# Patient Record
Sex: MALE | Race: WHITE | NOT HISPANIC OR LATINO | Employment: OTHER | URBAN - METROPOLITAN AREA
[De-identification: names, ages, dates, MRNs, and addresses within clinical notes are randomized per-mention and may not be internally consistent; named-entity substitution may affect disease eponyms.]

---

## 2017-03-24 ENCOUNTER — OFFICE VISIT (OUTPATIENT)
Dept: URGENT CARE | Facility: URGENT CARE | Age: 82
End: 2017-03-24
Payer: MEDICARE

## 2017-03-24 VITALS
WEIGHT: 175.9 LBS | HEART RATE: 90 BPM | OXYGEN SATURATION: 95 % | BODY MASS INDEX: 25.18 KG/M2 | TEMPERATURE: 98.7 F | SYSTOLIC BLOOD PRESSURE: 118 MMHG | HEIGHT: 70 IN | DIASTOLIC BLOOD PRESSURE: 72 MMHG

## 2017-03-24 DIAGNOSIS — R05.9 COUGH: ICD-10-CM

## 2017-03-24 DIAGNOSIS — R52 BODY ACHES: Primary | ICD-10-CM

## 2017-03-24 DIAGNOSIS — J11.1 INFLUENZA-LIKE ILLNESS: ICD-10-CM

## 2017-03-24 LAB
FLUAV+FLUBV AG SPEC QL: NEGATIVE
FLUAV+FLUBV AG SPEC QL: NORMAL
SPECIMEN SOURCE: NORMAL

## 2017-03-24 PROCEDURE — 99214 OFFICE O/P EST MOD 30 MIN: CPT | Performed by: PHYSICIAN ASSISTANT

## 2017-03-24 PROCEDURE — 87804 INFLUENZA ASSAY W/OPTIC: CPT | Performed by: PHYSICIAN ASSISTANT

## 2017-03-24 RX ORDER — OSELTAMIVIR PHOSPHATE 75 MG/1
75 CAPSULE ORAL 2 TIMES DAILY
Qty: 10 CAPSULE | Refills: 0 | Status: SHIPPED | OUTPATIENT
Start: 2017-03-24 | End: 2019-10-15

## 2017-03-24 RX ORDER — CODEINE PHOSPHATE AND GUAIFENESIN 10; 100 MG/5ML; MG/5ML
5-10 SOLUTION ORAL
Qty: 120 ML | Refills: 0 | Status: SHIPPED | OUTPATIENT
Start: 2017-03-24 | End: 2019-10-15

## 2017-03-24 RX ORDER — BENZONATATE 200 MG/1
200 CAPSULE ORAL 3 TIMES DAILY PRN
Qty: 21 CAPSULE | Refills: 0 | Status: SHIPPED | OUTPATIENT
Start: 2017-03-24 | End: 2019-10-15

## 2017-03-24 NOTE — PROGRESS NOTES
"SUBJECTIVE:   Santos Marti is a 85 year old male presenting with a chief complaint of fever, coughing, body aches.  Onset of symptoms was 1 day(s) ago.  Course of illness is same.    Severity moderate  Current and Associated symptoms: fever, nasal congestion, rhinorrhea, cough  and myalgias  Treatment measures tried include None tried.  Predisposing factors include GF with influenza .    PMH:  HTN     Allergies   Allergen Reactions     Sulfa Drugs Rash         Social History   Substance Use Topics     Smoking status: Former Smoker     Smokeless tobacco: Never Used      Comment: quit 1980's     Alcohol use Not on file       ROS:  CONSTITUTIONAL:POSITIVE  for chills and fever  INTEGUMENTARY/SKIN: NEGATIVE for worrisome rashes, moles or lesions  EYES: NEGATIVE for vision changes or irritation  RESP:POSITIVE for cough-non productive  CV: NEGATIVE for chest pain, palpitations or peripheral edema  GI: NEGATIVE for nausea, abdominal pain, heartburn, or change in bowel habits  MUSCULOSKELETAL: POSITIVE  for body aches  NEURO: NEGATIVE for weakness, dizziness or paresthesias    OBJECTIVE  :/72  Pulse 90  Temp 98.7  F (37.1  C) (Oral)  Ht 5' 10\" (1.778 m)  Wt 175 lb 14.4 oz (79.8 kg)  SpO2 95%  BMI 25.24 kg/m2  GENERAL APPEARANCE: healthy, alert and no distress  EYES: EOMI,  PERRL, conjunctiva clear  HENT: TM's normal bilaterally and rhinorrhea   NECK: supple, nontender, no lymphadenopathy  RESP: lungs clear to auscultation - no rales, rhonchi or wheezes  CV: regular rates and rhythm, normal S1 S2, no murmur noted  ABDOMEN:  soft, nontender, no HSM or masses and bowel sounds normal  NEURO: Normal strength and tone, sensory exam grossly normal,  normal speech and mentation  SKIN: no suspicious lesions or rashes    Results for orders placed or performed in visit on 03/24/17   Influenza A/B antigen   Result Value Ref Range    Influenza A/B Agn Specimen Nasopharyngeal     Influenza A Negative NEG    Influenza " B  NEG     Negative   Test results must be correlated with clinical data. If necessary, results   should be confirmed by a molecular assay or viral culture.         ASSESSMENT/PLAN:      ICD-10-CM    1. Body aches R52 Influenza A/B antigen   2. Influenza-like illness R69 oseltamivir (TAMIFLU) 75 MG capsule   3. Cough R05 benzonatate (TESSALON) 200 MG capsule     guaiFENesin-codeine (ROBITUSSIN AC) 100-10 MG/5ML SOLN solution       Patient given information about influenza.  Patient understands they are contagious until their fever has resolved without the use of motrin or tylenol.  At that time they can return to school/work.  Patient is to monitor for any worsening symptoms and return to the clinic if this occurs.  The most common complication of influenza is Pneumonia or other respiratory problems especially in those with underlying lung problems including asthma and COPD.  Patient will follow up if this occurs.

## 2017-03-24 NOTE — NURSING NOTE
"Chief Complaint   Patient presents with     Flu     cough, hedaches, body aches, fever 2x days        Initial /72  Pulse 90  Temp 98.7  F (37.1  C) (Oral)  Ht 5' 10\" (1.778 m)  Wt 175 lb 14.4 oz (79.8 kg)  SpO2 95%  BMI 25.24 kg/m2 Estimated body mass index is 25.24 kg/(m^2) as calculated from the following:    Height as of this encounter: 5' 10\" (1.778 m).    Weight as of this encounter: 175 lb 14.4 oz (79.8 kg).  Medication Reconciliation: complete    "

## 2017-03-24 NOTE — MR AVS SNAPSHOT
"              After Visit Summary   3/24/2017    Santos Marti    MRN: [id number]           Patient Information     Date Of Birth          1931        Visit Information        Provider Department      3/24/2017 10:45 AM Piotr Montaño PA-C [facility]        Today's Diagnoses     Body aches    -  1    Influenza-like illness        Cough           Follow-ups after your visit        Who to contact     If you have questions or need follow up information about today's clinic visit or your schedule please contact [facility] directly at [phone].  Normal or non-critical lab and imaging results will be communicated to you by MyChart, letter or phone within 4 business days after the clinic has received the results. If you do not hear from us within 7 days, please contact the clinic through [facility] or phone. If you have a critical or abnormal lab result, we will notify you by phone as soon as possible.  Submit refill requests through [facility] or call your pharmacy and they will forward the refill request to us. Please allow 3 business days for your refill to be completed.          Additional Information About Your Visit        MyChart Information     [facility] lets you send messages to your doctor, view your test results, renew your prescriptions, schedule appointments and more. To sign up, go to [email or web address] . Click on \"Log in\" on the left side of the screen, which will take you to the Welcome page. Then click on \"Sign up Now\" on the right side of the page.     You will be asked to enter the access code listed below, as well as some personal information. Please follow the directions to create your username and password.     Your access code is: [email or web address]  Expires: 2017 11:53 AM     Your access code will  in 90 days. If you need help or a new code, please call your [facility] clinic or [phone].        Care EveryWhere ID     " "This is your Care EveryWhere ID. This could be used by other organizations to access your [facility] medical records  [id number]        Your Vitals Were     Pulse Temperature Height Pulse Oximetry BMI (Body Mass Index)       90 98.7  F (37.1  C) (Oral) 5' 10\" (1.778 m) 95% 25.24 kg/m2        Blood Pressure from Last 3 Encounters:   03/24/17 118/72   04/16/15 137/87   03/05/08 128/80    Weight from Last 3 Encounters:   03/24/17 175 lb 14.4 oz (79.8 kg)   04/16/15 172 lb (78 kg)   03/05/08 173 lb 4.8 oz (78.6 kg)              We Performed the Following     Influenza A/B antigen          Today's Medication Changes          These changes are accurate as of: 3/24/17 11:53 AM.  If you have any questions, ask your nurse or doctor.               Start taking these medicines.        Dose/Directions    benzonatate 200 MG capsule   Commonly known as:  TESSALON   Used for:  Cough   Started by:  Piotr Montaño PA-C        Dose:  200 mg   Take 1 capsule (200 mg) by mouth 3 times daily as needed for cough   Quantity:  21 capsule   Refills:  0       guaiFENesin-codeine 100-10 MG/5ML Soln solution   Commonly known as:  ROBITUSSIN AC   Used for:  Cough   Started by:  Piotr Montaño PA-C        Dose:  5-10 mL   Take 5-10 mLs by mouth nightly as needed for cough   Quantity:  120 mL   Refills:  0       oseltamivir 75 MG capsule   Commonly known as:  TAMIFLU   Used for:  Influenza-like illness   Started by:  Piotr Montaño PA-C        Dose:  75 mg   Take 1 capsule (75 mg) by mouth 2 times daily   Quantity:  10 capsule   Refills:  0            Where to get your medicines      These medications were sent to [facility] [address] [address]     Phone:  [phone]     benzonatate 200 MG capsule    oseltamivir 75 MG capsule         Some of these will need a paper prescription and others can be bought over the counter.  Ask your nurse if you have questions.     " Bring a paper prescription for each of these medications     guaiFENesin-codeine 100-10 MG/5ML Soln solution                Primary Care Provider    None Specified       No primary provider on file.        Thank you!     Thank you for choosing [facility]  for your care. Our goal is always to provide you with excellent care. Hearing back from our patients is one way we can continue to improve our services. Please take a few minutes to complete the written survey that you may receive in the mail after your visit with us. Thank you!             Your Updated Medication List - Protect others around you: Learn how to safely use, store and throw away your medicines at www.disposemymeds.org.          This list is accurate as of: 3/24/17 11:53 AM.  Always use your most recent med list.                   Brand Name Dispense Instructions for use    ACIPHEX 20 MG EC tablet   Generic drug:  RABEprazole      1 TABLET EVERY MORNING       amLODIPine 10 MG tablet    NORVASC     1 TABLET DAILY       aspirin 81 MG tablet      Take 81 mg by mouth daily       benzonatate 200 MG capsule    TESSALON    21 capsule    Take 1 capsule (200 mg) by mouth 3 times daily as needed for cough       GLUCOSAMINE CHONDROITIN COMPLX PO      Take 2 tablets by mouth daily       guaiFENesin-codeine 100-10 MG/5ML Soln solution    ROBITUSSIN AC    120 mL    Take 5-10 mLs by mouth nightly as needed for cough       hydrochlorothiazide 25 MG tablet    HYDRODIURIL     1 TABLET EVERY MORNING       MULTIVITAMIN PO      Take 1 tablet by mouth daily       omeprazole 20 MG tablet      Take 20 mg by mouth daily       oseltamivir 75 MG capsule    TAMIFLU    10 capsule    Take 1 capsule (75 mg) by mouth 2 times daily       TRIAMTERENE PO      25 mg caps - 1 CAPSULE EVERY MORNING       valACYclovir 1000 mg tablet    VALTREX    21 tablet    Take 1 tablet (1,000 mg) by mouth 3 times daily for 7 days

## 2019-09-24 DIAGNOSIS — R31.9 HEMATURIA: Primary | ICD-10-CM

## 2019-10-04 ENCOUNTER — OFFICE VISIT (OUTPATIENT)
Dept: UROLOGY | Facility: CLINIC | Age: 84
End: 2019-10-04
Payer: MEDICARE

## 2019-10-04 VITALS
WEIGHT: 156 LBS | BODY MASS INDEX: 23.11 KG/M2 | SYSTOLIC BLOOD PRESSURE: 120 MMHG | OXYGEN SATURATION: 96 % | HEIGHT: 69 IN | HEART RATE: 80 BPM | DIASTOLIC BLOOD PRESSURE: 60 MMHG

## 2019-10-04 DIAGNOSIS — R31.0 GROSS HEMATURIA: Primary | ICD-10-CM

## 2019-10-04 PROCEDURE — 99202 OFFICE O/P NEW SF 15 MIN: CPT | Performed by: UROLOGY

## 2019-10-04 RX ORDER — ISOSORBIDE MONONITRATE 30 MG/1
TABLET, EXTENDED RELEASE ORAL
Refills: 0 | COMMUNITY
Start: 2019-07-26

## 2019-10-04 RX ORDER — LISINOPRIL AND HYDROCHLOROTHIAZIDE 20; 25 MG/1; MG/1
1 TABLET ORAL
COMMUNITY

## 2019-10-04 RX ORDER — METOPROLOL SUCCINATE 50 MG/1
75 TABLET, EXTENDED RELEASE ORAL
COMMUNITY
Start: 2019-07-29 | End: 2019-10-15

## 2019-10-04 ASSESSMENT — MIFFLIN-ST. JEOR: SCORE: 1367.99

## 2019-10-04 NOTE — NURSING NOTE
"Chief Complaint   Patient presents with     Blood in Urine     Patient seen by Dr Costello some year ago Patient here today because seen blood in Urine about 1 month       Blood pressure 120/60, pulse 80, height 1.753 m (5' 9\"), weight 70.8 kg (156 lb), SpO2 96 %. Body mass index is 23.04 kg/m .    There is no problem list on file for this patient.      Allergies   Allergen Reactions     Sulfa Drugs Rash       Current Outpatient Medications   Medication Sig Dispense Refill     ACIPHEX 20 MG OR TBEC 1 TABLET EVERY MORNING       apixaban ANTICOAGULANT (ELIQUIS) 5 MG tablet Take 5 mg by mouth       isosorbide mononitrate (IMDUR) 30 MG 24 hr tablet TK 1 T PO ONCE D  0     lisinopril-hydrochlorothiazide (PRINZIDE/ZESTORETIC) 20-25 MG tablet Take 1 tablet by mouth       metFORMIN (GLUCOPHAGE) 1000 MG tablet Take 500 mg by mouth       metoprolol succinate ER (TOPROL-XL) 50 MG 24 hr tablet Take 75 mg by mouth       omeprazole 20 MG tablet Take 20 mg by mouth daily       AMLODIPINE BESYLATE 10 MG OR TABS 1 TABLET DAILY       aspirin 81 MG tablet Take 81 mg by mouth daily       benzonatate (TESSALON) 200 MG capsule Take 1 capsule (200 mg) by mouth 3 times daily as needed for cough (Patient not taking: Reported on 10/4/2019) 21 capsule 0     GLUCOSAMINE CHONDROITIN COMPLX PO Take 2 tablets by mouth daily       guaiFENesin-codeine (ROBITUSSIN AC) 100-10 MG/5ML SOLN solution Take 5-10 mLs by mouth nightly as needed for cough (Patient not taking: Reported on 10/4/2019) 120 mL 0     HYDROCHLOROTHIAZIDE 25 MG OR TABS 1 TABLET EVERY MORNING       Multiple Vitamins-Minerals (MULTIVITAMIN PO) Take 1 tablet by mouth daily       oseltamivir (TAMIFLU) 75 MG capsule Take 1 capsule (75 mg) by mouth 2 times daily (Patient not taking: Reported on 10/4/2019) 10 capsule 0     TRIAMTERENE OR 25 mg caps - 1 CAPSULE EVERY MORNING       valACYclovir (VALTREX) 1000 mg tablet Take 1 tablet (1,000 mg) by mouth 3 times daily for 7 days 21 tablet 0 "       Social History     Tobacco Use     Smoking status: Former Smoker     Packs/day: 0.00     Smokeless tobacco: Never Used     Tobacco comment: quit 1980's   Substance Use Topics     Alcohol use: None     Drug use: None       ANGELIQUE Da Silva  10/4/2019

## 2019-10-04 NOTE — LETTER
10/4/2019       RE: Santos Marti  [address]  [facility] [address]     Dear Colleague,    Thank you for referring your patient, Santos Marti, to the [facility]OLOG[facility] [place] [facility]. Please see a copy of my visit note below.    Santos Marti is a pleasant 88-year-old male who was last seen by me 4-1/2 years ago.  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  He is now on Eliquis because of atrial fibrillation and a deep vein thrombosis after shoulder surgery.  He has had gross hematuria one time recently that was quite heavy and then resolved.  He is having no dysuria currently and his urinary stream is variable.  He dribbles sometimes and occasionally has urgency- he was told to do Kegel exercises on a regular basis and use a Nigel contraction to inhibit the urgency 4-1/2 years ago.  Unfortunately he never did these exercises.  Other past medical history: Former smoker.  Shoulder replacement surgeries, heart disease, type 2 diabetes, hypertension  Medications: AcipHex, Eliquis, imdur, lisinopril/hydrochlorothiazide, metformin, metoprolol, omeprazole allergies: Sulfa  Review of systems: As above, hearing loss.  [facility]  Exam: Alert and oriented, normal appearance, normal vital signs.  With spouse.  Normal respirations, neuro grossly intact assessment:  [facility], [facility] scan with and without IV contrast, office cystoscopy, urine FISH test or cytology, rectal exam and genital exam on return    Again, thank you for allowing me to participate in the care of your patient.      Sincerely,    Maximilian Costello MD

## 2019-10-04 NOTE — PROGRESS NOTES
Santos Marti is a pleasant 88-year-old male who was last seen by me 4-1/2 years ago.  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  He is now on Eliquis because of atrial fibrillation and a deep vein thrombosis after shoulder surgery.  He has had gross hematuria one time recently that was quite heavy and then resolved.  He is having no dysuria currently and his urinary stream is variable.  He dribbles sometimes and occasionally has urgency- he was told to do Kegel exercises on a regular basis and use a Nigel contraction to inhibit the urgency 4-1/2 years ago.  Unfortunately he never did these exercises.  Other past medical history: Former smoker.  Shoulder replacement surgeries, heart disease, type 2 diabetes, hypertension  Medications: AcipHex, Eliquis, imdur, lisinopril/hydrochlorothiazide, metformin, metoprolol, omeprazole allergies: Sulfa  Review of systems: As above, hearing loss.  [facility]  Exam: Alert and oriented, normal appearance, normal vital signs.  With spouse.  Normal respirations, neuro grossly intact assessment:  [facility], [facility] scan with and without IV contrast, office cystoscopy, urine FISH test or cytology, rectal exam and genital exam on return

## 2019-10-08 DIAGNOSIS — Z87.898 HISTORY OF GROSS HEMATURIA: Primary | ICD-10-CM

## 2019-10-09 ENCOUNTER — HOSPITAL ENCOUNTER (OUTPATIENT)
Dept: CT IMAGING | Facility: CLINIC | Age: 84
Discharge: HOME | End: 2019-10-09
Attending: UROLOGY | Admitting: UROLOGY
Payer: MEDICARE

## 2019-10-09 ENCOUNTER — OFFICE VISIT (OUTPATIENT)
Dept: UROLOGY | Facility: CLINIC | Age: 84
End: 2019-10-09
Payer: MEDICARE

## 2019-10-09 VITALS
DIASTOLIC BLOOD PRESSURE: 60 MMHG | BODY MASS INDEX: 22.19 KG/M2 | HEART RATE: 66 BPM | SYSTOLIC BLOOD PRESSURE: 112 MMHG | WEIGHT: 155 LBS | OXYGEN SATURATION: 94 % | HEIGHT: 70 IN

## 2019-10-09 DIAGNOSIS — Z87.898 HISTORY OF GROSS HEMATURIA: ICD-10-CM

## 2019-10-09 DIAGNOSIS — R31.0 GROSS HEMATURIA: ICD-10-CM

## 2019-10-09 LAB
ALBUMIN UR-MCNC: NEGATIVE MG/DL
APPEARANCE UR: CLEAR
BILIRUB UR QL STRIP: NEGATIVE
COLOR UR AUTO: YELLOW
CREAT BLD-MCNC: 1.2 MG/DL (ref 0.66–1.25)
GFR SERPL CREATININE-BSD FRML MDRD: 57 ML/MIN/{1.73_M2}
GLUCOSE UR STRIP-MCNC: NEGATIVE MG/DL
HGB UR QL STRIP: NEGATIVE
KETONES UR STRIP-MCNC: NEGATIVE MG/DL
LEUKOCYTE ESTERASE UR QL STRIP: NEGATIVE
NITRATE UR QL: NEGATIVE
PH UR STRIP: 5 PH (ref 5–7)
SOURCE: NORMAL
SP GR UR STRIP: <=1.005 (ref 1–1.03)
UROBILINOGEN UR STRIP-ACNC: 1 EU/DL (ref 0.2–1)

## 2019-10-09 PROCEDURE — 25000125 ZZHC RX 250: Performed by: UROLOGY

## 2019-10-09 PROCEDURE — 74178 CT ABD&PLV WO CNTR FLWD CNTR: CPT

## 2019-10-09 PROCEDURE — 52000 CYSTOURETHROSCOPY: CPT | Performed by: UROLOGY

## 2019-10-09 PROCEDURE — 25000128 H RX IP 250 OP 636: Performed by: UROLOGY

## 2019-10-09 PROCEDURE — 81003 URINALYSIS AUTO W/O SCOPE: CPT | Performed by: UROLOGY

## 2019-10-09 PROCEDURE — 82565 ASSAY OF CREATININE: CPT

## 2019-10-09 RX ORDER — CIPROFLOXACIN 500 MG/1
500 TABLET, FILM COATED ORAL ONCE
Qty: 1 TABLET | Refills: 0 | Status: SHIPPED | OUTPATIENT
Start: 2019-10-09 | End: 2019-10-15

## 2019-10-09 RX ORDER — CEFAZOLIN SODIUM 1 G
1 VIAL (EA) INJECTION SEE ADMIN INSTRUCTIONS
Status: CANCELLED | OUTPATIENT
Start: 2019-10-09

## 2019-10-09 RX ORDER — IOPAMIDOL 755 MG/ML
100 INJECTION, SOLUTION INTRAVASCULAR ONCE
Status: COMPLETED | OUTPATIENT
Start: 2019-10-09 | End: 2019-10-09

## 2019-10-09 RX ORDER — LIDOCAINE HYDROCHLORIDE 20 MG/ML
JELLY TOPICAL ONCE
Status: DISCONTINUED | OUTPATIENT
Start: 2019-10-09 | End: 2019-10-09 | Stop reason: HOSPADM

## 2019-10-09 RX ADMIN — IOPAMIDOL 100 ML: 755 INJECTION, SOLUTION INTRAVENOUS at 12:41

## 2019-10-09 RX ADMIN — SODIUM CHLORIDE 60 ML: 9 INJECTION, SOLUTION INTRAVENOUS at 12:41

## 2019-10-09 ASSESSMENT — MIFFLIN-ST. JEOR: SCORE: 1379.33

## 2019-10-09 ASSESSMENT — PAIN SCALES - GENERAL: PAINLEVEL: NO PAIN (0)

## 2019-10-09 NOTE — NURSING NOTE
"Chief Complaint   Patient presents with     Gross Hematuria     Patient here today for Cystoscopy and to go over his CT result       Blood pressure 112/60, pulse 66, height 1.778 m (5' 10\"), weight 70.3 kg (155 lb), SpO2 94 %. Body mass index is 22.24 kg/m .    Patient Active Problem List   Diagnosis     History of gross hematuria       Allergies   Allergen Reactions     Sulfa Drugs Rash       Current Outpatient Medications   Medication Sig Dispense Refill     ACIPHEX 20 MG OR TBEC 1 TABLET EVERY MORNING       AMLODIPINE BESYLATE 10 MG OR TABS 1 TABLET DAILY       apixaban ANTICOAGULANT (ELIQUIS) 5 MG tablet Take 5 mg by mouth       aspirin 81 MG tablet Take 81 mg by mouth daily       benzonatate (TESSALON) 200 MG capsule Take 1 capsule (200 mg) by mouth 3 times daily as needed for cough 21 capsule 0     ciprofloxacin (CIPRO) 500 MG tablet Take 1 tablet (500 mg) by mouth once for 1 dose 1 tablet 0     GLUCOSAMINE CHONDROITIN COMPLX PO Take 2 tablets by mouth daily       guaiFENesin-codeine (ROBITUSSIN AC) 100-10 MG/5ML SOLN solution Take 5-10 mLs by mouth nightly as needed for cough 120 mL 0     HYDROCHLOROTHIAZIDE 25 MG OR TABS 1 TABLET EVERY MORNING       isosorbide mononitrate (IMDUR) 30 MG 24 hr tablet TK 1 T PO ONCE D  0     lisinopril-hydrochlorothiazide (PRINZIDE/ZESTORETIC) 20-25 MG tablet Take 1 tablet by mouth       metFORMIN (GLUCOPHAGE) 1000 MG tablet Take 500 mg by mouth       metoprolol succinate ER (TOPROL-XL) 50 MG 24 hr tablet Take 75 mg by mouth       Multiple Vitamins-Minerals (MULTIVITAMIN PO) Take 1 tablet by mouth daily       omeprazole 20 MG tablet Take 20 mg by mouth daily       oseltamivir (TAMIFLU) 75 MG capsule Take 1 capsule (75 mg) by mouth 2 times daily 10 capsule 0     TRIAMTERENE OR 25 mg caps - 1 CAPSULE EVERY MORNING       valACYclovir (VALTREX) 1000 mg tablet Take 1 tablet (1,000 mg) by mouth 3 times daily for 7 days 21 tablet 0       Social History     Tobacco Use     Smoking " status: Former Smoker     Packs/day: 0.00     Smokeless tobacco: Never Used     Tobacco comment: quit 1980's   Substance Use Topics     Alcohol use: None     Drug use: None     UA RESULTS:  Recent Labs   Lab Test 10/09/19  1321   COLOR Yellow   APPEARANCE Clear   URINEGLC Negative   URINEBILI Negative   URINEKETONE Negative   SG <=1.005   UBLD Negative   URINEPH 5.0   PROTEIN Negative   UROBILINOGEN 1.0   NITRITE Negative   LEUKEST Negative     Prior to the start of the procedure and with procedural staff participation, I verbally confirmed the patient s identity using two indicators, relevant allergies, that the procedure was appropriate and matched the consent or emergent situation, and that the correct equipment/implants were available. Immediately prior to starting the procedure I conducted the Time Out with the procedural staff and re-confirmed the patient s name, procedure, and site/side. I have wiped the patient off with the povidone-Iodine solution, draped them,  used Lidocaine hydrochloride jelly, and instilled sterile water into the bladder. (The Joint Commission universal protocol was followed.)  Yes    Sedation (Moderate or Deep): None    5mL 2% lidocaine hydrochloride Urojet instilled into urethra.    NDC# [address]-54  Lot #:[id number]  Expiration Date:  06/21        Kirti Hart  CHRISFARHAT  10/9/2019

## 2019-10-09 NOTE — LETTER
10/9/2019       RE: Santos Marti  [address]  [facility] [address]     Dear Colleague,    Thank you for referring your patient, Sanots Marti, to the [facility]OLOG[facility] CL[facility] [place] at [facility]. Please see a copy of my visit note below.    Santos is an 88-year-old male with gross hematuria.  He returns for office cystoscopy.  His CT scan shows no renal stones and no renal mass.  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  Other past medical history, medications and allergies reviewed reviewed from last week  Flexible cystoscopy-normal urethra, radiated prosthetic fossa without obstruction.  Several papillary tumors in the bladder consistent with superficial transitional cell carcinoma.  Ureteral orifices normal.  Some radiation changes, not as severe as an prostate.  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  Schedule for outpatient video cystoscopy and cup biopsies of bladder tumor with fulguration.  Stop Eliquis 3 days prior and resume postop day 1.  All discussed with patient    Again, thank you for allowing me to participate in the care of your patient.      Sincerely,    Maximilian Costello MD

## 2019-10-09 NOTE — PATIENT INSTRUCTIONS
"                        AFTER YOUR CYSTOSCOPY  ?  ?  You have just completed a cystoscopy, or \"cysto\", which allowed your physician to learn more about your bladder (or to remove a stent placed after surgery). We suggest that you continue to avoid caffeine, fruit juice, and alcohol for the next 24 hours, however, you are encouraged to return to your normal activities.  ?  ?  A few things that are considered normal after your cystoscopy:  ?  * small amount of bleeding (or spotting) that clears within the next 24 hours  ?  * slight burning sensation with urination  ?  * sensation of needing to void (urinate) more frequently  ?  * the feeling of \"air\" in your urine  ?  * mild discomfort that is relieved with Tylenol    * bladder spasms  ?  ?  ?  Please contact our office promptly if you:  ?  * develop a fever above 101 degrees  ?  * are unable to urinate  ?  * develop bright red blood that does not stop  ?  * experience severe pain or swelling  ?  ?  ?  And of course, please contact our office with any concerns or questions [phone]  ?      "

## 2019-10-09 NOTE — PROGRESS NOTES
Santos is an 88-year-old male with gross hematuria.  He returns for office cystoscopy.  His CT scan shows no renal stones and no renal mass.  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  Other past medical history, medications and allergies reviewed reviewed from last week  Flexible cystoscopy-normal urethra, radiated prosthetic fossa without obstruction.  Several papillary tumors in the bladder consistent with superficial transitional cell carcinoma.  Ureteral orifices normal.  Some radiation changes, not as severe as an prostate.  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  Schedule for outpatient video cystoscopy and cup biopsies of bladder tumor with fulguration.  Stop Eliquis 3 days prior and resume postop day 1.  All discussed with patient

## 2019-10-10 ENCOUNTER — HOSPITAL ENCOUNTER (EMERGENCY)
Facility: CLINIC | Age: 84
Discharge: HOME | End: 2019-10-10
Attending: EMERGENCY MEDICINE | Admitting: EMERGENCY MEDICINE
Payer: MEDICARE

## 2019-10-10 VITALS
HEIGHT: 70 IN | OXYGEN SATURATION: 95 % | SYSTOLIC BLOOD PRESSURE: 158 MMHG | BODY MASS INDEX: 22.19 KG/M2 | TEMPERATURE: 97.4 F | WEIGHT: 155 LBS | DIASTOLIC BLOOD PRESSURE: 90 MMHG | RESPIRATION RATE: 15 BRPM | HEART RATE: 59 BPM

## 2019-10-10 DIAGNOSIS — R31.9 HEMATURIA, UNSPECIFIED TYPE: ICD-10-CM

## 2019-10-10 DIAGNOSIS — R33.9 URINARY RETENTION WITH INCOMPLETE BLADDER EMPTYING: ICD-10-CM

## 2019-10-10 PROCEDURE — 99283 EMERGENCY DEPT VISIT LOW MDM: CPT

## 2019-10-10 PROCEDURE — 25000125 ZZHC RX 250: Performed by: EMERGENCY MEDICINE

## 2019-10-10 PROCEDURE — 51702 INSERT TEMP BLADDER CATH: CPT

## 2019-10-10 RX ORDER — LIDOCAINE HYDROCHLORIDE 20 MG/ML
1 JELLY TOPICAL ONCE
Status: COMPLETED | OUTPATIENT
Start: 2019-10-10 | End: 2019-10-10

## 2019-10-10 RX ADMIN — LIDOCAINE HYDROCHLORIDE 1 TUBE: 20 JELLY TOPICAL at 02:36

## 2019-10-10 ASSESSMENT — ENCOUNTER SYMPTOMS
DIFFICULTY URINATING: 1
ABDOMINAL DISTENTION: 1

## 2019-10-10 ASSESSMENT — MIFFLIN-ST. JEOR: SCORE: 1379.33

## 2019-10-10 NOTE — ED PROVIDER NOTES
"History     Chief Complaint:  Urinary Retention (Pt had cystoscopy earlier today and found to have tumor in his bladder. Pt has been unable to void for several hours. Reports clots prior to inability to void.)     JANICE  Santos Marti is a 88 year old male who taking Eliquis presents to the emergency department today with urinary retention. The patient states that he had a cystoscopy yesterday and was found to have a small tumor in his bladder that is bleeding. He states he is having urinary retention with his last time urinating at 1830 this evening which was nonproductive with blood clots oozing out. He states he had one incident of hematemesis about a month ago, which has lead to the subsequent cystoscopy, biopsy and cauterization. He states he is scheduled for a biopsy and cauterization on October 22nd.     Allergies:  Sulfa Drugs     Medications:    Aciphex  Amlodipine  Eliquis  Baby Aspirin  Tessalon  Glucosamine  Robitussin ac  Hydrochlorothiazide  Imdur  Lisinopril  Metformin  Tamiflu  Valtrex  Omeprazole    Past Medical History:    RBBB  Type 2 diabetes  GERD  Osteoarthritis  Hypertension  Carcinoma of prostate    Past Surgical History:    Cystoscopy  Hernia repair  Prostate surgery  Tonsillectomy  Total hip arthroplasty    Family History:    Father - diabetes, bone cancer    Social History:  The patient was accompanied to the ED by his spouse.  Smoking Status: Former  Smokeless Tobacco: Never  Alcohol Use: Yes   Drug Use: No   PCP: Nehemias Gotti   Marital Status:       Review of Systems   Gastrointestinal: Positive for abdominal distention.   Genitourinary: Positive for difficulty urinating.   All other systems reviewed and are negative.         Physical Exam     Patient Vitals for the past 24 hrs:   BP Temp Temp src Pulse Heart Rate Resp SpO2 Height Weight   10/10/19 0234 (!) 158/90 -- -- 59 59 15 95 % -- --   10/10/19 0146 (!) 208/99 97.4  F (36.3  C) Oral -- 53 18 95 % 1.778 m (5' 10\") " 70.3 kg (155 lb)        Physical Exam  General: Patient is alert and normal appearing.  HEENT: Head atraumatic    Eyes: pupils equal and reactive. Conjunctiva clear   Nares: patent   Oropharynx: no lesions, uvula midline, no palatal draping, normal voice, no trismus  Neck: Supple without lymphadenopathy, no meningismus  Chest: Heart regular rate and rhythm.   Lungs: Equal clear to auscultation with no wheeze or rales  Abdomen: Soft, suprapubic fullness and tenderness, nondistended, normal bowel sounds  Back: No costovertebral angle tenderness, no midline C, T or L spine tenderness  Neuro: Grossly nonfocal, normal speech, strength equal bilaterally, CN 2-12 intact  Extremities: No deformities, equal radial and DP pulses. No clubbing, cyanosis.  No edema  Skin: Warm and dry with no rash.       Emergency Department Course     Interventions:  0236 Xylocaine 1 tube urethral     Emergency Department Course:  Nursing notes and vitals reviewed. (1:50 AM) I performed an exam of the patient as documented above.     Medicine administered as documented above.    0243 I rechecked the patient and discussed the results of their workup thus far.     Findings and plan explained to the Patient. Patient discharged home with instructions regarding supportive care, medications, and reasons to return. The importance of close follow-up was reviewed.     Impression & Plan       Medical Decision Making:  Santos Marti is a 88 year old male who presents for evaluation of abdominal pain and decreased urinary output.  I considered a broad differential including diverticulitis, aneurysm, urinary retention, ureterolithiasis, UTI, pyelonephritis, neurologic causes (MS, cauda equina,etc), colitis, etc.  The history and exam are consistent with acute urinary retention and this is confirmed after hunt catheter placement.  Patient had cystoscopy earlier today and was given Cipro after that.  Not start antibiotics at this time.  Patient was  irrigated to clear here in the emergency department.  Now draining clear urine.  He did have noted hematuria that likely was causing his retention.  Will send home with catheter and have patient followup with urology in 3-5 days.  Patient is stable for discharge home.          Diagnosis:    ICD-10-CM    1. Hematuria, unspecified type R31.9    2. Urinary retention with incomplete bladder emptying R33.9       Disposition:  Discharged to home.    Discharge Medications:  New Prescriptions    No medications on file      Scribe Disclosure:  Laz BURT, am serving as a scribe at 1:50 AM on 10/10/2019 to document services personally performed by Jazmin Singh MD based on my observations and the provider's statements to me.      10/10/2019    EMERGENCY DEPARTMENT       Jazmin Singh MD  10/10/19 0254

## 2019-10-10 NOTE — ED AVS SNAPSHOT
Emergency Department  [address] [place] [address]  Phone:  [phone]  Fax:  [phone]                                    Santos Marti   MRN: [id number]    Department:   Emergency Department   Date of Visit:  10/10/2019           After Visit Summary Signature Page    I have received my discharge instructions, and my questions have been answered. I have discussed any challenges I see with this plan with the nurse or doctor.    ..........................................................................................................................................  Patient/Patient Representative Signature      ..........................................................................................................................................  Patient Representative Print Name and Relationship to Patient    ..................................................               ................................................  Date                                   Time    ..........................................................................................................................................  Reviewed by Signature/Title    ...................................................              ..............................................  Date                                               Time          22EPIC Rev 08/18

## 2019-10-10 NOTE — ED TRIAGE NOTES
Pt had cystoscopy earlier today and found to have tumor in his bladder. Pt has been unable to void for several hours. Reports clots prior to inability to void.

## 2019-10-11 ENCOUNTER — OFFICE VISIT (OUTPATIENT)
Dept: UROLOGY | Facility: CLINIC | Age: 84
End: 2019-10-11
Payer: MEDICARE

## 2019-10-11 DIAGNOSIS — R33.9 URINARY RETENTION: Primary | ICD-10-CM

## 2019-10-11 PROCEDURE — 99211 OFF/OP EST MAY X REQ PHY/QHP: CPT

## 2019-10-11 NOTE — PROGRESS NOTES
Chief Complaint   Patient presents with     Gross Hematuria     Patient here today for Possible Cath Removal     Santos Marti comes into clinic today at the request of DR Singh the Emergency Department.Dr Costello is his Urology ReasonForVisit: Follow up after ED visit for Hunt Catheter placed  This service provided today was under the supervising provider of the day Dr Costello, who was available if needed.          Mr Boss had cystoscopy on 10/09/2019 with Dr Costello which a tumor was found  in his bladder. Mr Boss wasn't able  to void for several hours.He had to go into ED that day for a Hunt Catheter to be placed. He was told to follow up with Dr Costello.The patient came in today to have his cath removed.Which he was not able to because of  blood still visible in his leg bag. Per Dr Costello because of blood in his urine which is  still dark in color and visible as blood still in the bladder he would like to  leave the cath in until Monday 10/14/2019. Everything was explain to the patient. Mr. Marti was okay with Monday. Mr Boss had to have a Bladder irrigation which is  a procedure used to  flush sterile fluid through his hunt catheter which go into bladder to try to  prevent blood clots. Per Dr Costello please remove the hunt catheter if very little blood Is seen and he will see him on the day of his Surgery 10/22/2019.     Kirti Hart CMA

## 2019-10-11 NOTE — NURSING NOTE
Chief Complaint   Patient presents with     Gross Hematuria     Patient here today for Possible Cath Removal     Santos Marti comes into clinic today at the request of DR Singh the Emergency Department.Dr Costello is his Urology ReasonForVisit: Follow up after ED visit for Hunt Catheter placed  This service provided today was under the supervising provider of the day Dr Costello, who was available if needed.          Mr Boss had cystoscopy on 10/09/2019 with Dr Costello which a tumor was found  in his bladder. Mr Boss wasn't able  to void for several hours.He had to go into ED that day for a Hunt Catheter to be placed. He was told to follow up with Dr Costello.The patient came in today to have his cath removed.Which he was not able to because of  blood still visible in his leg bag. Per Dr Costello because of blood in his urine which is  still dark in color and visible as blood still in the bladder he would like to  leave the cath in until Monday 10/14/2019. Everything was explain to the patient. Mr. Marti was okay with Monday. Mr Boss had to have a Bladder irrigation which is  a procedure used to  flush sterile fluid through his hunt catheter which go into bladder to try to  prevent blood clots. Per Dr Costello please remove the hunt catheter if very little blood Is seen and he will see him on the day of his Surgery 10/22/2019.

## 2019-10-14 ENCOUNTER — ALLIED HEALTH/NURSE VISIT (OUTPATIENT)
Dept: UROLOGY | Facility: CLINIC | Age: 84
End: 2019-10-14
Payer: MEDICARE

## 2019-10-14 DIAGNOSIS — R33.9 URINARY RETENTION: Primary | ICD-10-CM

## 2019-10-14 PROCEDURE — 99211 OFF/OP EST MAY X REQ PHY/QHP: CPT

## 2019-10-14 NOTE — PROGRESS NOTES
Patient presents to clinic for catheter removal. Patient's leg-bag appeared to have clear-yellow urine. Patient's catheter balloon was deflated and catheter was removed with ease. No complaints voiced by patient. Patient instructed to drink plenty of water. If unable to urinate during office hours, he should call our office. If unable to urinate during non-office hours patient was instructed to go to ER. Patient will follow-up for procedure as planned.     Santos Marti comes into clinic today at the request of Dr. Costello Ordering Provider for Catheter Removal.     This service provided today was under the supervising provider of the day Dr. Murphy, who was available if needed.    Skye Serrano LPN

## 2019-10-15 RX ORDER — METOPROLOL TARTRATE 50 MG
50 TABLET ORAL 3 TIMES DAILY
COMMUNITY
End: 2019-10-21

## 2019-10-21 RX ORDER — ACETAMINOPHEN 500 MG
500-1000 TABLET ORAL EVERY 6 HOURS PRN
COMMUNITY

## 2019-10-21 RX ORDER — METOPROLOL SUCCINATE 50 MG/1
50 TABLET, EXTENDED RELEASE ORAL DAILY
COMMUNITY

## 2019-10-21 RX ORDER — MULTIPLE VITAMINS W/ MINERALS TAB 9MG-400MCG
1 TAB ORAL DAILY
Status: ON HOLD | COMMUNITY
End: 2019-10-22

## 2019-10-22 ENCOUNTER — HOSPITAL ENCOUNTER (EMERGENCY)
Facility: CLINIC | Age: 84
Discharge: HOME | End: 2019-10-22
Attending: EMERGENCY MEDICINE | Admitting: EMERGENCY MEDICINE
Payer: MEDICARE

## 2019-10-22 ENCOUNTER — ANESTHESIA EVENT (OUTPATIENT)
Dept: SURGERY | Facility: CLINIC | Age: 84
End: 2019-10-22
Payer: MEDICARE

## 2019-10-22 ENCOUNTER — ANESTHESIA (OUTPATIENT)
Dept: SURGERY | Facility: CLINIC | Age: 84
End: 2019-10-22
Payer: MEDICARE

## 2019-10-22 ENCOUNTER — HOSPITAL ENCOUNTER (OUTPATIENT)
Facility: CLINIC | Age: 84
Discharge: HOME | End: 2019-10-22
Attending: UROLOGY | Admitting: UROLOGY
Payer: MEDICARE

## 2019-10-22 VITALS
HEART RATE: 74 BPM | SYSTOLIC BLOOD PRESSURE: 124 MMHG | DIASTOLIC BLOOD PRESSURE: 69 MMHG | RESPIRATION RATE: 18 BRPM | TEMPERATURE: 98 F | OXYGEN SATURATION: 97 %

## 2019-10-22 VITALS
TEMPERATURE: 98.1 F | OXYGEN SATURATION: 98 % | HEIGHT: 70 IN | DIASTOLIC BLOOD PRESSURE: 87 MMHG | RESPIRATION RATE: 12 BRPM | BODY MASS INDEX: 22.19 KG/M2 | WEIGHT: 155 LBS | SYSTOLIC BLOOD PRESSURE: 159 MMHG | HEART RATE: 62 BPM

## 2019-10-22 DIAGNOSIS — Z87.898 HISTORY OF GROSS HEMATURIA: ICD-10-CM

## 2019-10-22 DIAGNOSIS — R33.9 URINARY RETENTION: ICD-10-CM

## 2019-10-22 LAB
GLUCOSE BLDC GLUCOMTR-MCNC: 145 MG/DL (ref 70–99)
GLUCOSE BLDC GLUCOMTR-MCNC: 150 MG/DL (ref 70–99)

## 2019-10-22 PROCEDURE — 25000128 H RX IP 250 OP 636: Performed by: NURSE ANESTHETIST, CERTIFIED REGISTERED

## 2019-10-22 PROCEDURE — 25000128 H RX IP 250 OP 636: Performed by: UROLOGY

## 2019-10-22 PROCEDURE — 99284 EMERGENCY DEPT VISIT MOD MDM: CPT | Mod: 25

## 2019-10-22 PROCEDURE — 37000008 ZZH ANESTHESIA TECHNICAL FEE, 1ST 30 MIN: Performed by: UROLOGY

## 2019-10-22 PROCEDURE — 40000306 ZZH STATISTIC PRE PROC ASSESS II: Performed by: UROLOGY

## 2019-10-22 PROCEDURE — 25800025 ZZH RX 258: Performed by: UROLOGY

## 2019-10-22 PROCEDURE — 25000125 ZZHC RX 250: Performed by: NURSE ANESTHETIST, CERTIFIED REGISTERED

## 2019-10-22 PROCEDURE — 51798 US URINE CAPACITY MEASURE: CPT

## 2019-10-22 PROCEDURE — 71000012 ZZH RECOVERY PHASE 1 LEVEL 1 FIRST HR: Performed by: UROLOGY

## 2019-10-22 PROCEDURE — 36000050 ZZH SURGERY LEVEL 2 1ST 30 MIN: Performed by: UROLOGY

## 2019-10-22 PROCEDURE — 51702 INSERT TEMP BLADDER CATH: CPT

## 2019-10-22 PROCEDURE — 71000027 ZZH RECOVERY PHASE 2 EACH 15 MINS: Performed by: UROLOGY

## 2019-10-22 PROCEDURE — 36000052 ZZH SURGERY LEVEL 2 EA 15 ADDTL MIN: Performed by: UROLOGY

## 2019-10-22 PROCEDURE — 27210794 ZZH OR GENERAL SUPPLY STERILE: Performed by: UROLOGY

## 2019-10-22 PROCEDURE — 88305 TISSUE EXAM BY PATHOLOGIST: CPT | Mod: 26 | Performed by: UROLOGY

## 2019-10-22 PROCEDURE — 25800030 ZZH RX IP 258 OP 636: Performed by: ANESTHESIOLOGY

## 2019-10-22 PROCEDURE — 25000125 ZZHC RX 250: Performed by: ANESTHESIOLOGY

## 2019-10-22 PROCEDURE — 37000009 ZZH ANESTHESIA TECHNICAL FEE, EACH ADDTL 15 MIN: Performed by: UROLOGY

## 2019-10-22 PROCEDURE — 88305 TISSUE EXAM BY PATHOLOGIST: CPT | Performed by: UROLOGY

## 2019-10-22 PROCEDURE — 52224 CYSTOSCOPY AND TREATMENT: CPT | Performed by: UROLOGY

## 2019-10-22 PROCEDURE — 82962 GLUCOSE BLOOD TEST: CPT | Mod: 91

## 2019-10-22 RX ORDER — MEPERIDINE HYDROCHLORIDE 50 MG/ML
12.5 INJECTION INTRAMUSCULAR; INTRAVENOUS; SUBCUTANEOUS
Status: DISCONTINUED | OUTPATIENT
Start: 2019-10-22 | End: 2019-10-22 | Stop reason: HOSPADM

## 2019-10-22 RX ORDER — ONDANSETRON 4 MG/1
4 TABLET, ORALLY DISINTEGRATING ORAL EVERY 30 MIN PRN
Status: DISCONTINUED | OUTPATIENT
Start: 2019-10-22 | End: 2019-10-22 | Stop reason: HOSPADM

## 2019-10-22 RX ORDER — PHENYLEPHRINE HYDROCHLORIDE 10 MG/ML
INJECTION INTRAVENOUS PRN
Status: DISCONTINUED | OUTPATIENT
Start: 2019-10-22 | End: 2019-10-22

## 2019-10-22 RX ORDER — LIDOCAINE HYDROCHLORIDE 20 MG/ML
JELLY TOPICAL
Status: DISCONTINUED
Start: 2019-10-22 | End: 2019-10-23 | Stop reason: HOSPADM

## 2019-10-22 RX ORDER — LABETALOL 20 MG/4 ML (5 MG/ML) INTRAVENOUS SYRINGE
10
Status: DISCONTINUED | OUTPATIENT
Start: 2019-10-22 | End: 2019-10-22 | Stop reason: HOSPADM

## 2019-10-22 RX ORDER — HYDROMORPHONE HYDROCHLORIDE 1 MG/ML
.3-.5 INJECTION, SOLUTION INTRAMUSCULAR; INTRAVENOUS; SUBCUTANEOUS EVERY 10 MIN PRN
Status: DISCONTINUED | OUTPATIENT
Start: 2019-10-22 | End: 2019-10-22 | Stop reason: HOSPADM

## 2019-10-22 RX ORDER — GLYCOPYRROLATE 0.2 MG/ML
INJECTION, SOLUTION INTRAMUSCULAR; INTRAVENOUS PRN
Status: DISCONTINUED | OUTPATIENT
Start: 2019-10-22 | End: 2019-10-22

## 2019-10-22 RX ORDER — DIMENHYDRINATE 50 MG/ML
25 INJECTION, SOLUTION INTRAMUSCULAR; INTRAVENOUS
Status: DISCONTINUED | OUTPATIENT
Start: 2019-10-22 | End: 2019-10-22 | Stop reason: HOSPADM

## 2019-10-22 RX ORDER — ONDANSETRON 2 MG/ML
4 INJECTION INTRAMUSCULAR; INTRAVENOUS EVERY 30 MIN PRN
Status: DISCONTINUED | OUTPATIENT
Start: 2019-10-22 | End: 2019-10-22 | Stop reason: HOSPADM

## 2019-10-22 RX ORDER — SODIUM CHLORIDE, SODIUM LACTATE, POTASSIUM CHLORIDE, CALCIUM CHLORIDE 600; 310; 30; 20 MG/100ML; MG/100ML; MG/100ML; MG/100ML
INJECTION, SOLUTION INTRAVENOUS CONTINUOUS
Status: DISCONTINUED | OUTPATIENT
Start: 2019-10-22 | End: 2019-10-22 | Stop reason: HOSPADM

## 2019-10-22 RX ORDER — HYDRALAZINE HYDROCHLORIDE 20 MG/ML
2.5-5 INJECTION INTRAMUSCULAR; INTRAVENOUS EVERY 10 MIN PRN
Status: DISCONTINUED | OUTPATIENT
Start: 2019-10-22 | End: 2019-10-22 | Stop reason: HOSPADM

## 2019-10-22 RX ORDER — PROPOFOL 10 MG/ML
INJECTION, EMULSION INTRAVENOUS PRN
Status: DISCONTINUED | OUTPATIENT
Start: 2019-10-22 | End: 2019-10-22

## 2019-10-22 RX ORDER — FENTANYL CITRATE 50 UG/ML
25-50 INJECTION, SOLUTION INTRAMUSCULAR; INTRAVENOUS
Status: DISCONTINUED | OUTPATIENT
Start: 2019-10-22 | End: 2019-10-22 | Stop reason: HOSPADM

## 2019-10-22 RX ORDER — DEXAMETHASONE SODIUM PHOSPHATE 4 MG/ML
INJECTION, SOLUTION INTRA-ARTICULAR; INTRALESIONAL; INTRAMUSCULAR; INTRAVENOUS; SOFT TISSUE PRN
Status: DISCONTINUED | OUTPATIENT
Start: 2019-10-22 | End: 2019-10-22

## 2019-10-22 RX ORDER — LIDOCAINE 40 MG/G
CREAM TOPICAL
Status: DISCONTINUED | OUTPATIENT
Start: 2019-10-22 | End: 2019-10-22 | Stop reason: HOSPADM

## 2019-10-22 RX ORDER — FENTANYL CITRATE 50 UG/ML
INJECTION, SOLUTION INTRAMUSCULAR; INTRAVENOUS PRN
Status: DISCONTINUED | OUTPATIENT
Start: 2019-10-22 | End: 2019-10-22

## 2019-10-22 RX ORDER — NALOXONE HYDROCHLORIDE 0.4 MG/ML
.1-.4 INJECTION, SOLUTION INTRAMUSCULAR; INTRAVENOUS; SUBCUTANEOUS
Status: DISCONTINUED | OUTPATIENT
Start: 2019-10-22 | End: 2019-10-22 | Stop reason: HOSPADM

## 2019-10-22 RX ORDER — CEFAZOLIN SODIUM 2 G/100ML
2 INJECTION, SOLUTION INTRAVENOUS
Status: COMPLETED | OUTPATIENT
Start: 2019-10-22 | End: 2019-10-22

## 2019-10-22 RX ORDER — ONDANSETRON 2 MG/ML
INJECTION INTRAMUSCULAR; INTRAVENOUS PRN
Status: DISCONTINUED | OUTPATIENT
Start: 2019-10-22 | End: 2019-10-22

## 2019-10-22 RX ORDER — CIPROFLOXACIN 500 MG/1
500 TABLET, FILM COATED ORAL ONCE
Qty: 1 TABLET | Refills: 0 | Status: SHIPPED | OUTPATIENT
Start: 2019-10-22 | End: 2019-10-22

## 2019-10-22 RX ORDER — CEFAZOLIN SODIUM 1 G/3ML
1 INJECTION, POWDER, FOR SOLUTION INTRAMUSCULAR; INTRAVENOUS SEE ADMIN INSTRUCTIONS
Status: DISCONTINUED | OUTPATIENT
Start: 2019-10-22 | End: 2019-10-22 | Stop reason: HOSPADM

## 2019-10-22 RX ADMIN — LIDOCAINE HYDROCHLORIDE 50 MG: 10 INJECTION, SOLUTION EPIDURAL; INFILTRATION; INTRACAUDAL; PERINEURAL at 13:58

## 2019-10-22 RX ADMIN — FENTANYL CITRATE 100 MCG: 50 INJECTION, SOLUTION INTRAMUSCULAR; INTRAVENOUS at 13:58

## 2019-10-22 RX ADMIN — PROPOFOL 150 MG: 10 INJECTION, EMULSION INTRAVENOUS at 13:58

## 2019-10-22 RX ADMIN — GLYCOPYRROLATE 0.2 MG: 0.2 INJECTION, SOLUTION INTRAMUSCULAR; INTRAVENOUS at 13:58

## 2019-10-22 RX ADMIN — CEFAZOLIN SODIUM 2 G: 2 INJECTION, SOLUTION INTRAVENOUS at 13:56

## 2019-10-22 RX ADMIN — PHENYLEPHRINE HYDROCHLORIDE 100 MCG: 10 INJECTION INTRAVENOUS at 14:06

## 2019-10-22 RX ADMIN — PHENYLEPHRINE HYDROCHLORIDE 100 MCG: 10 INJECTION INTRAVENOUS at 14:09

## 2019-10-22 RX ADMIN — ONDANSETRON HYDROCHLORIDE 4 MG: 2 INJECTION, SOLUTION INTRAVENOUS at 13:58

## 2019-10-22 RX ADMIN — SODIUM CHLORIDE, POTASSIUM CHLORIDE, SODIUM LACTATE AND CALCIUM CHLORIDE: 600; 310; 30; 20 INJECTION, SOLUTION INTRAVENOUS at 13:56

## 2019-10-22 RX ADMIN — DEXAMETHASONE SODIUM PHOSPHATE 4 MG: 4 INJECTION, SOLUTION INTRA-ARTICULAR; INTRALESIONAL; INTRAMUSCULAR; INTRAVENOUS; SOFT TISSUE at 13:58

## 2019-10-22 ASSESSMENT — COPD QUESTIONNAIRES: COPD: 0

## 2019-10-22 ASSESSMENT — ENCOUNTER SYMPTOMS
DIFFICULTY URINATING: 1
STRIDOR: 0
SEIZURES: 0
HEMATURIA: 1
DYSRHYTHMIAS: 1

## 2019-10-22 ASSESSMENT — LIFESTYLE VARIABLES: TOBACCO_USE: 0

## 2019-10-22 ASSESSMENT — MIFFLIN-ST. JEOR: SCORE: 1379.33

## 2019-10-22 NOTE — OP NOTE
Procedure Date: 10/22/2019      PREOPERATIVE DIAGNOSIS:  Papillary bladder tumors, gross hematuria.      POSTOPERATIVE DIAGNOSIS:  Papillary bladder tumors, gross hematuria.      PROCEDURE:  Examination under anesthesia, video cystoscopy, panendoscopy, Aleisha urethral dilation, cup biopsy of bladder tumor and fulguration of bladder tumors.      SURGEON:  Maximilian Costello MD      ANESTHESIA:  General laryngeal mask.      ESTIMATED BLOOD LOSS:  Less than 5 mL.      INDICATIONS:  The patient is an 88-year-old male who underwent radiation for prostate cancer a  number of years ago and has had gross hematuria.  CT scan was unremarkable and office cystoscopy revealed several areas of papillary bladder tumor, consistent with a low-grade superficial TCC of the bladder.  He also had a narrowing of the urethra and the deep bulb.  The patient's PSA has remained low and there is no evidence of recurrent prostate cancer.  He now presents for bladder tumor biopsy and fulguration.  The procedure, the alternatives, risks and followup were carefully discussed.      DESCRIPTION OF THE PROCEDURE:  The patient received 2 grams of IV Ancef and was taken to cystoscopy and placed supine on the operating table.  After adequate general laryngeal mask anesthesia, the patient was placed in lithotomy position and his genitalia were prepped and draped in a sterile fashion.  A #22 Chinese Storz cystoscope with 30 degree lens and video were used to visualize the urethra and bladder, using water as an irrigant.  The pendulous and distal bulbous urethra were normal, but there was narrowing of the deep bulbous urethra that I could not pass with the cystoscope.  I passed the 20-Chinese through 24-Chinese Aleisha sounds and dilated this area easily.  I followed this with the cystoscope and passed through the membranous urethra and the prostatic urethra, which showed radiation changes but no tumors.  The bladder neck was open.  The bladder revealed 2  different areas of papillary tumor, 1 near the right ureteral orifice and that was cauterized with the Bugbee Bovie.  On the posterior wall, there was a carpet of papillary tumor about a nickel or larger in size and this was biopsied and sent in formalin to Pathology.  The biopsy site and the surrounding area was then fulgurated with good hemostasis.  Other small areas of papillary tumor in 2 or 3 spots were cauterized with the Bugbee Bovie.  Hemostasis was good.  The bladder was emptied and the cystoscope removed.  Rectal exam revealed no palpable prostate tissue and no rectal mass.  The patient went to the recovery room in stable condition and will be discharged to home this evening and will take Cipro 500 mg x1 dose in the morning.  He will resume his Eliquis tomorrow and resume his glucosamine/chondroitin and multivitamin on Thursday.         FIDEL BARRON JR, MD             D: 10/22/2019   T: 10/22/2019   MT: KB      Name:     TRACI HOUSE   MRN:      [id number]        Account:        [id number]   :      1931           Procedure Date: 10/22/2019      Document: [id number]       cc: Leonardo Barron Jr, MD

## 2019-10-22 NOTE — ED AVS SNAPSHOT
[facility] Emergency Department  [address]  [facility] [address]  Phone:  [phone]  Fax:  [phone]                                    Santos Marti   MRN: [id number]    Department:  [facility] Emergency Department   Date of Visit:  10/22/2019           After Visit Summary Signature Page    I have received my discharge instructions, and my questions have been answered. I have discussed any challenges I see with this plan with the nurse or doctor.    ..........................................................................................................................................  Patient/Patient Representative Signature      ..........................................................................................................................................  Patient Representative Print Name and Relationship to Patient    ..................................................               ................................................  Date                                   Time    ..........................................................................................................................................  Reviewed by Signature/Title    ...................................................              ..............................................  Date                                               Time          22EPIC Rev 08/18

## 2019-10-22 NOTE — ANESTHESIA CARE TRANSFER NOTE
Patient: Santos Marti    Procedure(s):  Cystoscopy with bladder biopsy    Diagnosis: History of gross hematuria [Z87.448]  Diagnosis Additional Information: No value filed.    Anesthesia Type:   General, LMA     Note:  Airway :Face Mask  Patient transferred to:PACU  Comments:   Spontaneous respirations, oral suctioned, bilateral eye opening and hand grasps.  Extubated to FM O2 6lpm.  VSS to PACU.Handoff Report: Identifed the Patient, Identified the Reponsible Provider, Reviewed the pertinent medical history, Discussed the surgical course, Reviewed Intra-OP anesthesia mangement and issues during anesthesia, Set expectations for post-procedure period and Allowed opportunity for questions and acknowledgement of understanding      Vitals: (Last set prior to Anesthesia Care Transfer)    CRNA VITALS  10/22/2019 1400 - 10/22/2019 1437      10/22/2019             NIBP:  126/66    Pulse:  62    SpO2:  99 %    EKG:  A Paced                Electronically Signed By: CHUCK Galvan CRNA  October 22, 2019  2:37 PM

## 2019-10-22 NOTE — ANESTHESIA PREPROCEDURE EVALUATION
Anesthesia Pre-Procedure Evaluation    Patient: Santos Marti   MRN: [id number] : 1931          Preoperative Diagnosis: History of gross hematuria [Z87.448]    Procedure(s):  Cystoscopy with bladder biopsy    Past Medical History:   Diagnosis Date     Arthritis      Complication of anesthesia      Diabetes (H)      Gastroesophageal reflux disease      Hypertension      Pacemaker      Past Surgical History:   Procedure Laterality Date     CYSTOSCOPY  10/09/2019    Dr Costello     HERNIA REPAIR       ORTHOPEDIC SURGERY       PROSTATE SURGERY       Anesthesia Evaluation     . Pt has had prior anesthetic. Type: General           ROS/MED HX    ENT/Pulmonary:     (+)SHALINI risk factors hypertension, , . .   (-) tobacco use, asthma, COPD and recent URI   Neurologic:  - neg neurologic ROS    (-) seizures and CVA   Cardiovascular:     (+) hypertension--CAD, --. : . . . pacemaker :. dysrhythmias a-fib, . Previous cardiac testing date:results:Stress Testdate: results:Mild inferior ischemia.  EF 54%.ECG reviewed date:10/19 results:Atrial -paced rate 71.  PVCs.  RBBB. date: results:         (-) angina, valvular problems/murmurs and angina   METS/Exercise Tolerance:     Hematologic: Comments: Hgb 13.2  K 3.9  Cr 1.1  Plts 296,000 - neg hematologic  ROS       Musculoskeletal:   (+) arthritis,  -       GI/Hepatic:     (+) GERD Asymptomatic on medication,       Renal/Genitourinary:  - ROS Renal section negative       Endo:     (+) type II DM Not using insulin - not using insulin pump .   (-) Type I DM, thyroid disease, chronic steroid usage and obesity   Psychiatric:  - neg psychiatric ROS       Infectious Disease:  - neg infectious disease ROS       Malignancy:      - no malignancy   Other:    - neg other ROS                      Physical Exam  Normal systems: cardiovascular, pulmonary and dental    Airway   Mallampati: II  TM distance: >3 FB  Neck ROM: full    Dental     Cardiovascular   Rhythm and rate: irregular  "and normal  (-) no friction rub, no systolic click and no murmur    Pulmonary    breath sounds clear to auscultation(-) no rhonchi, no decreased breath sounds, no wheezes, no rales and no stridor            Lab Results   Component Value Date     06/27/2005    CHLORIDE 102 06/27/2005    CO2 27 06/27/2005    BUN 15 06/27/2005    PTT 29 06/27/2005    INR 0.97 06/27/2005       Preop Vitals  BP Readings from Last 3 Encounters:   10/22/19 135/84   10/10/19 (!) 158/90   10/09/19 112/60    Pulse Readings from Last 3 Encounters:   10/22/19 68   10/10/19 59   10/09/19 66      Resp Readings from Last 3 Encounters:   10/22/19 18   10/10/19 15    SpO2 Readings from Last 3 Encounters:   10/22/19 96%   10/10/19 95%   10/09/19 94%      Temp Readings from Last 1 Encounters:   10/22/19 98.2  F (36.8  C) (Temporal)    Ht Readings from Last 1 Encounters:   10/22/19 1.778 m (5' 10\")      Wt Readings from Last 1 Encounters:   10/22/19 70.3 kg (155 lb)    Estimated body mass index is 22.24 kg/m  as calculated from the following:    Height as of this encounter: 1.778 m (5' 10\").    Weight as of this encounter: 70.3 kg (155 lb).       Anesthesia Plan      History & Physical Review  History and physical reviewed and following examination; no interval change.    ASA Status:  3 .    NPO Status:  > 8 hours    Plan for General and LMA with Intravenous induction. Maintenance will be Balanced.    PONV prophylaxis:  Ondansetron (or other 5HT-3) and Dexamethasone or Solumedrol       Postoperative Care  Postoperative pain management:  IV analgesics.      Consents  Anesthetic plan, risks, benefits and alternatives discussed with:  Patient or representative, Patient and Spouse..                 Abelino Garcia MD                    .  "

## 2019-10-22 NOTE — DISCHARGE INSTRUCTIONS
CYSTOSCOPY DISCHARGE INSTRUCTIONS  [facility] UROLOGY  [address]  [phone]    YOU MAY GO BACK TO YOUR NORMAL DIET AND ACTIVITY, UNLESS YOUR DOCTOR TELLS YOU NOT TO.    FOR THE NEXT TWO DAYS, YOU MAY NOTICE:    SOME BLOOD IN YOUR URINE.  SOME BURNING WHEN YOU URINATE.  AN URGE TO URINATE MORE OFTEN.  BLADDER SPASMS.    THESE ARE NORMAL AFTER THE PROCEDURE.  THEY SHOULD GO AWAY AFTER A DAY OR TWO.  TO RELIEVE THESE PROBLEMS:     DRINK 6 TO 8 LARGE GLASSES OF WATER EACH DAY (INCLUDES DRINKS AT MEALS).  THIS WILL HELP CLEAR THE URINE.    TAKE WARM BATHS TO RELIEVE PAIN AND BLADDER SPASMS.  DO NOT ADD ANYTHING TO THE BATH WATER.    YOUR DOCTOR MAY PRESCRIBE PAIN MEDICINE.  YOU MAY ALSO TAKE TYLENOL (ACETAMINOPHEN) FOR PAIN.    CALL YOUR SURGEON IF YOU HAVE:    A FEVER OVER 101 DEGREES.  CHECK YOUR TEMPERATURE UNDER YOUR TONGUE.    CHILLS.    FAILURE TO URINATE (NO URINE COMES OUT WHEN YOU TRY TO USE THE TOILET).  TRY SOAKING IN A BATHTUB FULL OF WARM WATER.  IF STILL NO URINE, CALL YOUR DOCTOR.    A LOT OF BLOOD IN THE URINE, OR BLOOD CLOTS LARGER THAN A NICKEL.      PAIN IN THE BACK OR BELLY AREA (ABDOMEN).    PAIN OR SPASMS THAT ARE NOT RELIEVED BY WARM TUB BATHS AND PAIN MEDICINE.      SEVERE PAIN, BURNING OR OTHER PROBLEMS WHILE PASSING URINE.    PAIN THAT GETS WORSE AFTER TWO DAYS.        Take cipro in am  Resume glucosamine-chondroitin and multivitamin on Thursday  OK to take eliquis tomorrow    GENERAL ANESTHESIA OR SEDATION ADULT DISCHARGE INSTRUCTIONS   SPECIAL PRECAUTIONS FOR 24 HOURS AFTER SURGERY    IT IS NOT UNUSUAL TO FEEL LIGHT-HEADED OR FAINT, UP TO 24 HOURS AFTER SURGERY OR WHILE TAKING PAIN MEDICATION.  IF YOU HAVE THESE SYMPTOMS; SIT FOR A FEW MINUTES BEFORE STANDING AND HAVE SOMEONE ASSIST YOU WHEN YOU GET UP TO WALK OR USE THE BATHROOM.    YOU SHOULD REST AND RELAX FOR THE NEXT 24 HOURS AND YOU MUST MAKE ARRANGEMENTS TO HAVE SOMEONE STAY WITH YOU FOR AT LEAST 24 HOURS  AFTER YOUR DISCHARGE.  AVOID HAZARDOUS AND STRENUOUS ACTIVITIES.  DO NOT MAKE IMPORTANT DECISIONS FOR 24 HOURS.    DO NOT DRIVE ANY VEHICLE OR OPERATE MECHANICAL EQUIPMENT FOR 24 HOURS FOLLOWING THE END OF YOUR SURGERY.  EVEN THOUGH YOU MAY FEEL NORMAL, YOUR REACTIONS MAY BE AFFECTED BY THE MEDICATION YOU HAVE RECEIVED.    DO NOT DRINK ALCOHOLIC BEVERAGES FOR 24 HOURS FOLLOWING YOUR SURGERY.    DRINK CLEAR LIQUIDS (APPLE JUICE, GINGER ALE, 7-UP, BROTH, ETC.).  PROGRESS TO YOUR REGULAR DIET AS YOU FEEL ABLE.    YOU MAY HAVE A DRY MOUTH, A SORE THROAT, MUSCLES ACHES OR TROUBLE SLEEPING.  THESE SHOULD GO AWAY AFTER 24 HOURS.    CALL YOUR DOCTOR FOR ANY OF THE FOLLOWING:  SIGNS OF INFECTION (FEVER, GROWING TENDERNESS AT THE SURGERY SITE, A LARGE AMOUNT OF DRAINAGE OR BLEEDING, SEVERE PAIN, FOUL-SMELLING DRAINAGE, REDNESS OR SWELLING.    IT HAS BEEN OVER 8 TO 10 HOURS SINCE SURGERY AND YOU ARE STILL NOT ABLE TO URINATE (PASS WATER).

## 2019-10-22 NOTE — ANESTHESIA POSTPROCEDURE EVALUATION
Patient: Santos Marti    Procedure(s):  Cystoscopy with bladder biopsy    Diagnosis:History of gross hematuria [Z87.448]  Diagnosis Additional Information: No value filed.    Anesthesia Type:  General, LMA    Note:  Anesthesia Post Evaluation    Patient location during evaluation: PACU  Patient participation: Able to fully participate in evaluation  Level of consciousness: awake and alert  Pain management: adequate  multimodal analgesia used between 6 hours prior to anesthesia start to PACU dischargeAirway patency: patent  Cardiovascular status: acceptable  Respiratory status: acceptable  two or more mitigation strategies used for obstructive sleep apneaHydration status: acceptable  PONV: none     Anesthetic complications: None          Last vitals:  Vitals:    10/22/19 1550 10/22/19 1600 10/22/19 1654   BP:  135/77 (!) 159/87   Pulse:      Resp: 20 14 12   Temp: 98.1  F (36.7  C)     SpO2: 97%  98%         Electronically Signed By: Gary Caberra MD  October 22, 2019  5:00 PM

## 2019-10-22 NOTE — BRIEF OP NOTE
Diagnosis: Multiple bladder tumors, gross hematuria  Procedure: EUA, video cystopanendoscopy, cup biopsy of bladder tumor, fulguration of bladder tumors, Mustang urethral dilation  Surgeon: Trang  Anesthesia: General laryngeal mask  EBL: Less than 5 ml  Findings:  superficial appearing papillary bladder cancer, bulbous urethral stricture, radiation changes in bladder and prostate

## 2019-10-23 ENCOUNTER — TELEPHONE (OUTPATIENT)
Dept: UROLOGY | Facility: CLINIC | Age: 84
End: 2019-10-23

## 2019-10-23 LAB — COPATH REPORT: NORMAL

## 2019-10-23 NOTE — ED PROVIDER NOTES
History     Chief Complaint:  Urinary Retention    HPI   Santos Marti is a 88 year old male who presents with his wife to the ED for evaluation of urinary retention. The patient had a cystoscopy performed on 10/10/2019, and was found to have a tumor in his bladder; he later presented to [facility] ED that day for urinary retention. The patient had a catheter placed that night, which he had removed on 10/15/2019. The patient reports that he had a follow up cystoscopy with a biopsy earlier today, performed by Dr. Costello, and was discharged home around 1700. The patient states that he was able to urinate normally following the procedure, but noticed some clots in his urine. However, he states that he started experiencing urgency, and states that he has been unable to urinate for about 1.5 hours prior to presentation. The patient states that he normally is on Eliquis, but has not taken it the last few days due to his procedure, and that he was supposed to start taking it again tomorrow.     Allergies:  Sulfa drugs, rash     Medications:    Tylenol  Eliquis - was supposed to resume tomorrow  Cipro  Imdur  Prinzide  Metformin  Metoprolol succinate  Omeprazole     Past Medical History:    Arthritis  Complication of anesthesia  Diabetes  GERD  Hypertension  Pacemaker   Hx of gross hematuria    Past Surgical History:    Cystoscopy  Hernia repair  Orthopedic surgery   Prostate surgery    Family History:    Diabetes, father  Bone cancer, father    Social History:  Former smoker, quit in 1980.  Positive for alcohol use.   Negative for drug use.  Marital Status:   [2]     Review of Systems   Genitourinary: Positive for difficulty urinating, hematuria and urgency.   All other systems reviewed and are negative.      Physical Exam     Patient Vitals for the past 24 hrs:   BP Temp Temp src Pulse Resp SpO2   10/22/19 2154 (!) 180/117 98  F (36.7  C) Temporal 78 16 100 %     Physical Exam  General: Patient is alert  and interactive when I enter the room  Head:  The scalp, face, and head appear normal  Eyes:  Conjunctivae are normal  ENT:    The nose is normal    Pinnae are normal    External acoustic canals are normal  Neck:  Trachea midline  CV:  Pulses are normal   Resp:  No respiratory distress   Abdomen:      Soft, non-tender, non-distended  Musc:  Normal muscular tone    No major joint effusions    No asymmetric leg swelling  Skin:  No rash or lesions noted  Neuro:  Speech is normal and fluent. Face is symmetric.     Moving all extremities well.   Psych: Awake. Alert.  Normal affect.  Appropriate interactions.    Emergency Department Course   Interventions:  Lidocaine 2% external gel    Emergency Department Course:  Nursing notes and vitals reviewed. (2202) I performed an exam of the patient as documented above.     2215 I spoke with Dr. Berry of Urology about the patient.     Findings and plan explained to the Patient. Patient discharged home with instructions regarding supportive care, medications, and reasons to return. The importance of close follow-up was reviewed.     Impression & Plan    Medical Decision Making:  Santos Marti is a 88 year old male who presents for evaluation of decreased urinary output and urgency following a cystoscopy and biopsy earlier today.  I considered a broad differential including diverticulitis, aneurysm, urinary retention, ureterolithiasis, UTI, pyelonephritis, neurologic causes (MS, cauda equina,etc), colitis, etc.  The history and exam are consistent with acute urinary retention and this is confirmed after hunt catheter placement.  Did discuss with urology before Hunt placement and they recommended a 22 Georgian catheter.  He did have a large clot and then his urine was clear.  No indication for UA and no indication for CBI at this point.  Will send home with catheter and have patient followup with urology in 3-5 days. Patient is stable for discharge home.      Diagnosis:     ICD-10-CM   1. Urinary retention R33.9       Disposition:  The patient was discharged to home.    Scribe Disclosure:  I, Rosetta Vega, am serving as a scribe on 10/22/2019 at 10:02 PM to personally document services performed by Bridget Pruitt MD based on my observations and the provider's statements to me.     Rosetta Vega  10/22/2019   [facility] EMERGENCY DEPARTMENT       Bridget Pruitt MD  10/23/19 0132

## 2019-10-23 NOTE — TELEPHONE ENCOUNTER
Left message that Yes per Dr Costello he can make a nurse appointment tomorrow or Friday to get hunt removed.Emani Naik LPN

## 2019-10-23 NOTE — ED TRIAGE NOTES
Pt in with C/O urinary retention. Pt reports he had a cystoscopy today and was able to urinate after the procedure, but is now having bladder distention and an inability to urinate. Pt A&Ox4 ABCD's intact

## 2019-10-23 NOTE — TELEPHONE ENCOUNTER
[facility] Call Center    Phone Message    May a detailed message be left on voicemail: yes    Reason for Call: Other: pt calling to see if he can get an appt to get his catheter out before he sees Dr Costello? Please call pt. He says that his urine is clear in the catheter.     Action Taken: Message routed to:  [facility] ([facility] urology

## 2019-10-24 ENCOUNTER — ALLIED HEALTH/NURSE VISIT (OUTPATIENT)
Dept: UROLOGY | Facility: CLINIC | Age: 84
End: 2019-10-24
Payer: MEDICARE

## 2019-10-24 DIAGNOSIS — R33.9 URINARY RETENTION: Primary | ICD-10-CM

## 2019-10-24 PROCEDURE — 99211 OFF/OP EST MAY X REQ PHY/QHP: CPT

## 2019-10-24 NOTE — PROGRESS NOTES
Patient presents to clinic for Catheter removal and trial of Void. Patient's catheter was disconnected from leg-bag and a sterile syringe was attached to catheter. Patient was unable to hold any water in bladder via gravity instillation. Patient does not want to keep catheter in. He reports that he has not seen an blood in catheter, which is why catheter was originally placed. Patient's catheter balloon was deflated and catheter was removed. Patient was instructed to go home and drink plenty of water, if unable to void patient was instructed to go to ER.     Santos Toussaint Figueroa comes into clinic today at the request of Dr. Costello Ordering Provider for Catheter Removal and Trial of Void.     This service provided today was under the supervising provider of the day Dr. Murphy, who was available if needed.    Skye Serrano LPN

## 2019-10-30 ENCOUNTER — OFFICE VISIT (OUTPATIENT)
Dept: UROLOGY | Facility: CLINIC | Age: 84
End: 2019-10-30
Payer: MEDICARE

## 2019-10-30 VITALS
OXYGEN SATURATION: 98 % | HEIGHT: 70 IN | SYSTOLIC BLOOD PRESSURE: 138 MMHG | BODY MASS INDEX: 22.19 KG/M2 | HEART RATE: 62 BPM | WEIGHT: 155 LBS | DIASTOLIC BLOOD PRESSURE: 80 MMHG

## 2019-10-30 DIAGNOSIS — C67.2 MALIGNANT NEOPLASM OF LATERAL WALL OF URINARY BLADDER (H): Primary | ICD-10-CM

## 2019-10-30 PROCEDURE — 99212 OFFICE O/P EST SF 10 MIN: CPT | Performed by: UROLOGY

## 2019-10-30 ASSESSMENT — PAIN SCALES - GENERAL: PAINLEVEL: NO PAIN (0)

## 2019-10-30 ASSESSMENT — MIFFLIN-ST. JEOR: SCORE: 1379.33

## 2019-10-30 NOTE — PROGRESS NOTES
Santos Marti is an 88-year-old male with a history of prostate cancer that was treated with combination radiotherapy years ago.  He is now on Eliquis and had some gross hematuria.  He was found to have superficial low-grade bladder cancer as well as radiation changes in the prostate and bladder and a bulbous urethral stricture.  He is voiding comfortably now.  We discussed his pathology report and I have given him a copy of the report.  Other past medical history, medications and allergies reviewed  Exam: Normal appearance, with spouse.  Alert and oriented  Assessment: History of prostate cancer, grade 1 TCC of bladder.  Discussed follow-up with office cystoscopy and urine cytologies.  See me before he leaves in January for the winter or when he returns in early March for cystoscopy

## 2019-10-30 NOTE — NURSING NOTE
Chief Complaint   Patient presents with     Follow Up     patient is here to discuss pathology results.      Mary Ryder, CMA

## 2019-10-30 NOTE — LETTER
10/30/2019       RE: Santos Marti  [address]  [facility] [address]     Dear Colleague,    Thank you for referring your patient, Santos Marti, to the [facility] UROLOGY CL[facility]IC [place] at [facility]. Please see a copy of my visit note below.    Santos Marti is an 88-year-old male with a history of prostate cancer that was treated with combination radiotherapy years ago.  He is now on Eliquis and had some gross hematuria.  He was found to have superficial low-grade bladder cancer as well as radiation changes in the prostate and bladder and a bulbous urethral stricture.  He is voiding comfortably now.  We discussed his pathology report and I have given him a copy of the report.  Other past medical history, medications and allergies reviewed  Exam: Normal appearance, with spouse.  Alert and oriented  Assessment: History of prostate cancer, grade 1 TCC of bladder.  Discussed follow-up with office cystoscopy and urine cytologies.  See me before he leaves in January for the winter or when he returns in early March for cystoscopy    Again, thank you for allowing me to participate in the care of your patient.      Sincerely,    Maximilian Costello MD

## 2019-10-30 NOTE — LETTER
10/30/2019      RE: Santos Marti  [address]  [facility] [address]       Santos Marti is an 88-year-old male with a history of prostate cancer that was treated with combination radiotherapy years ago.  He is now on Eliquis and had some gross hematuria.  He was found to have superficial low-grade bladder cancer as well as radiation changes in the prostate and bladder and a bulbous urethral stricture.  He is voiding comfortably now.  We discussed his pathology report and I have given him a copy of the report.  Other past medical history, medications and allergies reviewed  Exam: Normal appearance, with spouse.  Alert and oriented  Assessment: History of prostate cancer, grade 1 TCC of bladder.  Discussed follow-up with office cystoscopy and urine cytologies.  See me before he leaves in January for the winter or when he returns in early March for cystoscopy    Maximilian Costello MD

## 2019-11-18 ENCOUNTER — HOSPITAL ENCOUNTER (EMERGENCY)
Facility: CLINIC | Age: 84
Discharge: HOME | End: 2019-11-19
Attending: EMERGENCY MEDICINE | Admitting: EMERGENCY MEDICINE
Payer: MEDICARE
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
## 2019-11-18 DIAGNOSIS — S01.81XA FACIAL LACERATION, INITIAL ENCOUNTER: ICD-10-CM

## 2019-11-18 PROCEDURE — 27210282 ZZH ADHESIVE DERMABOND SKIN

## 2019-11-18 PROCEDURE — 12011 RPR F/E/E/N/L/M 2.5 CM/<: CPT

## 2019-11-18 PROCEDURE — 99284 EMERGENCY DEPT VISIT MOD MDM: CPT | Mod: 25

## 2019-11-18 ASSESSMENT — MIFFLIN-ST. JEOR: SCORE: 1356.65

## 2019-11-18 NOTE — ED AVS SNAPSHOT
Emergency Department  [address] [place] [address]  Phone:  [phone]  Fax:  [phone]                                    Santos Marti   MRN: [id number]    Department:   Emergency Department   Date of Visit:  11/18/2019           After Visit Summary Signature Page    I have received my discharge instructions, and my questions have been answered. I have discussed any challenges I see with this plan with the nurse or doctor.    ..........................................................................................................................................  Patient/Patient Representative Signature      ..........................................................................................................................................  Patient Representative Print Name and Relationship to Patient    ..................................................               ................................................  Date                                   Time    ..........................................................................................................................................  Reviewed by Signature/Title    ...................................................              ..............................................  Date                                               Time          22EPIC Rev 08/18

## 2019-11-19 ENCOUNTER — APPOINTMENT (OUTPATIENT)
Dept: CT IMAGING | Facility: CLINIC | Age: 84
End: 2019-11-19
Attending: EMERGENCY MEDICINE
Payer: MEDICARE

## 2019-11-19 VITALS
WEIGHT: 150 LBS | DIASTOLIC BLOOD PRESSURE: 88 MMHG | HEIGHT: 70 IN | BODY MASS INDEX: 21.47 KG/M2 | SYSTOLIC BLOOD PRESSURE: 160 MMHG | TEMPERATURE: 97.5 F | OXYGEN SATURATION: 93 % | HEART RATE: 48 BPM

## 2019-11-19 PROCEDURE — 72125 CT NECK SPINE W/O DYE: CPT

## 2019-11-19 PROCEDURE — 70450 CT HEAD/BRAIN W/O DYE: CPT

## 2019-11-19 ASSESSMENT — ENCOUNTER SYMPTOMS
WOUND: 1
FEVER: 0
COUGH: 0
LIGHT-HEADEDNESS: 0
VOMITING: 0

## 2019-11-19 NOTE — ED NOTES
Bed: ED04  Expected date:   Expected time:   Means of arrival:   Comments:  536  88M  Head injury/Fall/HTN  [id number]

## 2019-11-19 NOTE — ED TRIAGE NOTES
Pt. had fall at home while taking out garbage=stumbled and fell striking head. Unsure if LOC, +laceration to righ lateral eyebrow. Concurrent stiffness to lover cervical-upper thorasic area. Pt. taking Elaquis for A-fib. Upon arrival, alert and orient. times 3.

## 2019-11-19 NOTE — ED PROVIDER NOTES
"  History     Chief Complaint:  Fall     The history is provided by the patient.      Santos Marti is a 88 year old male on EliProStor Systemsis with a history of atrial fibrillation, hypertension, and diabetes who presents with fall. He was walking along pushing his trash cart and fell over. The patient did hit the right side of his face on concrete, but denies loss of consciousness. He denies any light headedness prior to the fall. The patient was able to get back up with some help. He denies any cough, fever, or vomiting. He also notes scrapping his right knee from the fall.     Allergies:  Sulfa drugs     Medications:    apixaban  isosorbide mononitrate   lisinopril-hydrochlorothiazide  metformin  metoprolol succinate ER   omeprazole      Past Medical History:    Arthritis   Complication of anesthesia   Diabetes  Gastroesophageal reflux disease   Hypertension   Pacemaker     Past Surgical History:    Biopsy bladder  Hernia repair   Orthopedic surgery   Prostate surgery     Family History:    Diabetes   Bone cancer     Social History:  Smoking Status: former  Smokeless Tobacco: Never Used  Alcohol Use: Yes  Drug Use: Never  PCP: Nehemias Gotti   Marital Status:       Review of Systems   Constitutional: Negative for fever.   HENT:        Positive right facial injury    Respiratory: Negative for cough.    Gastrointestinal: Negative for vomiting.   Skin: Positive for wound (right knee).   Neurological: Negative for syncope and light-headedness.   All other systems reviewed and are negative.    Physical Exam     Patient Vitals for the past 24 hrs:   BP Temp Temp src Pulse Heart Rate SpO2 Height Weight   11/19/19 0121 -- -- -- -- -- 93 % -- --   11/19/19 0120 (!) 160/88 -- -- (!) 48 -- -- -- --   11/18/19 2330 (!) 177/109 97.5  F (36.4  C) Oral -- 68 95 % 1.778 m (5' 10\") 68 kg (150 lb)       Physical Exam   General: Appears well-developed and well-nourished.   Head: Abrasion and superficial laceration to right " upper eyelid/eyebrow region.  Mouth/Throat: Oropharynx is clear and moist.   Eyes: Conjunctivae are normal. Pupils are equal, round, and reactive to light.   Neck: Mild midline tendenress.  CV: Normal rate and regular rhythm.    Resp: Effort normal and breath sounds normal. No respiratory distress.   MSK: Normal range of motion. No tenderness to extremities.    Neuro: The patient is alert and oriented to person, place, and time.  PERRLA, EOMI, strength in upper/lower extremities normal and symmetrical.   Sensation normal. Speech normal.  GCS 15  Skin: Skin is warm and dry. Superficial abrasion to right knee.  Psych: normal mood and affect. behavior is normal.   Emergency Department Course     Imaging:  Radiology findings were communicated with the patient who voiced understanding of the findings.    Cervical spine CT w/o contrast  1. No evidence of an acute cervical spine fracture.  2. Vertebral body heights maintained and slight anterior listhesis of C4 in relation to C5 and C7 in relation to T1.  2. No significant canal compromise but prominent facet arthropathy leading to diffuse neural foraminal narrowing as described above.  Reading per radiology     Head CT w/o contrast  1.  No CT finding of a mass, hemorrhage or focal area suggestive of an acute infarct.  2.  Diffuse age related changes.  Reading per radiology       Procedures:    Laceration Repair        LACERATION:  A simple and superficial clean 2 cm laceration.      LOCATION:  Right eyelid       FUNCTION:  Distally sensation and circulation are intact.      ANESTHESIA:  none      PREPARATION:  Irrigation and Scrubbing with Shur Clens      DEBRIDEMENT:  no debridement      CLOSURE:  Wound was closed with Dermabond    Emergency Department Course:  Past medical records, nursing notes, and vitals reviewed.    2326 I performed an exam of the patient as documented above.     The patient was sent for a cervical spine and head CT while in the emergency  department, results above.       0118 Patient rechecked and updated.      0151 I rechecked the patient and preformed the procedure as noted above.      Findings and plan explained to the Patient. Patient discharged home with instructions regarding supportive care, medications, and reasons to return. The importance of close follow-up was reviewed.        Impression & Plan   Medical Decision Making:  Santos Marti is a 88 year old male who presents to the emergency department today following a fall.  He had what sounded to be like a mechanical fall when a trash can fell over causing him to fall.  He did strike his head, but there is no loss of consciousness and he was able to ambulate following.  On my evaluation, he did have a superficial laceration to the right eyelid along with his right knee was scraped.  Did obtain a CT scan of the head and neck which did not show any signs of acute process.  Patient is on a blood thinner, but continued to do well throughout his time in the ER with no neurologic findings.  His wounds were cleaned and I did apply skin adhesive to the laceration with good effect.  Patient was discharged home with recommendation to follow with his primary care doctor and supportive care recommendations and return instructions were discussed with him and his wife.      Discharge Diagnosis:    ICD-10-CM    1. Fall from standing, initial encounter W19.XXXA    2. Facial laceration, initial encounter S01.81XA        Disposition:  The patient is discharged to home.    Scribe Disclosure:  IEugene, am serving as a scribe at 1:16 AM on 11/19/2019 to document services personally performed by Kristopher Boston MD based on my observations and the provider's statements to me.     Nikki BURT, am serving as a scribe at 1:16 AM on 11/19/2019 to document services personally performed by Kristopher Boston MD based on my observations and the provider's statements to me.   11/18/2019   [facility]  EMERGENCY DEPARTMENT       Kristopher Boston MD  11/19/19 6611

## 2020-03-03 DIAGNOSIS — C67.2 MALIGNANT NEOPLASM OF LATERAL WALL OF URINARY BLADDER (H): Primary | ICD-10-CM

## 2020-03-11 ENCOUNTER — OFFICE VISIT (OUTPATIENT)
Dept: UROLOGY | Facility: CLINIC | Age: 85
End: 2020-03-11
Payer: MEDICARE

## 2020-03-11 VITALS
BODY MASS INDEX: 21.47 KG/M2 | WEIGHT: 150 LBS | HEIGHT: 70 IN | HEART RATE: 100 BPM | DIASTOLIC BLOOD PRESSURE: 68 MMHG | SYSTOLIC BLOOD PRESSURE: 124 MMHG | OXYGEN SATURATION: 96 %

## 2020-03-11 DIAGNOSIS — Z79.2 PROPHYLACTIC ANTIBIOTIC: ICD-10-CM

## 2020-03-11 DIAGNOSIS — Z85.51 PERSONAL HISTORY OF MALIGNANT NEOPLASM OF BLADDER: Primary | ICD-10-CM

## 2020-03-11 PROCEDURE — 99212 OFFICE O/P EST SF 10 MIN: CPT | Mod: 25 | Performed by: UROLOGY

## 2020-03-11 PROCEDURE — 52000 CYSTOURETHROSCOPY: CPT | Performed by: UROLOGY

## 2020-03-11 RX ORDER — CIPROFLOXACIN 500 MG/1
500 TABLET, FILM COATED ORAL ONCE
Qty: 1 TABLET | Refills: 0 | Status: SHIPPED | OUTPATIENT
Start: 2020-03-11 | End: 2020-07-13

## 2020-03-11 ASSESSMENT — PAIN SCALES - GENERAL: PAINLEVEL: NO PAIN (0)

## 2020-03-11 ASSESSMENT — MIFFLIN-ST. JEOR: SCORE: 1356.65

## 2020-03-11 NOTE — LETTER
3/11/2020       RE: Santos Marti  [address]  [facility] [address]     Dear Colleague,    Thank you for referring your patient, Santos Marti, to the [facility] UROLOGY CLINIC [place] at [facility]. Please see a copy of my visit note below.    Mr. 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  He also has a history of combination radiotherapy years ago for adenocarcinoma the prostate.  He is followed at the [facility] for this and his PSAs are not available.  He returns for cystoscopy today. Apparently the PSAs have been undetectable.  PMH reviewed  On eliquis  Exam: alert and oriented, normal ext. Genitalia  Flex cysto: scar at membranous urethra, radiation changes throughout prostate and bladder. No papillary tumors or raised erythema  A: no TCC recurrence  P: antibiotic X 1 today       See in July for BTC      Again, thank you for allowing me to participate in the care of your patient.      Sincerely,    Maximilian Costello MD

## 2020-03-11 NOTE — NURSING NOTE
Chief Complaint   Patient presents with     Hx of Bladder Cancer     Patient here today for Cystoscopy       UA RESULTS:  Recent Labs   Lab Test 10/09/19  1321   COLOR Yellow   APPEARANCE Clear   URINEGLC Negative   URINEBILI Negative   URINEKETONE Negative   SG <=1.005   UBLD Negative   URINEPH 5.0   PROTEIN Negative   UROBILINOGEN 1.0   NITRITE Negative   LEUKEST Negative     Pt has signed the consent form stating that we will be doing a CYSTOSCOPY (with or without stent removal) today, and that it is the correct procedure. I verbally confirmed the patient s identity using two indicators, relevant allergies, and that the correct equipment was available. Post-op information given to the pt as needed at check-out. A  appropriate antibiotic was sent  to the pharmacy in our building as recommended by the MD.

## 2020-03-11 NOTE — PROGRESS NOTES
Mr. 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  He also has a history of combination radiotherapy years ago for adenocarcinoma the prostate.  He is followed at the [facility] for this and his PSAs are not available.  He returns for cystoscopy today. Apparently the PSAs have been undetectable.  [facility] reviewed  On eliquis  Exam: alert and oriented, normal ext. Genitalia  Flex cysto: scar at membranous urethra, radiation changes throughout prostate and bladder. No papillary tumors or raised erythema  A: no TCC recurrence  P: antibiotic X 1 today       See in July for BTC

## 2020-03-11 NOTE — PATIENT INSTRUCTIONS

## 2020-07-07 DIAGNOSIS — Z85.51 PERSONAL HISTORY OF MALIGNANT NEOPLASM OF BLADDER: Primary | ICD-10-CM

## 2020-07-08 ENCOUNTER — PREP FOR PROCEDURE (OUTPATIENT)
Dept: UROLOGY | Facility: CLINIC | Age: 85
End: 2020-07-08

## 2020-07-08 ENCOUNTER — OFFICE VISIT (OUTPATIENT)
Dept: UROLOGY | Facility: CLINIC | Age: 85
End: 2020-07-08
Payer: MEDICARE

## 2020-07-08 VITALS
SYSTOLIC BLOOD PRESSURE: 136 MMHG | HEART RATE: 68 BPM | OXYGEN SATURATION: 95 % | BODY MASS INDEX: 21.47 KG/M2 | WEIGHT: 150 LBS | HEIGHT: 70 IN | DIASTOLIC BLOOD PRESSURE: 78 MMHG

## 2020-07-08 DIAGNOSIS — Z11.59 ENCOUNTER FOR SCREENING FOR OTHER VIRAL DISEASES: Primary | ICD-10-CM

## 2020-07-08 DIAGNOSIS — C67.2 MALIGNANT NEOPLASM OF LATERAL WALL OF URINARY BLADDER (H): Primary | ICD-10-CM

## 2020-07-08 DIAGNOSIS — Z79.2 PROPHYLACTIC ANTIBIOTIC: Primary | ICD-10-CM

## 2020-07-08 DIAGNOSIS — Z85.51 PERSONAL HISTORY OF MALIGNANT NEOPLASM OF BLADDER: ICD-10-CM

## 2020-07-08 LAB
ALBUMIN UR-MCNC: 100 MG/DL
APPEARANCE UR: CLEAR
BILIRUB UR QL STRIP: NEGATIVE
COLOR UR AUTO: YELLOW
GLUCOSE UR STRIP-MCNC: NEGATIVE MG/DL
HGB UR QL STRIP: ABNORMAL
KETONES UR STRIP-MCNC: ABNORMAL MG/DL
LEUKOCYTE ESTERASE UR QL STRIP: NEGATIVE
NITRATE UR QL: NEGATIVE
PH UR STRIP: 5 PH (ref 5–7)
SOURCE: ABNORMAL
SP GR UR STRIP: >1.03 (ref 1–1.03)
UROBILINOGEN UR STRIP-ACNC: 1 EU/DL (ref 0.2–1)

## 2020-07-08 PROCEDURE — 81003 URINALYSIS AUTO W/O SCOPE: CPT | Performed by: UROLOGY

## 2020-07-08 PROCEDURE — 52000 CYSTOURETHROSCOPY: CPT | Performed by: UROLOGY

## 2020-07-08 PROCEDURE — 99212 OFFICE O/P EST SF 10 MIN: CPT | Mod: 25 | Performed by: UROLOGY

## 2020-07-08 RX ORDER — CEFAZOLIN SODIUM 1 G
1 VIAL (EA) INJECTION SEE ADMIN INSTRUCTIONS
Status: CANCELLED | OUTPATIENT
Start: 2020-07-08

## 2020-07-08 RX ORDER — CIPROFLOXACIN 500 MG/1
500 TABLET, FILM COATED ORAL ONCE
Qty: 1 TABLET | Refills: 0 | Status: SHIPPED | OUTPATIENT
Start: 2020-07-08 | End: 2020-07-13

## 2020-07-08 ASSESSMENT — PAIN SCALES - GENERAL: PAINLEVEL: NO PAIN (0)

## 2020-07-08 ASSESSMENT — MIFFLIN-ST. JEOR: SCORE: 1356.65

## 2020-07-08 NOTE — LETTER
7/8/2020       RE: Santos Marti  [address]  [facility] [address]     Dear Colleague,    Thank you for referring your patient, Santos Marti, to the [facility] UROLOGY CLINIC [place] at [facility]. Please see a copy of my visit note below.    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  He is here for his 3-month follow-up and has had no difficulty voiding, dysuria or hematuria  Other past medical history: Radiation for prostate cancer years ago  Medications include Eliquis  Allergies: Sulfa  Exam: Alert and oriented, normal external genitalia, normal respirations, neuro grossly intact.  No respiratory symptoms or signs  Flexible cystoscopy- normal urethra, open prostatic fossa with normal mucosa and normal bladder neck.  Raised erythema left lateral wall with some elevation of bladder wall even when bladder full.  Patient has had no pneumaturia or urinary tract infections.  No lumen seen and no discharge seen.  Assessment: Possible recurrence of low-grade transitional cell carcinoma of the bladder  Plan: Transurethral resection of bladder tumor next week.  Stop Eliquis 3 days prior.  Antibiotic x1 today.  All discussed    Again, thank you for allowing me to participate in the care of your patient.      Sincerely,    Maximilian Costello MD

## 2020-07-08 NOTE — PROGRESS NOTES
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  He is here for his 3-month follow-up and has had no difficulty voiding, dysuria or hematuria  Other past medical history: Radiation for prostate cancer years ago  Medications include Eliquis  Allergies: Sulfa  Exam: Alert and oriented, normal external genitalia, normal respirations, neuro grossly intact.  No respiratory symptoms or signs  Flexible cystoscopy- normal urethra, open prostatic fossa with normal mucosa and normal bladder neck.  Raised erythema left lateral wall with some elevation of bladder wall even when bladder full.  Patient has had no pneumaturia or urinary tract infections.  No lumen seen and no discharge seen.  Assessment: Possible recurrence of low-grade transitional cell carcinoma of the bladder  Plan: Transurethral resection of bladder tumor next week.  Stop Eliquis 3 days prior.  Antibiotic x1 today.  All discussed

## 2020-07-12 DIAGNOSIS — Z11.59 ENCOUNTER FOR SCREENING FOR OTHER VIRAL DISEASES: ICD-10-CM

## 2020-07-12 PROCEDURE — U0003 INFECTIOUS AGENT DETECTION BY NUCLEIC ACID (DNA OR RNA); SEVERE ACUTE RESPIRATORY SYNDROME CORONAVIRUS 2 (SARS-COV-2) (CORONAVIRUS DISEASE [COVID-19]), AMPLIFIED PROBE TECHNIQUE, MAKING USE OF HIGH THROUGHPUT TECHNOLOGIES AS DESCRIBED BY CMS-2020-01-R: HCPCS | Performed by: UROLOGY

## 2020-07-12 PROCEDURE — 99207 ZZC NO BILLABLE SERVICE THIS VISIT: CPT

## 2020-07-13 LAB
SARS-COV-2 RNA SPEC QL NAA+PROBE: NOT DETECTED
SPECIMEN SOURCE: NORMAL

## 2020-07-15 ENCOUNTER — ANESTHESIA (OUTPATIENT)
Dept: SURGERY | Facility: CLINIC | Age: 85
End: 2020-07-15
Payer: MEDICARE

## 2020-07-15 ENCOUNTER — ANESTHESIA EVENT (OUTPATIENT)
Dept: SURGERY | Facility: CLINIC | Age: 85
End: 2020-07-15
Payer: MEDICARE

## 2020-07-15 ENCOUNTER — HOSPITAL ENCOUNTER (OUTPATIENT)
Facility: CLINIC | Age: 85
Discharge: HOME | End: 2020-07-15
Attending: UROLOGY | Admitting: UROLOGY
Payer: MEDICARE

## 2020-07-15 VITALS
BODY MASS INDEX: 22.62 KG/M2 | HEIGHT: 70 IN | WEIGHT: 158 LBS | HEART RATE: 61 BPM | SYSTOLIC BLOOD PRESSURE: 160 MMHG | RESPIRATION RATE: 20 BRPM | OXYGEN SATURATION: 97 % | TEMPERATURE: 97.5 F | DIASTOLIC BLOOD PRESSURE: 90 MMHG

## 2020-07-15 DIAGNOSIS — C67.2 MALIGNANT NEOPLASM OF LATERAL WALL OF URINARY BLADDER (H): ICD-10-CM

## 2020-07-15 LAB
ANION GAP SERPL CALCULATED.3IONS-SCNC: 6 MMOL/L (ref 3–14)
BUN SERPL-MCNC: 26 MG/DL (ref 7–30)
CALCIUM SERPL-MCNC: 8.9 MG/DL (ref 8.5–10.1)
CHLORIDE SERPL-SCNC: 106 MMOL/L (ref 94–109)
CO2 SERPL-SCNC: 26 MMOL/L (ref 20–32)
CREAT SERPL-MCNC: 1.08 MG/DL (ref 0.66–1.25)
ERYTHROCYTE [DISTWIDTH] IN BLOOD BY AUTOMATED COUNT: 12.2 % (ref 10–15)
GFR SERPL CREATININE-BSD FRML MDRD: 61 ML/MIN/{1.73_M2}
GLUCOSE BLDC GLUCOMTR-MCNC: 138 MG/DL (ref 70–99)
GLUCOSE SERPL-MCNC: 141 MG/DL (ref 70–99)
HCT VFR BLD AUTO: 42.8 % (ref 40–53)
HGB BLD-MCNC: 13.8 G/DL (ref 13.3–17.7)
MCH RBC QN AUTO: 31.9 PG (ref 26.5–33)
MCHC RBC AUTO-ENTMCNC: 32.2 G/DL (ref 31.5–36.5)
MCV RBC AUTO: 99 FL (ref 78–100)
PLATELET # BLD AUTO: 210 10E9/L (ref 150–450)
POTASSIUM SERPL-SCNC: 4.3 MMOL/L (ref 3.4–5.3)
RBC # BLD AUTO: 4.33 10E12/L (ref 4.4–5.9)
SODIUM SERPL-SCNC: 138 MMOL/L (ref 133–144)
WBC # BLD AUTO: 6.6 10E9/L (ref 4–11)

## 2020-07-15 PROCEDURE — 36000058 ZZH SURGERY LEVEL 3 EA 15 ADDTL MIN: Performed by: UROLOGY

## 2020-07-15 PROCEDURE — 93010 ELECTROCARDIOGRAM REPORT: CPT | Performed by: INTERNAL MEDICINE

## 2020-07-15 PROCEDURE — 88307 TISSUE EXAM BY PATHOLOGIST: CPT | Performed by: UROLOGY

## 2020-07-15 PROCEDURE — 37000009 ZZH ANESTHESIA TECHNICAL FEE, EACH ADDTL 15 MIN: Performed by: UROLOGY

## 2020-07-15 PROCEDURE — 85027 COMPLETE CBC AUTOMATED: CPT | Performed by: UROLOGY

## 2020-07-15 PROCEDURE — 36415 COLL VENOUS BLD VENIPUNCTURE: CPT | Performed by: UROLOGY

## 2020-07-15 PROCEDURE — 71000012 ZZH RECOVERY PHASE 1 LEVEL 1 FIRST HR: Performed by: UROLOGY

## 2020-07-15 PROCEDURE — 25000128 H RX IP 250 OP 636: Performed by: NURSE ANESTHETIST, CERTIFIED REGISTERED

## 2020-07-15 PROCEDURE — 25800025 ZZH RX 258: Performed by: UROLOGY

## 2020-07-15 PROCEDURE — 88341 IMHCHEM/IMCYTCHM EA ADD ANTB: CPT | Mod: 26,76 | Performed by: UROLOGY

## 2020-07-15 PROCEDURE — 36000056 ZZH SURGERY LEVEL 3 1ST 30 MIN: Performed by: UROLOGY

## 2020-07-15 PROCEDURE — 88342 IMHCHEM/IMCYTCHM 1ST ANTB: CPT | Mod: 26 | Performed by: UROLOGY

## 2020-07-15 PROCEDURE — 88307 TISSUE EXAM BY PATHOLOGIST: CPT | Mod: 26 | Performed by: UROLOGY

## 2020-07-15 PROCEDURE — 80048 BASIC METABOLIC PNL TOTAL CA: CPT | Performed by: UROLOGY

## 2020-07-15 PROCEDURE — 37000008 ZZH ANESTHESIA TECHNICAL FEE, 1ST 30 MIN: Performed by: UROLOGY

## 2020-07-15 PROCEDURE — 40000306 ZZH STATISTIC PRE PROC ASSESS II: Performed by: UROLOGY

## 2020-07-15 PROCEDURE — 25000128 H RX IP 250 OP 636: Performed by: UROLOGY

## 2020-07-15 PROCEDURE — 27210794 ZZH OR GENERAL SUPPLY STERILE: Performed by: UROLOGY

## 2020-07-15 PROCEDURE — 25800030 ZZH RX IP 258 OP 636: Performed by: NURSE ANESTHETIST, CERTIFIED REGISTERED

## 2020-07-15 PROCEDURE — 88342 IMHCHEM/IMCYTCHM 1ST ANTB: CPT | Performed by: UROLOGY

## 2020-07-15 PROCEDURE — 82962 GLUCOSE BLOOD TEST: CPT

## 2020-07-15 PROCEDURE — 25000125 ZZHC RX 250: Performed by: NURSE ANESTHETIST, CERTIFIED REGISTERED

## 2020-07-15 PROCEDURE — 71000027 ZZH RECOVERY PHASE 2 EACH 15 MINS: Performed by: UROLOGY

## 2020-07-15 PROCEDURE — 88341 IMHCHEM/IMCYTCHM EA ADD ANTB: CPT | Performed by: UROLOGY

## 2020-07-15 PROCEDURE — 52235 CYSTOSCOPY AND TREATMENT: CPT | Performed by: UROLOGY

## 2020-07-15 RX ORDER — CEFAZOLIN SODIUM 1 G/3ML
1 INJECTION, POWDER, FOR SOLUTION INTRAMUSCULAR; INTRAVENOUS SEE ADMIN INSTRUCTIONS
Status: DISCONTINUED | OUTPATIENT
Start: 2020-07-15 | End: 2020-07-15 | Stop reason: HOSPADM

## 2020-07-15 RX ORDER — GLYCOPYRROLATE 0.2 MG/ML
INJECTION, SOLUTION INTRAMUSCULAR; INTRAVENOUS PRN
Status: DISCONTINUED | OUTPATIENT
Start: 2020-07-15 | End: 2020-07-15

## 2020-07-15 RX ORDER — MEPERIDINE HYDROCHLORIDE 25 MG/ML
12.5 INJECTION INTRAMUSCULAR; INTRAVENOUS; SUBCUTANEOUS
Status: DISCONTINUED | OUTPATIENT
Start: 2020-07-15 | End: 2020-07-16 | Stop reason: HOSPADM

## 2020-07-15 RX ORDER — CEFAZOLIN SODIUM 2 G/100ML
2 INJECTION, SOLUTION INTRAVENOUS
Status: COMPLETED | OUTPATIENT
Start: 2020-07-15 | End: 2020-07-15

## 2020-07-15 RX ORDER — FENTANYL CITRATE 50 UG/ML
INJECTION, SOLUTION INTRAMUSCULAR; INTRAVENOUS PRN
Status: DISCONTINUED | OUTPATIENT
Start: 2020-07-15 | End: 2020-07-15

## 2020-07-15 RX ORDER — SODIUM CHLORIDE, SODIUM LACTATE, POTASSIUM CHLORIDE, CALCIUM CHLORIDE 600; 310; 30; 20 MG/100ML; MG/100ML; MG/100ML; MG/100ML
INJECTION, SOLUTION INTRAVENOUS CONTINUOUS PRN
Status: DISCONTINUED | OUTPATIENT
Start: 2020-07-15 | End: 2020-07-15

## 2020-07-15 RX ORDER — PHENYLEPHRINE HYDROCHLORIDE 10 MG/ML
INJECTION INTRAVENOUS PRN
Status: DISCONTINUED | OUTPATIENT
Start: 2020-07-15 | End: 2020-07-15

## 2020-07-15 RX ORDER — FENTANYL CITRATE 50 UG/ML
25-50 INJECTION, SOLUTION INTRAMUSCULAR; INTRAVENOUS
Status: CANCELLED | OUTPATIENT
Start: 2020-07-15

## 2020-07-15 RX ORDER — SODIUM CHLORIDE, SODIUM LACTATE, POTASSIUM CHLORIDE, CALCIUM CHLORIDE 600; 310; 30; 20 MG/100ML; MG/100ML; MG/100ML; MG/100ML
INJECTION, SOLUTION INTRAVENOUS CONTINUOUS
Status: DISCONTINUED | OUTPATIENT
Start: 2020-07-15 | End: 2020-07-16 | Stop reason: HOSPADM

## 2020-07-15 RX ORDER — HYDROMORPHONE HYDROCHLORIDE 1 MG/ML
.3-.5 INJECTION, SOLUTION INTRAMUSCULAR; INTRAVENOUS; SUBCUTANEOUS EVERY 10 MIN PRN
Status: DISCONTINUED | OUTPATIENT
Start: 2020-07-15 | End: 2020-07-16 | Stop reason: HOSPADM

## 2020-07-15 RX ORDER — ALBUTEROL SULFATE 0.83 MG/ML
2.5 SOLUTION RESPIRATORY (INHALATION) EVERY 4 HOURS PRN
Status: DISCONTINUED | OUTPATIENT
Start: 2020-07-15 | End: 2020-07-15 | Stop reason: HOSPADM

## 2020-07-15 RX ORDER — PROPOFOL 10 MG/ML
INJECTION, EMULSION INTRAVENOUS PRN
Status: DISCONTINUED | OUTPATIENT
Start: 2020-07-15 | End: 2020-07-15

## 2020-07-15 RX ORDER — NALOXONE HYDROCHLORIDE 0.4 MG/ML
.1-.4 INJECTION, SOLUTION INTRAMUSCULAR; INTRAVENOUS; SUBCUTANEOUS
Status: DISCONTINUED | OUTPATIENT
Start: 2020-07-15 | End: 2020-07-16 | Stop reason: HOSPADM

## 2020-07-15 RX ORDER — ONDANSETRON 4 MG/1
4 TABLET, ORALLY DISINTEGRATING ORAL EVERY 30 MIN PRN
Status: DISCONTINUED | OUTPATIENT
Start: 2020-07-15 | End: 2020-07-16 | Stop reason: HOSPADM

## 2020-07-15 RX ORDER — CIPROFLOXACIN 250 MG/1
250 TABLET, FILM COATED ORAL EVERY 12 HOURS
Qty: 2 TABLET | Refills: 0 | Status: SHIPPED | OUTPATIENT
Start: 2020-07-15 | End: 2020-07-30

## 2020-07-15 RX ORDER — ONDANSETRON 2 MG/ML
INJECTION INTRAMUSCULAR; INTRAVENOUS PRN
Status: DISCONTINUED | OUTPATIENT
Start: 2020-07-15 | End: 2020-07-15

## 2020-07-15 RX ORDER — FENTANYL CITRATE 50 UG/ML
25-50 INJECTION, SOLUTION INTRAMUSCULAR; INTRAVENOUS EVERY 5 MIN PRN
Status: DISCONTINUED | OUTPATIENT
Start: 2020-07-15 | End: 2020-07-15 | Stop reason: HOSPADM

## 2020-07-15 RX ORDER — LIDOCAINE HYDROCHLORIDE 10 MG/ML
INJECTION, SOLUTION INFILTRATION; PERINEURAL PRN
Status: DISCONTINUED | OUTPATIENT
Start: 2020-07-15 | End: 2020-07-15

## 2020-07-15 RX ORDER — DEXAMETHASONE SODIUM PHOSPHATE 4 MG/ML
INJECTION, SOLUTION INTRA-ARTICULAR; INTRALESIONAL; INTRAMUSCULAR; INTRAVENOUS; SOFT TISSUE PRN
Status: DISCONTINUED | OUTPATIENT
Start: 2020-07-15 | End: 2020-07-15

## 2020-07-15 RX ORDER — ONDANSETRON 2 MG/ML
4 INJECTION INTRAMUSCULAR; INTRAVENOUS EVERY 30 MIN PRN
Status: DISCONTINUED | OUTPATIENT
Start: 2020-07-15 | End: 2020-07-16 | Stop reason: HOSPADM

## 2020-07-15 RX ADMIN — PROPOFOL 30 MG: 10 INJECTION, EMULSION INTRAVENOUS at 14:29

## 2020-07-15 RX ADMIN — FENTANYL CITRATE 25 MCG: 50 INJECTION, SOLUTION INTRAMUSCULAR; INTRAVENOUS at 14:20

## 2020-07-15 RX ADMIN — FENTANYL CITRATE 50 MCG: 50 INJECTION, SOLUTION INTRAMUSCULAR; INTRAVENOUS at 14:15

## 2020-07-15 RX ADMIN — FENTANYL CITRATE 25 MCG: 50 INJECTION, SOLUTION INTRAMUSCULAR; INTRAVENOUS at 14:28

## 2020-07-15 RX ADMIN — PHENYLEPHRINE HYDROCHLORIDE 100 MCG: 10 INJECTION INTRAVENOUS at 14:23

## 2020-07-15 RX ADMIN — LIDOCAINE HYDROCHLORIDE 20 MG: 10 INJECTION, SOLUTION INFILTRATION; PERINEURAL at 14:15

## 2020-07-15 RX ADMIN — PHENYLEPHRINE HYDROCHLORIDE 100 MCG: 10 INJECTION INTRAVENOUS at 14:28

## 2020-07-15 RX ADMIN — GLYCOPYRROLATE 0.1 MG: 0.2 INJECTION, SOLUTION INTRAMUSCULAR; INTRAVENOUS at 14:15

## 2020-07-15 RX ADMIN — CEFAZOLIN SODIUM 2 G: 2 INJECTION, SOLUTION INTRAVENOUS at 14:17

## 2020-07-15 RX ADMIN — SODIUM CHLORIDE, POTASSIUM CHLORIDE, SODIUM LACTATE AND CALCIUM CHLORIDE: 600; 310; 30; 20 INJECTION, SOLUTION INTRAVENOUS at 12:40

## 2020-07-15 RX ADMIN — ONDANSETRON HYDROCHLORIDE 4 MG: 2 INJECTION, SOLUTION INTRAVENOUS at 14:33

## 2020-07-15 RX ADMIN — DEXAMETHASONE SODIUM PHOSPHATE 4 MG: 4 INJECTION, SOLUTION INTRA-ARTICULAR; INTRALESIONAL; INTRAMUSCULAR; INTRAVENOUS; SOFT TISSUE at 14:15

## 2020-07-15 RX ADMIN — PROPOFOL 120 MG: 10 INJECTION, EMULSION INTRAVENOUS at 14:15

## 2020-07-15 SDOH — HEALTH STABILITY: MENTAL HEALTH: CURRENT SMOKER: 0

## 2020-07-15 ASSESSMENT — MIFFLIN-ST. JEOR: SCORE: 1392.93

## 2020-07-15 ASSESSMENT — ENCOUNTER SYMPTOMS
DYSRHYTHMIAS: 1
SEIZURES: 0

## 2020-07-15 ASSESSMENT — LIFESTYLE VARIABLES: TOBACCO_USE: 0

## 2020-07-15 NOTE — ADDENDUM NOTE
Addendum  created 07/15/20 1532 by Edgard Serrano MD    Order list changed, Order sets accessed

## 2020-07-15 NOTE — ANESTHESIA POSTPROCEDURE EVALUATION
Patient: Santos Marti    Procedure(s):  CYSTOSCOPY, WITH TRANSURETHRAL RESECTION BLADDER TUMOR    Diagnosis:Malignant neoplasm of lateral wall of urinary bladder (H) [C67.2]  Diagnosis Additional Information: No value filed.    Anesthesia Type:  No value filed.    Note:  Anesthesia Post Evaluation    Patient location during evaluation: PACU  Patient participation: Able to fully participate in evaluation  Level of consciousness: awake and alert  Pain management: adequate  multimodal analgesia used between 6 hours prior to anesthesia start to PACU dischargeAirway patency: patent  Cardiovascular status: acceptable  Respiratory status: acceptable  two or more mitigation strategies used for obstructive sleep apneaHydration status: acceptable  PONV: none     Anesthetic complications: None          Last vitals:  Vitals:    07/15/20 1156 07/15/20 1448   BP: (!) 154/75 104/64   Pulse:  62   Resp: 12 14   Temp: 98.7  F (37.1  C) 97.8  F (36.6  C)   SpO2: 98% 100%         Electronically Signed By: Gary Cabrera MD  July 15, 2020  3:06 PM

## 2020-07-15 NOTE — ANESTHESIA PREPROCEDURE EVALUATION
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]    Procedure(s):  CYSTOSCOPY, WITH TRANSURETHRAL RESECTION BLADDER TUMOR    Past Medical History:   Diagnosis Date     Arthritis      Complication of anesthesia      Diabetes (H)      Gastroesophageal reflux disease      Hypertension      Pacemaker      Past Surgical History:   Procedure Laterality Date     CYSTOSCOPY  10/09/2019    Dr Costello     CYSTOSCOPY, BIOPSY BLADDER, COMBINED N/A 10/22/2019    Procedure: Examination under anesthesia, video cystoscopy, panendoscopy, Verónica urethral dilation, cup biopsy of bladder tumor and fulguration of bladder tumors;  Surgeon: Maximilian Costello MD;  Location: [facility] OR     ENT SURGERY      tonsilectomy     HERNIA REPAIR       ORTHOPEDIC SURGERY       PROSTATE SURGERY       Anesthesia Evaluation     .             ROS/MED HX    ENT/Pulmonary:     (+)SHALINI risk factors hypertension, , . .   (-) tobacco use and sleep apnea   Neurologic:      (-) seizures, Delerium, Dementia, Neuropathy and migraines   Cardiovascular: Comment: Exam Date  Exam Time  Accession #  Results    06  12:16 PM  [id number]  JOSE ALFREDO CASTILLO   Result Impression         STRESS ECHOCARDIOGRAM            Dr. Merlin Brown will report the stress ECG portion of this study.        The patient's baseline echocardiogram does demonstrate normal left   ventricular cavity size and overall systolic function. Aortic root   size appears normal. There is no pericardial effusion.          Post stress images were of adequate quality and shows appropriate   response by all myocardial segments to exercise without any regional   wall motion abnormalities.          CONCLUSION: No evidence of inducible ischemia seen in this study.       (+) hypertension--CAD, --. : . . . pacemaker :. dysrhythmias a-fib, .      (-) pulmonary  "hypertension   METS/Exercise Tolerance:     Hematologic:        (-) anemia   Musculoskeletal:   (+) arthritis,  -       GI/Hepatic:     (+) GERD Asymptomatic on medication,       Renal/Genitourinary:     (+) Other Renal/ Genitourinary, Bladder tumor      Endo:     (+) type II DM Not using insulin - not using insulin pump .      Psychiatric:        (-) psychiatric history   Infectious Disease:         Malignancy:         Other:                          Physical Exam      Airway   Mallampati: II  TM distance: >3 FB  Neck ROM: full    Dental     Cardiovascular   Rhythm and rate: regular and normal  (-) no murmur    Pulmonary    breath sounds clear to auscultation    Other findings: No lab results found.   No lab results found.        Lab Results   Component Value Date     06/27/2005    CHLORIDE 102 06/27/2005    CO2 27 06/27/2005    BUN 15 06/27/2005    PTT 29 06/27/2005    INR 0.97 06/27/2005       Preop Vitals  BP Readings from Last 3 Encounters:   07/15/20 (!) 154/75   07/08/20 136/78   03/11/20 124/68    Pulse Readings from Last 3 Encounters:   07/08/20 68   03/11/20 100   11/19/19 (!) 48      Resp Readings from Last 3 Encounters:   07/15/20 12   10/22/19 18   10/22/19 12    SpO2 Readings from Last 3 Encounters:   07/15/20 98%   07/08/20 95%   03/11/20 96%      Temp Readings from Last 1 Encounters:   07/15/20 98.7  F (37.1  C) (Temporal)    Ht Readings from Last 1 Encounters:   07/15/20 1.778 m (5' 10\")      Wt Readings from Last 1 Encounters:   07/15/20 71.7 kg (158 lb)    Estimated body mass index is 22.67 kg/m  as calculated from the following:    Height as of this encounter: 1.778 m (5' 10\").    Weight as of this encounter: 71.7 kg (158 lb).       Anesthesia Plan      History & Physical Review      ASA Status:  3 .    NPO Status:  > 8 hours    Plan for General with Intravenous induction. Maintenance will be Balanced.    PONV prophylaxis:  Ondansetron (or other 5HT-3) and Dexamethasone or Solumedrol    The " patient is not a current smoker      Postoperative Care  Postoperative pain management:  IV analgesics, Oral pain medications and Multi-modal analgesia.      Consents  Anesthetic plan, risks, benefits and alternatives discussed with:  Patient..                 Kristopher Alanis MD                    .

## 2020-07-15 NOTE — ANESTHESIA CARE TRANSFER NOTE
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    Anesthesia Type:   No value filed.     Note:  Airway :Face Mask and Oral Airway  Patient transferred to:PACU  Comments: Patient with spontaneous respirations and adequate tidal volumes. Patient awake and responsive. LMA removed in OR to 6L facemask. To PACU ventilating well. VSS. Report given.Handoff Report: Identifed the Patient, Identified the Reponsible Provider, Reviewed the pertinent medical history, Discussed the surgical course, Reviewed Intra-OP anesthesia mangement and issues during anesthesia, Set expectations for post-procedure period and Allowed opportunity for questions and acknowledgement of understanding      Vitals: (Last set prior to Anesthesia Care Transfer)    CRNA VITALS  7/15/2020 1416 - 7/15/2020 1453      7/15/2020             Pulse:  60    SpO2:  100 %                Electronically Signed By: CHUCK Davis CRNA  July 15, 2020  2:53 PM

## 2020-07-15 NOTE — OR NURSING
Able to void small amount with some blood noted.  Denies clots.  Pt states still feeling full will scan and record amount.

## 2020-07-15 NOTE — DISCHARGE INSTRUCTIONS
GENERAL ANESTHESIA OR SEDATION ADULT DISCHARGE INSTRUCTIONS   SPECIAL PRECAUTIONS FOR 24 HOURS AFTER SURGERY    IT IS NOT UNUSUAL TO FEEL LIGHT-HEADED OR FAINT, UP TO 24 HOURS AFTER SURGERY OR WHILE TAKING PAIN MEDICATION.  IF YOU HAVE THESE SYMPTOMS; SIT FOR A FEW MINUTES BEFORE STANDING AND HAVE SOMEONE ASSIST YOU WHEN YOU GET UP TO WALK OR USE THE BATHROOM.    YOU SHOULD REST AND RELAX FOR THE NEXT 24 HOURS AND YOU MUST MAKE ARRANGEMENTS TO HAVE SOMEONE STAY WITH YOU FOR AT LEAST 24 HOURS AFTER YOUR DISCHARGE.  AVOID HAZARDOUS AND STRENUOUS ACTIVITIES.  DO NOT MAKE IMPORTANT DECISIONS FOR 24 HOURS.    DO NOT DRIVE ANY VEHICLE OR OPERATE MECHANICAL EQUIPMENT FOR 24 HOURS FOLLOWING THE END OF YOUR SURGERY.  EVEN THOUGH YOU MAY FEEL NORMAL, YOUR REACTIONS MAY BE AFFECTED BY THE MEDICATION YOU HAVE RECEIVED.    DO NOT DRINK ALCOHOLIC BEVERAGES FOR 24 HOURS FOLLOWING YOUR SURGERY.    DRINK CLEAR LIQUIDS (APPLE JUICE, GINGER ALE, 7-UP, BROTH, ETC.).  PROGRESS TO YOUR REGULAR DIET AS YOU FEEL ABLE.    YOU MAY HAVE A DRY MOUTH, A SORE THROAT, MUSCLES ACHES OR TROUBLE SLEEPING.  THESE SHOULD GO AWAY AFTER 24 HOURS.    CALL YOUR DOCTOR FOR ANY OF THE FOLLOWING:  SIGNS OF INFECTION (FEVER, GROWING TENDERNESS AT THE SURGERY SITE, A LARGE AMOUNT OF DRAINAGE OR BLEEDING, SEVERE PAIN, FOUL-SMELLING DRAINAGE, REDNESS OR SWELLING.    IT HAS BEEN OVER 8 TO 10 HOURS SINCE SURGERY AND YOU ARE STILL NOT ABLE TO URINATE (PASS WATER).     CYSTOSCOPY DISCHARGE INSTRUCTIONS  [facility]  [address]  [phone]    YOU MAY GO BACK TO YOUR NORMAL DIET AND ACTIVITY, UNLESS YOUR DOCTOR TELLS YOU NOT TO.    FOR THE NEXT TWO DAYS, YOU MAY NOTICE:    SOME BLOOD IN YOUR URINE.  SOME BURNING WHEN YOU URINATE.  AN URGE TO URINATE MORE OFTEN.  BLADDER SPASMS.    THESE ARE NORMAL AFTER THE PROCEDURE.  THEY SHOULD GO AWAY AFTER A DAY OR TWO.  TO RELIEVE THESE PROBLEMS:     DRINK 6 TO 8 LARGE GLASSES OF WATER EACH DAY  (INCLUDES DRINKS AT MEALS).  THIS WILL HELP CLEAR THE URINE.    TAKE WARM BATHS TO RELIEVE PAIN AND BLADDER SPASMS.  DO NOT ADD ANYTHING TO THE BATH WATER.    YOUR DOCTOR MAY PRESCRIBE PAIN MEDICINE.  YOU MAY ALSO TAKE TYLENOL (ACETAMINOPHEN) FOR PAIN.    CALL YOUR SURGEON IF YOU HAVE:    A FEVER OVER 101 DEGREES.  CHECK YOUR TEMPERATURE UNDER YOUR TONGUE.    CHILLS.    FAILURE TO URINATE (NO URINE COMES OUT WHEN YOU TRY TO USE THE TOILET).  TRY SOAKING IN A BATHTUB FULL OF WARM WATER.  IF STILL NO URINE, CALL YOUR DOCTOR.    A LOT OF BLOOD IN THE URINE, OR BLOOD CLOTS LARGER THAN A NICKEL.      PAIN IN THE BACK OR BELLY AREA (ABDOMEN).    PAIN OR SPASMS THAT ARE NOT RELIEVED BY WARM TUB BATHS AND PAIN MEDICINE.      SEVERE PAIN, BURNING OR OTHER PROBLEMS WHILE PASSING URINE.    PAIN THAT GETS WORSE AFTER TWO DAY

## 2020-07-15 NOTE — BRIEF OP NOTE
Diagnosis: Recurrent bladder cancer  Procedure: Video cystopanendoscopy, EUA, transurethral resection of bladder tumor (medium)  Surgeon: Trang  Anesthesia: General laryngeal mask  EBL: Less than 5 ml  Findings: Solid tumor with gross muscle invasion

## 2020-07-15 NOTE — OR NURSING
Post void residual scan 43.  Pt states slow urinating but able to go, no clots.  Pt would like to go home.

## 2020-07-16 ENCOUNTER — HOSPITAL ENCOUNTER (EMERGENCY)
Facility: CLINIC | Age: 85
Discharge: HOME | End: 2020-07-16
Attending: EMERGENCY MEDICINE | Admitting: EMERGENCY MEDICINE
Payer: MEDICARE

## 2020-07-16 VITALS
DIASTOLIC BLOOD PRESSURE: 68 MMHG | SYSTOLIC BLOOD PRESSURE: 112 MMHG | RESPIRATION RATE: 20 BRPM | HEART RATE: 61 BPM | TEMPERATURE: 98.3 F | OXYGEN SATURATION: 99 %

## 2020-07-16 DIAGNOSIS — R33.8 POSTOPERATIVE RETENTION OF URINE: ICD-10-CM

## 2020-07-16 DIAGNOSIS — N99.89 POSTOPERATIVE RETENTION OF URINE: ICD-10-CM

## 2020-07-16 LAB
ALBUMIN UR-MCNC: 50 MG/DL
ANION GAP SERPL CALCULATED.3IONS-SCNC: 8 MMOL/L (ref 3–14)
APPEARANCE UR: CLEAR
BACTERIA #/AREA URNS HPF: ABNORMAL /HPF
BASOPHILS # BLD AUTO: 0 10E9/L (ref 0–0.2)
BASOPHILS NFR BLD AUTO: 0.1 %
BILIRUB UR QL STRIP: NEGATIVE
BUN SERPL-MCNC: 27 MG/DL (ref 7–30)
CALCIUM SERPL-MCNC: 8.9 MG/DL (ref 8.5–10.1)
CHLORIDE SERPL-SCNC: 100 MMOL/L (ref 94–109)
CO2 SERPL-SCNC: 24 MMOL/L (ref 20–32)
COLOR UR AUTO: ABNORMAL
CREAT SERPL-MCNC: 1.13 MG/DL (ref 0.66–1.25)
DIFFERENTIAL METHOD BLD: ABNORMAL
EOSINOPHIL # BLD AUTO: 0 10E9/L (ref 0–0.7)
EOSINOPHIL NFR BLD AUTO: 0.2 %
ERYTHROCYTE [DISTWIDTH] IN BLOOD BY AUTOMATED COUNT: 12.3 % (ref 10–15)
GFR SERPL CREATININE-BSD FRML MDRD: 57 ML/MIN/{1.73_M2}
GLUCOSE SERPL-MCNC: 146 MG/DL (ref 70–99)
GLUCOSE UR STRIP-MCNC: NEGATIVE MG/DL
HCT VFR BLD AUTO: 42.2 % (ref 40–53)
HGB BLD-MCNC: 13.9 G/DL (ref 13.3–17.7)
HGB UR QL STRIP: ABNORMAL
IMM GRANULOCYTES # BLD: 0 10E9/L (ref 0–0.4)
IMM GRANULOCYTES NFR BLD: 0.3 %
INR PPP: 1.08 (ref 0.86–1.14)
INTERPRETATION ECG - MUSE: NORMAL
KETONES UR STRIP-MCNC: NEGATIVE MG/DL
LEUKOCYTE ESTERASE UR QL STRIP: ABNORMAL
LYMPHOCYTES # BLD AUTO: 1.6 10E9/L (ref 0.8–5.3)
LYMPHOCYTES NFR BLD AUTO: 15.5 %
MCH RBC QN AUTO: 32 PG (ref 26.5–33)
MCHC RBC AUTO-ENTMCNC: 32.9 G/DL (ref 31.5–36.5)
MCV RBC AUTO: 97 FL (ref 78–100)
MONOCYTES # BLD AUTO: 0.8 10E9/L (ref 0–1.3)
MONOCYTES NFR BLD AUTO: 7.7 %
MUCOUS THREADS #/AREA URNS LPF: PRESENT /LPF
NEUTROPHILS # BLD AUTO: 8 10E9/L (ref 1.6–8.3)
NEUTROPHILS NFR BLD AUTO: 76.2 %
NITRATE UR QL: NEGATIVE
NRBC # BLD AUTO: 0 10*3/UL
NRBC BLD AUTO-RTO: 0 /100
PH UR STRIP: 5.5 PH (ref 5–7)
PLATELET # BLD AUTO: 201 10E9/L (ref 150–450)
POTASSIUM SERPL-SCNC: 3.9 MMOL/L (ref 3.4–5.3)
RBC # BLD AUTO: 4.34 10E12/L (ref 4.4–5.9)
RBC #/AREA URNS AUTO: 71 /HPF (ref 0–2)
SODIUM SERPL-SCNC: 132 MMOL/L (ref 133–144)
SOURCE: ABNORMAL
SP GR UR STRIP: 1.01 (ref 1–1.03)
UROBILINOGEN UR STRIP-MCNC: NORMAL MG/DL (ref 0–2)
WBC # BLD AUTO: 10.5 10E9/L (ref 4–11)
WBC #/AREA URNS AUTO: 20 /HPF (ref 0–5)

## 2020-07-16 PROCEDURE — 99283 EMERGENCY DEPT VISIT LOW MDM: CPT | Mod: 25

## 2020-07-16 PROCEDURE — 51700 IRRIGATION OF BLADDER: CPT

## 2020-07-16 PROCEDURE — 85025 COMPLETE CBC W/AUTO DIFF WBC: CPT | Performed by: EMERGENCY MEDICINE

## 2020-07-16 PROCEDURE — 87086 URINE CULTURE/COLONY COUNT: CPT | Performed by: EMERGENCY MEDICINE

## 2020-07-16 PROCEDURE — 81001 URINALYSIS AUTO W/SCOPE: CPT | Performed by: EMERGENCY MEDICINE

## 2020-07-16 PROCEDURE — 80048 BASIC METABOLIC PNL TOTAL CA: CPT | Performed by: EMERGENCY MEDICINE

## 2020-07-16 PROCEDURE — 85610 PROTHROMBIN TIME: CPT | Performed by: EMERGENCY MEDICINE

## 2020-07-16 PROCEDURE — 51702 INSERT TEMP BLADDER CATH: CPT

## 2020-07-16 RX ORDER — TAMSULOSIN HYDROCHLORIDE 0.4 MG/1
0.4 CAPSULE ORAL DAILY
Qty: 10 CAPSULE | Refills: 0 | Status: SHIPPED | OUTPATIENT
Start: 2020-07-16 | End: 2020-07-26

## 2020-07-16 RX ORDER — LIDOCAINE HYDROCHLORIDE 20 MG/ML
6 JELLY TOPICAL ONCE
Status: DISCONTINUED | OUTPATIENT
Start: 2020-07-16 | End: 2020-07-17 | Stop reason: HOSPADM

## 2020-07-16 RX ORDER — LIDOCAINE HYDROCHLORIDE 20 MG/ML
JELLY TOPICAL
Status: DISCONTINUED
Start: 2020-07-16 | End: 2020-07-17 | Stop reason: HOSPADM

## 2020-07-16 ASSESSMENT — ENCOUNTER SYMPTOMS
DIARRHEA: 0
ABDOMINAL PAIN: 1
VOMITING: 0
COUGH: 0
FEVER: 0

## 2020-07-16 NOTE — OP NOTE
Procedure Date: 07/15/2020      PREOPERATIVE DIAGNOSIS:  Recurrent bladder cancer.      POSTOPERATIVE DIAGNOSIS:  Recurrent bladder cancer.      PROCEDURE:  Video cystoscopy, EUA, transurethral resection of bladder tumor (medium size).      SURGEON:  Maximilian Costello MD      ANESTHESIA:  General laryngeal mask.      ESTIMATED BLOOD LOSS:  Less than 5 mL      INDICATIONS:  The patient is an 88-year-old male whom I have known for many years, who was treated with radiation for prostate cancer years ago.  He developed bladder cancer, and at his 3-month followup he was found to have a tumor on the left lateral wall, and he now presents for resection.  The procedure, the alternatives, risks and followup were carefully discussed.  The patient has been off his Eliquis for 3 full days.      DESCRIPTION OF THE PROCEDURE:  The patient was given 2 grams of IV Ancef and taken to cystoscopy and placed supine on the operating table.  After adequate general laryngeal mask anesthesia, the patient was placed in lithotomy position, and his genitalia were prepped and draped in a sterile fashion.  Rectal exam revealed no palpable prostate tissue or rectal mass and no bladder induration or mass.      I then passed a 26-Spanish Storz resectoscope sheath with direct vision using the 30-degree lens and video and water as an irrigant.  I passed through a radiated prostatic urethra and was able to get into the bladder with some resistance.  The resectoscope loop was then placed through the resectoscope sheath and the tumor on the left lateral wall visualized and completely resected down to superficial and deep muscularis, but no perforations were made.  The bleeding was less than 5 mL.  The tumor was irrigated out of the bladder, and the area was fulgurated widely with the roller ball, and there was good hemostasis.  The bladder was emptied and the resectoscope removed.  The patient went to the recovery room in stable condition and will be  discharged on Cipro 250 mg every 12 hours for 2 doses.  He will resume his Eliquis tomorrow and be called with a tissue report in 48 hours.         FIDEL BARRON JR, MD             D: 07/15/2020   T: 07/15/2020   MT: SAHIL      Name:     TRACI HOUSE   MRN:      [id number]        Account:        [id number]   :      1931           Procedure Date: 07/15/2020      Document: [id number]       cc: Nehemias Barron Jr, MD

## 2020-07-16 NOTE — ED AVS SNAPSHOT
[facility] Emergency Department  [address]  [facility] [address]  Phone:  [phone]  Fax:  [phone]                                    Santos Marti   MRN: [id number]    Department:  [facility] Emergency Department   Date of Visit:  7/16/2020           After Visit Summary Signature Page    I have received my discharge instructions, and my questions have been answered. I have discussed any challenges I see with this plan with the nurse or doctor.    ..........................................................................................................................................  Patient/Patient Representative Signature      ..........................................................................................................................................  Patient Representative Print Name and Relationship to Patient    ..................................................               ................................................  Date                                   Time    ..........................................................................................................................................  Reviewed by Signature/Title    ...................................................              ..............................................  Date                                               Time          22EPIC Rev 08/18

## 2020-07-17 LAB — COPATH REPORT: NORMAL

## 2020-07-17 NOTE — DISCHARGE INSTRUCTIONS
Return for fevers, increasing blood in the Gudino catheter bag, Gudino catheter bag not draining urine.  Follow-up with your urologist.

## 2020-07-17 NOTE — ED PROVIDER NOTES
History     Chief Complaint:  Urinary Retention    HPI   Santos Marti is a 88 year old male who presents with urinary retention and abdominal pain. He had a transurethral tumor resection yesterday, and initially noticed blood in his urine following the procedure. However this later resolved. This afternoon, he noticed urinary retention and he last urinated approximately 5 hours ago. He also reports mild abdominal discomfort. He denies fever, cough, vomiting, and diarrhea. Of note, he has not taken his Eliquis medication for the past 3 days.     Allergies:  Sulfa drugs     Medications:    Eliquis  Ciprofloxacin  Voltaren  Advair  Imdur  Prinzide  Metformin  Toprol  Omeprazole    Past Medical History:    Arthritis  Complication of anesthesia  Diabetes  GERD  Hypertension  pacemaker  Malignant neoplasm of urinary bladder    Past Surgical History:    Tonsillectomy  Hernia repair  Orthopedic surgery  Prostate surgery   Transurethral resection of bladder tumor    Family History:    Diabetes  Bone cancer     Social History:  Smoking status: former, quit 1980  Alcohol use: yes, 4 drinks per week  Marital Status:   [2]   Presents with wife    Review of Systems   Constitutional: Negative for fever.   Respiratory: Negative for cough.    Gastrointestinal: Positive for abdominal pain. Negative for diarrhea and vomiting.   Genitourinary: Positive for decreased urine volume.   All other systems reviewed and are negative.      Physical Exam     Patient Vitals for the past 24 hrs:   BP Temp Temp src Pulse Resp SpO2   07/16/20 2028 (!) 196/94 98.3  F (36.8  C) Temporal 60 20 99 %       Physical Exam  VS: Reviewed per above  HENT: Mucous membranes moist  EYES: sclera anicteric  CV: Rate as noted, regular rhythm.   RESP: Effort normal. Breath sounds are normal bilaterally.  GI: mild suprapubic tenderness without rebound/guarding, not distended.  NEURO: Alert, moving all extremities  MSK: No deformity of the  extremities  SKIN: Warm and dry    Emergency Department Course     Laboratory:  Laboratory findings were communicated with the patient who voiced understanding of the findings.  CBC: WNL. (WBC 10.5, HGB 13.9, )   BMP: Glucose 146 (H), GFR 57 (L),  (L), o/w WNL (Creatinine: 1.13)     INR: 1.08    UA with micro: moderate blood, protein albumin 50, small leukocyte esterase, WBC/HPF 20 (H), RBC/HPF (H), few bacteria, mucous present o/w negative  Urine Culture Aerobic Bacterial: Pending    Emergency Department Course:  Past medical records, nursing notes, and vitals reviewed.  2040: I performed an exam of the patient and obtained history, as documented above.     Blood drawn. This was sent to the lab for further testing, results above.    The patient provided a urine sample here in the emergency department. This was sent for laboratory testing, findings above.    2229: I rechecked the patient. Explained findings to patient.    2236: Patient discharged home with instructions regarding supportive care, medications, and reasons to return. The importance of close follow-up was reviewed.     Impression & Plan      Medical Decision Making:  Patient presents to the ER for evaluation of urinary retention.  On arrival vital signs within normal limits.  On exam he has mild suprapubic tenderness without peritoneal signs.  He had a transurethral resection of bladder neoplasm yesterday with Dr. Costello.  After Gudino catheter placement here in the ER, we are able to drain approximately 300 cc of asad urine.  We did temporarily irrigate the bladder to wash out any occult blood clots but no bleeding was identified.  Basic labs are reassuring.  UA is abnormal but not obviously infected and thus it was sent for culture.  Patient was discharged with Gudino catheter in place and given a prescription for Flomax.  I encouraged him to make an appointment with urology after the weekend for discussion of trial of void.  Return  precautions were discussed prior to discharge.    Diagnosis:    ICD-10-CM    1. Postoperative retention of urine  N99.89     R33.8        Disposition:  discharged to home    Discharge Medications:  New Prescriptions    TAMSULOSIN (FLOMAX) 0.4 MG CAPSULE    Take 1 capsule (0.4 mg) by mouth daily for 10 doses     Scribe Disclosure:  I, Sisi Temple, am serving as a scribe at 8:51 PM on 7/16/2020 to document services personally performed by Oliverio Spence MD based on my observations and the provider's statements to me.     Sisi Temple  7/16/2020   [facility] EMERGENCY DEPARTMENT       Oliverio Spence MD  07/17/20 0015       Oliverio Spence MD  07/17/20 0015

## 2020-07-18 LAB
BACTERIA SPEC CULT: NO GROWTH
Lab: NORMAL
SPECIMEN SOURCE: NORMAL

## 2020-07-21 ENCOUNTER — ALLIED HEALTH/NURSE VISIT (OUTPATIENT)
Dept: UROLOGY | Facility: CLINIC | Age: 85
End: 2020-07-21
Payer: MEDICARE

## 2020-07-21 DIAGNOSIS — Z79.2 PROPHYLACTIC ANTIBIOTIC: Primary | ICD-10-CM

## 2020-07-21 PROCEDURE — 99211 OFF/OP EST MAY X REQ PHY/QHP: CPT

## 2020-07-21 RX ORDER — CIPROFLOXACIN 500 MG/1
500 TABLET, FILM COATED ORAL ONCE
Qty: 1 TABLET | Refills: 0 | Status: SHIPPED | OUTPATIENT
Start: 2020-07-21 | End: 2020-07-21

## 2020-07-21 NOTE — PROGRESS NOTES
Santos Marit comes into clinic today at the request of Dr Costello Ordering Provider for hunt removal .  This service provided today was under the supervising provider of the day Dr Sifuentes, who was available if needed.      Patient went into post op  Retention  Was seen in ED  Last Thursday and hunt placed . He was aslo given 10 days of Flomax. Here  Today to have hunt removed . Urine clear in drainage bag. Balloon deflated for 12 ml  And hunt removed with ease. Instructed patient to continue Flomax until gone . Will check with Dr Costello to see if he needs more than the 10 day course that was given , He will  Take one  Cipro  500 mg  Today . Instructed to go home and drink water  Call office if unable to void this afternoon.DENISSE Norman LPN

## 2020-07-22 ENCOUNTER — OFFICE VISIT (OUTPATIENT)
Dept: UROLOGY | Facility: CLINIC | Age: 85
End: 2020-07-22
Payer: MEDICARE

## 2020-07-22 VITALS
OXYGEN SATURATION: 94 % | DIASTOLIC BLOOD PRESSURE: 60 MMHG | BODY MASS INDEX: 22.67 KG/M2 | HEART RATE: 62 BPM | SYSTOLIC BLOOD PRESSURE: 122 MMHG | WEIGHT: 158 LBS

## 2020-07-22 DIAGNOSIS — C67.2 MALIGNANT NEOPLASM OF LATERAL WALL OF URINARY BLADDER (H): Primary | ICD-10-CM

## 2020-07-22 LAB
ALBUMIN UR-MCNC: 100 MG/DL
APPEARANCE UR: CLEAR
BILIRUB UR QL STRIP: NEGATIVE
COLOR UR AUTO: YELLOW
GLUCOSE UR STRIP-MCNC: NEGATIVE MG/DL
HGB UR QL STRIP: ABNORMAL
KETONES UR STRIP-MCNC: NEGATIVE MG/DL
LEUKOCYTE ESTERASE UR QL STRIP: ABNORMAL
NITRATE UR QL: NEGATIVE
PH UR STRIP: 5 PH (ref 5–7)
RESIDUAL VOLUME (RV) (EXTERNAL): 13
SOURCE: ABNORMAL
SP GR UR STRIP: 1.02 (ref 1–1.03)
UROBILINOGEN UR STRIP-ACNC: 0.2 EU/DL (ref 0.2–1)

## 2020-07-22 PROCEDURE — 51798 US URINE CAPACITY MEASURE: CPT | Performed by: UROLOGY

## 2020-07-22 PROCEDURE — 99212 OFFICE O/P EST SF 10 MIN: CPT | Mod: 25 | Performed by: UROLOGY

## 2020-07-22 PROCEDURE — 81003 URINALYSIS AUTO W/O SCOPE: CPT | Performed by: UROLOGY

## 2020-07-22 ASSESSMENT — PAIN SCALES - GENERAL: PAINLEVEL: NO PAIN (0)

## 2020-07-22 NOTE — PROGRESS NOTES
Mr. Marti is an 88-year-old gentleman who was radiated years ago for prostate cancer and developed superficial bladder cancer in 2019.  He had a recurrence on the left lateral wall that was resected several days ago and shows high-grade muscle invasive disease.  He had a CT urogram last October that showed no metastases.  This will be repeated and he will be referred to medical oncology for possible systemic therapy.  Other past medical history: Arthritis, diabetes, GERD, hypertension, pacemaker, former smoker  Medications: Tylenol, Eliquis, Voltaren gel, Advair, Imdur, lisinopril/hydrochlorothiazide, metformin, metoprolol, omeprazole, Flomax  Allergies: Sulfa  Review of systems: Has had difficulty voiding since surgery and required a Gudino catheter on discharge.  Trial of void yesterday and is now having urinary frequency  Exam: Alert and oriented, normal respirations, neuro grossly intact  Postvoid residual: 13 cc  Assessment: Invasive bladder cancer, postoperative urinary retention that is resolved.  Urinary urgency will improve as he heals the left lateral wall  Plan: CT urogram, see medical oncology for suggestions.  Not a candidate for partial cystectomy or cystectomy, cannot receive more pelvic radiation

## 2020-07-22 NOTE — NURSING NOTE
Chief Complaint   Patient presents with     Urinary Retention     Patient  state not able Void UA and PVR today       UA RESULTS:  Recent Labs   Lab Test 07/22/20  0810 07/16/20  2120   COLOR Yellow Light Yellow   APPEARANCE Clear Clear   URINEGLC Negative Negative   URINEBILI Negative Negative   URINEKETONE Negative Negative   SG 1.025 1.014   UBLD Moderate* Moderate*   URINEPH 5.0 5.5   PROTEIN 100* 50*   UROBILINOGEN 0.2  --    NITRITE Negative Negative   LEUKEST Trace* Small*   RBCU  --  71*   WBCU  --  20*       PVR 13ML      Kirti Hart, [facility]

## 2020-07-22 NOTE — LETTER
7/22/2020      RE: Santos Marti  [address]  [facility] [address]       Mr. Marti is an 88-year-old gentleman who was radiated years ago for prostate cancer and developed superficial bladder cancer in 2019.  He had a recurrence on the left lateral wall that was resected several days ago and shows high-grade muscle invasive disease.  He had a CT urogram last October that showed no metastases.  This will be repeated and he will be referred to medical oncology for possible systemic therapy.  Other past medical history: Arthritis, diabetes, GERD, hypertension, pacemaker, former smoker  Medications: Tylenol, Eliquis, Voltaren gel, Advair, Imdur, lisinopril/hydrochlorothiazide, metformin, metoprolol, omeprazole, Flomax  Allergies: Sulfa  Review of systems: Has had difficulty voiding since surgery and required a Gudino catheter on discharge.  Trial of void yesterday and is now having urinary frequency  Exam: Alert and oriented, normal respirations, neuro grossly intact  Postvoid residual: 13 cc  Assessment: Invasive bladder cancer, postoperative urinary retention that is resolved.  Urinary urgency will improve as he heals the left lateral wall  Plan: CT urogram, see medical oncology for suggestions.  Not a candidate for partial cystectomy or cystectomy, cannot receive more pelvic radiation    Maximilian Costello MD

## 2020-07-22 NOTE — LETTER
7/22/2020       RE: Santos Marti  [address]  [facility] [address]     Dear Colleague,    Thank you for referring your patient, Santos Marti, to the [facility]OLOG[facility] [place] at [facility]. Please see a copy of my visit note below.    Mr. Marti is an 88-year-old gentleman who was radiated years ago for prostate cancer and developed superficial bladder cancer in 2019.  He had a recurrence on the left lateral wall that was resected several days ago and shows high-grade muscle invasive disease.  He had a CT urogram last October that showed no metastases.  This will be repeated and he will be referred to medical oncology for possible systemic therapy.  Other past medical history: Arthritis, diabetes, GERD, hypertension, pacemaker, former smoker  Medications: Tylenol, Eliquis, Voltaren gel, Advair, Imdur, lisinopril/hydrochlorothiazide, metformin, metoprolol, omeprazole, Flomax  Allergies: Sulfa  Review of systems: Has had difficulty voiding since surgery and required a Gudino catheter on discharge.  Trial of void yesterday and is now having urinary frequency  Exam: Alert and oriented, normal respirations, neuro grossly intact  Postvoid residual: 13 cc  Assessment: Invasive bladder cancer, postoperative urinary retention that is resolved.  Urinary urgency will improve as he heals the left lateral wall  Plan: CT urogram, see medical oncology for suggestions.  Not a candidate for partial cystectomy or cystectomy, cannot receive more pelvic radiation    Again, thank you for allowing me to participate in the care of your patient.      Sincerely,    Maximilian Costello MD

## 2020-07-24 ENCOUNTER — TELEPHONE (OUTPATIENT)
Dept: UROLOGY | Facility: CLINIC | Age: 85
End: 2020-07-24

## 2020-07-24 NOTE — TELEPHONE ENCOUNTER
Called [facility] to let them know about referral. They will call froilan Borrego RN      ----- Message from Emani Naik LPN sent at 7/22/2020  8:05 AM CDT -----    ----- Message -----  From: Maximilian Costello MD  Sent: 7/21/2020   5:02 PM CDT  To: [facility] Nurse-Serina    Needs appointment with Dr Branham or Dr Ellis in Oncology in #[phone] at [place] - muscle invasive bladder cancer  I spoke with patient

## 2020-07-30 ENCOUNTER — PATIENT OUTREACH (OUTPATIENT)
Dept: ONCOLOGY | Facility: CLINIC | Age: 85
End: 2020-07-30

## 2020-07-30 ENCOUNTER — ONCOLOGY VISIT (OUTPATIENT)
Dept: ONCOLOGY | Facility: CLINIC | Age: 85
End: 2020-07-30
Payer: MEDICARE

## 2020-07-30 VITALS
SYSTOLIC BLOOD PRESSURE: 98 MMHG | HEIGHT: 70 IN | RESPIRATION RATE: 18 BRPM | WEIGHT: 157.3 LBS | BODY MASS INDEX: 22.52 KG/M2 | DIASTOLIC BLOOD PRESSURE: 52 MMHG | OXYGEN SATURATION: 96 % | HEART RATE: 62 BPM | TEMPERATURE: 98.4 F

## 2020-07-30 DIAGNOSIS — C67.2 MALIGNANT NEOPLASM OF LATERAL WALL OF URINARY BLADDER (H): Primary | ICD-10-CM

## 2020-07-30 PROCEDURE — 99205 OFFICE O/P NEW HI 60 MIN: CPT | Performed by: INTERNAL MEDICINE

## 2020-07-30 ASSESSMENT — PAIN SCALES - GENERAL: PAINLEVEL: NO PAIN (0)

## 2020-07-30 ASSESSMENT — MIFFLIN-ST. JEOR: SCORE: 1389.76

## 2020-07-30 NOTE — LETTER
7/30/2020         RE: Santos Marti  [address]  [facility] [address]        Dear Colleague,    Thank you for referring your patient, Santos Marti, to the [facility]. Please see a copy of my visit note below.    [facility] CANCER [facility]    NEW PATIENT VISIT NOTE    PATIENT NAME: Santos Marti MRN # [id number]  DATE OF VISIT: July 30, 2020 YOB: 1931    REFERRING PROVIDER: No referring provider defined for this encounter.    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  He was on apixaban for atrial fibrillation.  TURBT done on 10/22/2019 revealed normal muscle invasive bladder cancer  -He was followed with surveillance cystoscopies every 3 months.  His cystoscopy evaluation was negative for any tumors or raised erythema on 3/11/2020, however a follow-up exam on 7/8/2020 revealed raised erythematous area in the left lateral bladder wall.  He had TURBT under anesthesia on 7/15/2020 which is revealed high-grade muscle invasive urothelial carcinoma.  -Santos was diagnosed with prostate cancer in October 2004.  He underwent brachii therapy followed by external beam radiation therapy administered at the [facility].  -Due to his previous radiation for his prostate cancer he is not a candidate for bladder radiation.  He is not a candidate for cystectomy due to his advanced age and also previous extensive radiation to the prostate.     CURRENT INTERVENTIONS:  Ongoing staging     HISTORY OF PRESENT ILLNESS       Last fall he had hematuria. He only had blood in urine for 3 hrs or so. It cleared up after that. He has been seeing doctor [facility] the [facility]. [facility] and they suggested just to follow without intervention. He was diagnosed with prostate cancer about 10 yrs ago. He was managed by   Trang at that time and so he reached out to him. Dr. Costello did a cystoscopic evaluation and trans urethral resection of bladder tumor (TURBT). He was then followed with serial cystoscopies. He had a cystoscopy done couple of weeks ago which has revealed muscle invasive bladder cancer. He has been referred to Medical Oncology for this.     He denies any further hematuria except around the time of cystoscopy. He denies any pain/discomfort. He needed catheter which was painful.     He has been in good health. He had both hip replacement 8 yrs ago and both shoulder replacement about 2 years ago. He had syncope after one of the shoulder surgeries. He was fitted with a pacemaker for this. He has been in good health. He has been recently diagnosed with diabetes mellitus type II. He has hypertension for a very long time.  He is on anticoagulation for atrial fibrillation.      PAST MEDICAL HISTORY     Past Medical History:   Diagnosis Date     Arthritis      Complication of anesthesia      Diabetes (H)      Gastroesophageal reflux disease      Hypertension      Pacemaker           CURRENT OUTPATIENT MEDICATIONS     Current Outpatient Medications   Medication Sig     acetaminophen (TYLENOL) 500 MG tablet Take 500-1,000 mg by mouth every 6 hours as needed for mild pain     apixaban ANTICOAGULANT (ELIQUIS) 5 MG tablet Take 5 mg by mouth 2 times daily      isosorbide mononitrate (IMDUR) 30 MG 24 hr tablet TK 1 T PO ONCE D     lisinopril-hydrochlorothiazide (PRINZIDE/ZESTORETIC) 20-25 MG tablet Take 1 tablet by mouth     metFORMIN (GLUCOPHAGE) 1000 MG tablet Take 500 mg by mouth daily      metoprolol succinate ER (TOPROL-XL) 50 MG 24 hr tablet Take 50 mg by mouth daily Take one and a half tabs daily     omeprazole 20 MG tablet Take 20 mg by mouth daily     fluticasone-salmeterol (ADVAIR) 500-50 MCG/DOSE inhaler Inhale 1 puff into the lungs every 12 hours     No current facility-administered medications for this visit.          ALLERGIES      Allergies   Allergen Reactions     Sulfa Drugs Rash        SOCIAL HISTORY   He is  and lives with his wife. Retired. He was an . 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     He quit smoking long ago about 30 yrs ago. He smoked about pack a week. He denies alcohol or recreational drug abuse.      FAMILY HISTORY   - Father had cancer - bone cancer. Had treatment and was in remission for rest of his life.   - Mother had 63 yrs of age; CVA     REVIEW OF SYSTEMS   As above in the HPI, o/w complete 12-point ROS was negative.     PHYSICAL EXAM   B/P: 98/52[manual[, T: 98.4, P: 62, R: 18  @LASTSAO2(4)@  Wt Readings from Last 3 Encounters:   07/30/20 71.4 kg (157 lb 4.8 oz)   07/22/20 71.7 kg (158 lb)   07/15/20 71.7 kg (158 lb)     GEN: NAD  HEENT: PERRL, EOMI, no icterus, injection or pallor. Oropharynx is clear.  NECK: no cervical or supraclavicular lymphadenopathy  LUNGS: clear bilaterally  CV: regular, no murmurs, rubs, or gallops  ABDOMEN: soft, non-tender, non-distended, normal bowel sounds, no hepatosplenomegaly by percussion or palpation  EXT: warm, well perfused, no edema  NEURO: alert  SKIN: no rashes     LABORATORY AND IMAGING STUDIES     Recent Labs   Lab Test 07/16/20  2120 07/15/20  1216   * 138   POTASSIUM 3.9 4.3   CHLORIDE 100 106   CO2 24 26   ANIONGAP 8 6   BUN 27 26   CR 1.13 1.08   * 141*   SAAD 8.9 8.9     No results for input(s): MAG, PHOS in the last 97389 hours.  Recent Labs   Lab Test 07/16/20  2120 07/15/20  1216   WBC 10.5 6.6   HGB 13.9 13.8    210   MCV 97 99   NEUTROPHIL 76.2  --      No results for input(s): BILITOTAL, ALKPHOS, ALT, AST, ALBUMIN, LDH in the last 93869 hours.  No results found for: TSH  No results for input(s): CEA in the last 46337 hours.  Results for orders placed or performed during the hospital encounter of 11/18/19   Head CT w/o contrast    Narrative    EXAM: CT HEAD W/O CONTRAST  LOCATION:  [facility]  DATE/TIME: 11/19/2019 12:41 AM    INDICATION: Fall hitting head.  COMPARISON: None.  TECHNIQUE: Routine without IV contrast. Multiplanar reformats. Dose reduction techniques were used.    FINDINGS:  INTRACRANIAL CONTENTS: No intracranial hemorrhage, extraaxial collection, or mass effect.  No CT evidence of acute infarct. There is diffuse confluent low-attenuation within the periventricular and subcortical white matter consistent with diffuse small   vessel ischemic disease. 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 The ventricular system, basal cisterns and the cortical sulci are consistent with diffuse volume loss.     VISUALIZED ORBITS/SINUSES/MASTOIDS: No intraorbital abnormality. There is no significant paranasal sinus disease. No middle ear or mastoid effusion.    BONES/SOFT TISSUES: The calvarium is intact. There is a mild mild scalp hematoma in the region of the right superior lateral orbital rim.      Impression    IMPRESSION:  1.  No CT finding of a mass, hemorrhage or focal area suggestive of an acute infarct.  2.  Diffuse age related changes.   Cervical spine CT w/o contrast    Narrative    EXAM: CT CERVICAL SPINE W/O CONTRAST  LOCATION: [facility]  DATE/TIME: 11/19/2019 12:41 AM    INDICATION: Fall with neck pain.  COMPARISON: None.  TECHNIQUE: CT cervical spine without contrast. Dose reduction techniques were performed.    FINDINGS: There is diffuse osteopenia of the bony structures. There is good anatomic alignment of the C1 and C2 vertebral bodies and also with the occipital condyles. The prevertebral soft tissues and the predental space is maintained. There is a slight   anterior listhesis of C4 in relation to C5 and C7 in relation to T1. The vertebral body heights are well-maintained throughout. There is prominent degenerative disc disease from the C4-C5 to the C6-C7 disc space levels. These levels have a significant   loss of disc space height, endplate  changes and anterior osteophyte formations. There is prominent facet arthropathy throughout the cervical region.     At the C3-C4 disc space level there is moderate bilateral neural foraminal narrowing. At the C4-C5 disc space level there is significant right neural foraminal narrowing and moderate left neural foraminal narrowing. At the C5-C6 and the C6-C7 disc space   levels there is moderate bilateral neural foraminal narrowing. There is no significant canal compromise throughout the cervical region. There is mild prominence of the interstitial tissues of the lung apices. The paraspinal soft tissues are grossly   unremarkable.      Impression    IMPRESSION:  1. No evidence of an acute cervical spine fracture.  2. Vertebral body heights maintained and slight anterior listhesis of C4 in relation to C5 and C7 in relation to T1.  2. No significant canal compromise but prominent facet arthropathy leading to diffuse neural foraminal narrowing as described above.       Lab Results   Component Value Date    PATH  07/15/2020     Patient Name: TRACI HOUSE  MR#: [id number]  Specimen #: [id number]  Collected: 7/15/2020  Received: 7/15/2020  Reported: 7/17/2020 12:40  Ordering Phy(s): FIDEL BARRON    For improved result formatting, select 'View Enhanced Report Format' under   Linked Documents section.    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  -Tumor invades lamina propria and muscularis propria.  -Urothelial (transitional cell) carcinoma in-situ: Not identified.  -Lymph/vascular space invasion: Not identified.  -Sampling includes: Urothelium, lamina propria, and muscularis propria.    Electronically signed out by:    Marta Bernal M.D.    CLINICAL HISTORY:  Malignant neoplasm of lateral wall of urinary  "bladder.    GROSS:  The specimen is received in formalin labeled with the patient's name,   identifying information and designated  \"bladder tumor\".  It consists of five pink brown rubbery tissue fragments,   ranging from 0.5-0.7 cm.  Submitted  entirely in one block. (Dictated by: FEDERICO Austin 7/15/2020 02:54 PM)    MICROSCOPIC:  Microscopic examination is performed.    Selected immunoperoxidase stains are performed to better characterize the   tumor, using appropriate controls.  The tumor cells erika for BECCA-3 and p63 (patchy); the tumor cells do not   erika for NKX3, chromogranin, and  synaptophysin. The immunohistochemical staining profile is consistent with   bladder origin of tumor.    The case is reviewed internally by an additional pathologist (SHUBHAM) who   concurs with the diagnosis.    The technical component of this testing was completed at the [facility], with the professional component performed   at the [facility], [address], [place]  [address]   ([phone])    CPT Codes:  A: 12185-KF5, 52048-DCJ, 30283-VBW, 94212-GZK, 52696-DEQ, 16118-YUY    COLLECTION SITE:  Client: [facility]  Location: [facility] (R)           ASSESSMENT    1. High-grade muscle invasive bladder cancer in a patient not a candidate for either surgery or radiation therapy  2. Prostate cancer treated with brachii therapy followed by external beam radiation therapy  3. Hypertension, diabetes mellitus type 2, atrial fibrillation on chronic anticoagulation with apixaban    DISCUSSION   I had a lengthy discussion with Santos in presence of several family members. I reviewed management of muscle invasive bladder cancer, prognosis, treatment options.     Localized non muscle invasive bladder cancer is treated with instillation of BCG, gemcitabine and mitomycin. Once the cancer invades in to the deeper muscle layers, it is treated " with either surgical excision or chemoradiation as this tumor has an early tendency to spread. I briefly reviewed both of the curative options. Chemotherapy is done dorinda-operatively to improve the cure rates as 30 - 40% fail despite appearance of localized muscle invasive bladder cancer. We reviewed that radiation is done with chemotherapy as radiation alone is not an effective curative option - though is often utilized in palliative setting. Radiation requires daily clinic visits for 6 weeks period.     He is not a candidate for surgery, chemotherapy or even radiation therapy. He has received previous radiation for prostate cancer - where he received brachytherapy with external beam radiation therapy. The amount of radiation received to the pelvic area is close to maximum with this combined radiation modality and he is unlikely to be a candidate even for palliative radiation at a later date.     I reviewed the option of immunotherapy.  I extensively reviewed immunotherapy with a PD-L1 inhibitor - pembrozilumab. I explained the concept of checkpoint inhibitor with a physiological role to sustain an effective but precise and limited immune response. PD-L1 is expressed on healthy, normal tissue in the area of trauma so that the immune response is limited to dead tissue and the infecting agents and does not extend over to the normal tissue. The tumor uses some of these checks and balances to its advantage and successfully evades the immune system. Pembrozilumab is administered intravenously every 3 weeks as an outpatient and interferes with this negative interaction between white cells and PDL1 on tumor cells permitting anti-tumor response.     This places patient at risk for autoimmunity if there are any white cells directed against any part of our own body. When this immune response involves skin give rash and is called dermatitis, with gut it presents with diarrhea and is called colitis, and with lung it gives  shortness of breath and is called pneumonitis. Similarly, depending on the affected tissue, we might have hepatitis, nephritis, thyroiditis, hypophysitis and so on. For the most part, only a few percentage of patients has substantial side effects; for example, pneumonitis or hepatitis. In these cases, we identify and act aggressively. We can use oral steroids to suppress the autoimmune response. The antitumor response is known to persist even beyond that. We would continue to treat as long as there are no unacceptable side effects and tumor is relatively stable without significant disease progression.      I would like to get a PET-CT scan to better assess his extent of disease. We could then consider if we should treat him or continue to observe him. There would be no right or wrong decision.      PLAN   - Recommend PET-CT scan to complete staging  - I would like to follow him to review results after his PET-CT scan with lasix protocol    Over 65 min of direct face to face time spent with patient with more than 50% time spent in counseling and coordinating care.      Imtiaz Ellis  Adj ,  Division of Hematology, Oncology & Transplantation  [facility].       Again, thank you for allowing me to participate in the care of your patient.        Sincerely,        Imtiaz Ellis MD

## 2020-07-30 NOTE — PROGRESS NOTES
Met patient briefly today after consultation with Dr. Ellis for bladder cancer.  Patient wishes to have treatment at [facility] as this is closer to home.  He was given written information on Keytruda and side effects briefly reviewed.  His CT scan for tomorrow will be canceled and will need a PET and then video visit with Dr. Ellis to review.  Provided him with name of David RN coordinator, Karoline Morales.  He I accompanied by his wife today.

## 2020-07-30 NOTE — NURSING NOTE
"Oncology Rooming Note    July 30, 2020 2:21 PM   Santos Marti is a 88 year old male who presents for:    Chief Complaint   Patient presents with     Oncology Clinic Visit     New Patient     Initial Vitals: BP 98/52 (BP Location: Left arm)   Pulse 62   Temp 98.4  F (36.9  C) (Oral)   Resp 18   Ht 1.778 m (5' 10\")   Wt 71.4 kg (157 lb 4.8 oz)   SpO2 96%   BMI 22.57 kg/m   Estimated body mass index is 22.57 kg/m  as calculated from the following:    Height as of this encounter: 1.778 m (5' 10\").    Weight as of this encounter: 71.4 kg (157 lb 4.8 oz). Body surface area is 1.88 meters squared.  No Pain (0) Comment: Data Unavailable   No LMP for male patient.  Allergies reviewed: Yes  Medications reviewed: Yes    Medications: Medication refills not needed today.  Pharmacy name entered into [employer]:    [id number] - [place], [place] - [address] AT [facility] [address] & [address] [place] [address] PHARMACY [facility] - [place], [place] - [address] [place] [address] PHARMACY - [place], [place] - [address]    Melanie Topete LPN              "

## 2020-07-30 NOTE — PROGRESS NOTES
[facility] CANCER CLINIC    NEW PATIENT VISIT NOTE    PATIENT NAME: Santos Marti MRN # [id number]  DATE OF VISIT: July 30, 2020 YOB: 1931    REFERRING PROVIDER: No referring provider defined for this encounter.    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  He was on apixaban for atrial fibrillation.  TURBT done on 10/22/2019 revealed normal muscle invasive bladder cancer  -He was followed with surveillance cystoscopies every 3 months.  His cystoscopy evaluation was negative for any tumors or raised erythema on 3/11/2020, however a follow-up exam on 7/8/2020 revealed raised erythematous area in the left lateral bladder wall.  He had TURBT under anesthesia on 7/15/2020 which is revealed high-grade muscle invasive urothelial carcinoma.  -Santos was diagnosed with prostate cancer in October 2004.  He underwent brachii therapy followed by external beam radiation therapy administered at the [facility].  -Due to his previous radiation for his prostate cancer he is not a candidate for bladder radiation.  He is not a candidate for cystectomy due to his advanced age and also previous extensive radiation to the prostate.     CURRENT INTERVENTIONS:  Ongoing staging     HISTORY OF PRESENT ILLNESS       Last fall he had hematuria. He only had blood in urine for 3 hrs or so. It cleared up after that. He has been seeing doctor at the [facility]. [facility] and they suggested just to follow without intervention. He was diagnosed with prostate cancer about 10 yrs ago. He was managed by Dr. Costello at that time and so he reached out to him. Dr. Costello did a cystoscopic evaluation and trans urethral resection of bladder tumor (TURBT). He was then followed with serial cystoscopies. He had a cystoscopy done couple of weeks ago which has revealed muscle  invasive bladder cancer. He has been referred to Medical Oncology for this.     He denies any further hematuria except around the time of cystoscopy. He denies any pain/discomfort. He needed catheter which was painful.     He has been in good health. He had both hip replacement 8 yrs ago and both shoulder replacement about 2 years ago. He had syncope after one of the shoulder surgeries. He was fitted with a pacemaker for this. He has been in good health. He has been recently diagnosed with diabetes mellitus type II. He has hypertension for a very long time.  He is on anticoagulation for atrial fibrillation.      PAST MEDICAL HISTORY     Past Medical History:   Diagnosis Date     Arthritis      Complication of anesthesia      Diabetes (H)      Gastroesophageal reflux disease      Hypertension      Pacemaker           CURRENT OUTPATIENT MEDICATIONS     Current Outpatient Medications   Medication Sig     acetaminophen (TYLENOL) 500 MG tablet Take 500-1,000 mg by mouth every 6 hours as needed for mild pain     apixaban ANTICOAGULANT (ELIQUIS) 5 MG tablet Take 5 mg by mouth 2 times daily      isosorbide mononitrate (IMDUR) 30 MG 24 hr tablet TK 1 T PO ONCE D     lisinopril-hydrochlorothiazide (PRINZIDE/ZESTORETIC) 20-25 MG tablet Take 1 tablet by mouth     metFORMIN (GLUCOPHAGE) 1000 MG tablet Take 500 mg by mouth daily      metoprolol succinate ER (TOPROL-XL) 50 MG 24 hr tablet Take 50 mg by mouth daily Take one and a half tabs daily     omeprazole 20 MG tablet Take 20 mg by mouth daily     fluticasone-salmeterol (ADVAIR) 500-50 MCG/DOSE inhaler Inhale 1 puff into the lungs every 12 hours     No current facility-administered medications for this visit.         ALLERGIES      Allergies   Allergen Reactions     Sulfa Drugs Rash        SOCIAL HISTORY   He is  and lives with his wife. Retired. He was an . 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     He quit smoking long ago about  30 yrs ago. He smoked about pack a week. He denies alcohol or recreational drug abuse.      FAMILY HISTORY   - Father had cancer - bone cancer. Had treatment and was in remission for rest of his life.   - Mother had 63 yrs of age; CVA     REVIEW OF SYSTEMS   As above in the HPI, o/w complete 12-point ROS was negative.     PHYSICAL EXAM   B/P: 98/52[manual[, T: 98.4, P: 62, R: 18  @LASTSAO2(4)@  Wt Readings from Last 3 Encounters:   07/30/20 71.4 kg (157 lb 4.8 oz)   07/22/20 71.7 kg (158 lb)   07/15/20 71.7 kg (158 lb)     GEN: NAD  HEENT: PERRL, EOMI, no icterus, injection or pallor. Oropharynx is clear.  NECK: no cervical or supraclavicular lymphadenopathy  LUNGS: clear bilaterally  CV: regular, no murmurs, rubs, or gallops  ABDOMEN: soft, non-tender, non-distended, normal bowel sounds, no hepatosplenomegaly by percussion or palpation  EXT: warm, well perfused, no edema  NEURO: alert  SKIN: no rashes     LABORATORY AND IMAGING STUDIES     Recent Labs   Lab Test 07/16/20  2120 07/15/20  1216   * 138   POTASSIUM 3.9 4.3   CHLORIDE 100 106   CO2 24 26   ANIONGAP 8 6   BUN 27 26   CR 1.13 1.08   * 141*   SAAD 8.9 8.9     No results for input(s): MAG, PHOS in the last 09673 hours.  Recent Labs   Lab Test 07/16/20  2120 07/15/20  1216   WBC 10.5 6.6   HGB 13.9 13.8    210   MCV 97 99   NEUTROPHIL 76.2  --      No results for input(s): BILITOTAL, ALKPHOS, ALT, AST, ALBUMIN, LDH in the last 03967 hours.  No results found for: TSH  No results for input(s): CEA in the last 49155 hours.  Results for orders placed or performed during the hospital encounter of 11/18/19   Head CT w/o contrast    Narrative    EXAM: CT HEAD W/O CONTRAST  LOCATION: [facility]  DATE/TIME: 11/19/2019 12:41 AM    INDICATION: Fall hitting head.  COMPARISON: None.  TECHNIQUE: Routine without IV contrast. Multiplanar reformats. Dose reduction techniques were used.    FINDINGS:  INTRACRANIAL CONTENTS: No intracranial  hemorrhage, extraaxial collection, or mass effect.  No CT evidence of acute infarct. There is diffuse confluent low-attenuation within the periventricular and subcortical white matter consistent with diffuse small   vessel ischemic disease. 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 The ventricular system, basal cisterns and the cortical sulci are consistent with diffuse volume loss.     VISUALIZED ORBITS/SINUSES/MASTOIDS: No intraorbital abnormality. There is no significant paranasal sinus disease. No middle ear or mastoid effusion.    BONES/SOFT TISSUES: The calvarium is intact. There is a mild mild scalp hematoma in the region of the right superior lateral orbital rim.      Impression    IMPRESSION:  1.  No CT finding of a mass, hemorrhage or focal area suggestive of an acute infarct.  2.  Diffuse age related changes.   Cervical spine CT w/o contrast    Narrative    EXAM: CT CERVICAL SPINE W/O CONTRAST  LOCATION: [facility]  DATE/TIME: 11/19/2019 12:41 AM    INDICATION: Fall with neck pain.  COMPARISON: None.  TECHNIQUE: CT cervical spine without contrast. Dose reduction techniques were performed.    FINDINGS: There is diffuse osteopenia of the bony structures. There is good anatomic alignment of the C1 and C2 vertebral bodies and also with the occipital condyles. The prevertebral soft tissues and the predental space is maintained. There is a slight   anterior listhesis of C4 in relation to C5 and C7 in relation to T1. The vertebral body heights are well-maintained throughout. There is prominent degenerative disc disease from the C4-C5 to the C6-C7 disc space levels. These levels have a significant   loss of disc space height, endplate changes and anterior osteophyte formations. There is prominent facet arthropathy throughout the cervical region.     At the C3-C4 disc space level there is moderate bilateral neural foraminal narrowing. At the C4-C5 disc space level there is significant right  "neural foraminal narrowing and moderate left neural foraminal narrowing. At the C5-C6 and the C6-C7 disc space   levels there is moderate bilateral neural foraminal narrowing. There is no significant canal compromise throughout the cervical region. There is mild prominence of the interstitial tissues of the lung apices. The paraspinal soft tissues are grossly   unremarkable.      Impression    IMPRESSION:  1. No evidence of an acute cervical spine fracture.  2. Vertebral body heights maintained and slight anterior listhesis of C4 in relation to C5 and C7 in relation to T1.  2. No significant canal compromise but prominent facet arthropathy leading to diffuse neural foraminal narrowing as described above.       Lab Results   Component Value Date    PATH  07/15/2020     Patient Name: TRACI HOUSE  MR#: [id number]  Specimen #: [id number]  Collected: 7/15/2020  Received: 7/15/2020  Reported: 7/17/2020 12:40  Ordering Phy(s): FIDEL BARRON    For improved result formatting, select 'View Enhanced Report Format' under   Linked Documents section.    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  -Tumor invades lamina propria and muscularis propria.  -Urothelial (transitional cell) carcinoma in-situ: Not identified.  -Lymph/vascular space invasion: Not identified.  -Sampling includes: Urothelium, lamina propria, and muscularis propria.    Electronically signed out by:    Marta Bernal M.D.    CLINICAL HISTORY:  Malignant neoplasm of lateral wall of urinary bladder.    GROSS:  The specimen is received in formalin labeled with the patient's name,   identifying information and designated  \"bladder tumor\".  It consists of five pink brown rubbery tissue fragments,   ranging from 0.5-0.7 cm.  Submitted  entirely in one block. (Dictated " by: FEDERICO Austin 7/15/2020 02:54 PM)    MICROSCOPIC:  Microscopic examination is performed.    Selected immunoperoxidase stains are performed to better characterize the   tumor, using appropriate controls.  The tumor cells erika for BECCA-3 and p63 (patchy); the tumor cells do not   erika for NKX3, chromogranin, and  synaptophysin. The immunohistochemical staining profile is consistent with   bladder origin of tumor.    The case is reviewed internally by an additional pathologist (SHUBHAM) who   concurs with the diagnosis.    The technical component of this testing was completed at the [facility], with the professional component performed   at the [facility], [address], [place]  [address]   ([phone])    CPT Codes:  A: 77009-UT0, 57615-XXE, 22322-SLJ, 41203-ETI, 92479-FYM, 06625-PUK    COLLECTION SITE:  Client: [facility]  Location: [facility] (R)           ASSESSMENT    1. High-grade muscle invasive bladder cancer in a patient not a candidate for either surgery or radiation therapy  2. Prostate cancer treated with brachii therapy followed by external beam radiation therapy  3. Hypertension, diabetes mellitus type 2, atrial fibrillation on chronic anticoagulation with apixaban    DISCUSSION   I had a lengthy discussion with Santos in presence of several family members. I reviewed management of muscle invasive bladder cancer, prognosis, treatment options.     Localized non muscle invasive bladder cancer is treated with instillation of BCG, gemcitabine and mitomycin. Once the cancer invades in to the deeper muscle layers, it is treated with either surgical excision or chemoradiation as this tumor has an early tendency to spread. I briefly reviewed both of the curative options. Chemotherapy is done dorinda-operatively to improve the cure rates as 30 - 40% fail despite appearance of localized muscle invasive  bladder cancer. We reviewed that radiation is done with chemotherapy as radiation alone is not an effective curative option - though is often utilized in palliative setting. Radiation requires daily clinic visits for 6 weeks period.     He is not a candidate for surgery, chemotherapy or even radiation therapy. He has received previous radiation for prostate cancer - where he received brachytherapy with external beam radiation therapy. The amount of radiation received to the pelvic area is close to maximum with this combined radiation modality and he is unlikely to be a candidate even for palliative radiation at a later date.     I reviewed the option of immunotherapy.  I extensively reviewed immunotherapy with a PD-L1 inhibitor - pembrozilumab. I explained the concept of checkpoint inhibitor with a physiological role to sustain an effective but precise and limited immune response. PD-L1 is expressed on healthy, normal tissue in the area of trauma so that the immune response is limited to dead tissue and the infecting agents and does not extend over to the normal tissue. The tumor uses some of these checks and balances to its advantage and successfully evades the immune system. Pembrozilumab is administered intravenously every 3 weeks as an outpatient and interferes with this negative interaction between white cells and PDL1 on tumor cells permitting anti-tumor response.     This places patient at risk for autoimmunity if there are any white cells directed against any part of our own body. When this immune response involves skin give rash and is called dermatitis, with gut it presents with diarrhea and is called colitis, and with lung it gives shortness of breath and is called pneumonitis. Similarly, depending on the affected tissue, we might have hepatitis, nephritis, thyroiditis, hypophysitis and so on. For the most part, only a few percentage of patients has substantial side effects; for example, pneumonitis or  hepatitis. In these cases, we identify and act aggressively. We can use oral steroids to suppress the autoimmune response. The antitumor response is known to persist even beyond that. We would continue to treat as long as there are no unacceptable side effects and tumor is relatively stable without significant disease progression.      I would like to get a PET-CT scan to better assess his extent of disease. We could then consider if we should treat him or continue to observe him. There would be no right or wrong decision.      PLAN   - Recommend PET-CT scan to complete staging  - I would like to follow him to review results after his PET-CT scan with lasix protocol    Over 65 min of direct face to face time spent with patient with more than 50% time spent in counseling and coordinating care.      Imtiaz Ellis  Adj ,  Division of Hematology, Oncology & Transplantation  [facility].

## 2020-08-05 ENCOUNTER — ANCILLARY PROCEDURE (OUTPATIENT)
Dept: PET IMAGING | Facility: CLINIC | Age: 85
End: 2020-08-05
Attending: INTERNAL MEDICINE
Payer: MEDICARE

## 2020-08-05 DIAGNOSIS — Z08 ENCOUNTER FOR FOLLOW-UP EXAMINATION AFTER COMPLETED TREATMENT FOR MALIGNANT NEOPLASM: ICD-10-CM

## 2020-08-05 DIAGNOSIS — C67.2 MALIGNANT NEOPLASM OF LATERAL WALL OF URINARY BLADDER (H): ICD-10-CM

## 2020-08-05 LAB
CREAT BLD-MCNC: 1.1 MG/DL (ref 0.5–1.2)
GFR SERPL CREATININE-BSD FRML MDRD: 60 ML/MIN/{1.73_M2}
GFRB: NORMAL
GLUCOSE SERPL-MCNC: 126 MG/DL (ref 70–99)

## 2020-08-05 RX ORDER — FUROSEMIDE 10 MG/ML
40 INJECTION INTRAMUSCULAR; INTRAVENOUS ONCE
Status: COMPLETED | OUTPATIENT
Start: 2020-08-05 | End: 2020-08-05

## 2020-08-05 RX ADMIN — FUROSEMIDE 40 MG: 10 INJECTION INTRAMUSCULAR; INTRAVENOUS at 13:45

## 2020-08-05 NOTE — DISCHARGE INSTRUCTIONS

## 2020-08-06 ENCOUNTER — TELEPHONE (OUTPATIENT)
Dept: ONCOLOGY | Facility: CLINIC | Age: 85
End: 2020-08-06

## 2020-08-06 ENCOUNTER — VIRTUAL VISIT (OUTPATIENT)
Dept: ONCOLOGY | Facility: CLINIC | Age: 85
End: 2020-08-06
Payer: MEDICARE

## 2020-08-06 DIAGNOSIS — C67.2 MALIGNANT NEOPLASM OF LATERAL WALL OF URINARY BLADDER (H): Primary | ICD-10-CM

## 2020-08-06 PROCEDURE — 99215 OFFICE O/P EST HI 40 MIN: CPT | Mod: 95 | Performed by: INTERNAL MEDICINE

## 2020-08-06 RX ORDER — METHYLPREDNISOLONE SODIUM SUCCINATE 125 MG/2ML
125 INJECTION, POWDER, LYOPHILIZED, FOR SOLUTION INTRAMUSCULAR; INTRAVENOUS
Status: CANCELLED
Start: 2020-08-13

## 2020-08-06 RX ORDER — LORAZEPAM 2 MG/ML
0.5 INJECTION INTRAMUSCULAR EVERY 4 HOURS PRN
Status: CANCELLED
Start: 2020-08-13

## 2020-08-06 RX ORDER — EPINEPHRINE 1 MG/ML
0.3 INJECTION, SOLUTION INTRAMUSCULAR; SUBCUTANEOUS EVERY 5 MIN PRN
Status: CANCELLED | OUTPATIENT
Start: 2020-08-13

## 2020-08-06 RX ORDER — MEPERIDINE HYDROCHLORIDE 25 MG/ML
25 INJECTION INTRAMUSCULAR; INTRAVENOUS; SUBCUTANEOUS EVERY 30 MIN PRN
Status: CANCELLED | OUTPATIENT
Start: 2020-08-13

## 2020-08-06 RX ORDER — NALOXONE HYDROCHLORIDE 0.4 MG/ML
.1-.4 INJECTION, SOLUTION INTRAMUSCULAR; INTRAVENOUS; SUBCUTANEOUS
Status: CANCELLED | OUTPATIENT
Start: 2020-08-13

## 2020-08-06 RX ORDER — HEPARIN SODIUM,PORCINE 10 UNIT/ML
5 VIAL (ML) INTRAVENOUS
Status: CANCELLED | OUTPATIENT
Start: 2020-08-13

## 2020-08-06 RX ORDER — ALBUTEROL SULFATE 0.83 MG/ML
2.5 SOLUTION RESPIRATORY (INHALATION)
Status: CANCELLED | OUTPATIENT
Start: 2020-08-13

## 2020-08-06 RX ORDER — HEPARIN SODIUM (PORCINE) LOCK FLUSH IV SOLN 100 UNIT/ML 100 UNIT/ML
5 SOLUTION INTRAVENOUS
Status: CANCELLED | OUTPATIENT
Start: 2020-08-13

## 2020-08-06 RX ORDER — SODIUM CHLORIDE 9 MG/ML
1000 INJECTION, SOLUTION INTRAVENOUS CONTINUOUS PRN
Status: CANCELLED
Start: 2020-08-13

## 2020-08-06 RX ORDER — DIPHENHYDRAMINE HYDROCHLORIDE 50 MG/ML
50 INJECTION INTRAMUSCULAR; INTRAVENOUS
Status: CANCELLED
Start: 2020-08-13

## 2020-08-06 RX ORDER — ALBUTEROL SULFATE 90 UG/1
1-2 AEROSOL, METERED RESPIRATORY (INHALATION)
Status: CANCELLED
Start: 2020-08-13

## 2020-08-06 NOTE — TELEPHONE ENCOUNTER
This  spoke to patient regarding future appointments. Patient verbalized understanding of lab draw prior to infusion and dates, times of infusion/clinic appointments.

## 2020-08-06 NOTE — NURSING NOTE
"SYMPTOM QUESTIONNAIRE    Pain: None    Nausea/Vomiting: None    Mouth Sores: None    Shortness of Breath: None    Smoking: No    Fever or Chills: None    Hard Stools: None    Soft Stools: None    Weight Loss: None    Weakness: None    Burning, numbness or tingling in hands or feet: None    Problems with skin or swelling: None    Memory Loss: None    Anxiety or Depression: None    Trouble Sleeping: None    Santos Marti is a 88 year old male who is being evaluated via a billable video visit.      The patient has been notified of following:     \"This video visit will be conducted via a call between you and your physician/provider. We have found that certain health care needs can be provided without the need for an in-person physical exam.  This service lets us provide the care you need with a video conversation.  If a prescription is necessary we can send it directly to your pharmacy.  If lab work is needed we can place an order for that and you can then stop by our lab to have the test done at a later time.    Video visits are billed at different rates depending on your insurance coverage.  Please reach out to your insurance provider with any questions.    If during the course of the call the physician/provider feels a video visit is not appropriate, you will not be charged for this service.\"    Patient has given verbal consent for Video visit? Yes  How would you like to obtain your AVS? MyChart  If you are dropped from the video visit, the video invite should be resent to: Text to cell phone: [phone]  Will anyone else be joining your video visit? Yes, patient's wife Virginia who is home with patient.        Video-Visit Details    Type of service:  Video Visit    Originating Location (pt. Location): Home    Distant Location (provider location):  [facility]     Platform used for Video Visit: Kristi Romo CMA              "

## 2020-08-06 NOTE — LETTER
8/6/2020         RE: Santos Marti  [address]  [facility] [address]        Dear Colleague,    Thank you for referring your patient, Santos Marti, to the [facility]. Please see a copy of my visit note below.    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  He was on apixaban for atrial fibrillation.  TURBT done on 10/22/2019 revealed normal muscle invasive bladder cancer  -He was followed with surveillance cystoscopies every 3 months.  His cystoscopy evaluation was negative for any tumors or raised erythema on 3/11/2020, however a follow-up exam on 7/8/2020 revealed raised erythematous area in the left lateral bladder wall.  He had TURBT under anesthesia on 7/15/2020 which is revealed high-grade muscle invasive urothelial carcinoma.  -Santos was diagnosed with prostate cancer in October 2004.  He underwent brachii therapy followed by external beam radiation therapy administered at the [facility].  -Due to his previous radiation for his prostate cancer he is not a candidate for bladder radiation.  He is not a candidate for cystectomy due to his advanced age and also previous extensive radiation to the prostate.    CURRENT INTERVENTIONS:  Ongoing staging    SUBJECTIVE   Santos Marti is being followed for muscle invasive bladder cancer    Patient was reached over video for this telemedicine visit due to restrictions posed by COVID 19 pandemic.  He was joined by his wife at this clinic visit over video.  He is being seen with restaging PET CT scan.  He has no new symptoms  since our last evaluation a week ago.      PAST MEDICAL HISTORY     Past Medical History:   Diagnosis Date     Arthritis      Complication of anesthesia      Diabetes (H)      Gastroesophageal reflux disease      Hypertension      Pacemaker          CURRENT OUTPATIENT MEDICATIONS     Current Outpatient Medications   Medication Sig     acetaminophen (TYLENOL) 500 MG tablet Take 500-1,000 mg by mouth every 6 hours as needed for mild pain     apixaban ANTICOAGULANT (ELIQUIS) 5 MG tablet Take 5 mg by mouth 2 times daily      fluticasone-salmeterol (ADVAIR) 500-50 MCG/DOSE inhaler Inhale 1 puff into the lungs every 12 hours     isosorbide mononitrate (IMDUR) 30 MG 24 hr tablet TK 1 T PO ONCE D     lisinopril-hydrochlorothiazide (PRINZIDE/ZESTORETIC) 20-25 MG tablet Take 1 tablet by mouth     metFORMIN (GLUCOPHAGE) 1000 MG tablet Take 500 mg by mouth daily      metoprolol succinate ER (TOPROL-XL) 50 MG 24 hr tablet Take 50 mg by mouth daily Take one and a half tabs daily     omeprazole 20 MG tablet Take 20 mg by mouth daily     No current facility-administered medications for this visit.         ALLERGIES      Allergies   Allergen Reactions     Sulfa Drugs Rash        REVIEW OF SYSTEMS   As above in the HPI, o/w complete 12-point ROS was negative.     PHYSICAL EXAM   There were no vitals taken for this visit.  Limited physical exam during video visit due to COVID19 restrictions  Elderly male in no acute distress  Breathing comfortably, no tachypnea  Speech and hearing normal  Pleasant mood and congruent affect  Moving all extremities, no focal neurologic deficits apparent      LABORATORY AND IMAGING STUDIES     Recent Labs   Lab Test 08/05/20  1225 07/16/20  2120 07/15/20  1216   NA  --  132* 138   POTASSIUM  --  3.9 4.3   CHLORIDE  --  100 106   CO2  --  24 26   ANIONGAP  --  8 6   BUN  --  27 26   CR  --  1.13 1.08   * 146* 141*   SAAD  --  8.9 8.9     No results for input(s): MAG, PHOS in the last 55985  hours.  Recent Labs   Lab Test 07/16/20  2120 07/15/20  1216   WBC 10.5 6.6   HGB 13.9 13.8    210   MCV 97 99   NEUTROPHIL 76.2  --      No results for input(s): BILITOTAL, ALKPHOS, ALT, AST, ALBUMIN, LDH in the last 13222 hours.  No results found for: TSH  No results for input(s): CEA in the last 60544 hours.  Results for orders placed or performed in visit on 08/05/20   PET Oncology Whole Body    Narrative    Combined Report of:    PET and CT on  8/5/2020 2:40 PM :    1. PET of the neck, chest, abdomen, and pelvis.  2. PET CT Fusion for Attenuation Correction and Anatomical  Localization:    3. Diagnostic CT scan of the chest, abdomen, and pelvis with  intravenous contrast for interpretation. Additional PET/CT images of  the abdomen and pelvis after administration of Lasix.  4. 3D MIP and PET-CT fused images were processed on an independent  workstation and archived to PACS and reviewed by a radiologist.    Technique:    1. PET: The patient received 14.6 mCi of F-18-FDG; the serum glucose  was 126 prior to administration, body weight was 74.1 kg. Images were  evaluated in the axial, sagittal, and coronal planes as well as the  rotational whole body MIP. Images were acquired from the Vertex to the  Feet.    UPTAKE WAS MEASURED AT 60 MINUTES.     BACKGROUND:  Liver SUV max= 3.5,   Aorta Blood SUV Max: 2.8.     2. CT: Volumetric acquisition for clinical interpretation of the  chest, abdomen, and pelvis acquired at 3 mm sections . The chest,  abdomen, and pelvis were evaluated at 5 mm sections in bone, soft  tissue, and lung windows.      The patient received 100 cc. Of Optiray 320 intravenously for the  examination.    breeza oral contrast    3. 3D MIP and PET-CT fused images were processed on an independent  workstation and archived to PACS and reviewed by a radiologist.    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  Also diagnosed with prostate cancer 10 years ago.    COMPARISON: Abdomen and pelvis CT 10/9/2019    FINDINGS:     HEAD/NECK:  There is no  suspicious FDG uptake in the neck.     The paranasal sinuses are clear. The mastoid air cells are clear.     The mucosal pharyngeal space, the , prevertebral and carotid  spaces are within normal limits.     No masses, mass effect or pathologically enlarged lymph nodes are  evident. The thyroid gland appears normal.    CHEST:  There is no suspicious FDG uptake in the chest. Left chest wall  pacemaker.  FDG activity along the right shoulder replacement, max SUV 8.7.   Degenerative changes at the left sternoclavicular joint.  There are no pathologically enlarged mediastinal, hilar or axillary  lymph nodes.  Stable non-FDG avid 8 mm right lower lobe nodule.      There is no significant pericardial or plural effusions.    ABDOMEN AND PELVIS:  There is an FDG avid left lateral bladder wall mass measuring 1.7 x  2.8 cm, max SUV 11.1. Bilateral hip arthroplasties contribute to  significant artifact in this region.  FDG avidity of the rectal wall with large amount of intramural stool  There are no suspicious hepatic lesions. There is no splenomegaly or  evidence for splenic or pancreatic mass lesion.  There are no suspicious adrenal mass lesions or opaque gallbladder  calculi. There is symmetric nephrographic renal phase without  hydronephrosis.    There is no evidence for diverticulitis, bowel obstruction or free  fluid.  Multiple radiation seeds in the prostate.    LOWER EXTREMITIES: .  No abnormal masses or hypermetabolic lesions.    BONES:   There are no suspicious lytic or blastic osseous lesions.  There is no  abnormal FDG uptake in the skeleton.        Impression    IMPRESSION: In this patient with transitional cell carcinoma of  the  bladder status post transurethral resection of the tumor:   1. 2.8 cm FDG avid left lateral bladder wall transitional cell  carcinoma, representing residual tumor.  2. No evidence of metastatic disease in the chest, abdomen, or pelvis.  3. FDG avid rectal wall with large amount of stool, likely  representing stercoral colitis.  4. FDG avidity along the  patient's right shoulder arthroplasty,  likely inflammatory.    I have personally reviewed the examination and initial interpretation  and I agree with the findings.    SANJAY MARIE MD           ASSESSMENT AND PLAN   1. High-grade muscle invasive bladder cancer in a patient not a candidate for either surgery or radiation therapy  2. Prostate cancer treated with brachii therapy followed by external beam radiation therapy  3. Hypertension, diabetes mellitus type 2, atrial fibrillation on chronic anticoagulation with apixaban    I had a lengthy discussion with patient at this clinic visit over video in presence of his wife.  Santos presented with hematuria in October 2019.  He had cystoscopy with biopsy which revealed non-muscle invasive bladder cancer at that time.  He has been followed with serial surveillance cystoscopies since then.  His cystoscopic evaluation in March was negative but the follow-up exam on July 8 revealed an elevated erythematous area in the left lateral bladder wall.  He had TURBT done on 7/15/2020 which is revealed muscle invasive bladder cancer.  He has been referred to medical oncology for further management.    I did get a PET CT scan which was done on August 5, 2020.  I have reviewed actual images of this PET scan with patient and his wife.  He has uptake in the left lateral bladder wall at the known area of disease.  He has residual disease in the bladder wall as suspected.  There is additional uptake in the rectal mucosa which is likely benign.  There is uptake in the right shoulder where he had arthroplasty done.  He denies any pain  in his right shoulder at this time.    He at least has a locally advanced bladder cancer.  I explained him that typical management for his stage of disease is neoadjuvant chemotherapy followed by cystoprostatectomy with lymph node dissection.  He is not a surgical candidate owing to extensive radiation that he received for his prostate cancer and also his advanced age of 88 years.  His previous radiation to the prostate also makes him ineligible for any additional radiation to the bladder.    I reviewed the option of systemic immunotherapy which has been approved for non-muscle invasive bladder cancer and also metastatic bladder in patients were not a chemotherapy candidate.      I extensively reviewed immunotherapy with a PD-L1 inhibitor - pembrozilumab. I explained the concept of checkpoint inhibitor with a physiological role to sustain an effective but precise and limited immune response. PD-L1 is expressed on healthy, normal tissue in the area of trauma so that the immune response is limited to dead tissue and the infecting agents and does not extend over to the normal tissue. The tumor uses some of these checks and balances to its advantage and successfully evades the immune system. Pembrozilumab is administered intravenously every 3 weeks as an outpatient and interferes with this negative interaction between white cells and PDL1 on tumor cells permitting anti-tumor response.     This places patient at risk for autoimmunity if there are any white cells directed against any part of our own body. When this immune response involves skin give rash and is called dermatitis, with gut it presents with diarrhea and is called colitis, and with lung it gives shortness of breath and is called pneumonitis. Similarly, depending on the affected tissue, we might have hepatitis, nephritis, thyroiditis, hypophysitis and so on. For the most part, only a few percentage of patients has substantial side effects; for example,  pneumonitis or hepatitis. In these cases, we identify and act aggressively. We can use oral steroids to suppress the autoimmune response. The antitumor response is known to persist even beyond that. We would continue to treat as long as there are no unacceptable side effects and tumor is relatively stable without significant disease progression.  I will hold therapy if he have a complete response after a year.    After reviewing different options he would like to proceed with immunotherapy as discussed above.  I will have this coordinated at our facility in [place].    Type of service:  Video Visit  Originating Location (pt. Location): Home  Distant Location (provider location):  [facility]   Platform used for Video Visit: [employer]    Over 45 min spent with patient during this video visit with more than 50% time spent in counseling and coordinating care.      Imtiaz Ellis    Hematologist and Medical Oncologist  [facility]      Again, thank you for allowing me to participate in the care of your patient.        Sincerely,        Imtiaz Ellis MD

## 2020-08-06 NOTE — PROGRESS NOTES
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  He was on apixaban for atrial fibrillation.  TURBT done on 10/22/2019 revealed normal muscle invasive bladder cancer  -He was followed with surveillance cystoscopies every 3 months.  His cystoscopy evaluation was negative for any tumors or raised erythema on 3/11/2020, however a follow-up exam on 7/8/2020 revealed raised erythematous area in the left lateral bladder wall.  He had TURBT under anesthesia on 7/15/2020 which is revealed high-grade muscle invasive urothelial carcinoma.  -Santos was diagnosed with prostate cancer in October 2004.  He underwent brachii therapy followed by external beam radiation therapy administered at the [facility].  -Due to his previous radiation for his prostate cancer he is not a candidate for bladder radiation.  He is not a candidate for cystectomy due to his advanced age and also previous extensive radiation to the prostate.    CURRENT INTERVENTIONS:  Ongoing staging    SUBJECTIVE   Santos Marti is being followed for muscle invasive bladder cancer    Patient was reached over video for this telemedicine visit due to restrictions posed by COVID 19 pandemic.  He was joined by his wife at this clinic visit over video.  He is being seen with restaging PET CT scan.  He has no new symptoms since our last evaluation a week ago.      PAST MEDICAL HISTORY     Past Medical History:   Diagnosis Date     Arthritis      Complication of anesthesia      Diabetes (H)      Gastroesophageal reflux disease      Hypertension      Pacemaker          CURRENT  OUTPATIENT MEDICATIONS     Current Outpatient Medications   Medication Sig     acetaminophen (TYLENOL) 500 MG tablet Take 500-1,000 mg by mouth every 6 hours as needed for mild pain     apixaban ANTICOAGULANT (ELIQUIS) 5 MG tablet Take 5 mg by mouth 2 times daily      fluticasone-salmeterol (ADVAIR) 500-50 MCG/DOSE inhaler Inhale 1 puff into the lungs every 12 hours     isosorbide mononitrate (IMDUR) 30 MG 24 hr tablet TK 1 T PO ONCE D     lisinopril-hydrochlorothiazide (PRINZIDE/ZESTORETIC) 20-25 MG tablet Take 1 tablet by mouth     metFORMIN (GLUCOPHAGE) 1000 MG tablet Take 500 mg by mouth daily      metoprolol succinate ER (TOPROL-XL) 50 MG 24 hr tablet Take 50 mg by mouth daily Take one and a half tabs daily     omeprazole 20 MG tablet Take 20 mg by mouth daily     No current facility-administered medications for this visit.         ALLERGIES      Allergies   Allergen Reactions     Sulfa Drugs Rash        REVIEW OF SYSTEMS   As above in the HPI, o/w complete 12-point ROS was negative.     PHYSICAL EXAM   There were no vitals taken for this visit.  Limited physical exam during video visit due to COVID19 restrictions  Elderly male in no acute distress  Breathing comfortably, no tachypnea  Speech and hearing normal  Pleasant mood and congruent affect  Moving all extremities, no focal neurologic deficits apparent      LABORATORY AND IMAGING STUDIES     Recent Labs   Lab Test 08/05/20  1225 07/16/20  2120 07/15/20  1216   NA  --  132* 138   POTASSIUM  --  3.9 4.3   CHLORIDE  --  100 106   CO2  --  24 26   ANIONGAP  --  8 6   BUN  --  27 26   CR  --  1.13 1.08   * 146* 141*   SAAD  --  8.9 8.9     No results for input(s): MAG, PHOS in the last 62491 hours.  Recent Labs   Lab Test 07/16/20  2120 07/15/20  1216   WBC 10.5 6.6   HGB 13.9 13.8    210   MCV 97 99   NEUTROPHIL 76.2  --      No results for input(s): BILITOTAL, ALKPHOS, ALT, AST, ALBUMIN, LDH in the last 95258 hours.  No results found for:  TSH  No results for input(s): CEA in the last 83257 hours.  Results for orders placed or performed in visit on 08/05/20   PET Oncology Whole Body    Narrative    Combined Report of:    PET and CT on  8/5/2020 2:40 PM :    1. PET of the neck, chest, abdomen, and pelvis.  2. PET CT Fusion for Attenuation Correction and Anatomical  Localization:    3. Diagnostic CT scan of the chest, abdomen, and pelvis with  intravenous contrast for interpretation. Additional PET/CT images of  the abdomen and pelvis after administration of Lasix.  4. 3D MIP and PET-CT fused images were processed on an independent  workstation and archived to PACS and reviewed by a radiologist.    Technique:    1. PET: The patient received 14.6 mCi of F-18-FDG; the serum glucose  was 126 prior to administration, body weight was 74.1 kg. Images were  evaluated in the axial, sagittal, and coronal planes as well as the  rotational whole body MIP. Images were acquired from the Vertex to the  Feet.    UPTAKE WAS MEASURED AT 60 MINUTES.     BACKGROUND:  Liver SUV max= 3.5,   Aorta Blood SUV Max: 2.8.     2. CT: Volumetric acquisition for clinical interpretation of the  chest, abdomen, and pelvis acquired at 3 mm sections . The chest,  abdomen, and pelvis were evaluated at 5 mm sections in bone, soft  tissue, and lung windows.      The patient received 100 cc. Of Optiray 320 intravenously for the  examination.    breeza oral contrast    3. 3D MIP and PET-CT fused images were processed on an independent  workstation and archived to PACS and reviewed by a radiologist.    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  Also diagnosed with prostate cancer 10 years ago.    COMPARISON: Abdomen and pelvis CT 10/9/2019    FINDINGS:      HEAD/NECK:  There is no  suspicious FDG uptake in the neck.     The paranasal sinuses are clear. The mastoid air cells are clear.     The mucosal pharyngeal space, the , prevertebral and carotid  spaces are within normal limits.     No masses, mass effect or pathologically enlarged lymph nodes are  evident. The thyroid gland appears normal.    CHEST:  There is no suspicious FDG uptake in the chest. Left chest wall  pacemaker.  FDG activity along the right shoulder replacement, max SUV 8.7.   Degenerative changes at the left sternoclavicular joint.  There are no pathologically enlarged mediastinal, hilar or axillary  lymph nodes.  Stable non-FDG avid 8 mm right lower lobe nodule.      There is no significant pericardial or plural effusions.    ABDOMEN AND PELVIS:  There is an FDG avid left lateral bladder wall mass measuring 1.7 x  2.8 cm, max SUV 11.1. Bilateral hip arthroplasties contribute to  significant artifact in this region.  FDG avidity of the rectal wall with large amount of intramural stool  There are no suspicious hepatic lesions. There is no splenomegaly or  evidence for splenic or pancreatic mass lesion.  There are no suspicious adrenal mass lesions or opaque gallbladder  calculi. There is symmetric nephrographic renal phase without  hydronephrosis.    There is no evidence for diverticulitis, bowel obstruction or free  fluid.  Multiple radiation seeds in the prostate.    LOWER EXTREMITIES: .  No abnormal masses or hypermetabolic lesions.    BONES:   There are no suspicious lytic or blastic osseous lesions.  There is no  abnormal FDG uptake in the skeleton.        Impression    IMPRESSION: In this patient with transitional cell carcinoma of the  bladder status post transurethral resection of the tumor:   1. 2.8 cm FDG avid left lateral bladder wall transitional cell  carcinoma, representing residual tumor.  2. No evidence of metastatic disease in the chest, abdomen, or pelvis.  3. FDG avid  rectal wall with large amount of stool, likely  representing stercoral colitis.  4. FDG avidity along the  patient's right shoulder arthroplasty,  likely inflammatory.    I have personally reviewed the examination and initial interpretation  and I agree with the findings.    SANJAY MARIE MD           ASSESSMENT AND PLAN   1. High-grade muscle invasive bladder cancer in a patient not a candidate for either surgery or radiation therapy  2. Prostate cancer treated with brachii therapy followed by external beam radiation therapy  3. Hypertension, diabetes mellitus type 2, atrial fibrillation on chronic anticoagulation with apixaban    I had a lengthy discussion with patient at this clinic visit over video in presence of his wife.  Santos presented with hematuria in October 2019.  He had cystoscopy with biopsy which revealed non-muscle invasive bladder cancer at that time.  He has been followed with serial surveillance cystoscopies since then.  His cystoscopic evaluation in March was negative but the follow-up exam on July 8 revealed an elevated erythematous area in the left lateral bladder wall.  He had TURBT done on 7/15/2020 which is revealed muscle invasive bladder cancer.  He has been referred to medical oncology for further management.    I did get a PET CT scan which was done on August 5, 2020.  I have reviewed actual images of this PET scan with patient and his wife.  He has uptake in the left lateral bladder wall at the known area of disease.  He has residual disease in the bladder wall as suspected.  There is additional uptake in the rectal mucosa which is likely benign.  There is uptake in the right shoulder where he had arthroplasty done.  He denies any pain in his right shoulder at this time.    He at least has a locally advanced bladder cancer.  I explained him that typical management for his stage of disease is neoadjuvant chemotherapy followed by cystoprostatectomy with lymph node dissection.  He is not  a surgical candidate owing to extensive radiation that he received for his prostate cancer and also his advanced age of 88 years.  His previous radiation to the prostate also makes him ineligible for any additional radiation to the bladder.    I reviewed the option of systemic immunotherapy which has been approved for non-muscle invasive bladder cancer and also metastatic bladder in patients were not a chemotherapy candidate.      I extensively reviewed immunotherapy with a PD-L1 inhibitor - pembrozilumab. I explained the concept of checkpoint inhibitor with a physiological role to sustain an effective but precise and limited immune response. PD-L1 is expressed on healthy, normal tissue in the area of trauma so that the immune response is limited to dead tissue and the infecting agents and does not extend over to the normal tissue. The tumor uses some of these checks and balances to its advantage and successfully evades the immune system. Pembrozilumab is administered intravenously every 3 weeks as an outpatient and interferes with this negative interaction between white cells and PDL1 on tumor cells permitting anti-tumor response.     This places patient at risk for autoimmunity if there are any white cells directed against any part of our own body. When this immune response involves skin give rash and is called dermatitis, with gut it presents with diarrhea and is called colitis, and with lung it gives shortness of breath and is called pneumonitis. Similarly, depending on the affected tissue, we might have hepatitis, nephritis, thyroiditis, hypophysitis and so on. For the most part, only a few percentage of patients has substantial side effects; for example, pneumonitis or hepatitis. In these cases, we identify and act aggressively. We can use oral steroids to suppress the autoimmune response. The antitumor response is known to persist even beyond that. We would continue to treat as long as there are no  unacceptable side effects and tumor is relatively stable without significant disease progression.  I will hold therapy if he have a complete response after a year.    After reviewing different options he would like to proceed with immunotherapy as discussed above.  I will have this coordinated at our facility in [place].    Type of service:  Video Visit  Originating Location (pt. Location): Home  Distant Location (provider location):  [facility]   Platform used for Video Visit: [employer]    Over 45 min spent with patient during this video visit with more than 50% time spent in counseling and coordinating care.      Imtiaz Ellis    Hematologist and Medical Oncologist  [facility]

## 2020-08-12 ENCOUNTER — HOSPITAL ENCOUNTER (OUTPATIENT)
Facility: CLINIC | Age: 85
Setting detail: SPECIMEN
Discharge: HOME | End: 2020-08-12
Attending: INTERNAL MEDICINE | Admitting: INTERNAL MEDICINE
Payer: MEDICARE

## 2020-08-12 ENCOUNTER — INFUSION THERAPY VISIT (OUTPATIENT)
Dept: INFUSION THERAPY | Facility: CLINIC | Age: 85
End: 2020-08-12
Attending: INTERNAL MEDICINE
Payer: MEDICARE

## 2020-08-12 DIAGNOSIS — Z79.899 ENCOUNTER FOR LONG-TERM (CURRENT) USE OF MEDICATIONS: ICD-10-CM

## 2020-08-12 DIAGNOSIS — C67.2 MALIGNANT NEOPLASM OF LATERAL WALL OF URINARY BLADDER (H): Primary | ICD-10-CM

## 2020-08-12 LAB
ALBUMIN SERPL-MCNC: 4 G/DL (ref 3.4–5)
ALP SERPL-CCNC: 89 U/L (ref 40–150)
ALT SERPL W P-5'-P-CCNC: 15 U/L (ref 0–70)
ANION GAP SERPL CALCULATED.3IONS-SCNC: 6 MMOL/L (ref 3–14)
AST SERPL W P-5'-P-CCNC: 11 U/L (ref 0–45)
BILIRUB SERPL-MCNC: 1.2 MG/DL (ref 0.2–1.3)
BUN SERPL-MCNC: 32 MG/DL (ref 7–30)
CALCIUM SERPL-MCNC: 9.4 MG/DL (ref 8.5–10.1)
CHLORIDE SERPL-SCNC: 103 MMOL/L (ref 94–109)
CO2 SERPL-SCNC: 27 MMOL/L (ref 20–32)
CREAT SERPL-MCNC: 1.17 MG/DL (ref 0.66–1.25)
GFR SERPL CREATININE-BSD FRML MDRD: 55 ML/MIN/{1.73_M2}
GLUCOSE SERPL-MCNC: 151 MG/DL (ref 70–99)
POTASSIUM SERPL-SCNC: 4.1 MMOL/L (ref 3.4–5.3)
PROT SERPL-MCNC: 7.7 G/DL (ref 6.8–8.8)
SODIUM SERPL-SCNC: 136 MMOL/L (ref 133–144)
TSH SERPL DL<=0.005 MIU/L-ACNC: 1.69 MU/L (ref 0.4–4)

## 2020-08-12 PROCEDURE — 36415 COLL VENOUS BLD VENIPUNCTURE: CPT

## 2020-08-12 PROCEDURE — 80053 COMPREHEN METABOLIC PANEL: CPT | Performed by: INTERNAL MEDICINE

## 2020-08-12 PROCEDURE — 84443 ASSAY THYROID STIM HORMONE: CPT | Performed by: INTERNAL MEDICINE

## 2020-08-12 NOTE — PROGRESS NOTES
Medical Assistant Note:  Santos Marti presents today for blood draw.    Patient seen by provider today: No.   present during visit today: Not Applicable.    Concerns: No Concerns.    Procedure:  Lab draw site: rac, Needle type: bf, Gauge: 23.    Post Assessment:  Labs drawn without difficulty: Yes.    Discharge Plan:  Departure Mode: Ambulatory.    Face to Face Time: 5 min  .    Gaby Luque, CMA

## 2020-08-13 ENCOUNTER — ONCOLOGY VISIT (OUTPATIENT)
Dept: ONCOLOGY | Facility: CLINIC | Age: 85
End: 2020-08-13
Attending: INTERNAL MEDICINE
Payer: MEDICARE

## 2020-08-13 ENCOUNTER — INFUSION THERAPY VISIT (OUTPATIENT)
Dept: INFUSION THERAPY | Facility: CLINIC | Age: 85
End: 2020-08-13
Attending: INTERNAL MEDICINE
Payer: MEDICARE

## 2020-08-13 VITALS
RESPIRATION RATE: 14 BRPM | BODY MASS INDEX: 22.24 KG/M2 | SYSTOLIC BLOOD PRESSURE: 153 MMHG | OXYGEN SATURATION: 97 % | WEIGHT: 155 LBS | DIASTOLIC BLOOD PRESSURE: 84 MMHG | HEART RATE: 60 BPM | TEMPERATURE: 97.6 F

## 2020-08-13 DIAGNOSIS — C67.2 MALIGNANT NEOPLASM OF LATERAL WALL OF URINARY BLADDER (H): Primary | ICD-10-CM

## 2020-08-13 PROCEDURE — 25800030 ZZH RX IP 258 OP 636: Performed by: INTERNAL MEDICINE

## 2020-08-13 PROCEDURE — G0463 HOSPITAL OUTPT CLINIC VISIT: HCPCS | Mod: 25

## 2020-08-13 PROCEDURE — 25000128 H RX IP 250 OP 636: Performed by: INTERNAL MEDICINE

## 2020-08-13 PROCEDURE — 96413 CHEMO IV INFUSION 1 HR: CPT

## 2020-08-13 PROCEDURE — 99214 OFFICE O/P EST MOD 30 MIN: CPT | Performed by: NURSE PRACTITIONER

## 2020-08-13 RX ORDER — LORAZEPAM 0.5 MG/1
0.5 TABLET ORAL EVERY 4 HOURS PRN
Qty: 30 TABLET | Refills: 2 | Status: SHIPPED | OUTPATIENT
Start: 2020-08-13 | End: 2020-11-09

## 2020-08-13 RX ORDER — PROCHLORPERAZINE MALEATE 10 MG
5 TABLET ORAL EVERY 6 HOURS PRN
Qty: 30 TABLET | Refills: 2 | Status: CANCELLED | OUTPATIENT
Start: 2020-08-13

## 2020-08-13 RX ORDER — PROCHLORPERAZINE MALEATE 10 MG
5 TABLET ORAL EVERY 6 HOURS PRN
Qty: 30 TABLET | Refills: 2 | Status: SHIPPED | OUTPATIENT
Start: 2020-08-13 | End: 2020-11-09

## 2020-08-13 RX ADMIN — SODIUM CHLORIDE 400 MG: 9 INJECTION, SOLUTION INTRAVENOUS at 11:55

## 2020-08-13 RX ADMIN — SODIUM CHLORIDE 250 ML: 9 INJECTION, SOLUTION INTRAVENOUS at 11:23

## 2020-08-13 ASSESSMENT — PAIN SCALES - GENERAL: PAINLEVEL: NO PAIN (0)

## 2020-08-13 NOTE — PROGRESS NOTES
"Oncology Rooming Note    August 13, 2020 10:24 AM   Santos Marti is a 88 year old male who presents for:    Chief Complaint   Patient presents with     Oncology Clinic Visit     Initial Vitals: BP (!) 153/84   Pulse 60   Temp 97.6  F (36.4  C) (Oral)   Resp 14   Wt 70.3 kg (155 lb)   SpO2 97%   BMI 22.24 kg/m   Estimated body mass index is 22.24 kg/m  as calculated from the following:    Height as of 7/30/20: 1.778 m (5' 10\").    Weight as of this encounter: 70.3 kg (155 lb). Body surface area is 1.86 meters squared.  No Pain (0) Comment: Data Unavailable   No LMP for male patient.  Allergies reviewed: Yes  Medications reviewed: Yes    Medications: Medication refills not needed today.  Pharmacy name entered into [employer]:    [id number] - [place], [place] - [address] [facility] [address] [facility]AC[facility] [place], [place] - [address] [place] [address] PHARMACY - [place], [place] - [address]    Clinical concerns: no       Shari J. Schoenberger, CMA            "

## 2020-08-13 NOTE — LETTER
"    8/13/2020         RE: Santos Marti  [address]  [facility][1] [phone]        Dear Colleague,    Thank you for referring your patient, Santos Marti, to the [facility]. Please see a copy of my visit note below.    Oncology Rooming Note    August 13, 2020 10:24 AM   Santos Marti is a 88 year old male who presents for:    Chief Complaint   Patient presents with     Oncology Clinic Visit     Initial Vitals: BP (!) 153/84   Pulse 60   Temp 97.6  F (36.4  C) (Oral)   Resp 14   Wt 70.3 kg (155 lb)   SpO2 97%   BMI 22.24 kg/m   Estimated body mass index is 22.24 kg/m  as calculated from the following:    Height as of 7/30/20: 1.778 m (5' 10\").    Weight as of this encounter: 70.3 kg (155 lb). Body surface area is 1.86 meters squared.  No Pain (0) Comment: Data Unavailable   No LMP for male patient.  Allergies reviewed: Yes  Medications reviewed: Yes    Medications: Medication refills not needed today.  Pharmacy name entered into [employer]:    [id number] - [place], [place] - [address] [facility] [address] [facility]ACY - [place], [place] - [address]    Clinical concerns: no       Shari J. Schoenberger, ITZEL              Oncology/Hematology Visit Note  Aug 13, 2020    Reason for Visit: follow up of high-grade muscle invasive bladder cancer-patient is not a candidate for surgery or radiation therapy.  Patient had previous radiation to the prostate  Locally advanced bladder cancer -she met with Dr. Williamson who recommended treatment with Keytruda every 6 weeks      Interval History:    Patient reports he is feeling well.  He denies fever chills sweats denies cough no shortness of breath denies chest pain denies nausea vomiting diarrhea denies abdominal pain denies bleeding energy and appetite are good      Review of Systems:  14 point ROS of systems including " Constitutional, Eyes, Respiratory, Cardiovascular, Gastroenterology, Genitourinary, Integumentary, Muscularskeletal, Psychiatric were all negative except for pertinent positives noted in my HPI.      Physical Examination:  General: The patient is a pleasant male in no acute distress.  BP (!) 153/84   Pulse 60   Temp 97.6  F (36.4  C) (Oral)   Resp 14   Wt 70.3 kg (155 lb)   SpO2 97%   BMI 22.24 kg/m    HEENT: EOMI, PERRL. Sclerae are anicteric. Oral mucosa is pink and moist with no lesions or thrush.   Lymph: Neck is supple with no lymphadenopathy in the cervical or supraclavicular areas.   Heart: Regular rate and rhythm.   Lungs: Clear to auscultation bilaterally.   GI: Bowel sounds present, soft, nontender with no palpable hepatosplenomegaly or masses.   Extremities: No lower extremity edema noted bilaterally.   Skin: No rashes, petechiae, or bruising noted on exposed skin.    Laboratory Data:  Results for [facility] (MRN [id number]) as of 8/13/2020 11:50   Ref. Range 8/12/2020 10:14   Sodium Latest Ref Range: 133 - 144 mmol/L 136   Potassium Latest Ref Range: 3.4 - 5.3 mmol/L 4.1   Chloride Latest Ref Range: 94 - 109 mmol/L 103   Carbon Dioxide Latest Ref Range: 20 - 32 mmol/L 27   Urea Nitrogen Latest Ref Range: 7 - 30 mg/dL 32 (H)   Creatinine Latest Ref Range: 0.66 - 1.25 mg/dL 1.17   GFR Estimate Latest Ref Range: >60 mL/min/1.73_m2 55 (L)   GFR Estimate If Black Latest Ref Range: >60 mL/min/1.73_m2 64   Calcium Latest Ref Range: 8.5 - 10.1 mg/dL 9.4   Anion Gap Latest Ref Range: 3 - 14 mmol/L 6   Albumin Latest Ref Range: 3.4 - 5.0 g/dL 4.0   Protein Total Latest Ref Range: 6.8 - 8.8 g/dL 7.7   Bilirubin Total Latest Ref Range: 0.2 - 1.3 mg/dL 1.2   Alkaline Phosphatase Latest Ref Range: 40 - 150 U/L 89   ALT Latest Ref Range: 0 - 70 U/L 15   AST Latest Ref Range: 0 - 45 U/L 11   TSH Latest Ref Range: 0.40 - 4.00 mU/L 1.69   Glucose Latest Ref Range: 70 - 99 mg/dL 151 (H)       Assessment  and Plan:      This is a 88-year-old male with    igh-grade muscle invasive bladder cancer-patient is not a candidate for surgery or radiation therapy.  Patient had previous radiation to the prostate  Locally advanced bladder cancer -she met with Dr. Ellis who recommended treatment with Keytruda every 6 weeks  Treatment regimen and side effects of Keytruda discussed in detail with patient.  Questions answered patient agreeable to proceed with treatment  -Patient has follow-up appointment with Dr. Williamson in 6 weeks for cycle 2      Prostate cancer diagnosed in 2004 treated with brachytherapy and followed [facility]       Patient is advised to call our clinic or go to emergency room in the event of fever chills sweats cough shortness of breath chest pain sore throat nausea vomiting diarrhea abdominal pain or bleeding  Or any changes in health condition      CHUCK Dumont CNP  [facility] [place]     Chart documentation with Dragon Voice recognition Software. Although reviewed after completion, some words and grammatical errors may remain.          Again, thank you for allowing me to participate in the care of your patient.        Sincerely,        CHUCK Dumont CNP

## 2020-08-13 NOTE — PROGRESS NOTES
Oncology/Hematology Visit Note  Aug 13, 2020    Reason for Visit: follow up of high-grade muscle invasive bladder cancer-patient is not a candidate for surgery or radiation therapy.  Patient had previous radiation to the prostate  Locally advanced bladder cancer -she met with Dr. Williamson who recommended treatment with Keytruda every 6 weeks      Interval History:    Patient reports he is feeling well.  He denies fever chills sweats denies cough no shortness of breath denies chest pain denies nausea vomiting diarrhea denies abdominal pain denies bleeding energy and appetite are good      Review of Systems:  14 point ROS of systems including Constitutional, Eyes, Respiratory, Cardiovascular, Gastroenterology, Genitourinary, Integumentary, Muscularskeletal, Psychiatric were all negative except for pertinent positives noted in my HPI.      Physical Examination:  General: The patient is a pleasant male in no acute distress.  BP (!) 153/84   Pulse 60   Temp 97.6  F (36.4  C) (Oral)   Resp 14   Wt 70.3 kg (155 lb)   SpO2 97%   BMI 22.24 kg/m    HEENT: EOMI, PERRL. Sclerae are anicteric. Oral mucosa is pink and moist with no lesions or thrush.   Lymph: Neck is supple with no lymphadenopathy in the cervical or supraclavicular areas.   Heart: Regular rate and rhythm.   Lungs: Clear to auscultation bilaterally.   GI: Bowel sounds present, soft, nontender with no palpable hepatosplenomegaly or masses.   Extremities: No lower extremity edema noted bilaterally.   Skin: No rashes, petechiae, or bruising noted on exposed skin.    Laboratory Data:  Results for [facility] (MRN [id number]) as of 8/13/2020 11:50   Ref. Range 8/12/2020 10:14   Sodium Latest Ref Range: 133 - 144 mmol/L 136   Potassium Latest Ref Range: 3.4 - 5.3 mmol/L 4.1   Chloride Latest Ref Range: 94 - 109 mmol/L 103   Carbon Dioxide Latest Ref Range: 20 - 32 mmol/L 27   Urea Nitrogen Latest Ref Range: 7 - 30 mg/dL 32 (H)   Creatinine Latest Ref  Range: 0.66 - 1.25 mg/dL 1.17   GFR Estimate Latest Ref Range: >60 mL/min/1.73_m2 55 (L)   GFR Estimate If Black Latest Ref Range: >60 mL/min/1.73_m2 64   Calcium Latest Ref Range: 8.5 - 10.1 mg/dL 9.4   Anion Gap Latest Ref Range: 3 - 14 mmol/L 6   Albumin Latest Ref Range: 3.4 - 5.0 g/dL 4.0   Protein Total Latest Ref Range: 6.8 - 8.8 g/dL 7.7   Bilirubin Total Latest Ref Range: 0.2 - 1.3 mg/dL 1.2   Alkaline Phosphatase Latest Ref Range: 40 - 150 U/L 89   ALT Latest Ref Range: 0 - 70 U/L 15   AST Latest Ref Range: 0 - 45 U/L 11   TSH Latest Ref Range: 0.40 - 4.00 mU/L 1.69   Glucose Latest Ref Range: 70 - 99 mg/dL 151 (H)       Assessment and Plan:      This is a 88-year-old male with    igh-grade muscle invasive bladder cancer-patient is not a candidate for surgery or radiation therapy.  Patient had previous radiation to the prostate  Locally advanced bladder cancer -she met with Dr. Ellis who recommended treatment with Keytruda every 6 weeks  Treatment regimen and side effects of Keytruda discussed in detail with patient.  Questions answered patient agreeable to proceed with treatment  -Patient has follow-up appointment with Dr. Williamson in 6 weeks for cycle 2      Prostate cancer diagnosed in 2004 treated with brachytherapy and followed [facility]       Patient is advised to call our clinic or go to emergency room in the event of fever chills sweats cough shortness of breath chest pain sore throat nausea vomiting diarrhea abdominal pain or bleeding  Or any changes in health condition      CHUCK Dumont [facility] [place]     Chart documentation with Dragon Voice recognition Software. Although reviewed after completion, some words and grammatical errors may remain.

## 2020-08-13 NOTE — PROGRESS NOTES
Infusion Nursing Note:  Santos Marti presents today for keytruda.    Patient seen by provider today: Yes: DEVORA Valderrama NP   present during visit today: Not Applicable.    Note: N/A.    Intravenous Access:  Peripheral IV placed.    Treatment Conditions:  Results reviewed, labs MET treatment parameters, ok to proceed with treatment.      Post Infusion Assessment:  Patient tolerated infusion without incident.  Blood return noted pre and post infusion.  Site patent and intact, free from redness, edema or discomfort.  No evidence of extravasations.  Access discontinued per protocol.       Discharge Plan:   Discharge instructions reviewed with: Patient.  Patient and/or family verbalized understanding of discharge instructions and all questions answered.  AVS to patient via [employer].  Patient will return 9/23/20 for next appointment.   Patient discharged in stable condition accompanied by: self.  Departure Mode: Ambulatory.    Estuardo Starr RN

## 2020-08-14 ENCOUNTER — CARE COORDINATION (OUTPATIENT)
Dept: ONCOLOGY | Facility: CLINIC | Age: 85
End: 2020-08-14

## 2020-08-14 NOTE — PROGRESS NOTES
Follow up call made to Santos to see how he is doing after Keytruda yesterday.   States he is feeling just fine.   Offered no new concerns or questions.  Briefly reviewed commonly reported s/e of keytruda with him. (Previous education done with AJAY Aparicio)  Gave him my name and contact information.

## 2020-09-09 ENCOUNTER — TELEPHONE (OUTPATIENT)
Dept: UROLOGY | Facility: CLINIC | Age: 85
End: 2020-09-09

## 2020-09-09 NOTE — TELEPHONE ENCOUNTER
Health Call Center    Phone Message    May a detailed message be left on voicemail: yes     Reason for Call: Other: Santos calling to schedule his follow up appointment with Dr. Costello. Santos saw Dr. Costello on 7/22/20, and Dr. Costello had wanted Santos to return around 10/22/20. Dr. Costello's next available appointment was not until 2/5/21. Santos isn't sure if it is necessary for him to come in for a follow up with Dr. Costello anyway, because he stated he thought his care was being given to Dr. Ellis. Santos would like to know if he should be following up with Dr. Costello, and if he is, if there is anything closer to the 10/22/20 that he can be scheduled for. Please give Santos a call back at your earliest convenience to discuss.     Action Taken: Message routed to:  Other: [facility]    Travel Screening: Not Applicable

## 2020-09-23 ENCOUNTER — HOSPITAL ENCOUNTER (OUTPATIENT)
Facility: CLINIC | Age: 85
Setting detail: SPECIMEN
Discharge: HOME | End: 2020-09-23
Attending: INTERNAL MEDICINE | Admitting: INTERNAL MEDICINE
Payer: MEDICARE

## 2020-09-23 ENCOUNTER — INFUSION THERAPY VISIT (OUTPATIENT)
Dept: INFUSION THERAPY | Facility: CLINIC | Age: 85
End: 2020-09-23
Attending: INTERNAL MEDICINE
Payer: MEDICARE

## 2020-09-23 DIAGNOSIS — C67.2 MALIGNANT NEOPLASM OF LATERAL WALL OF URINARY BLADDER (H): Primary | ICD-10-CM

## 2020-09-23 LAB
ALBUMIN SERPL-MCNC: 3.9 G/DL (ref 3.4–5)
ALP SERPL-CCNC: 94 U/L (ref 40–150)
ALT SERPL W P-5'-P-CCNC: 15 U/L (ref 0–70)
ANION GAP SERPL CALCULATED.3IONS-SCNC: 4 MMOL/L (ref 3–14)
AST SERPL W P-5'-P-CCNC: 11 U/L (ref 0–45)
BILIRUB SERPL-MCNC: 0.5 MG/DL (ref 0.2–1.3)
BUN SERPL-MCNC: 24 MG/DL (ref 7–30)
CALCIUM SERPL-MCNC: 9 MG/DL (ref 8.5–10.1)
CHLORIDE SERPL-SCNC: 105 MMOL/L (ref 94–109)
CO2 SERPL-SCNC: 28 MMOL/L (ref 20–32)
CREAT SERPL-MCNC: 1.2 MG/DL (ref 0.66–1.25)
GFR SERPL CREATININE-BSD FRML MDRD: 53 ML/MIN/{1.73_M2}
GLUCOSE SERPL-MCNC: 148 MG/DL (ref 70–99)
POTASSIUM SERPL-SCNC: 3.9 MMOL/L (ref 3.4–5.3)
PROT SERPL-MCNC: 7.7 G/DL (ref 6.8–8.8)
SODIUM SERPL-SCNC: 137 MMOL/L (ref 133–144)
TSH SERPL DL<=0.005 MIU/L-ACNC: 0.49 MU/L (ref 0.4–4)

## 2020-09-23 PROCEDURE — 36415 COLL VENOUS BLD VENIPUNCTURE: CPT

## 2020-09-23 PROCEDURE — 80053 COMPREHEN METABOLIC PANEL: CPT | Performed by: INTERNAL MEDICINE

## 2020-09-23 PROCEDURE — 84443 ASSAY THYROID STIM HORMONE: CPT | Mod: GZ | Performed by: INTERNAL MEDICINE

## 2020-09-23 NOTE — PROGRESS NOTES
Medical Assistant Note:  Santos Marti presents today for blood draw.    Patient seen by provider today: No.   present during visit today: Not Applicable.    Concerns: No Concerns.    Procedure:  Lab draw site: lac, Needle type: bf, Gauge: 23.    Post Assessment:  Labs drawn without difficulty: Yes.    Discharge Plan:  Departure Mode: Ambulatory.    Face to Face Time: 5 min  .    Gaby Luque, CMA

## 2020-09-24 ENCOUNTER — ONCOLOGY VISIT (OUTPATIENT)
Dept: ONCOLOGY | Facility: CLINIC | Age: 85
End: 2020-09-24
Attending: INTERNAL MEDICINE
Payer: MEDICARE

## 2020-09-24 ENCOUNTER — INFUSION THERAPY VISIT (OUTPATIENT)
Dept: INFUSION THERAPY | Facility: CLINIC | Age: 85
End: 2020-09-24
Attending: INTERNAL MEDICINE
Payer: MEDICARE

## 2020-09-24 VITALS
WEIGHT: 158 LBS | OXYGEN SATURATION: 98 % | RESPIRATION RATE: 16 BRPM | SYSTOLIC BLOOD PRESSURE: 162 MMHG | HEART RATE: 43 BPM | DIASTOLIC BLOOD PRESSURE: 77 MMHG | BODY MASS INDEX: 22.62 KG/M2 | HEIGHT: 70 IN | TEMPERATURE: 97.9 F

## 2020-09-24 DIAGNOSIS — R53.83 OTHER FATIGUE: ICD-10-CM

## 2020-09-24 DIAGNOSIS — C67.2 MALIGNANT NEOPLASM OF LATERAL WALL OF URINARY BLADDER (H): Primary | ICD-10-CM

## 2020-09-24 DIAGNOSIS — Z91.89 AT RISK FOR INJURY FROM CHEMOTHERAPY: ICD-10-CM

## 2020-09-24 PROCEDURE — 25000128 H RX IP 250 OP 636: Performed by: INTERNAL MEDICINE

## 2020-09-24 PROCEDURE — G0008 ADMIN INFLUENZA VIRUS VAC: HCPCS

## 2020-09-24 PROCEDURE — 25800030 ZZH RX IP 258 OP 636: Performed by: INTERNAL MEDICINE

## 2020-09-24 PROCEDURE — 96413 CHEMO IV INFUSION 1 HR: CPT

## 2020-09-24 PROCEDURE — G0463 HOSPITAL OUTPT CLINIC VISIT: HCPCS | Mod: 25

## 2020-09-24 PROCEDURE — 99214 OFFICE O/P EST MOD 30 MIN: CPT | Performed by: INTERNAL MEDICINE

## 2020-09-24 PROCEDURE — 90662 IIV NO PRSV INCREASED AG IM: CPT | Performed by: INTERNAL MEDICINE

## 2020-09-24 RX ORDER — DIPHENHYDRAMINE HYDROCHLORIDE 50 MG/ML
50 INJECTION INTRAMUSCULAR; INTRAVENOUS
Status: CANCELLED
Start: 2020-09-24

## 2020-09-24 RX ORDER — ALBUTEROL SULFATE 0.83 MG/ML
2.5 SOLUTION RESPIRATORY (INHALATION)
Status: CANCELLED | OUTPATIENT
Start: 2020-09-24

## 2020-09-24 RX ORDER — HEPARIN SODIUM (PORCINE) LOCK FLUSH IV SOLN 100 UNIT/ML 100 UNIT/ML
5 SOLUTION INTRAVENOUS
Status: CANCELLED | OUTPATIENT
Start: 2020-09-24

## 2020-09-24 RX ORDER — LORAZEPAM 2 MG/ML
0.5 INJECTION INTRAMUSCULAR EVERY 4 HOURS PRN
Status: CANCELLED
Start: 2020-09-24

## 2020-09-24 RX ORDER — NALOXONE HYDROCHLORIDE 0.4 MG/ML
.1-.4 INJECTION, SOLUTION INTRAMUSCULAR; INTRAVENOUS; SUBCUTANEOUS
Status: CANCELLED | OUTPATIENT
Start: 2020-09-24

## 2020-09-24 RX ORDER — HEPARIN SODIUM,PORCINE 10 UNIT/ML
5 VIAL (ML) INTRAVENOUS
Status: CANCELLED | OUTPATIENT
Start: 2020-09-24

## 2020-09-24 RX ORDER — MEPERIDINE HYDROCHLORIDE 25 MG/ML
25 INJECTION INTRAMUSCULAR; INTRAVENOUS; SUBCUTANEOUS EVERY 30 MIN PRN
Status: CANCELLED | OUTPATIENT
Start: 2020-09-24

## 2020-09-24 RX ORDER — ALBUTEROL SULFATE 90 UG/1
1-2 AEROSOL, METERED RESPIRATORY (INHALATION)
Status: CANCELLED
Start: 2020-09-24

## 2020-09-24 RX ORDER — METHYLPREDNISOLONE SODIUM SUCCINATE 125 MG/2ML
125 INJECTION, POWDER, LYOPHILIZED, FOR SOLUTION INTRAMUSCULAR; INTRAVENOUS
Status: CANCELLED
Start: 2020-09-24

## 2020-09-24 RX ORDER — EPINEPHRINE 1 MG/ML
0.3 INJECTION, SOLUTION INTRAMUSCULAR; SUBCUTANEOUS EVERY 5 MIN PRN
Status: CANCELLED | OUTPATIENT
Start: 2020-09-24

## 2020-09-24 RX ORDER — SODIUM CHLORIDE 9 MG/ML
1000 INJECTION, SOLUTION INTRAVENOUS CONTINUOUS PRN
Status: CANCELLED
Start: 2020-09-24

## 2020-09-24 RX ADMIN — INFLUENZA A VIRUS A/MICHIGAN/45/2015 X-275 (H1N1) ANTIGEN (FORMALDEHYDE INACTIVATED), INFLUENZA A VIRUS A/SINGAPORE/INFIMH-16-0019/2016 IVR-186 (H3N2) ANTIGEN (FORMALDEHYDE INACTIVATED), INFLUENZA B VIRUS B/PHUKET/3073/2013 ANTIGEN (FORMALDEHYDE INACTIVATED), AND INFLUENZA B VIRUS B/MARYLAND/15/2016 BX-69A ANTIGEN (FORMALDEHYDE INACTIVATED) 0.7 ML: 60; 60; 60; 60 INJECTION, SUSPENSION INTRAMUSCULAR at 09:27

## 2020-09-24 RX ADMIN — SODIUM CHLORIDE 250 ML: 9 INJECTION, SOLUTION INTRAVENOUS at 09:23

## 2020-09-24 RX ADMIN — SODIUM CHLORIDE 400 MG: 9 INJECTION, SOLUTION INTRAVENOUS at 09:26

## 2020-09-24 ASSESSMENT — MIFFLIN-ST. JEOR: SCORE: 1387.93

## 2020-09-24 ASSESSMENT — PAIN SCALES - GENERAL: PAINLEVEL: NO PAIN (0)

## 2020-09-24 NOTE — PROGRESS NOTES
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    Weight as of this encounter: 71.7 kg (158 lb). Body surface area is 1.88 meters squared.  No Pain (0) Comment: Data Unavailable   No LMP for male patient.  Allergies reviewed: Yes  Medications reviewed: Yes    Medications: Medication refills not needed today.  Pharmacy name entered into W[employer]:    [id number] - [place], [place] - [address] AT [facility] & [address] [place] [address] PHARMACY [facility] - [place], [place] - [address] [facility]ACY - [place], [place] - [address]    Clinical concerns: no      Tanya Ceja CMA              "

## 2020-09-24 NOTE — LETTER
"    9/24/2020         RE: Santos Marti  [address]  [place] [facility] [address]        Dear Colleague,    Thank you for referring your patient, Santos Marti, to the [facility]. Please see a copy of my visit note below.    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    Weight as of this encounter: 71.7 kg (158 lb). Body surface area is 1.88 meters squared.  No Pain (0) Comment: Data Unavailable   No LMP for male patient.  Allergies reviewed: Yes  Medications reviewed: Yes    Medications: Medication refills not needed today.  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  He was on apixaban for atrial fibrillation.  " TURBT done on 10/22/2019 revealed normal muscle invasive bladder cancer  -He was followed with surveillance cystoscopies every 3 months.  His cystoscopy evaluation was negative for any tumors or raised erythema on 3/11/2020, however a follow-up exam on 7/8/2020 revealed raised erythematous area in the left lateral bladder wall.  He had TURBT under anesthesia on 7/15/2020 which is revealed high-grade muscle invasive urothelial carcinoma.  -Santos was diagnosed with prostate cancer in October 2004.  He underwent brachii therapy followed by external beam radiation therapy administered at the [facility].  -Due to his previous radiation for his prostate cancer he is not a candidate for bladder radiation.  He is not a candidate for cystectomy due to his advanced age and also previous extensive radiation to the prostate.  - He was started on pembrolizumab for his locally advanced bladder cancer since 8/13/20.     CURRENT INTERVENTIONS:  Pembrolizumab 400 mg IV every 6 weeks since 8/13/20    SUBJECTIVE   Santos Marti is being followed for muscle invasive bladder cancer    Patient is being followed on therapy for his muscle invasive bladder cancer. He has been started on pembrolizumab 6 weeks ago. He has not noticed any side effects on therapy. He denies any recurrent hematuria. He has good appetite and fair energy. No side effects suggestive of autoimmune disease.       PAST MEDICAL HISTORY     Past Medical History:   Diagnosis Date     Arthritis      Complication of anesthesia      Diabetes (H)      Gastroesophageal reflux disease      Hypertension      Pacemaker          CURRENT OUTPATIENT MEDICATIONS     Current Outpatient Medications   Medication Sig     acetaminophen (TYLENOL) 500 MG tablet Take 500-1,000 mg by mouth every 6 hours as needed for mild pain     apixaban ANTICOAGULANT (ELIQUIS) 5 MG tablet Take 5 mg by mouth 2 times daily      isosorbide mononitrate (IMDUR) 30 MG 24 hr tablet TK 1 T PO ONCE D  "    lisinopril-hydrochlorothiazide (PRINZIDE/ZESTORETIC) 20-25 MG tablet Take 1 tablet by mouth     LORazepam (ATIVAN) 0.5 MG tablet Take 1 tablet (0.5 mg) by mouth every 4 hours as needed (Anxiety, Nausea/Vomiting or Sleep)     metFORMIN (GLUCOPHAGE) 1000 MG tablet Take 500 mg by mouth daily      metoprolol succinate ER (TOPROL-XL) 50 MG 24 hr tablet Take 50 mg by mouth daily Take one and a half tabs daily     omeprazole 20 MG tablet Take 20 mg by mouth daily     prochlorperazine (COMPAZINE) 10 MG tablet Take 0.5 tablets (5 mg) by mouth every 6 hours as needed (Nausea/Vomiting)     fluticasone-salmeterol (ADVAIR) 500-50 MCG/DOSE inhaler Inhale 1 puff into the lungs every 12 hours     No current facility-administered medications for this visit.         ALLERGIES      Allergies   Allergen Reactions     Sulfa Drugs Rash        REVIEW OF SYSTEMS   As above in the HPI, o/w complete 12-point ROS was negative.     PHYSICAL EXAM   BP (!) 162/77 (BP Location: Right arm, Patient Position: Sitting, Cuff Size: Adult Regular)   Pulse (!) 43   Temp 97.9  F (36.6  C) (Oral)   Resp 16   Ht 1.778 m (5' 10\")   Wt 71.7 kg (158 lb)   SpO2 98%   BMI 22.67 kg/m    Limited physical exam during video visit due to COVID19 restrictions  Elderly male in no acute distress  Breathing comfortably, no tachypnea  Speech and hearing normal  Pleasant mood and congruent affect  Moving all extremities, no focal neurologic deficits apparent      LABORATORY AND IMAGING STUDIES     Recent Labs   Lab Test 09/23/20  1032 08/12/20  1014 08/05/20  1225 07/16/20  2120 07/15/20  1216    136  --  132* 138   POTASSIUM 3.9 4.1  --  3.9 4.3   CHLORIDE 105 103  --  100 106   CO2 28 27  --  24 26   ANIONGAP 4 6  --  8 6   BUN 24 32*  --  27 26   CR 1.20 1.17  --  1.13 1.08   * 151* 126* 146* 141*   SAAD 9.0 9.4  --  8.9 8.9     No results for input(s): MAG, PHOS in the last 72881 hours.  Recent Labs   Lab Test 07/16/20 2120 07/15/20  1216   WBC " 10.5 6.6   HGB 13.9 13.8    210   MCV 97 99   NEUTROPHIL 76.2  --      Recent Labs   Lab Test 09/23/20  1032 08/12/20  1014   BILITOTAL 0.5 1.2   ALKPHOS 94 89   ALT 15 15   AST 11 11   ALBUMIN 3.9 4.0     TSH   Date Value Ref Range Status   09/23/2020 0.49 0.40 - 4.00 mU/L Final   08/12/2020 1.69 0.40 - 4.00 mU/L Final     No results for input(s): CEA in the last 36726 hours.  Results for orders placed or performed in visit on 08/05/20   PET Oncology Whole Body    Narrative    Combined Report of:    PET and CT on  8/5/2020 2:40 PM :    1. PET of the neck, chest, abdomen, and pelvis.  2. PET CT Fusion for Attenuation Correction and Anatomical  Localization:    3. Diagnostic CT scan of the chest, abdomen, and pelvis with  intravenous contrast for interpretation. Additional PET/CT images of  the abdomen and pelvis after administration of Lasix.  4. 3D MIP and PET-CT fused images were processed on an independent  workstation and archived to PACS and reviewed by a radiologist.    Technique:    1. PET: The patient received 14.6 mCi of F-18-FDG; the serum glucose  was 126 prior to administration, body weight was 74.1 kg. Images were  evaluated in the axial, sagittal, and coronal planes as well as the  rotational whole body MIP. Images were acquired from the Vertex to the  Feet.    UPTAKE WAS MEASURED AT 60 MINUTES.     BACKGROUND:  Liver SUV max= 3.5,   Aorta Blood SUV Max: 2.8.     2. CT: Volumetric acquisition for clinical interpretation of the  chest, abdomen, and pelvis acquired at 3 mm sections . The chest,  abdomen, and pelvis were evaluated at 5 mm sections in bone, soft  tissue, and lung windows.      The patient received 100 cc. Of Optiray 320 intravenously for the  examination.    breeza oral contrast    3. 3D MIP and PET-CT fused images were processed on an independent  workstation and archived to PACS and reviewed by a radiologist.    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  Also diagnosed with prostate cancer 10 years ago.    COMPARISON: Abdomen and pelvis CT 10/9/2019    FINDINGS:     HEAD/NECK:  There is no  suspicious FDG uptake in the neck.     The paranasal sinuses are clear. The mastoid air cells are clear.     The mucosal pharyngeal space, the , prevertebral and carotid  spaces are within normal limits.     No masses, mass effect or pathologically enlarged lymph nodes are  evident. The thyroid gland appears normal.    CHEST:  There is no suspicious FDG uptake in the chest. Left chest wall  pacemaker.  FDG activity along the right shoulder replacement, max SUV 8.7.   Degenerative changes at the left sternoclavicular joint.  There are no pathologically enlarged mediastinal, hilar or axillary  lymph nodes.  Stable non-FDG avid 8 mm right lower lobe nodule.      There is no significant pericardial or plural effusions.    ABDOMEN AND PELVIS:  There is an FDG avid left lateral bladder wall mass measuring 1.7 x  2.8 cm, max SUV 11.1. Bilateral hip arthroplasties contribute to  significant artifact in this region.  FDG avidity of the rectal wall with large amount of intramural stool  There are no suspicious hepatic lesions. There is no splenomegaly or  evidence for splenic or pancreatic mass lesion.  There are no suspicious adrenal mass lesions or opaque gallbladder  calculi. There is symmetric nephrographic renal phase without  hydronephrosis.    There is no evidence for diverticulitis, bowel obstruction or free  fluid.  Multiple radiation seeds in the prostate.    LOWER EXTREMITIES: .  No abnormal masses or hypermetabolic lesions.    BONES:   There are no suspicious lytic or blastic osseous lesions.  There is no  abnormal FDG uptake in the skeleton.         Impression    IMPRESSION: In this patient with transitional cell carcinoma of the  bladder status post transurethral resection of the tumor:   1. 2.8 cm FDG avid left lateral bladder wall transitional cell  carcinoma, representing residual tumor.  2. No evidence of metastatic disease in the chest, abdomen, or pelvis.  3. FDG avid rectal wall with large amount of stool, likely  representing stercoral colitis.  4. FDG avidity along the  patient's right shoulder arthroplasty,  likely inflammatory.    I have personally reviewed the examination and initial interpretation  and I agree with the findings.    SANJAY MARIE MD               ASSESSMENT AND PLAN   1. High-grade muscle invasive bladder cancer in a patient not a candidate for either surgery or radiation therapy  2. Prostate cancer treated with brachii therapy followed by external beam radiation therapy  3. Hypertension, diabetes mellitus type 2, atrial fibrillation on chronic anticoagulation with apixaban    I had a lengthy discussion with patient at this clinic visit. He has been started on pembrolizumab for his muscle invasive bladder cancer on 8/13/20. He has tolerated this well. He has not noticed any side effects on therapy. There is nothing to suggest auto-immune disease. He denies any hematuria.     Monitoring response to this disease is going to be a little difficult as it is only accurately measured on a PET-CT and we cannot use the PET scan for continued response assessment. I would follow him with CT scans. As long as he is not having a clear disease progression, we could continue on therapy as current. He has a follow up cystoscopy in 6 weeks or so. I would also get a CT scan and urine cytology. Together these would give us enough data points to monitor his disease progression rate. We would continue as long as he is gaining clinical benefit.     I would recommend that he receive influenza vaccine. He should get it during his appointment for  pembrolizumab infusion. I will see if we have a shingles vaccine as he notes that he is due for it.     I will see him in 6 weeks with labs including urine cytology and CT chest, abdomen and pelvis prior to visit.     Over 25 min spent face to face with patient during this visit with more than 50% time spent in counseling and coordinating care.      Imtiaz Ellis    Hematologist and Medical Oncologist  [facility]      Again, thank you for allowing me to participate in the care of your patient.        Sincerely,        Imtiaz Ellis MD

## 2020-09-24 NOTE — PROGRESS NOTES
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  He was on apixaban for atrial fibrillation.  TURBT done on 10/22/2019 revealed normal muscle invasive bladder cancer  -He was followed with surveillance cystoscopies every 3 months.  His cystoscopy evaluation was negative for any tumors or raised erythema on 3/11/2020, however a follow-up exam on 7/8/2020 revealed raised erythematous area in the left lateral bladder wall.  He had TURBT under anesthesia on 7/15/2020 which is revealed high-grade muscle invasive urothelial carcinoma.  -Santos was diagnosed with prostate cancer in October 2004.  He underwent brachii therapy followed by external beam radiation therapy administered at the [facility].  -Due to his previous radiation for his prostate cancer he is not a candidate for bladder radiation.  He is not a candidate for cystectomy due to his advanced age and also previous extensive radiation to the prostate.  - He was started on pembrolizumab for his locally advanced bladder cancer since 8/13/20.     CURRENT INTERVENTIONS:  Pembrolizumab 400 mg IV every 6 weeks since 8/13/20    SUBJECTIVE   Santos Marti is being followed for muscle invasive bladder cancer    Patient is being followed on therapy for his muscle invasive bladder cancer. He has been started on pembrolizumab 6 weeks ago. He has not noticed any side effects on therapy. He denies any recurrent hematuria. He has good appetite and fair energy. No side effects suggestive of autoimmune disease.       PAST MEDICAL HISTORY     Past Medical History:   Diagnosis Date  "    Arthritis      Complication of anesthesia      Diabetes (H)      Gastroesophageal reflux disease      Hypertension      Pacemaker          CURRENT OUTPATIENT MEDICATIONS     Current Outpatient Medications   Medication Sig     acetaminophen (TYLENOL) 500 MG tablet Take 500-1,000 mg by mouth every 6 hours as needed for mild pain     apixaban ANTICOAGULANT (ELIQUIS) 5 MG tablet Take 5 mg by mouth 2 times daily      isosorbide mononitrate (IMDUR) 30 MG 24 hr tablet TK 1 T PO ONCE D     lisinopril-hydrochlorothiazide (PRINZIDE/ZESTORETIC) 20-25 MG tablet Take 1 tablet by mouth     LORazepam (ATIVAN) 0.5 MG tablet Take 1 tablet (0.5 mg) by mouth every 4 hours as needed (Anxiety, Nausea/Vomiting or Sleep)     metFORMIN (GLUCOPHAGE) 1000 MG tablet Take 500 mg by mouth daily      metoprolol succinate ER (TOPROL-XL) 50 MG 24 hr tablet Take 50 mg by mouth daily Take one and a half tabs daily     omeprazole 20 MG tablet Take 20 mg by mouth daily     prochlorperazine (COMPAZINE) 10 MG tablet Take 0.5 tablets (5 mg) by mouth every 6 hours as needed (Nausea/Vomiting)     fluticasone-salmeterol (ADVAIR) 500-50 MCG/DOSE inhaler Inhale 1 puff into the lungs every 12 hours     No current facility-administered medications for this visit.         ALLERGIES      Allergies   Allergen Reactions     Sulfa Drugs Rash        REVIEW OF SYSTEMS   As above in the HPI, o/w complete 12-point ROS was negative.     PHYSICAL EXAM   BP (!) 162/77 (BP Location: Right arm, Patient Position: Sitting, Cuff Size: Adult Regular)   Pulse (!) 43   Temp 97.9  F (36.6  C) (Oral)   Resp 16   Ht 1.778 m (5' 10\")   Wt 71.7 kg (158 lb)   SpO2 98%   BMI 22.67 kg/m    Limited physical exam during video visit due to COVID19 restrictions  Elderly male in no acute distress  Breathing comfortably, no tachypnea  Speech and hearing normal  Pleasant mood and congruent affect  Moving all extremities, no focal neurologic deficits apparent      LABORATORY AND " IMAGING STUDIES     Recent Labs   Lab Test 09/23/20  1032 08/12/20  1014 08/05/20  1225 07/16/20  2120 07/15/20  1216    136  --  132* 138   POTASSIUM 3.9 4.1  --  3.9 4.3   CHLORIDE 105 103  --  100 106   CO2 28 27  --  24 26   ANIONGAP 4 6  --  8 6   BUN 24 32*  --  27 26   CR 1.20 1.17  --  1.13 1.08   * 151* 126* 146* 141*   SAAD 9.0 9.4  --  8.9 8.9     No results for input(s): MAG, PHOS in the last 59295 hours.  Recent Labs   Lab Test 07/16/20  2120 07/15/20  1216   WBC 10.5 6.6   HGB 13.9 13.8    210   MCV 97 99   NEUTROPHIL 76.2  --      Recent Labs   Lab Test 09/23/20  1032 08/12/20  1014   BILITOTAL 0.5 1.2   ALKPHOS 94 89   ALT 15 15   AST 11 11   ALBUMIN 3.9 4.0     TSH   Date Value Ref Range Status   09/23/2020 0.49 0.40 - 4.00 mU/L Final   08/12/2020 1.69 0.40 - 4.00 mU/L Final     No results for input(s): CEA in the last 94424 hours.  Results for orders placed or performed in visit on 08/05/20   PET Oncology Whole Body    Narrative    Combined Report of:    PET and CT on  8/5/2020 2:40 PM :    1. PET of the neck, chest, abdomen, and pelvis.  2. PET CT Fusion for Attenuation Correction and Anatomical  Localization:    3. Diagnostic CT scan of the chest, abdomen, and pelvis with  intravenous contrast for interpretation. Additional PET/CT images of  the abdomen and pelvis after administration of Lasix.  4. 3D MIP and PET-CT fused images were processed on an independent  workstation and archived to PACS and reviewed by a radiologist.    Technique:    1. PET: The patient received 14.6 mCi of F-18-FDG; the serum glucose  was 126 prior to administration, body weight was 74.1 kg. Images were  evaluated in the axial, sagittal, and coronal planes as well as the  rotational whole body MIP. Images were acquired from the Vertex to the  Feet.    UPTAKE WAS MEASURED AT 60 MINUTES.     BACKGROUND:  Liver SUV max= 3.5,   Aorta Blood SUV Max: 2.8.     2. CT: Volumetric acquisition for clinical  interpretation of the  chest, abdomen, and pelvis acquired at 3 mm sections . The chest,  abdomen, and pelvis were evaluated at 5 mm sections in bone, soft  tissue, and lung windows.      The patient received 100 cc. Of Optiray 320 intravenously for the  examination.    breeza oral contrast    3. 3D MIP and PET-CT fused images were processed on an independent  workstation and archived to PACS and reviewed by a radiologist.    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  Also diagnosed with prostate cancer 10 years ago.    COMPARISON: Abdomen and pelvis CT 10/9/2019    FINDINGS:     HEAD/NECK:  There is no  suspicious FDG uptake in the neck.     The paranasal sinuses are clear. The mastoid air cells are clear.     The mucosal pharyngeal space, the , prevertebral and carotid  spaces are within normal limits.     No masses, mass effect or pathologically enlarged lymph nodes are  evident. The thyroid gland appears normal.    CHEST:  There is no suspicious FDG uptake in the chest. Left chest wall  pacemaker.  FDG activity along the right shoulder replacement, max SUV 8.7.   Degenerative changes at the left sternoclavicular joint.  There are no pathologically enlarged mediastinal, hilar or axillary  lymph nodes.  Stable non-FDG avid 8 mm right lower lobe nodule.      There is no significant pericardial or plural effusions.    ABDOMEN AND PELVIS:  There is an FDG avid left lateral bladder wall mass measuring 1.7 x  2.8 cm, max SUV 11.1. Bilateral hip arthroplasties contribute to  significant artifact in this region.  FDG avidity of the rectal wall with large amount of intramural stool  There are no suspicious hepatic lesions. There is no splenomegaly or  evidence for splenic or pancreatic mass  lesion.  There are no suspicious adrenal mass lesions or opaque gallbladder  calculi. There is symmetric nephrographic renal phase without  hydronephrosis.    There is no evidence for diverticulitis, bowel obstruction or free  fluid.  Multiple radiation seeds in the prostate.    LOWER EXTREMITIES: .  No abnormal masses or hypermetabolic lesions.    BONES:   There are no suspicious lytic or blastic osseous lesions.  There is no  abnormal FDG uptake in the skeleton.        Impression    IMPRESSION: In this patient with transitional cell carcinoma of the  bladder status post transurethral resection of the tumor:   1. 2.8 cm FDG avid left lateral bladder wall transitional cell  carcinoma, representing residual tumor.  2. No evidence of metastatic disease in the chest, abdomen, or pelvis.  3. FDG avid rectal wall with large amount of stool, likely  representing stercoral colitis.  4. FDG avidity along the  patient's right shoulder arthroplasty,  likely inflammatory.    I have personally reviewed the examination and initial interpretation  and I agree with the findings.    SANJAY MARIE MD               ASSESSMENT AND PLAN   1. High-grade muscle invasive bladder cancer in a patient not a candidate for either surgery or radiation therapy  2. Prostate cancer treated with brachii therapy followed by external beam radiation therapy  3. Hypertension, diabetes mellitus type 2, atrial fibrillation on chronic anticoagulation with apixaban    I had a lengthy discussion with patient at this clinic visit. He has been started on pembrolizumab for his muscle invasive bladder cancer on 8/13/20. He has tolerated this well. He has not noticed any side effects on therapy. There is nothing to suggest auto-immune disease. He denies any hematuria.     Monitoring response to this disease is going to be a little difficult as it is only accurately measured on a PET-CT and we cannot use the PET scan for continued response assessment. I would  follow him with CT scans. As long as he is not having a clear disease progression, we could continue on therapy as current. He has a follow up cystoscopy in 6 weeks or so. I would also get a CT scan and urine cytology. Together these would give us enough data points to monitor his disease progression rate. We would continue as long as he is gaining clinical benefit.     I would recommend that he receive influenza vaccine. He should get it during his appointment for pembrolizumab infusion. I will see if we have a shingles vaccine as he notes that he is due for it.     I will see him in 6 weeks with labs including urine cytology and CT chest, abdomen and pelvis prior to visit.     Over 25 min spent face to face with patient during this visit with more than 50% time spent in counseling and coordinating care.      Imtiaz Ellis    Hematologist and Medical Oncologist  [facility]

## 2020-09-24 NOTE — PROGRESS NOTES
Infusion Nursing Note:  Santos Marti presents today for C2D1 Keytruda.    Patient seen by provider today: Yes: Dr. Ellis    Note: Received flu vaccine today per order.  No new concerns today after patient's visit with Dr. Ellis.    Intravenous Access:  Peripheral IV placed.      Treatment Conditions:  Lab Results   Component Value Date    HGB 13.9 07/16/2020     Lab Results   Component Value Date    WBC 10.5 07/16/2020      Lab Results   Component Value Date    ANEU 8.0 07/16/2020     Lab Results   Component Value Date     07/16/2020      Lab Results   Component Value Date     09/23/2020                   Lab Results   Component Value Date    POTASSIUM 3.9 09/23/2020           No results found for: MAG         Lab Results   Component Value Date    CR 1.20 09/23/2020                   Lab Results   Component Value Date    SAAD 9.0 09/23/2020                Lab Results   Component Value Date    BILITOTAL 0.5 09/23/2020           Lab Results   Component Value Date    ALBUMIN 3.9 09/23/2020                    Lab Results   Component Value Date    ALT 15 09/23/2020           Lab Results   Component Value Date    AST 11 09/23/2020       Results reviewed, labs MET treatment parameters, ok to proceed with treatment.      Post Infusion Assessment:  Patient tolerated infusion without incident.  Patient tolerated one flu vaccine without incident to left arm.  Blood return noted pre and post infusion.  Site patent and intact, free from redness, edema or discomfort.  No evidence of extravasations.  Access discontinued per protocol.    Discharge Plan:   Patient declined prescription refills.  Discharge instructions reviewed with: Patient.  Patient and/or family verbalized understanding of discharge instructions and all questions answered.  AVS to patient via Weiju[employer].  No future appointment    Patient discharged in stable condition accompanied by: self.  Departure Mode: Ambulatory.    Bridgette Rios  RN

## 2020-10-22 ENCOUNTER — OFFICE VISIT (OUTPATIENT)
Dept: UROLOGY | Facility: CLINIC | Age: 85
End: 2020-10-22
Payer: MEDICARE

## 2020-10-22 VITALS
WEIGHT: 155 LBS | BODY MASS INDEX: 22.19 KG/M2 | HEIGHT: 70 IN | SYSTOLIC BLOOD PRESSURE: 124 MMHG | DIASTOLIC BLOOD PRESSURE: 70 MMHG

## 2020-10-22 DIAGNOSIS — Z79.2 PROPHYLACTIC ANTIBIOTIC: ICD-10-CM

## 2020-10-22 DIAGNOSIS — Z85.46 PERSONAL HISTORY OF MALIGNANT NEOPLASM OF PROSTATE: ICD-10-CM

## 2020-10-22 DIAGNOSIS — Z85.51 PERSONAL HISTORY OF MALIGNANT NEOPLASM OF BLADDER: Primary | ICD-10-CM

## 2020-10-22 LAB
ALBUMIN UR-MCNC: >=300 MG/DL
APPEARANCE UR: CLEAR
BILIRUB UR QL STRIP: ABNORMAL
COLOR UR AUTO: YELLOW
GLUCOSE UR STRIP-MCNC: NEGATIVE MG/DL
HGB UR QL STRIP: ABNORMAL
KETONES UR STRIP-MCNC: ABNORMAL MG/DL
LEUKOCYTE ESTERASE UR QL STRIP: NEGATIVE
NITRATE UR QL: NEGATIVE
PH UR STRIP: 5.5 PH (ref 5–7)
SOURCE: ABNORMAL
SP GR UR STRIP: >1.03 (ref 1–1.03)
UROBILINOGEN UR STRIP-ACNC: 1 EU/DL (ref 0.2–1)

## 2020-10-22 PROCEDURE — 81003 URINALYSIS AUTO W/O SCOPE: CPT | Mod: QW | Performed by: UROLOGY

## 2020-10-22 PROCEDURE — 52000 CYSTOURETHROSCOPY: CPT | Performed by: UROLOGY

## 2020-10-22 PROCEDURE — 99212 OFFICE O/P EST SF 10 MIN: CPT | Mod: 25 | Performed by: UROLOGY

## 2020-10-22 RX ORDER — LIDOCAINE HYDROCHLORIDE 20 MG/ML
JELLY TOPICAL ONCE
Status: ACTIVE | OUTPATIENT
Start: 2020-10-22

## 2020-10-22 RX ORDER — CIPROFLOXACIN 500 MG/1
500 TABLET, FILM COATED ORAL ONCE
Qty: 1 TABLET | Refills: 0 | Status: SHIPPED | OUTPATIENT
Start: 2020-10-22 | End: 2020-10-22

## 2020-10-22 ASSESSMENT — PAIN SCALES - GENERAL: PAINLEVEL: NO PAIN (0)

## 2020-10-22 ASSESSMENT — MIFFLIN-ST. JEOR: SCORE: 1374.33

## 2020-10-22 NOTE — PROGRESS NOTES
"Santos Marti is an 89-year-old gentleman who was treated with combination radiation years ago for adenocarcinoma of the prostate.  He developed transitional cell carcinoma of the bladder and 2019 and was found to have muscle invasive disease.  He is currently receiving IV pembrolizumab.  He returns for office cystoscopy.  He has been voiding comfortably and has had no hematuria or dysuria  Other past medical history, medications and allergies were reviewed  Exam: Alert and oriented, normal appearance, normal respirations, normal external genitalia  Flexible cystoscopy-normal urethra, normal prostatic fossa, significant radiation changes in bladder with no papillary tumors or raised erythema.  Normal ureteral orifices  Assessment: Transitional cell carcinoma of the bladder, adenocarcinoma of the prostate.  [facility] \"undetectable \"-they have never sent me copies of these results.  Plan: Antibiotic x1 today.  Follow-up cystoscopy in 3 months + PSA  "

## 2020-10-22 NOTE — PATIENT INSTRUCTIONS

## 2020-10-22 NOTE — NURSING NOTE
Chief Complaint   Patient presents with     Bladder Cancer     Prior to the start of the procedure and with procedural staff participation, I verbally confirmed the patient s identity using two indicators, relevant allergies, that the procedure was appropriate and matched the consent or emergent situation, and that the correct equipment/implants were available. Immediately prior to starting the procedure I conducted the Time Out with the procedural staff and re-confirmed the patient s name, procedure, and site/side. I have wiped the patient off with the povidone-Iodine solution, draped them, and used Lidocaine hydrochloride jelly. (The Joint Commission universal protocol was followed.)  Yes    Sedation (Moderate or Deep): None    5mL 2% lidocaine hydrochloride Urojet instilled into urethra.    NDC# [address]-5  Lot #: [id number]   Expiration Date:  05/22    Estefanía Shaver EMT

## 2020-10-22 NOTE — LETTER
"10/22/2020       RE: Santos Marti  [address]  [place] [facility] [address]     Dear Colleague,    Thank you for referring your patient, Santos Marti, to the [facility] [place] at [facility]. Please see a copy of my visit note below.    Santos Marti is an 89-year-old gentleman who was treated with combination radiation years ago for adenocarcinoma of the prostate.  He developed transitional cell carcinoma of the bladder and 2019 and was found to have muscle invasive disease.  He is currently receiving IV pembrolizumab.  He returns for office cystoscopy.  He has been voiding comfortably and has had no hematuria or dysuria  Other past medical history, medications and allergies were reviewed  Exam: Alert and oriented, normal appearance, normal respirations, normal external genitalia  Flexible cystoscopy-normal urethra, normal prostatic fossa, significant radiation changes in bladder with no papillary tumors or raised erythema.  Normal ureteral orifices  Assessment: Transitional cell carcinoma of the bladder, adenocarcinoma of the prostate.  [facility] \"[facility] \"-they have never sent me copies of these results.  Plan: Antibiotic x1 today.  Follow-up cystoscopy in 3 months + PSA      Again, thank you for allowing me to participate in the care of your patient.      Sincerely,    Maximilian Costello MD      "

## 2020-10-22 NOTE — LETTER
"10/22/2020      RE: Santos Marti  [address]  [place] [address]       Santos Marti is an 89-year-old gentleman who was treated with combination radiation years ago for adenocarcinoma of the prostate.  He developed transitional cell carcinoma of the bladder and 2019 and was found to have muscle invasive disease.  He is currently receiving IV pembrolizumab.  He returns for office cystoscopy.  He has been voiding comfortably and has had no hematuria or dysuria  Other past medical history, medications and allergies were reviewed  Exam: Alert and oriented, normal appearance, normal respirations, normal external genitalia  Flexible cystoscopy-normal urethra, normal prostatic fossa, significant radiation changes in bladder with no papillary tumors or raised erythema.  Normal ureteral orifices  Assessment: Transitional cell carcinoma of the bladder, adenocarcinoma of the prostate.  [facility] and [facility] been \"[facility]etectable \"-they have never sent me copies of these results.  Plan: Antibiotic x1 today.  Follow-up cystoscopy in 3 months + PSA      Maximilian Costello MD    "

## 2020-11-03 ENCOUNTER — HOSPITAL ENCOUNTER (OUTPATIENT)
Dept: CT IMAGING | Facility: CLINIC | Age: 85
End: 2020-11-03
Attending: INTERNAL MEDICINE
Payer: MEDICARE

## 2020-11-03 ENCOUNTER — HOSPITAL ENCOUNTER (OUTPATIENT)
Dept: LAB | Facility: CLINIC | Age: 85
End: 2020-11-03
Attending: INTERNAL MEDICINE
Payer: MEDICARE

## 2020-11-03 DIAGNOSIS — R53.83 OTHER FATIGUE: ICD-10-CM

## 2020-11-03 DIAGNOSIS — C67.2 MALIGNANT NEOPLASM OF LATERAL WALL OF URINARY BLADDER (H): ICD-10-CM

## 2020-11-03 DIAGNOSIS — C67.2 MALIGNANT NEOPLASM OF LATERAL WALL OF URINARY BLADDER (H): Primary | ICD-10-CM

## 2020-11-03 DIAGNOSIS — Z91.89 AT RISK FOR INJURY FROM CHEMOTHERAPY: ICD-10-CM

## 2020-11-03 LAB
ALBUMIN SERPL-MCNC: 3.8 G/DL (ref 3.4–5)
ALBUMIN UR-MCNC: 100 MG/DL
ALP SERPL-CCNC: 89 U/L (ref 40–150)
ALT SERPL W P-5'-P-CCNC: 16 U/L (ref 0–70)
ANION GAP SERPL CALCULATED.3IONS-SCNC: 5 MMOL/L (ref 3–14)
APPEARANCE UR: CLEAR
AST SERPL W P-5'-P-CCNC: 12 U/L (ref 0–45)
BASOPHILS # BLD AUTO: 0 10E9/L (ref 0–0.2)
BASOPHILS NFR BLD AUTO: 0.1 %
BILIRUB SERPL-MCNC: 0.5 MG/DL (ref 0.2–1.3)
BILIRUB UR QL STRIP: NEGATIVE
BUN SERPL-MCNC: 26 MG/DL (ref 7–30)
CALCIUM SERPL-MCNC: 9.1 MG/DL (ref 8.5–10.1)
CHLORIDE SERPL-SCNC: 103 MMOL/L (ref 94–109)
CO2 SERPL-SCNC: 28 MMOL/L (ref 20–32)
COLOR UR AUTO: ABNORMAL
CREAT BLD-MCNC: 1.2 MG/DL (ref 0.66–1.25)
CREAT SERPL-MCNC: 1.13 MG/DL (ref 0.66–1.25)
DIFFERENTIAL METHOD BLD: NORMAL
EOSINOPHIL # BLD AUTO: 0.1 10E9/L (ref 0–0.7)
EOSINOPHIL NFR BLD AUTO: 0.5 %
ERYTHROCYTE [DISTWIDTH] IN BLOOD BY AUTOMATED COUNT: 13.3 % (ref 10–15)
GFR SERPL CREATININE-BSD FRML MDRD: 57 ML/MIN/{1.73_M2}
GFR SERPL CREATININE-BSD FRML MDRD: 57 ML/MIN/{1.73_M2}
GLUCOSE SERPL-MCNC: 170 MG/DL (ref 70–99)
GLUCOSE UR STRIP-MCNC: NEGATIVE MG/DL
HCT VFR BLD AUTO: 43.1 % (ref 40–53)
HGB BLD-MCNC: 14 G/DL (ref 13.3–17.7)
HGB UR QL STRIP: ABNORMAL
IMM GRANULOCYTES # BLD: 0 10E9/L (ref 0–0.4)
IMM GRANULOCYTES NFR BLD: 0.2 %
KETONES UR STRIP-MCNC: NEGATIVE MG/DL
LDH SERPL L TO P-CCNC: 169 U/L (ref 85–227)
LEUKOCYTE ESTERASE UR QL STRIP: NEGATIVE
LYMPHOCYTES # BLD AUTO: 2.4 10E9/L (ref 0.8–5.3)
LYMPHOCYTES NFR BLD AUTO: 22.2 %
MCH RBC QN AUTO: 31.2 PG (ref 26.5–33)
MCHC RBC AUTO-ENTMCNC: 32.5 G/DL (ref 31.5–36.5)
MCV RBC AUTO: 96 FL (ref 78–100)
MONOCYTES # BLD AUTO: 0.9 10E9/L (ref 0–1.3)
MONOCYTES NFR BLD AUTO: 8.1 %
NEUTROPHILS # BLD AUTO: 7.4 10E9/L (ref 1.6–8.3)
NEUTROPHILS NFR BLD AUTO: 68.9 %
NITRATE UR QL: NEGATIVE
NRBC # BLD AUTO: 0 10*3/UL
NRBC BLD AUTO-RTO: 0 /100
PH UR STRIP: 5 PH (ref 5–7)
PLATELET # BLD AUTO: 213 10E9/L (ref 150–450)
POTASSIUM SERPL-SCNC: 3.9 MMOL/L (ref 3.4–5.3)
PROT SERPL-MCNC: 7.6 G/DL (ref 6.8–8.8)
RBC # BLD AUTO: 4.49 10E12/L (ref 4.4–5.9)
SODIUM SERPL-SCNC: 136 MMOL/L (ref 133–144)
SOURCE: ABNORMAL
SP GR UR STRIP: 1.02 (ref 1–1.03)
TSH SERPL DL<=0.005 MIU/L-ACNC: 0.56 MU/L (ref 0.4–4)
UROBILINOGEN UR STRIP-MCNC: 0.2 MG/DL (ref 0–2)
WBC # BLD AUTO: 10.7 10E9/L (ref 4–11)

## 2020-11-03 PROCEDURE — 80053 COMPREHEN METABOLIC PANEL: CPT | Performed by: INTERNAL MEDICINE

## 2020-11-03 PROCEDURE — 250N000009 HC RX 250: Performed by: INTERNAL MEDICINE

## 2020-11-03 PROCEDURE — 81003 URINALYSIS AUTO W/O SCOPE: CPT | Performed by: UROLOGY

## 2020-11-03 PROCEDURE — 85025 COMPLETE CBC W/AUTO DIFF WBC: CPT | Performed by: INTERNAL MEDICINE

## 2020-11-03 PROCEDURE — 82565 ASSAY OF CREATININE: CPT | Mod: 91

## 2020-11-03 PROCEDURE — 83615 LACTATE (LD) (LDH) ENZYME: CPT | Performed by: INTERNAL MEDICINE

## 2020-11-03 PROCEDURE — 250N000011 HC RX IP 250 OP 636: Performed by: INTERNAL MEDICINE

## 2020-11-03 PROCEDURE — 74177 CT ABD & PELVIS W/CONTRAST: CPT

## 2020-11-03 PROCEDURE — 84443 ASSAY THYROID STIM HORMONE: CPT | Performed by: INTERNAL MEDICINE

## 2020-11-03 RX ORDER — IOPAMIDOL 755 MG/ML
76 INJECTION, SOLUTION INTRAVASCULAR ONCE
Status: COMPLETED | OUTPATIENT
Start: 2020-11-03 | End: 2020-11-03

## 2020-11-03 RX ADMIN — IOPAMIDOL 76 ML: 755 INJECTION, SOLUTION INTRAVENOUS at 11:35

## 2020-11-03 RX ADMIN — SODIUM CHLORIDE 62 ML: 9 INJECTION, SOLUTION INTRAVENOUS at 11:35

## 2020-11-05 ENCOUNTER — INFUSION THERAPY VISIT (OUTPATIENT)
Dept: INFUSION THERAPY | Facility: CLINIC | Age: 85
End: 2020-11-05
Attending: INTERNAL MEDICINE
Payer: MEDICARE

## 2020-11-05 VITALS
HEART RATE: 70 BPM | TEMPERATURE: 97.4 F | BODY MASS INDEX: 22.71 KG/M2 | DIASTOLIC BLOOD PRESSURE: 80 MMHG | HEIGHT: 70 IN | OXYGEN SATURATION: 100 % | SYSTOLIC BLOOD PRESSURE: 164 MMHG | WEIGHT: 158.6 LBS | RESPIRATION RATE: 16 BRPM

## 2020-11-05 DIAGNOSIS — C67.2 MALIGNANT NEOPLASM OF LATERAL WALL OF URINARY BLADDER (H): Primary | ICD-10-CM

## 2020-11-05 PROCEDURE — 250N000011 HC RX IP 250 OP 636: Performed by: INTERNAL MEDICINE

## 2020-11-05 PROCEDURE — 96413 CHEMO IV INFUSION 1 HR: CPT

## 2020-11-05 PROCEDURE — 99207 PR NO CHARGE LOS: CPT

## 2020-11-05 PROCEDURE — 258N000003 HC RX IP 258 OP 636: Performed by: INTERNAL MEDICINE

## 2020-11-05 RX ORDER — HEPARIN SODIUM,PORCINE 10 UNIT/ML
5 VIAL (ML) INTRAVENOUS
Status: CANCELLED | OUTPATIENT
Start: 2020-11-05

## 2020-11-05 RX ORDER — NALOXONE HYDROCHLORIDE 0.4 MG/ML
.1-.4 INJECTION, SOLUTION INTRAMUSCULAR; INTRAVENOUS; SUBCUTANEOUS
Status: CANCELLED | OUTPATIENT
Start: 2020-11-05

## 2020-11-05 RX ORDER — DIPHENHYDRAMINE HYDROCHLORIDE 50 MG/ML
50 INJECTION INTRAMUSCULAR; INTRAVENOUS
Status: CANCELLED
Start: 2020-11-05

## 2020-11-05 RX ORDER — ALBUTEROL SULFATE 0.83 MG/ML
2.5 SOLUTION RESPIRATORY (INHALATION)
Status: CANCELLED | OUTPATIENT
Start: 2020-11-05

## 2020-11-05 RX ORDER — LORAZEPAM 2 MG/ML
0.5 INJECTION INTRAMUSCULAR EVERY 4 HOURS PRN
Status: CANCELLED
Start: 2020-11-05

## 2020-11-05 RX ORDER — HEPARIN SODIUM (PORCINE) LOCK FLUSH IV SOLN 100 UNIT/ML 100 UNIT/ML
5 SOLUTION INTRAVENOUS
Status: CANCELLED | OUTPATIENT
Start: 2020-11-05

## 2020-11-05 RX ORDER — MEPERIDINE HYDROCHLORIDE 25 MG/ML
25 INJECTION INTRAMUSCULAR; INTRAVENOUS; SUBCUTANEOUS EVERY 30 MIN PRN
Status: CANCELLED | OUTPATIENT
Start: 2020-11-05

## 2020-11-05 RX ORDER — ALBUTEROL SULFATE 90 UG/1
1-2 AEROSOL, METERED RESPIRATORY (INHALATION)
Status: CANCELLED
Start: 2020-11-05

## 2020-11-05 RX ORDER — SODIUM CHLORIDE 9 MG/ML
1000 INJECTION, SOLUTION INTRAVENOUS CONTINUOUS PRN
Status: CANCELLED
Start: 2020-11-05

## 2020-11-05 RX ORDER — EPINEPHRINE 1 MG/ML
0.3 INJECTION, SOLUTION, CONCENTRATE INTRAVENOUS EVERY 5 MIN PRN
Status: CANCELLED | OUTPATIENT
Start: 2020-11-05

## 2020-11-05 RX ADMIN — SODIUM CHLORIDE 400 MG: 9 INJECTION, SOLUTION INTRAVENOUS at 10:26

## 2020-11-05 RX ADMIN — SODIUM CHLORIDE 250 ML: 9 INJECTION, SOLUTION INTRAVENOUS at 10:24

## 2020-11-05 ASSESSMENT — MIFFLIN-ST. JEOR: SCORE: 1390.65

## 2020-11-05 ASSESSMENT — PAIN SCALES - GENERAL: PAINLEVEL: NO PAIN (0)

## 2020-11-05 NOTE — PROGRESS NOTES
Infusion Nursing Note:  Santos Marti presents today for C3D1 Keytruda.    Patient seen by provider today: No   present during visit today: Not Applicable.    Note: N/A.    Intravenous Access:  Peripheral IV placed.    Treatment Conditions:  Lab Results   Component Value Date    HGB 14.0 11/03/2020     Lab Results   Component Value Date    WBC 10.7 11/03/2020      Lab Results   Component Value Date    ANEU 7.4 11/03/2020     Lab Results   Component Value Date     11/03/2020      Lab Results   Component Value Date     11/03/2020                   Lab Results   Component Value Date    POTASSIUM 3.9 11/03/2020           No results found for: MAG         Lab Results   Component Value Date    CR 1.13 11/03/2020                   Lab Results   Component Value Date    SAAD 9.1 11/03/2020                Lab Results   Component Value Date    BILITOTAL 0.5 11/03/2020           Lab Results   Component Value Date    ALBUMIN 3.8 11/03/2020                    Lab Results   Component Value Date    ALT 16 11/03/2020           Lab Results   Component Value Date    AST 12 11/03/2020       Results reviewed, labs MET treatment parameters, ok to proceed with treatment.      Post Infusion Assessment:  Patient tolerated infusion without incident.  Blood return noted pre and post infusion.  Site patent and intact, free from redness, edema or discomfort.  No evidence of extravasations.  Access discontinued per protocol.       Discharge Plan:   Discharge instructions reviewed with: Patient.  Patient and/or family verbalized understanding of discharge instructions and all questions answered.  AVS to patient via Sc[employer]lecT.  Patient will return 12/17/20 for next appointment.   Patient discharged in stable condition accompanied by: self.  Departure Mode: Ambulatory.    Luisa Fajardo RN                      '

## 2020-11-06 ENCOUNTER — ONCOLOGY VISIT (OUTPATIENT)
Dept: ONCOLOGY | Facility: CLINIC | Age: 85
End: 2020-11-06
Attending: INTERNAL MEDICINE
Payer: MEDICARE

## 2020-11-06 VITALS
RESPIRATION RATE: 16 BRPM | SYSTOLIC BLOOD PRESSURE: 136 MMHG | HEART RATE: 57 BPM | DIASTOLIC BLOOD PRESSURE: 77 MMHG | TEMPERATURE: 98.2 F | OXYGEN SATURATION: 98 % | BODY MASS INDEX: 22.53 KG/M2 | WEIGHT: 157 LBS

## 2020-11-06 DIAGNOSIS — C67.2 MALIGNANT NEOPLASM OF LATERAL WALL OF URINARY BLADDER (H): Primary | ICD-10-CM

## 2020-11-06 PROCEDURE — G0463 HOSPITAL OUTPT CLINIC VISIT: HCPCS

## 2020-11-06 PROCEDURE — 99214 OFFICE O/P EST MOD 30 MIN: CPT | Performed by: INTERNAL MEDICINE

## 2020-11-06 ASSESSMENT — PAIN SCALES - GENERAL: PAINLEVEL: NO PAIN (0)

## 2020-11-06 NOTE — NURSING NOTE
"Oncology Rooming Note    November 6, 2020 3:30 PM   Santos Marti is a 89 year old male who presents for:    Chief Complaint   Patient presents with     Oncology Clinic Visit     Malignant neoplasm of lateral wall of urinary bladder      Initial Vitals: /77   Pulse 57   Temp 98.2  F (36.8  C) (Oral)   Resp 16   Wt 71.2 kg (157 lb)   SpO2 98%   BMI 22.53 kg/m   Estimated body mass index is 22.53 kg/m  as calculated from the following:    Height as of 11/5/20: 1.778 m (5' 10\").    Weight as of this encounter: 71.2 kg (157 lb). Body surface area is 1.88 meters squared.  No Pain (0) Comment: Data Unavailable   No LMP for male patient.  Allergies reviewed: Yes  Medications reviewed: Yes    Medications: Medication refills not needed today.  Pharmacy name entered into [employer]:    [id number] - [place], [place] - [address] AT [facility] [address] [facility] [place], [place] - [address] [place] [address] PHARMACY - [place], [place] - [address]    Clinical concerns: follow up       [facility]              "

## 2020-11-06 NOTE — PROGRESS NOTES
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  He was on apixaban for atrial fibrillation.  TURBT done on 10/22/2019 revealed normal muscle invasive bladder cancer  -He was followed with surveillance cystoscopies every 3 months.  His cystoscopy evaluation was negative for any tumors or raised erythema on 3/11/2020, however a follow-up exam on 7/8/2020 revealed raised erythematous area in the left lateral bladder wall.  He had TURBT under anesthesia on 7/15/2020 which is revealed high-grade muscle invasive urothelial carcinoma.  -Santos was diagnosed with prostate cancer in October 2004.  He underwent brachii therapy followed by external beam radiation therapy administered at the [facility].  -Due to his previous radiation for his prostate cancer he is not a candidate for bladder radiation.  He is not a candidate for cystectomy due to his advanced age and also previous extensive radiation to the prostate.  - He was started on pembrolizumab for his locally advanced bladder cancer since 8/13/20.     CURRENT INTERVENTIONS:  Pembrolizumab 400 mg IV every 6 weeks since 8/13/20    SUBJECTIVE   Santos Marti is being followed for muscle invasive bladder cancer    Patient is being followed on therapy for his muscle invasive bladder cancer. He has been started on pembrolizumab 3 months ago. He has not noticed any side effects on therapy. He denies any recurrent hematuria. He has good appetite and fair energy. No side effects suggestive of autoimmune disease.       PAST MEDICAL HISTORY     Past Medical History:   Diagnosis Date      Arthritis      Complication of anesthesia      Diabetes (H)      Gastroesophageal reflux disease      Hypertension      Pacemaker          CURRENT OUTPATIENT MEDICATIONS     Current Outpatient Medications   Medication Sig     acetaminophen (TYLENOL) 500 MG tablet Take 500-1,000 mg by mouth every 6 hours as needed for mild pain     apixaban ANTICOAGULANT (ELIQUIS) 5 MG tablet Take 5 mg by mouth 2 times daily      isosorbide mononitrate (IMDUR) 30 MG 24 hr tablet TK 1 T PO ONCE D     lisinopril-hydrochlorothiazide (PRINZIDE/ZESTORETIC) 20-25 MG tablet Take 1 tablet by mouth     metFORMIN (GLUCOPHAGE) 1000 MG tablet Take 500 mg by mouth daily      metoprolol succinate ER (TOPROL-XL) 50 MG 24 hr tablet Take 50 mg by mouth daily Take one and a half tabs daily     omeprazole 20 MG tablet Take 20 mg by mouth daily     Current Facility-Administered Medications   Medication     lidocaine (XYLOCAINE) 2 % external gel        ALLERGIES      Allergies   Allergen Reactions     Sulfa Drugs Rash        REVIEW OF SYSTEMS   As above in the HPI, o/w complete 12-point ROS was negative.     PHYSICAL EXAM   /77   Pulse 57   Temp 98.2  F (36.8  C) (Oral)   Resp 16   Wt 71.2 kg (157 lb)   SpO2 98%   BMI 22.53 kg/m    Limited physical exam during video visit due to COVID19 restrictions  Elderly male in no acute distress  Breathing comfortably, no tachypnea  Speech and hearing normal  Pleasant mood and congruent affect  Moving all extremities, no focal neurologic deficits apparent      LABORATORY AND IMAGING STUDIES     Recent Labs   Lab Test 11/03/20  1130 09/23/20  1032 08/12/20  1014 08/05/20  1225 07/16/20  2120 07/15/20  1216    137 136  --  132* 138   POTASSIUM 3.9 3.9 4.1  --  3.9 4.3   CHLORIDE 103 105 103  --  100 106   CO2 28 28 27  --  24 26   ANIONGAP 5 4 6  --  8 6   BUN 26 24 32*  --  27 26   CR 1.13 1.20 1.17  --  1.13 1.08   * 148* 151* 126* 146* 141*   SAAD 9.1 9.0 9.4  --  8.9 8.9     No results  for input(s): MAG, PHOS in the last 27067 hours.  Recent Labs   Lab Test 11/03/20  1130 07/16/20  2120 07/15/20  1216   WBC 10.7 10.5 6.6   HGB 14.0 13.9 13.8    201 210   MCV 96 97 99   NEUTROPHIL 68.9 76.2  --      Recent Labs   Lab Test 11/03/20  1130 09/23/20  1032 08/12/20  1014   BILITOTAL 0.5 0.5 1.2   ALKPHOS 89 94 89   ALT 16 15 15   AST 12 11 11   ALBUMIN 3.8 3.9 4.0     --   --      TSH   Date Value Ref Range Status   11/03/2020 0.56 0.40 - 4.00 mU/L Final   09/23/2020 0.49 0.40 - 4.00 mU/L Final   08/12/2020 1.69 0.40 - 4.00 mU/L Final     No results for input(s): CEA in the last 27187 hours.  Results for orders placed or performed during the hospital encounter of 11/03/20   CT Chest/Abdomen/Pelvis w Contrast    Narrative    CT CHEST/ABDOMEN/PELVIS W CONTRAST 11/3/2020 11:48 AM    CLINICAL HISTORY: Bladder cancer on immunotherapy; Malignant neoplasm  of lateral wall of urinary bladder (H)    TECHNIQUE: CT scan of the chest, abdomen, and pelvis was performed  following injection of IV contrast. Multiplanar reformats were  obtained. Dose reduction techniques were used.   CONTRAST: 76mL Isovue-370    COMPARISON: PET CT 8/25/2020    FINDINGS:   LUNGS AND PLEURA: Stable small pulmonary nodules. For example a 7 mm  left upper lobe nodule (series 8, image 127) and 7 mm right upper lobe  nodule (series 8, image 193) are unchanged. No definite new or  enlarging nodules. Evaluation is slightly limited by breathing. No  infiltrate or effusion.    MEDIASTINUM/AXILLAE: No lymphadenopathy. Pacemaker. The heart and  great vessels are normal in size. No pericardial effusion.    HEPATOBILIARY: No significant mass or bile duct dilatation. No  calcified gallstones.     PANCREAS: No significant mass, duct dilatation, or inflammatory  change.    SPLEEN: Normal.    ADRENAL GLANDS: Normal.    KIDNEYS/BLADDER: No suspicious renal masses, calculi or hydronephrosis  in either kidney. Evaluation of the bladder is  limited by streak  artifact from bilateral hip arthroplasties.    BOWEL: Diverticulosis in the colon. No acute inflammatory change. No  obstruction.     PELVIC ORGANS: Brachytherapy seeds in the prostate gland.    ADDITIONAL FINDINGS: No lymphadenopathy in the abdomen and visualized  pelvis. Evaluation of the pelvis is limited by bilateral hip  arthroplasties.    MUSCULOSKELETAL: Bilateral shoulder and hip arthroplasties.  Degenerative changes in the spine. Stable wedge compression deformity  of midthoracic vertebral bodies. No suspicious lesions in the bones.      Impression    IMPRESSION:  1.  Small pulmonary nodules measuring up to 7 mm are stable.  2.  No definite evidence for metastatic disease in the abdomen and  pelvis. Evaluation of the bladder and pelvis is limited by streak  artifact from bilateral hip arthroplasties.    AMADEO SMITH MD                    ASSESSMENT AND PLAN   1. High-grade muscle invasive bladder cancer in a patient not a candidate for either surgery or radiation therapy  2. Prostate cancer treated with brachii therapy followed by external beam radiation therapy  3. Hypertension, diabetes mellitus type 2, atrial fibrillation on chronic anticoagulation with apixaban    I had a lengthy discussion with patient at this clinic visit. He has been started on pembrolizumab for his muscle invasive bladder cancer on 8/13/20. He has tolerated this well. He has not noticed any side effects on therapy. There is nothing to suggest auto-immune disease. He denies any hematuria.     I reviewed actual images with him. His bladder tumor cannot be assessed on the CT portion due to the bilateral hip replacements. I have pasted representative pictures above. Monitoring response to this disease is going to be a little difficult as it is only accurately measured on a PET-CT and we cannot use the PET scan for continued response assessment. I would follow him with CT scans. As long as he is not having a clear  disease progression, we could continue on therapy as current. He is being followed up cystoscopy. I would also get a CT scan and urine cytology. Together these would give us enough data points to monitor his disease progression rate. We would continue as long as he is gaining clinical benefit.     I will have him follow with Sophia Bruce for the next infusion and I will see him in 3 months with labs including urine cytology and CT chest, abdomen and pelvis prior to visit.     Over 25 min spent face to face with patient during this visit with more than 50% time spent in counseling and coordinating care.      Imtiaz Ellis    Hematologist and Medical Oncologist  [facility]

## 2020-11-06 NOTE — LETTER
11/6/2020         RE: Santos Marti  [address]  [place] [facility] [place] [facility] [address]        Dear Colleague,    Thank you for referring your patient, Santos Marti, to the [facility]. Please see a copy of my visit note below.    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  He was on apixaban for atrial fibrillation.  TURBT done on 10/22/2019 revealed normal muscle invasive bladder cancer  -He was followed with surveillance cystoscopies every 3 months.  His cystoscopy evaluation was negative for any tumors or raised erythema on 3/11/2020, however a follow-up exam on 7/8/2020 revealed raised erythematous area in the left lateral bladder wall.  He had TURBT under anesthesia on 7/15/2020 which is revealed high-grade muscle invasive urothelial carcinoma.  -Santos was diagnosed with prostate cancer in October 2004.  He underwent brachii therapy followed by external beam radiation therapy administered at the [facility].  -Due to his previous radiation for his prostate cancer he is not a candidate for bladder radiation.  He is not a candidate for cystectomy due to his advanced age and also previous extensive radiation to the prostate.  - He was started on pembrolizumab for his locally advanced bladder cancer since 8/13/20.     CURRENT INTERVENTIONS:  Pembrolizumab 400 mg IV every 6 weeks since 8/13/20    SUBJECTIVE   Santos Marti is being followed for muscle invasive bladder cancer    Patient is being followed on therapy for his muscle invasive bladder cancer. He has been started  on pembrolizumab 3 months ago. He has not noticed any side effects on therapy. He denies any recurrent hematuria. He has good appetite and fair energy. No side effects suggestive of autoimmune disease.       PAST MEDICAL HISTORY     Past Medical History:   Diagnosis Date     Arthritis      Complication of anesthesia      Diabetes (H)      Gastroesophageal reflux disease      Hypertension      Pacemaker          CURRENT OUTPATIENT MEDICATIONS     Current Outpatient Medications   Medication Sig     acetaminophen (TYLENOL) 500 MG tablet Take 500-1,000 mg by mouth every 6 hours as needed for mild pain     apixaban ANTICOAGULANT (ELIQUIS) 5 MG tablet Take 5 mg by mouth 2 times daily      isosorbide mononitrate (IMDUR) 30 MG 24 hr tablet TK 1 T PO ONCE D     lisinopril-hydrochlorothiazide (PRINZIDE/ZESTORETIC) 20-25 MG tablet Take 1 tablet by mouth     metFORMIN (GLUCOPHAGE) 1000 MG tablet Take 500 mg by mouth daily      metoprolol succinate ER (TOPROL-XL) 50 MG 24 hr tablet Take 50 mg by mouth daily Take one and a half tabs daily     omeprazole 20 MG tablet Take 20 mg by mouth daily     Current Facility-Administered Medications   Medication     lidocaine (XYLOCAINE) 2 % external gel        ALLERGIES      Allergies   Allergen Reactions     Sulfa Drugs Rash        REVIEW OF SYSTEMS   As above in the HPI, o/w complete 12-point ROS was negative.     PHYSICAL EXAM   /77   Pulse 57   Temp 98.2  F (36.8  C) (Oral)   Resp 16   Wt 71.2 kg (157 lb)   SpO2 98%   BMI 22.53 kg/m    Limited physical exam during video visit due to COVID19 restrictions  Elderly male in no acute distress  Breathing comfortably, no tachypnea  Speech and hearing normal  Pleasant mood and congruent affect  Moving all extremities, no focal neurologic deficits apparent      LABORATORY AND IMAGING STUDIES     Recent Labs   Lab Test 11/03/20  1130 09/23/20  1032 08/12/20  1014 08/05/20  1225 07/16/20  2120 07/15/20  1216    137 136  --  132*  138   POTASSIUM 3.9 3.9 4.1  --  3.9 4.3   CHLORIDE 103 105 103  --  100 106   CO2 28 28 27  --  24 26   ANIONGAP 5 4 6  --  8 6   BUN 26 24 32*  --  27 26   CR 1.13 1.20 1.17  --  1.13 1.08   * 148* 151* 126* 146* 141*   SAAD 9.1 9.0 9.4  --  8.9 8.9     No results for input(s): MAG, PHOS in the last 36391 hours.  Recent Labs   Lab Test 11/03/20  1130 07/16/20 2120 07/15/20  1216   WBC 10.7 10.5 6.6   HGB 14.0 13.9 13.8    201 210   MCV 96 97 99   NEUTROPHIL 68.9 76.2  --      Recent Labs   Lab Test 11/03/20  1130 09/23/20  1032 08/12/20  1014   BILITOTAL 0.5 0.5 1.2   ALKPHOS 89 94 89   ALT 16 15 15   AST 12 11 11   ALBUMIN 3.8 3.9 4.0     --   --      TSH   Date Value Ref Range Status   11/03/2020 0.56 0.40 - 4.00 mU/L Final   09/23/2020 0.49 0.40 - 4.00 mU/L Final   08/12/2020 1.69 0.40 - 4.00 mU/L Final     No results for input(s): CEA in the last 23029 hours.  Results for orders placed or performed during the hospital encounter of 11/03/20   CT Chest/Abdomen/Pelvis w Contrast    Narrative    CT CHEST/ABDOMEN/PELVIS W CONTRAST 11/3/2020 11:48 AM    CLINICAL HISTORY: Bladder cancer on immunotherapy; Malignant neoplasm  of lateral wall of urinary bladder (H)    TECHNIQUE: CT scan of the chest, abdomen, and pelvis was performed  following injection of IV contrast. Multiplanar reformats were  obtained. Dose reduction techniques were used.   CONTRAST: 76mL Isovue-370    COMPARISON: PET CT 8/25/2020    FINDINGS:   LUNGS AND PLEURA: Stable small pulmonary nodules. For example a 7 mm  left upper lobe nodule (series 8, image 127) and 7 mm right upper lobe  nodule (series 8, image 193) are unchanged. No definite new or  enlarging nodules. Evaluation is slightly limited by breathing. No  infiltrate or effusion.    MEDIASTINUM/AXILLAE: No lymphadenopathy. Pacemaker. The heart and  great vessels are normal in size. No pericardial effusion.    HEPATOBILIARY: No significant mass or bile duct dilatation.  No  calcified gallstones.     PANCREAS: No significant mass, duct dilatation, or inflammatory  change.    SPLEEN: Normal.    ADRENAL GLANDS: Normal.    KIDNEYS/BLADDER: No suspicious renal masses, calculi or hydronephrosis  in either kidney. Evaluation of the bladder is limited by streak  artifact from bilateral hip arthroplasties.    BOWEL: Diverticulosis in the colon. No acute inflammatory change. No  obstruction.     PELVIC ORGANS: Brachytherapy seeds in the prostate gland.    ADDITIONAL FINDINGS: No lymphadenopathy in the abdomen and visualized  pelvis. Evaluation of the pelvis is limited by bilateral hip  arthroplasties.    MUSCULOSKELETAL: Bilateral shoulder and hip arthroplasties.  Degenerative changes in the spine. Stable wedge compression deformity  of midthoracic vertebral bodies. No suspicious lesions in the bones.      Impression    IMPRESSION:  1.  Small pulmonary nodules measuring up to 7 mm are stable.  2.  No definite evidence for metastatic disease in the abdomen and  pelvis. Evaluation of the bladder and pelvis is limited by streak  artifact from bilateral hip arthroplasties.    AMADEO SMITH MD                    ASSESSMENT AND PLAN   1. High-grade muscle invasive bladder cancer in a patient not a candidate for either surgery or radiation therapy  2. Prostate cancer treated with brachii therapy followed by external beam radiation therapy  3. Hypertension, diabetes mellitus type 2, atrial fibrillation on chronic anticoagulation with apixaban    I had a lengthy discussion with patient at this clinic visit. He has been started on pembrolizumab for his muscle invasive bladder cancer on 8/13/20. He has tolerated this well. He has not noticed any side effects on therapy. There is nothing to suggest auto-immune disease. He denies any hematuria.     I reviewed actual images with him. His bladder tumor cannot be assessed on the CT portion due to the bilateral hip replacements. I have pasted representative  pictures above. Monitoring response to this disease is going to be a little difficult as it is only accurately measured on a PET-CT and we cannot use the PET scan for continued response assessment. I would follow him with CT scans. As long as he is not having a clear disease progression, we could continue on therapy as current. He is being followed up cystoscopy. I would also get a CT scan and urine cytology. Together these would give us enough data points to monitor his disease progression rate. We would continue as long as he is gaining clinical benefit.     I will have him follow with Sophia Bruce for the next infusion and I will see him in 3 months with labs including urine cytology and CT chest, abdomen and pelvis prior to visit.     Over 25 min spent face to face with patient during this visit with more than 50% time spent in counseling and coordinating care.      Imtiaz Ellis    Hematologist and Medical Oncologist  [facility]        Again, thank you for allowing me to participate in the care of your patient.        Sincerely,        Imtiaz Ellis MD

## 2020-12-16 ENCOUNTER — HOSPITAL ENCOUNTER (OUTPATIENT)
Facility: CLINIC | Age: 85
Setting detail: SPECIMEN
Discharge: HOME | End: 2020-12-16
Attending: INTERNAL MEDICINE | Admitting: INTERNAL MEDICINE
Payer: MEDICARE

## 2020-12-16 ENCOUNTER — INFUSION THERAPY VISIT (OUTPATIENT)
Dept: INFUSION THERAPY | Facility: CLINIC | Age: 85
End: 2020-12-16
Attending: INTERNAL MEDICINE
Payer: MEDICARE

## 2020-12-16 DIAGNOSIS — Z91.89 AT RISK FOR INJURY FROM CHEMOTHERAPY: ICD-10-CM

## 2020-12-16 DIAGNOSIS — R53.83 OTHER FATIGUE: ICD-10-CM

## 2020-12-16 DIAGNOSIS — C67.2 MALIGNANT NEOPLASM OF LATERAL WALL OF URINARY BLADDER (H): Primary | ICD-10-CM

## 2020-12-16 DIAGNOSIS — C67.2 MALIGNANT NEOPLASM OF LATERAL WALL OF URINARY BLADDER (H): ICD-10-CM

## 2020-12-16 LAB
ALBUMIN SERPL-MCNC: 4.1 G/DL (ref 3.4–5)
ALP SERPL-CCNC: 85 U/L (ref 40–150)
ALT SERPL W P-5'-P-CCNC: 19 U/L (ref 0–70)
ANION GAP SERPL CALCULATED.3IONS-SCNC: 4 MMOL/L (ref 3–14)
AST SERPL W P-5'-P-CCNC: 14 U/L (ref 0–45)
BASOPHILS # BLD AUTO: 0 10E9/L (ref 0–0.2)
BASOPHILS NFR BLD AUTO: 0.2 %
BILIRUB SERPL-MCNC: 0.6 MG/DL (ref 0.2–1.3)
BUN SERPL-MCNC: 22 MG/DL (ref 7–30)
CALCIUM SERPL-MCNC: 9.1 MG/DL (ref 8.5–10.1)
CHLORIDE SERPL-SCNC: 106 MMOL/L (ref 94–109)
CO2 SERPL-SCNC: 29 MMOL/L (ref 20–32)
CREAT SERPL-MCNC: 1.24 MG/DL (ref 0.66–1.25)
DIFFERENTIAL METHOD BLD: NORMAL
EOSINOPHIL # BLD AUTO: 0.1 10E9/L (ref 0–0.7)
EOSINOPHIL NFR BLD AUTO: 1.1 %
ERYTHROCYTE [DISTWIDTH] IN BLOOD BY AUTOMATED COUNT: 13.5 % (ref 10–15)
GFR SERPL CREATININE-BSD FRML MDRD: 51 ML/MIN/{1.73_M2}
GLUCOSE SERPL-MCNC: 147 MG/DL (ref 70–99)
HCT VFR BLD AUTO: 45 % (ref 40–53)
HGB BLD-MCNC: 14.8 G/DL (ref 13.3–17.7)
IMM GRANULOCYTES # BLD: 0 10E9/L (ref 0–0.4)
IMM GRANULOCYTES NFR BLD: 0.2 %
LDH SERPL L TO P-CCNC: 211 U/L (ref 85–227)
LYMPHOCYTES # BLD AUTO: 2.1 10E9/L (ref 0.8–5.3)
LYMPHOCYTES NFR BLD AUTO: 25.9 %
MCH RBC QN AUTO: 31.6 PG (ref 26.5–33)
MCHC RBC AUTO-ENTMCNC: 32.9 G/DL (ref 31.5–36.5)
MCV RBC AUTO: 96 FL (ref 78–100)
MONOCYTES # BLD AUTO: 0.6 10E9/L (ref 0–1.3)
MONOCYTES NFR BLD AUTO: 7.6 %
NEUTROPHILS # BLD AUTO: 5.3 10E9/L (ref 1.6–8.3)
NEUTROPHILS NFR BLD AUTO: 65 %
NRBC # BLD AUTO: 0 10*3/UL
NRBC BLD AUTO-RTO: 0 /100
PLATELET # BLD AUTO: 232 10E9/L (ref 150–450)
POTASSIUM SERPL-SCNC: 3.7 MMOL/L (ref 3.4–5.3)
PROT SERPL-MCNC: 8 G/DL (ref 6.8–8.8)
RBC # BLD AUTO: 4.69 10E12/L (ref 4.4–5.9)
SODIUM SERPL-SCNC: 139 MMOL/L (ref 133–144)
TSH SERPL DL<=0.005 MIU/L-ACNC: 0.84 MU/L (ref 0.4–4)
WBC # BLD AUTO: 8.1 10E9/L (ref 4–11)

## 2020-12-16 PROCEDURE — 99207 PR NO CHARGE NURSE ONLY: CPT

## 2020-12-16 PROCEDURE — 83615 LACTATE (LD) (LDH) ENZYME: CPT | Performed by: INTERNAL MEDICINE

## 2020-12-16 PROCEDURE — 36415 COLL VENOUS BLD VENIPUNCTURE: CPT

## 2020-12-16 PROCEDURE — 84443 ASSAY THYROID STIM HORMONE: CPT | Performed by: INTERNAL MEDICINE

## 2020-12-16 PROCEDURE — 80053 COMPREHEN METABOLIC PANEL: CPT | Performed by: INTERNAL MEDICINE

## 2020-12-16 PROCEDURE — 85025 COMPLETE CBC W/AUTO DIFF WBC: CPT | Performed by: INTERNAL MEDICINE

## 2020-12-16 RX ORDER — ALBUTEROL SULFATE 0.83 MG/ML
2.5 SOLUTION RESPIRATORY (INHALATION)
Status: CANCELLED | OUTPATIENT
Start: 2020-12-17

## 2020-12-16 RX ORDER — LORAZEPAM 2 MG/ML
0.5 INJECTION INTRAMUSCULAR EVERY 4 HOURS PRN
Status: CANCELLED
Start: 2020-12-17

## 2020-12-16 RX ORDER — DIPHENHYDRAMINE HYDROCHLORIDE 50 MG/ML
50 INJECTION INTRAMUSCULAR; INTRAVENOUS
Status: CANCELLED
Start: 2020-12-17

## 2020-12-16 RX ORDER — HEPARIN SODIUM,PORCINE 10 UNIT/ML
5 VIAL (ML) INTRAVENOUS
Status: CANCELLED | OUTPATIENT
Start: 2020-12-17

## 2020-12-16 RX ORDER — EPINEPHRINE 1 MG/ML
0.3 INJECTION, SOLUTION INTRAMUSCULAR; SUBCUTANEOUS EVERY 5 MIN PRN
Status: CANCELLED | OUTPATIENT
Start: 2020-12-17

## 2020-12-16 RX ORDER — SODIUM CHLORIDE 9 MG/ML
1000 INJECTION, SOLUTION INTRAVENOUS CONTINUOUS PRN
Status: CANCELLED
Start: 2020-12-17

## 2020-12-16 RX ORDER — METHYLPREDNISOLONE SODIUM SUCCINATE 125 MG/2ML
125 INJECTION, POWDER, LYOPHILIZED, FOR SOLUTION INTRAMUSCULAR; INTRAVENOUS
Status: CANCELLED
Start: 2020-12-17

## 2020-12-16 RX ORDER — HEPARIN SODIUM (PORCINE) LOCK FLUSH IV SOLN 100 UNIT/ML 100 UNIT/ML
5 SOLUTION INTRAVENOUS
Status: CANCELLED | OUTPATIENT
Start: 2020-12-17

## 2020-12-16 RX ORDER — ALBUTEROL SULFATE 90 UG/1
1-2 AEROSOL, METERED RESPIRATORY (INHALATION)
Status: CANCELLED
Start: 2020-12-17

## 2020-12-16 RX ORDER — NALOXONE HYDROCHLORIDE 0.4 MG/ML
.1-.4 INJECTION, SOLUTION INTRAMUSCULAR; INTRAVENOUS; SUBCUTANEOUS
Status: CANCELLED | OUTPATIENT
Start: 2020-12-17

## 2020-12-16 RX ORDER — MEPERIDINE HYDROCHLORIDE 25 MG/ML
25 INJECTION INTRAMUSCULAR; INTRAVENOUS; SUBCUTANEOUS EVERY 30 MIN PRN
Status: CANCELLED | OUTPATIENT
Start: 2020-12-17

## 2020-12-16 NOTE — PROGRESS NOTES
Medical Assistant Note:    Santos Marti presents today for blood draw.    Patient seen by provider today: No.   present during visit today: Not Applicable.    Concerns: No Concerns.    Procedure:  Lab draw site: rac, Needle type: bf, Gauge: 23.    Post Assessment:  Labs drawn without difficulty: Yes.    Discharge Plan:  Departure Mode: Ambulatory.    Face to Face Time: 5 minutes.    Anastasiya Huerta CMA  12/16/2020      10:48 AM

## 2020-12-17 ENCOUNTER — HOSPITAL ENCOUNTER (OUTPATIENT)
Facility: CLINIC | Age: 85
Setting detail: SPECIMEN
Discharge: HOME | End: 2020-12-17
Attending: NURSE PRACTITIONER | Admitting: NURSE PRACTITIONER
Payer: MEDICARE

## 2020-12-17 ENCOUNTER — ONCOLOGY VISIT (OUTPATIENT)
Dept: ONCOLOGY | Facility: CLINIC | Age: 85
End: 2020-12-17
Attending: NURSE PRACTITIONER
Payer: MEDICARE

## 2020-12-17 ENCOUNTER — INFUSION THERAPY VISIT (OUTPATIENT)
Dept: INFUSION THERAPY | Facility: CLINIC | Age: 85
End: 2020-12-17
Attending: INTERNAL MEDICINE
Payer: MEDICARE

## 2020-12-17 VITALS
RESPIRATION RATE: 16 BRPM | SYSTOLIC BLOOD PRESSURE: 165 MMHG | TEMPERATURE: 97.9 F | HEIGHT: 70 IN | WEIGHT: 162.8 LBS | BODY MASS INDEX: 23.31 KG/M2 | OXYGEN SATURATION: 97 % | DIASTOLIC BLOOD PRESSURE: 83 MMHG | HEART RATE: 56 BPM

## 2020-12-17 DIAGNOSIS — C67.2 MALIGNANT NEOPLASM OF LATERAL WALL OF URINARY BLADDER (H): Primary | ICD-10-CM

## 2020-12-17 DIAGNOSIS — C91.10 CLL (CHRONIC LYMPHOCYTIC LEUKEMIA) (H): ICD-10-CM

## 2020-12-17 DIAGNOSIS — Z87.898 HISTORY OF GROSS HEMATURIA: ICD-10-CM

## 2020-12-17 DIAGNOSIS — D47.2 MGUS (MONOCLONAL GAMMOPATHY OF UNKNOWN SIGNIFICANCE): ICD-10-CM

## 2020-12-17 LAB
ALBUMIN UR-MCNC: NEGATIVE MG/DL
APPEARANCE UR: CLEAR
BILIRUB UR QL STRIP: NEGATIVE
COLOR UR AUTO: YELLOW
GLUCOSE UR STRIP-MCNC: NEGATIVE MG/DL
HGB UR QL STRIP: NEGATIVE
KETONES UR STRIP-MCNC: NEGATIVE MG/DL
LEUKOCYTE ESTERASE UR QL STRIP: NEGATIVE
NITRATE UR QL: NEGATIVE
PH UR STRIP: 6 PH (ref 5–7)
SOURCE: NORMAL
SP GR UR STRIP: 1.02 (ref 1–1.03)
UROBILINOGEN UR STRIP-MCNC: NORMAL MG/DL (ref 0–2)

## 2020-12-17 PROCEDURE — 96413 CHEMO IV INFUSION 1 HR: CPT

## 2020-12-17 PROCEDURE — G0463 HOSPITAL OUTPT CLINIC VISIT: HCPCS

## 2020-12-17 PROCEDURE — 258N000003 HC RX IP 258 OP 636: Performed by: NURSE PRACTITIONER

## 2020-12-17 PROCEDURE — 250N000011 HC RX IP 250 OP 636: Performed by: NURSE PRACTITIONER

## 2020-12-17 PROCEDURE — 99214 OFFICE O/P EST MOD 30 MIN: CPT | Performed by: NURSE PRACTITIONER

## 2020-12-17 PROCEDURE — 81003 URINALYSIS AUTO W/O SCOPE: CPT | Performed by: NURSE PRACTITIONER

## 2020-12-17 RX ADMIN — SODIUM CHLORIDE 400 MG: 9 INJECTION, SOLUTION INTRAVENOUS at 11:15

## 2020-12-17 RX ADMIN — SODIUM CHLORIDE 250 ML: 9 INJECTION, SOLUTION INTRAVENOUS at 11:15

## 2020-12-17 ASSESSMENT — PAIN SCALES - GENERAL: PAINLEVEL: NO PAIN (0)

## 2020-12-17 ASSESSMENT — MIFFLIN-ST. JEOR: SCORE: 1409.71

## 2020-12-17 NOTE — PROGRESS NOTES
"Oncology/Hematology Visit Note  Dec 17, 2020    Reason for Visit: follow up of high-grade muscle invasive bladder cancer-patient is not a candidate for surgery or radiation therapy.  Patient had previous radiation to the prostate  Locally advanced bladder cancer -she met with Dr. Ellis who recommended treatment with Keytruda every 6 weeks      Interval History:  Overall patient reports feeling well.  He denies fever chills sweats cough shortness of breath chest pain denies headache dizziness seizures.  Denies nausea vomiting diarrhea abdominal pain bleeding.  Denies excessive fatigue    Review of Systems:  14 point ROS of systems including Constitutional, Eyes, Respiratory, Cardiovascular, Gastroenterology, Genitourinary, Integumentary, Muscularskeletal, Psychiatric were all negative except for pertinent positives noted in my HPI.      Physical Examination:  General: The patient is a pleasant male in no acute distress.  BP (!) 165/83   Pulse 56   Temp 97.9  F (36.6  C) (Oral)   Resp 16   Ht 1.778 m (5' 10\")   Wt 73.8 kg (162 lb 12.8 oz)   SpO2 97%   BMI 23.36 kg/m    HEENT: EOMI, PERRL. Sclerae are anicteric. Oral mucosa is pink and moist with no lesions or thrush.   Lymph: Neck is supple with no lymphadenopathy in the cervical or supraclavicular areas.   Heart: Regular rate and rhythm.   Lungs: Clear to auscultation bilaterally.   GI: Bowel sounds present, soft, nontender with no palpable hepatosplenomegaly or masses.   Extremities: No lower extremity edema noted bilaterally.   Skin: No rashes, petechiae, or bruising noted on exposed skin.    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  Patient had previous radiation to the prostate  Locally advanced bladder cancer -pt met with Dr. Ellis who recommended treatment with Keytruda every 6 weeks  -Patient Saw Doctor , " November who recommended repeat of CT scan and urine cytology in 3 months  Meanwhile we continue with immunotherapy  labreviewed patient has been tolerating treatment well continue with immunotherapy  -Schedule for Keytruda every 6 weeks  -Schedule for CT scan and urine cytology February  Schedule with Dr. Ellis in February        Prostate cancer diagnosed in 2004 treated with brachytherapy and followed by [facility] beam [facility].       Hypertension  Patient is asymptomatic  He is taking blood pressure medications  Follow-up with primary care physician        Patient is advised to call our clinic or go to emergency room in the event of fever chills sweats cough shortness of breath chest pain sore throat nausea vomiting diarrhea abdominal pain or bleeding  Or any changes in health condition      CHUCK Dumont [facility]     Chart documentation with Dragon Voice recognition Software. Although reviewed after completion, some words and grammatical errors may remain.

## 2020-12-17 NOTE — Clinical Note
"    12/17/2020         RE: Santos Marti  [address]  [facility] [address]        Dear Colleague,    Thank you for referring your patient, aSntos Marti, to the [facility]. Please see a copy of my visit note below.    Oncology Rooming Note    December 17, 2020 10:39 AM   Santos Marti is a 89 year old male who presents for:    Chief Complaint   Patient presents with     Oncology Clinic Visit     Initial Vitals: BP (!) 165/83   Pulse 56   Resp 16   Ht 1.778 m (5' 10\")   Wt 73.8 kg (162 lb 12.8 oz)   SpO2 97%   BMI 23.36 kg/m   Estimated body mass index is 23.36 kg/m  as calculated from the following:    Height as of this encounter: 1.778 m (5' 10\").    Weight as of this encounter: 73.8 kg (162 lb 12.8 oz). Body surface area is 1.91 meters squared.  No Pain (0) Comment: Data Unavailable   No LMP for male patient.  Allergies reviewed: Yes  Medications reviewed: Yes    Medications: Medication refills not needed today.  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  Patient had previous radiation to the prostate  Locally advanced bladder cancer -she met with Dr. Ellis who recommended treatment with Keytruda every 6 weeks      Interval History:  Overall patient reports feeling well.  He denies fever chills sweats cough shortness of breath chest pain denies headache dizziness seizures.  Denies nausea vomiting diarrhea abdominal pain bleeding.  Denies excessive fatigue    Review of Systems:  14 point ROS of " "systems including Constitutional, Eyes, Respiratory, Cardiovascular, Gastroenterology, Genitourinary, Integumentary, Muscularskeletal, Psychiatric were all negative except for pertinent positives noted in my HPI.      Physical Examination:  General: The patient is a pleasant male in no acute distress.  BP (!) 165/83   Pulse 56   Temp 97.9  F (36.6  C) (Oral)   Resp 16   Ht 1.778 m (5' 10\")   Wt 73.8 kg (162 lb 12.8 oz)   SpO2 97%   BMI 23.36 kg/m    HEENT: EOMI, PERRL. Sclerae are anicteric. Oral mucosa is pink and moist with no lesions or thrush.   Lymph: Neck is supple with no lymphadenopathy in the cervical or supraclavicular areas.   Heart: Regular rate and rhythm.   Lungs: Clear to auscultation bilaterally.   GI: Bowel sounds present, soft, nontender with no palpable hepatosplenomegaly or masses.   Extremities: No lower extremity edema noted bilaterally.   Skin: No rashes, petechiae, or bruising noted on exposed skin.    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  Patient had previous radiation to the prostate  Locally advanced bladder cancer -pt met with Dr. Ellis who recommended treatment with Keytruda every 6 weeks  -Patient Saw Doctor , November who recommended repeat of CT scan and urine cytology in 3 months  Meanwhile we continue with immunotherapy  labreviewed patient has been tolerating treatment well continue with immunotherapy  -Schedule for Keytruda every 6 weeks  -Schedule for CT scan and urine cytology February  Schedule with Dr. Ellis in February        Prostate cancer diagnosed in 2004 treated with brachytherapy and [facility]       Hypertension  Patient is asymptomatic  He is taking blood pressure medications  Follow-up with primary care physician        Patient is advised to call our clinic or " go to emergency room in the event of fever chills sweats cough shortness of breath chest pain sore throat nausea vomiting diarrhea abdominal pain or bleeding  Or any changes in health condition      CHUCK Dumont CNP  [facility]     Chart documentation with Dragon Voice recognition Software. Although reviewed after completion, some words and grammatical errors may remain.            Again, thank you for allowing me to participate in the care of your patient.        Sincerely,        CHUCK Dumont CNP

## 2020-12-17 NOTE — PROGRESS NOTES
Infusion Nursing Note:  Santos Marti presents today for Keytruda.    Patient seen by provider today: Yes: Polo Valderrama NP   present during visit today: Not Applicable.    Note: N/A.    Intravenous Access:  Peripheral IV placed.    Treatment Conditions:  Lab Results   Component Value Date     12/16/2020                   Lab Results   Component Value Date    POTASSIUM 3.7 12/16/2020           No results found for: MAG         Lab Results   Component Value Date    CR 1.24 12/16/2020                   Lab Results   Component Value Date    SAAD 9.1 12/16/2020                Lab Results   Component Value Date    BILITOTAL 0.6 12/16/2020           Lab Results   Component Value Date    ALBUMIN 4.1 12/16/2020                    Lab Results   Component Value Date    ALT 19 12/16/2020           Lab Results   Component Value Date    AST 14 12/16/2020       Results reviewed, labs MET treatment parameters, ok to proceed with treatment.      Post Infusion Assessment:  Patient tolerated infusion without incident.  Site patent and intact, free from redness, edema or discomfort.  No evidence of extravasations.  Access discontinued per protocol.       Discharge Plan:   Patient declined prescription refills.  Discharge instructions reviewed with: Patient.  Patient and/or family verbalized understanding of discharge instructions and all questions answered.  Copy of AVS reviewed with patient and/or family.  Patient will return 02/11/21 for next appointment.  Patient discharged in stable condition accompanied by: self.  Departure Mode: Ambulatory.    Goldie Rea RN

## 2020-12-17 NOTE — PROGRESS NOTES
"Oncology Rooming Note    December 17, 2020 10:39 AM   Santos Marti is a 89 year old male who presents for:    Chief Complaint   Patient presents with     Oncology Clinic Visit     Initial Vitals: BP (!) 165/83   Pulse 56   Resp 16   Ht 1.778 m (5' 10\")   Wt 73.8 kg (162 lb 12.8 oz)   SpO2 97%   BMI 23.36 kg/m   Estimated body mass index is 23.36 kg/m  as calculated from the following:    Height as of this encounter: 1.778 m (5' 10\").    Weight as of this encounter: 73.8 kg (162 lb 12.8 oz). Body surface area is 1.91 meters squared.  No Pain (0) Comment: Data Unavailable   No LMP for male patient.  Allergies reviewed: Yes  Medications reviewed: Yes    Medications: Medication refills not needed today.  Pharmacy name entered into [employer]:    [id number] - [place], [place] - [address] [facility] [address] [facility] [place], [place] - [address] [place] [address] PHARMACY - [place], [place] - [address]    Clinical concerns: no      Gaby Luque CMA            "

## 2021-01-15 ENCOUNTER — HEALTH MAINTENANCE LETTER (OUTPATIENT)
Age: 86
End: 2021-01-15

## 2021-01-25 DIAGNOSIS — C67.9 BLADDER CANCER (H): Primary | ICD-10-CM

## 2021-01-26 ENCOUNTER — OFFICE VISIT (OUTPATIENT)
Dept: UROLOGY | Facility: CLINIC | Age: 86
End: 2021-01-26
Payer: MEDICARE

## 2021-01-26 VITALS
HEIGHT: 70 IN | BODY MASS INDEX: 22.62 KG/M2 | WEIGHT: 158 LBS | HEART RATE: 78 BPM | SYSTOLIC BLOOD PRESSURE: 140 MMHG | DIASTOLIC BLOOD PRESSURE: 80 MMHG

## 2021-01-26 DIAGNOSIS — Z79.2 PROPHYLACTIC ANTIBIOTIC: Primary | ICD-10-CM

## 2021-01-26 DIAGNOSIS — C67.2 MALIGNANT NEOPLASM OF LATERAL WALL OF URINARY BLADDER (H): ICD-10-CM

## 2021-01-26 DIAGNOSIS — C61 PROSTATE CANCER (H): ICD-10-CM

## 2021-01-26 LAB
ALBUMIN UR-MCNC: 100 MG/DL
APPEARANCE UR: CLEAR
BILIRUB UR QL STRIP: NEGATIVE
COLOR UR AUTO: YELLOW
GLUCOSE UR STRIP-MCNC: NEGATIVE MG/DL
HGB UR QL STRIP: NEGATIVE
KETONES UR STRIP-MCNC: NEGATIVE MG/DL
LEUKOCYTE ESTERASE UR QL STRIP: NEGATIVE
NITRATE UR QL: NEGATIVE
PH UR STRIP: 5 PH (ref 5–7)
SOURCE: ABNORMAL
SP GR UR STRIP: 1.02 (ref 1–1.03)
UROBILINOGEN UR STRIP-ACNC: 0.2 EU/DL (ref 0.2–1)

## 2021-01-26 PROCEDURE — 52000 CYSTOURETHROSCOPY: CPT | Performed by: UROLOGY

## 2021-01-26 PROCEDURE — 99212 OFFICE O/P EST SF 10 MIN: CPT | Mod: 25 | Performed by: UROLOGY

## 2021-01-26 PROCEDURE — 81003 URINALYSIS AUTO W/O SCOPE: CPT | Mod: QW | Performed by: UROLOGY

## 2021-01-26 RX ORDER — METHYLPREDNISOLONE SODIUM SUCCINATE 125 MG/2ML
125 INJECTION, POWDER, LYOPHILIZED, FOR SOLUTION INTRAMUSCULAR; INTRAVENOUS
Status: CANCELLED
Start: 2021-01-28

## 2021-01-26 RX ORDER — HEPARIN SODIUM,PORCINE 10 UNIT/ML
5 VIAL (ML) INTRAVENOUS
Status: CANCELLED | OUTPATIENT
Start: 2021-01-28

## 2021-01-26 RX ORDER — EPINEPHRINE 1 MG/ML
0.3 INJECTION, SOLUTION INTRAMUSCULAR; SUBCUTANEOUS EVERY 5 MIN PRN
Status: CANCELLED | OUTPATIENT
Start: 2021-01-28

## 2021-01-26 RX ORDER — NALOXONE HYDROCHLORIDE 0.4 MG/ML
.1-.4 INJECTION, SOLUTION INTRAMUSCULAR; INTRAVENOUS; SUBCUTANEOUS
Status: CANCELLED | OUTPATIENT
Start: 2021-01-28

## 2021-01-26 RX ORDER — ALBUTEROL SULFATE 90 UG/1
1-2 AEROSOL, METERED RESPIRATORY (INHALATION)
Status: CANCELLED
Start: 2021-01-28

## 2021-01-26 RX ORDER — MEPERIDINE HYDROCHLORIDE 25 MG/ML
25 INJECTION INTRAMUSCULAR; INTRAVENOUS; SUBCUTANEOUS EVERY 30 MIN PRN
Status: CANCELLED | OUTPATIENT
Start: 2021-01-28

## 2021-01-26 RX ORDER — CIPROFLOXACIN 500 MG/1
500 TABLET, FILM COATED ORAL ONCE
Qty: 1 TABLET | Refills: 0 | Status: SHIPPED | OUTPATIENT
Start: 2021-01-26 | End: 2021-01-26

## 2021-01-26 RX ORDER — SODIUM CHLORIDE 9 MG/ML
1000 INJECTION, SOLUTION INTRAVENOUS CONTINUOUS PRN
Status: CANCELLED
Start: 2021-01-28

## 2021-01-26 RX ORDER — ALBUTEROL SULFATE 0.83 MG/ML
2.5 SOLUTION RESPIRATORY (INHALATION)
Status: CANCELLED | OUTPATIENT
Start: 2021-01-28

## 2021-01-26 RX ORDER — LORAZEPAM 2 MG/ML
0.5 INJECTION INTRAMUSCULAR EVERY 4 HOURS PRN
Status: CANCELLED
Start: 2021-01-28

## 2021-01-26 RX ORDER — LIDOCAINE HYDROCHLORIDE 20 MG/ML
JELLY TOPICAL ONCE
Status: COMPLETED | OUTPATIENT
Start: 2021-01-26 | End: 2021-01-26

## 2021-01-26 RX ORDER — DIPHENHYDRAMINE HYDROCHLORIDE 50 MG/ML
50 INJECTION INTRAMUSCULAR; INTRAVENOUS
Status: CANCELLED
Start: 2021-01-28

## 2021-01-26 RX ORDER — HEPARIN SODIUM (PORCINE) LOCK FLUSH IV SOLN 100 UNIT/ML 100 UNIT/ML
5 SOLUTION INTRAVENOUS
Status: CANCELLED | OUTPATIENT
Start: 2021-01-28

## 2021-01-26 RX ADMIN — LIDOCAINE HYDROCHLORIDE: 20 JELLY TOPICAL at 11:00

## 2021-01-26 ASSESSMENT — MIFFLIN-ST. JEOR: SCORE: 1387.93

## 2021-01-26 NOTE — PROGRESS NOTES
Mr. Marti is an 89-year-old gentleman with prostate cancer and muscle invasive bladder cancer.  He is here for office cystoscopy.  PSA was to be drawn but was not done.  He is having no trouble voiding, dysuria or hematuria.  Urine sample was inadequate for a urinalysis, urine cytology or FISH test  Other past medical history, medications and allergies reviewed.  Exam: Alert and oriented, normal external genitalia  Flexible cystoscopy-normal urethra, radiated prostatic fossa with no papillary tumors or raised erythema.  Bladder shows 4+ trabeculation and radiation changes and one small papillary tumor just above right ureteral orifice.  No tumor at either orifice.  No raised erythema or stones.  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  PSA due  Plan: Antibiotic x1 today.  Follow-up in 2 to 3 weeks at [facility] office for PSA and cystoscopy with fulguration of papillary tumor under local anesthesia.

## 2021-01-26 NOTE — LETTER
1/26/2021       RE: Santos Marti  [address]  [facility] [address]     Dear Colleague,    Thank you for referring your patient, Santos Marti, to the [facility]OLOG[facility]IC [place] [facility]. Please see a copy of my visit note below.    Mr. Marti is an 89-year-old gentleman with prostate cancer and muscle invasive bladder cancer.  He is here for office cystoscopy.  PSA was to be drawn but was not done.  He is having no trouble voiding, dysuria or hematuria.  Urine sample was inadequate for a urinalysis, urine cytology or FISH test  Other past medical history, medications and allergies reviewed.  Exam: Alert and oriented, normal external genitalia  Flexible cystoscopy-normal urethra, radiated prostatic fossa with no papillary tumors or raised erythema.  Bladder shows 4+ trabeculation and radiation changes and one small papillary tumor just above right ureteral orifice.  No tumor at either orifice.  No raised erythema or stones.  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  PSA due  Plan: Antibiotic x1 today.  Follow-up in 2 to 3 weeks at [facility] office for PSA and cystoscopy with fulguration of papillary tumor under local anesthesia.    Sincerely,    Maximilian Costello MD

## 2021-01-26 NOTE — PATIENT INSTRUCTIONS

## 2021-01-26 NOTE — NURSING NOTE
Prior to the start of the procedure and with procedural staff participation, I verbally confirmed the patient s identity using two indicators, relevant allergies, that the procedure was appropriate and matched the consent or emergent situation, and that the correct equipment/implants were available. Immediately prior to starting the procedure I conducted the Time Out with the procedural staff and re-confirmed the patient s name, procedure, and site/side. I have wiped the patient off with the povidone-Iodine solution, draped them,  used Lidocaine hydrochloride jelly, and instilled sterile water into the bladder. (The Joint Commission universal protocol was followed.)  Yes    Sedation (Moderate or Deep): None  5mL 2% lidocaine hydrochloride Urojet instilled into urethra.    NDC# [address]-9  Lot #: [id number]  Expiration Date:  {07/22

## 2021-01-27 ENCOUNTER — VIRTUAL VISIT (OUTPATIENT)
Dept: ONCOLOGY | Facility: CLINIC | Age: 86
End: 2021-01-27
Attending: NURSE PRACTITIONER
Payer: MEDICARE

## 2021-01-27 ENCOUNTER — HOSPITAL ENCOUNTER (OUTPATIENT)
Facility: CLINIC | Age: 86
Setting detail: SPECIMEN
Discharge: HOME | End: 2021-01-27
Attending: INTERNAL MEDICINE | Admitting: INTERNAL MEDICINE
Payer: MEDICARE

## 2021-01-27 ENCOUNTER — OFFICE VISIT (OUTPATIENT)
Dept: INFUSION THERAPY | Facility: CLINIC | Age: 86
End: 2021-01-27
Attending: INTERNAL MEDICINE
Payer: MEDICARE

## 2021-01-27 DIAGNOSIS — R53.83 OTHER FATIGUE: ICD-10-CM

## 2021-01-27 DIAGNOSIS — C67.2 MALIGNANT NEOPLASM OF LATERAL WALL OF URINARY BLADDER (H): ICD-10-CM

## 2021-01-27 DIAGNOSIS — Z91.89 AT RISK FOR INJURY FROM CHEMOTHERAPY: ICD-10-CM

## 2021-01-27 DIAGNOSIS — C67.2 MALIGNANT NEOPLASM OF LATERAL WALL OF URINARY BLADDER (H): Primary | ICD-10-CM

## 2021-01-27 LAB
ALBUMIN SERPL-MCNC: 3.8 G/DL (ref 3.4–5)
ALP SERPL-CCNC: 99 U/L (ref 40–150)
ALT SERPL W P-5'-P-CCNC: 16 U/L (ref 0–70)
ANION GAP SERPL CALCULATED.3IONS-SCNC: 5 MMOL/L (ref 3–14)
AST SERPL W P-5'-P-CCNC: 14 U/L (ref 0–45)
BASOPHILS # BLD AUTO: 0 10E9/L (ref 0–0.2)
BASOPHILS NFR BLD AUTO: 0.1 %
BILIRUB SERPL-MCNC: 0.4 MG/DL (ref 0.2–1.3)
BUN SERPL-MCNC: 27 MG/DL (ref 7–30)
CALCIUM SERPL-MCNC: 9.4 MG/DL (ref 8.5–10.1)
CHLORIDE SERPL-SCNC: 105 MMOL/L (ref 94–109)
CO2 SERPL-SCNC: 28 MMOL/L (ref 20–32)
CREAT SERPL-MCNC: 1.2 MG/DL (ref 0.66–1.25)
DIFFERENTIAL METHOD BLD: NORMAL
EOSINOPHIL # BLD AUTO: 0.1 10E9/L (ref 0–0.7)
EOSINOPHIL NFR BLD AUTO: 1.1 %
ERYTHROCYTE [DISTWIDTH] IN BLOOD BY AUTOMATED COUNT: 13.3 % (ref 10–15)
GFR SERPL CREATININE-BSD FRML MDRD: 53 ML/MIN/{1.73_M2}
GLUCOSE SERPL-MCNC: 185 MG/DL (ref 70–99)
HCT VFR BLD AUTO: 43.8 % (ref 40–53)
HGB BLD-MCNC: 14.4 G/DL (ref 13.3–17.7)
IMM GRANULOCYTES # BLD: 0 10E9/L (ref 0–0.4)
IMM GRANULOCYTES NFR BLD: 0.4 %
LDH SERPL L TO P-CCNC: 196 U/L (ref 85–227)
LYMPHOCYTES # BLD AUTO: 1.9 10E9/L (ref 0.8–5.3)
LYMPHOCYTES NFR BLD AUTO: 23.7 %
MCH RBC QN AUTO: 31.6 PG (ref 26.5–33)
MCHC RBC AUTO-ENTMCNC: 32.9 G/DL (ref 31.5–36.5)
MCV RBC AUTO: 96 FL (ref 78–100)
MONOCYTES # BLD AUTO: 0.6 10E9/L (ref 0–1.3)
MONOCYTES NFR BLD AUTO: 7.3 %
NEUTROPHILS # BLD AUTO: 5.5 10E9/L (ref 1.6–8.3)
NEUTROPHILS NFR BLD AUTO: 67.4 %
NRBC # BLD AUTO: 0 10*3/UL
NRBC BLD AUTO-RTO: 0 /100
PLATELET # BLD AUTO: 231 10E9/L (ref 150–450)
POTASSIUM SERPL-SCNC: 4 MMOL/L (ref 3.4–5.3)
PROT SERPL-MCNC: 7.7 G/DL (ref 6.8–8.8)
RBC # BLD AUTO: 4.56 10E12/L (ref 4.4–5.9)
SODIUM SERPL-SCNC: 138 MMOL/L (ref 133–144)
TSH SERPL DL<=0.005 MIU/L-ACNC: 0.62 MU/L (ref 0.4–4)
WBC # BLD AUTO: 8.2 10E9/L (ref 4–11)

## 2021-01-27 PROCEDURE — 83615 LACTATE (LD) (LDH) ENZYME: CPT | Performed by: INTERNAL MEDICINE

## 2021-01-27 PROCEDURE — 99442 PR PHYSICIAN TELEPHONE EVALUATION 11-20 MIN: CPT | Mod: 95 | Performed by: NURSE PRACTITIONER

## 2021-01-27 PROCEDURE — 80053 COMPREHEN METABOLIC PANEL: CPT | Performed by: INTERNAL MEDICINE

## 2021-01-27 PROCEDURE — 36415 COLL VENOUS BLD VENIPUNCTURE: CPT

## 2021-01-27 PROCEDURE — 84443 ASSAY THYROID STIM HORMONE: CPT | Performed by: INTERNAL MEDICINE

## 2021-01-27 PROCEDURE — 85025 COMPLETE CBC W/AUTO DIFF WBC: CPT | Performed by: INTERNAL MEDICINE

## 2021-01-27 PROCEDURE — 999N001193 HC VIDEO/TELEPHONE VISIT; NO CHARGE

## 2021-01-27 ASSESSMENT — PAIN SCALES - GENERAL: PAINLEVEL: NO PAIN (0)

## 2021-01-27 NOTE — LETTER
1/27/2021         RE: Santos Marti  [address]  [facility] [address]        Dear Colleague,    Thank you for referring your patient, Santos Marti, to the [facility] CANCER [facility]. Please see a copy of my visit note below.    Medical Assistant Note:  Santos Marti presents today for blood draw.    Patient seen by provider today: No.   present during visit today: Not Applicable.    Concerns: No Concerns.    Procedure:  Lab draw site: lac, Needle type: bf, Gauge: 23.    Post Assessment:  Labs drawn without difficulty: Yes.    Discharge Plan:  Departure Mode: Ambulatory.    Face to Face Time: 5 min  .    Gaby Luque [facility]              Again, thank you for allowing me to participate in the care of your patient.        Sincerely,         Lab Draw

## 2021-01-27 NOTE — PROGRESS NOTES
Santos is a 89 year old who is being evaluated via a billable telephone visit.      What phone number would you like to be contacted at? [phone]    How would you like to obtain your AVS? Mail a copy  Phone call duration: 5  minutes

## 2021-01-27 NOTE — LETTER
"    1/27/2021         RE: Santos Marti  [address]  [facility] [address]        Dear Colleague,    Thank you for referring your patient, Santos Marti, to the [facility]. Please see a copy of my visit note below.    Santos is a 89 year old who is being evaluated via a billable telephone visit.      What phone number would you like to be contacted at? [phone]    How would you like to obtain your AVS? Mail a copy  Phone call duration: 5  minutes        The patient has been notified of following:      \"This telephone visit will be conducted via a call between you and your physician/provider. We have found that certain health care needs can be provided without the need for a physical exam.  This service lets us provide the care you need with a short phone conversation during the COVID-19 pandemic.  If a prescription is necessary we can send it directly to your pharmacy.  If lab work is needed we can place an order for that and you can then stop by our lab to have the test done at a later time.     Telephone visits are billed at different rates depending on your insurance coverage. During this emergency period, for some insurers they may be billed the same as an in-person visit.  Please reach out to your insurance provider with any questions.     If during the course of the call the physician/provider feels a telephone visit is not appropriate, you will not be charged for this service.\"     Patient has given verbal consent for Telephone visit?  Yes       Telephone visit  15 min      Oncology/Hematology Visit Note  Jan 27, 2021    Reason for Visit: follow up of high-grade muscle invasive bladder cancer-patient is not a candidate for surgery or radiation therapy.  Patient had previous radiation to the prostate  Locally advanced bladder cancer pt met with Dr. Ellis who recommended treatment with Keytruda every 6 weeks      Interval History:  Pt reports he is feeling " well. Pt reports he has been tolerating treatment well.  He denies side effects from immunotherapy       Review of Systems:  14 point ROS of systems including Constitutional, Eyes, Respiratory, Cardiovascular, Gastroenterology, Genitourinary, Integumentary, Muscularskeletal, Psychiatric were all negative except for pertinent positives noted in my HPI.      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  Patient had previous radiation to the prostate  Locally advanced bladder cancer -pt met with Dr. Ellis who recommended treatment with Keytruda every 6 weeks  Labs reviewed patient has been tolerating treatment well   -continue with immunotherapy  -Schedule for Keytruda every 6 weeks  -Schedule for CT scan and urine cytology February  Schedule with Dr. Ellis in February        Prostate cancer diagnosed in 2004 treated with brachytherapy and followed [facility]       Hypertension  Patient is asymptomatic  He is taking blood pressure medications  Follow-up with primary care physician        Patient is advised to call our clinic or go to emergency room in the event of fever chills sweats cough shortness of breath chest pain sore throat nausea vomiting diarrhea abdominal pain or bleeding  Or any changes in health condition      CHUCK Dumont CNP  [facility]     Chart documentation with Dragon Voice recognition Software. Although reviewed after completion, some words and grammatical errors may remain.            Again, thank you for allowing me to participate in the care of your patient.        Sincerely,        CHUCK Dumont CNP

## 2021-01-27 NOTE — LETTER
"    1/27/2021         RE: Santos Marti  [address]  [facility] [address]        Dear Colleague,    Thank you for referring your patient, Santos Marti, to the [facility]. Please see a copy of my visit note below.    Santos is a 89 year old who is being evaluated via a billable telephone visit.      What phone number would you like to be contacted at? [phone]    How would you like to obtain your AVS? Mail a copy  Phone call duration: 5  minutes        The patient has been notified of following:      \"This telephone visit will be conducted via a call between you and your physician/provider. We have found that certain health care needs can be provided without the need for a physical exam.  This service lets us provide the care you need with a short phone conversation during the COVID-19 pandemic.  If a prescription is necessary we can send it directly to your pharmacy.  If lab work is needed we can place an order for that and you can then stop by our lab to have the test done at a later time.     Telephone visits are billed at different rates depending on your insurance coverage. During this emergency period, for some insurers they may be billed the same as an in-person visit.  Please reach out to your insurance provider with any questions.     If during the course of the call the physician/provider feels a telephone visit is not appropriate, you will not be charged for this service.\"     Patient has given verbal consent for Telephone visit?  Yes       Telephone visit  15 min      Oncology/Hematology Visit Note  Jan 27, 2021    Reason for Visit: follow up of high-grade muscle invasive bladder cancer-patient is not a candidate for surgery or radiation therapy.  Patient had previous radiation to the prostate  Locally advanced bladder cancer pt met with Dr. Ellis who recommended treatment with Keytruda every 6 weeks      Interval History:  Pt reports he is feeling " well. Pt reports he has been tolerating treatment well.  He denies side effects from immunotherapy       Review of Systems:  14 point ROS of systems including Constitutional, Eyes, Respiratory, Cardiovascular, Gastroenterology, Genitourinary, Integumentary, Muscularskeletal, Psychiatric were all negative except for pertinent positives noted in my HPI.      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  Patient had previous radiation to the prostate  Locally advanced bladder cancer -pt met with Dr. Ellis who recommended treatment with Keytruda every 6 weeks  Labs reviewed patient has been tolerating treatment well   -continue with immunotherapy  -Schedule for Keytruda every 6 weeks  -Schedule for CT scan and urine cytology February  Schedule with Dr. Ellis in February        Prostate cancer diagnosed in 2004 treated with brachytherapy and followed [facility]       Hypertension  Patient is asymptomatic  He is taking blood pressure medications  Follow-up with primary care physician        Patient is advised to call our clinic or go to emergency room in the event of fever chills sweats cough shortness of breath chest pain sore throat nausea vomiting diarrhea abdominal pain or bleeding  Or any changes in health condition      CHUCK Dumont CNP  [facility]     Chart documentation with [employer] recognition Software. Although reviewed after completion, some words and grammatical errors may remain.            Again, thank you for allowing me to participate in the care of your patient.        Sincerely,        CHUCK Dumont CNP

## 2021-01-28 ENCOUNTER — HOSPITAL ENCOUNTER (OUTPATIENT)
Facility: CLINIC | Age: 86
Setting detail: SPECIMEN
Discharge: HOME | End: 2021-01-28
Attending: INTERNAL MEDICINE | Admitting: INTERNAL MEDICINE
Payer: MEDICARE

## 2021-01-28 ENCOUNTER — INFUSION THERAPY VISIT (OUTPATIENT)
Dept: INFUSION THERAPY | Facility: CLINIC | Age: 86
End: 2021-01-28
Attending: INTERNAL MEDICINE
Payer: MEDICARE

## 2021-01-28 VITALS
TEMPERATURE: 97.7 F | RESPIRATION RATE: 18 BRPM | SYSTOLIC BLOOD PRESSURE: 138 MMHG | HEART RATE: 58 BPM | DIASTOLIC BLOOD PRESSURE: 78 MMHG | OXYGEN SATURATION: 97 %

## 2021-01-28 DIAGNOSIS — C67.2 MALIGNANT NEOPLASM OF LATERAL WALL OF URINARY BLADDER (H): Primary | ICD-10-CM

## 2021-01-28 PROCEDURE — 258N000003 HC RX IP 258 OP 636: Performed by: NURSE PRACTITIONER

## 2021-01-28 PROCEDURE — 96413 CHEMO IV INFUSION 1 HR: CPT

## 2021-01-28 PROCEDURE — 250N000011 HC RX IP 250 OP 636: Performed by: NURSE PRACTITIONER

## 2021-01-28 PROCEDURE — 88120 CYTP URNE 3-5 PROBES EA SPEC: CPT | Mod: TC | Performed by: INTERNAL MEDICINE

## 2021-01-28 PROCEDURE — 88112 CYTOPATH CELL ENHANCE TECH: CPT | Mod: TC | Performed by: INTERNAL MEDICINE

## 2021-01-28 PROCEDURE — 88120 CYTP URNE 3-5 PROBES EA SPEC: CPT | Mod: 26 | Performed by: PATHOLOGY

## 2021-01-28 PROCEDURE — 88112 CYTOPATH CELL ENHANCE TECH: CPT | Mod: 26 | Performed by: PATHOLOGY

## 2021-01-28 RX ADMIN — SODIUM CHLORIDE 250 ML: 9 INJECTION, SOLUTION INTRAVENOUS at 11:00

## 2021-01-28 RX ADMIN — SODIUM CHLORIDE 400 MG: 9 INJECTION, SOLUTION INTRAVENOUS at 11:00

## 2021-01-28 ASSESSMENT — PAIN SCALES - GENERAL: PAINLEVEL: NO PAIN (0)

## 2021-01-28 NOTE — PROGRESS NOTES
"The patient has been notified of following:      \"This telephone visit will be conducted via a call between you and your physician/provider. We have found that certain health care needs can be provided without the need for a physical exam.  This service lets us provide the care you need with a short phone conversation during the COVID-19 pandemic.  If a prescription is necessary we can send it directly to your pharmacy.  If lab work is needed we can place an order for that and you can then stop by our lab to have the test done at a later time.     Telephone visits are billed at different rates depending on your insurance coverage. During this emergency period, for some insurers they may be billed the same as an in-person visit.  Please reach out to your insurance provider with any questions.     If during the course of the call the physician/provider feels a telephone visit is not appropriate, you will not be charged for this service.\"     Patient has given verbal consent for Telephone visit?  Yes       Telephone visit  15 min      Oncology/Hematology Visit Note  Jan 27, 2021    Reason for Visit: follow up of high-grade muscle invasive bladder cancer-patient is not a candidate for surgery or radiation therapy.  Patient had previous radiation to the prostate  Locally advanced bladder cancer pt met with Dr. Ellis who recommended treatment with Keytruda every 6 weeks      Interval History:  Pt reports he is feeling well. Pt reports he has been tolerating treatment well.  He denies side effects from immunotherapy       Review of Systems:  14 point ROS of systems including Constitutional, Eyes, Respiratory, Cardiovascular, Gastroenterology, Genitourinary, Integumentary, Muscularskeletal, Psychiatric were all negative except for pertinent positives noted in my HPI.      Physical Examination:  Telephone visit - no audile wheezing or cough     Laboratory Data:  Reviewed      Assessment and Plan:      This is a 89-year-old " male with    high-grade muscle invasive bladder cancer-patient is not a candidate for surgery or radiation therapy.  Patient had previous radiation to the prostate  Locally advanced bladder cancer -pt met with Dr. Ellis who recommended treatment with Keytruda every 6 weeks  Labs reviewed patient has been tolerating treatment well   -continue with immunotherapy  -Schedule for Keytruda every 6 weeks  -Schedule for CT scan and urine cytology February  Schedule with Dr. Ellis in February        Prostate cancer diagnosed in 2004 treated with brachytherapy and followed [facility]       Hypertension  Patient is asymptomatic  He is taking blood pressure medications  Follow-up with primary care physician        Patient is advised to call our clinic or go to emergency room in the event of fever chills sweats cough shortness of breath chest pain sore throat nausea vomiting diarrhea abdominal pain or bleeding  Or any changes in health condition      CHUCK Dumont [facility]     Chart documentation with Dragon Voice recognition Software. Although reviewed after completion, some words and grammatical errors may remain.

## 2021-01-28 NOTE — PROGRESS NOTES
Infusion Nursing Note:  Santos Marti presents today for C5D1 keytruda   Patient seen by provider today: No   present during visit today: Not Applicable.    Note: Urine for cytology sent down per treatment orders (not enough collected yesterday); lab called stating there still wasn't enough urine collected (needed 30 mls). Patient was already discharged by then. Lab will run the FISH and the cytology will be run at lab appt on 2/9.    Intravenous Access:  Peripheral IV placed.    Treatment Conditions:  Lab Results   Component Value Date    HGB 14.4 01/27/2021     Lab Results   Component Value Date    WBC 8.2 01/27/2021      Lab Results   Component Value Date    ANEU 5.5 01/27/2021     Lab Results   Component Value Date     01/27/2021      Lab Results   Component Value Date     01/27/2021                   Lab Results   Component Value Date    POTASSIUM 4.0 01/27/2021           No results found for: MAG         Lab Results   Component Value Date    CR 1.20 01/27/2021                   Lab Results   Component Value Date    SAAD 9.4 01/27/2021                Lab Results   Component Value Date    BILITOTAL 0.4 01/27/2021           Lab Results   Component Value Date    ALBUMIN 3.8 01/27/2021                    Lab Results   Component Value Date    ALT 16 01/27/2021           Lab Results   Component Value Date    AST 14 01/27/2021       Results reviewed, labs MET treatment parameters, ok to proceed with treatment.      Post Infusion Assessment:  Patient tolerated infusion without incident.  Site patent and intact, free from redness, edema or discomfort.  No evidence of extravasations.  Access discontinued per protocol.       Discharge Plan:   Copy of AVS reviewed with patient and/or family.  Patient will return as prev chad for next appointment.  Patient discharged in stable condition accompanied by: self.  Departure Mode: Ambulatory.    Christian Pace RN

## 2021-02-09 ENCOUNTER — HOSPITAL ENCOUNTER (OUTPATIENT)
Dept: CT IMAGING | Facility: CLINIC | Age: 86
End: 2021-02-09
Attending: INTERNAL MEDICINE
Payer: MEDICARE

## 2021-02-09 ENCOUNTER — HOSPITAL ENCOUNTER (OUTPATIENT)
Dept: LAB | Facility: CLINIC | Age: 86
End: 2021-02-09
Attending: INTERNAL MEDICINE
Payer: MEDICARE

## 2021-02-09 ENCOUNTER — PATIENT OUTREACH (OUTPATIENT)
Dept: ONCOLOGY | Facility: CLINIC | Age: 86
End: 2021-02-09

## 2021-02-09 DIAGNOSIS — C67.2 MALIGNANT NEOPLASM OF LATERAL WALL OF URINARY BLADDER (H): ICD-10-CM

## 2021-02-09 DIAGNOSIS — C67.2 MALIGNANT NEOPLASM OF LATERAL WALL OF URINARY BLADDER (H): Primary | ICD-10-CM

## 2021-02-09 LAB
CREAT BLD-MCNC: 1.3 MG/DL (ref 0.66–1.25)
GFR SERPL CREATININE-BSD FRML MDRD: 52 ML/MIN/{1.73_M2}

## 2021-02-09 PROCEDURE — 88112 CYTOPATH CELL ENHANCE TECH: CPT | Mod: 26 | Performed by: PATHOLOGY

## 2021-02-09 PROCEDURE — 250N000009 HC RX 250: Performed by: INTERNAL MEDICINE

## 2021-02-09 PROCEDURE — 999N001015 HC STATISTIC CYTO-FISH QC 88120: Performed by: INTERNAL MEDICINE

## 2021-02-09 PROCEDURE — 71260 CT THORAX DX C+: CPT | Mod: MG

## 2021-02-09 PROCEDURE — 250N000011 HC RX IP 250 OP 636: Performed by: INTERNAL MEDICINE

## 2021-02-09 PROCEDURE — 88112 CYTOPATH CELL ENHANCE TECH: CPT | Mod: TC | Performed by: INTERNAL MEDICINE

## 2021-02-09 PROCEDURE — 88120 CYTP URNE 3-5 PROBES EA SPEC: CPT | Mod: TC | Performed by: INTERNAL MEDICINE

## 2021-02-09 PROCEDURE — 82565 ASSAY OF CREATININE: CPT

## 2021-02-09 RX ORDER — IOPAMIDOL 755 MG/ML
500 INJECTION, SOLUTION INTRAVASCULAR ONCE
Status: COMPLETED | OUTPATIENT
Start: 2021-02-09 | End: 2021-02-09

## 2021-02-09 RX ADMIN — SODIUM CHLORIDE 62 ML: 9 INJECTION, SOLUTION INTRAVENOUS at 11:50

## 2021-02-09 RX ADMIN — IOPAMIDOL 78 ML: 755 INJECTION, SOLUTION INTRAVENOUS at 11:50

## 2021-02-11 ENCOUNTER — VIRTUAL VISIT (OUTPATIENT)
Dept: ONCOLOGY | Facility: CLINIC | Age: 86
End: 2021-02-11
Attending: INTERNAL MEDICINE
Payer: MEDICARE

## 2021-02-11 VITALS — WEIGHT: 158 LBS | BODY MASS INDEX: 22.67 KG/M2

## 2021-02-11 DIAGNOSIS — N18.31 STAGE 3A CHRONIC KIDNEY DISEASE (H): ICD-10-CM

## 2021-02-11 DIAGNOSIS — C67.2 MALIGNANT NEOPLASM OF LATERAL WALL OF URINARY BLADDER (H): Primary | ICD-10-CM

## 2021-02-11 PROBLEM — N18.30 CHRONIC KIDNEY DISEASE, STAGE 3 (H): Status: ACTIVE | Noted: 2021-02-11

## 2021-02-11 PROCEDURE — 999N001193 HC VIDEO/TELEPHONE VISIT; NO CHARGE

## 2021-02-11 PROCEDURE — 99214 OFFICE O/P EST MOD 30 MIN: CPT | Mod: 95 | Performed by: INTERNAL MEDICINE

## 2021-02-11 ASSESSMENT — PAIN SCALES - GENERAL: PAINLEVEL: NO PAIN (0)

## 2021-02-11 NOTE — PROGRESS NOTES
Santos is a 89 year old who is being evaluated via a billable video visit.      How would you like to obtain your AVS? MyChart  If the video visit is dropped, the invitation should be resent by # [phone]  Will anyone else be joining your video visit? Yes: wife. How would they like to receive their invitation? Send to e-mail at: [email or web address]      Video Start Time:    Video-Visit Details    Type of service:  Video Visit    Video End Time:   Originating Location (pt. Location): Home    Distant Location (provider location):  [facility]     Platform used for Video Visit: NIKITA Ceja CMA

## 2021-02-11 NOTE — LETTER
2/11/2021         RE: Santos Marti  [address]  [facility] [address]        Dear Colleague,    Thank you for referring your patient, Santos Marti, to the [facility]. Please see a copy of my visit note below.    Santos is a 89 year old who is being evaluated via a billable video visit.      How would you like to obtain your AVS? MyChart  If the video visit is dropped, the invitation should be resent by # [phone]  Will anyone else be joining your video visit? Yes: wife. How would they like to receive their invitation? Send to e-mail at: [email or web address]  {If patient encounters technical issues they should call [phone]    Video Start Time:    Video-Visit Details    Type of service:  Video Visit    Video End Time:   Originating Location (pt. 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  He was on apixaban for atrial fibrillation.  TURBT done on 10/22/2019 revealed normal muscle invasive bladder cancer  -He was followed with surveillance cystoscopies every 3 months.  His cystoscopy evaluation was negative for any tumors or raised erythema on 3/11/2020, however a follow-up exam on 7/8/2020 revealed raised erythematous area in the left lateral bladder wall.  He had TURBT under anesthesia on 7/15/2020 which is revealed high-grade muscle invasive urothelial carcinoma.  -Santos was diagnosed with  prostate cancer in October 2004.  He underwent brachii therapy followed by external beam radiation therapy administered at the [facility].  -Due to his previous radiation for his prostate cancer he is not a candidate for bladder radiation.  He is not a candidate for cystectomy due to his advanced age and also previous extensive radiation to the prostate.  - He was started on pembrolizumab for his locally advanced bladder cancer since 8/13/20.     CURRENT INTERVENTIONS:  Pembrolizumab 400 mg IV every 6 weeks since 8/13/20    FARTUN   Santos Marti is being followed for muscle invasive bladder cancer    Patient is being followed on therapy for his muscle invasive bladder cancer. He has been started on pembrolizumab 5 months ago. He has not noticed any side effects on therapy. He denies any recurrent hematuria. He has good appetite and fair energy. No side effects suggestive of autoimmune disease.       PAST MEDICAL HISTORY     Past Medical History:   Diagnosis Date     Arthritis      Complication of anesthesia      Diabetes (H)      Gastroesophageal reflux disease      Hypertension      Pacemaker          CURRENT OUTPATIENT MEDICATIONS     Current Outpatient Medications   Medication Sig     acetaminophen (TYLENOL) 500 MG tablet Take 500-1,000 mg by mouth every 6 hours as needed for mild pain     apixaban ANTICOAGULANT (ELIQUIS) 5 MG tablet Take 5 mg by mouth 2 times daily      isosorbide mononitrate (IMDUR) 30 MG 24 hr tablet TK 1 T PO ONCE D     lisinopril-hydrochlorothiazide (PRINZIDE/ZESTORETIC) 20-25 MG tablet Take 1 tablet by mouth     metFORMIN (GLUCOPHAGE) 1000 MG tablet Take 500 mg by mouth daily      metoprolol succinate ER (TOPROL-XL) 50 MG 24 hr tablet Take 50 mg by mouth daily Take one and a half tabs daily     omeprazole 20 MG tablet Take 20 mg by mouth daily     Current Facility-Administered Medications   Medication     lidocaine (XYLOCAINE) 2 % external gel        ALLERGIES       Allergies   Allergen Reactions     Sulfa Drugs Rash        REVIEW OF SYSTEMS   As above in the HPI, o/w complete 12-point ROS was negative.     PHYSICAL EXAM   Wt 71.7 kg (158 lb)   BMI 22.67 kg/m    Limited physical exam during video visit due to COVID19 restrictions  Elderly male in no acute distress  Breathing comfortably, no tachypnea  Speech and hearing normal  Pleasant mood and congruent affect  Moving all extremities, no focal neurologic deficits apparent      LABORATORY AND IMAGING STUDIES     Recent Labs   Lab Test 01/27/21  1038 12/16/20  1046 11/03/20  1130 09/23/20  1032 08/12/20  1014    139 136 137 136   POTASSIUM 4.0 3.7 3.9 3.9 4.1   CHLORIDE 105 106 103 105 103   CO2 28 29 28 28 27   ANIONGAP 5 4 5 4 6   BUN 27 22 26 24 32*   CR 1.20 1.24 1.13 1.20 1.17   * 147* 170* 148* 151*   SAAD 9.4 9.1 9.1 9.0 9.4     No results for input(s): MAG, PHOS in the last 85535 hours.  Recent Labs   Lab Test 01/27/21  1038 12/16/20  1046 11/03/20  1130 07/16/20  2120 07/15/20  1216   WBC 8.2 8.1 10.7 10.5 6.6   HGB 14.4 14.8 14.0 13.9 13.8    232 213 201 210   MCV 96 96 96 97 99   NEUTROPHIL 67.4 65.0 68.9 76.2  --      Recent Labs   Lab Test 01/27/21  1038 12/16/20  1046 11/03/20  1130   BILITOTAL 0.4 0.6 0.5   ALKPHOS 99 85 89   ALT 16 19 16   AST 14 14 12   ALBUMIN 3.8 4.1 3.8    211 169     TSH   Date Value Ref Range Status   01/27/2021 0.62 0.40 - 4.00 mU/L Final   12/16/2020 0.84 0.40 - 4.00 mU/L Final   11/03/2020 0.56 0.40 - 4.00 mU/L Final     No results for input(s): CEA in the last 10423 hours.  Results for orders placed or performed during the hospital encounter of 02/09/21   CT Chest/Abdomen/Pelvis w Contrast    Narrative    CT CHEST/ABDOMEN/PELVIS WITH CONTRAST 2/9/2021 12:21 PM    CLINICAL HISTORY: Urothelial cancer - response on immunotherapy;  Malignant neoplasm of lateral wall of urinary bladder (H).    TECHNIQUE: CT scan of the chest, abdomen, and pelvis was  performed  following injection of IV contrast. Multiplanar reformats were  obtained. Dose reduction techniques were used.     CONTRAST:  78mL [id number]    COMPARISON: November 3, 2020, October 9, 2019    FINDINGS:   LUNGS AND PLEURA: Bibasilar atelectasis and/or fibrosis have increased  since comparison study. Nodule in the posterior right costophrenic  sulcus measures 1.1 x 0.8 cm, not definitely seen previously although  there was some motion artifact present on the most recent comparison  study, this is stable since 2019. Smaller nodule inferior and lateral  to this is also stable. 8 mm nodule in the right upper lobe measures  0.8 cm today previously 7 mm, it is unclear if this is significantly  changed. A few scattered smaller nodules are not significantly changed  from previous.    MEDIASTINUM/AXILLAE: Multiple nonenlarged lymph nodes are noted in the  mediastinum and hilum, similar to previous.    HEPATOBILIARY: No significant mass or bile duct dilatation. No  calcified gallstones.     PANCREAS: No significant mass, duct dilatation, or inflammatory  change.    SPLEEN: Normal size.    ADRENAL GLANDS: No significant nodules.    KIDNEYS/BLADDER: No significant mass, stones, or hydronephrosis. No  suspicious filling defects within the intrarenal collecting systems or  opacified ureters to suggest a urothelial malignancy. Much of the  bladder is obscured by streak artifact, no definite suspicious filling  defects.    BOWEL: No obstruction or inflammatory change.    PELVIC ORGANS: Essentially the bladder and prostate are largely  obscured by streak artifact from the bilateral hip arthroplasties. No  gross mass lesions evident.    ADDITIONAL FINDINGS: No ascites.    MUSCULOSKELETAL: Survey of the visualized bony structures demonstrates  no destructive bony lesions.      Impression    IMPRESSION:  1.  Pulmonary nodules on the right are not significantly changed since the past two comparison studies.  2.  No  metastatic disease demonstrated in the abdomen and pelvis.  3.  No new lesions.    JANESSA HUTCHISON MD          ASSESSMENT AND PLAN   1. High-grade muscle invasive bladder cancer in a patient not a candidate for either surgery or radiation therapy  2. Prostate cancer treated with brachii therapy followed by external beam radiation therapy  3. Hypertension, diabetes mellitus type 2, atrial fibrillation on chronic anticoagulation with apixaban    I had a lengthy discussion with patient at this clinic visit. He has been started on pembrolizumab for his muscle invasive bladder cancer on 8/13/20. He has tolerated this well. He has not noticed any side effects on therapy. There is nothing to suggest auto-immune disease. He denies any hematuria.     I reviewed actual images with him. I shared the images over video with him. His bladder tumor cannot be assessed on the CT portion due to the bilateral hip replacements. Monitoring response to this disease is going to be a little difficult as it is only accurately measured on a PET-CT and we cannot use the PET scan for continued response assessment. I would follow him with CT scans. As long as he is not having a clear disease progression, we could continue on therapy as current. He is being followed up cystoscopy. His last cystoscopy did show evidence of papillary bladder cancer. Our plan was to continue as long as he is gaining clinical benefit. I feel dr. Costello has the best assessment of his bladder tumor as we cannot see it on scans. I will directly speak to him before discontinuing therapy. If there is a chance that it could be slowing disease as he does not have any more hematuria we could continue for another 6 months and then stop. But if he feels it is completely unresponsive, then we should quit.     I will have him follow with Polo Valderrama for the next infusion and I will see him in 3 months with labs including urine cytology and CT chest, abdomen and pelvis prior to  visit.       Video-Visit Details    Type of service:  Video Visit  Originating Location (pt. Location): Home  Distant Location (provider location):  [facility]  Platform used for Video Visit: Kristi   Patient had difficulty connecting with [place] but could connect with me. I could share images and review with him.     35 minutes spent on the date of the encounter doing chart review, history and exam, documentation and further activities as noted above      Imtiaz Ellis    Hematologist and Medical Oncologist  [facility]        Again, thank you for allowing me to participate in the care of your patient.        Sincerely,        Imtiaz Ellis MD

## 2021-02-11 NOTE — PROGRESS NOTES
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  He was on apixaban for atrial fibrillation.  TURBT done on 10/22/2019 revealed normal muscle invasive bladder cancer  -He was followed with surveillance cystoscopies every 3 months.  His cystoscopy evaluation was negative for any tumors or raised erythema on 3/11/2020, however a follow-up exam on 7/8/2020 revealed raised erythematous area in the left lateral bladder wall.  He had TURBT under anesthesia on 7/15/2020 which is revealed high-grade muscle invasive urothelial carcinoma.  -Santos was diagnosed with prostate cancer in October 2004.  He underwent brachii therapy followed by external beam radiation therapy administered at [facility].  -Due to his previous radiation for his prostate cancer he is not a candidate for bladder radiation.  He is not a candidate for cystectomy due to his advanced age and also previous extensive radiation to the prostate.  - He was started on pembrolizumab for his locally advanced bladder cancer since 8/13/20.     CURRENT INTERVENTIONS:  Pembrolizumab 400 mg IV every 6 weeks since 8/13/20    SUBJECTIVE   Santos Marti is being followed for muscle invasive bladder cancer    Patient is being followed on therapy for his muscle invasive bladder cancer. He has been started on pembrolizumab 5 months ago. He has not noticed any side effects on therapy. He denies any recurrent hematuria. He has good appetite and fair energy. No side effects suggestive of autoimmune disease.       PAST MEDICAL HISTORY     Past Medical History:   Diagnosis Date      Arthritis      Complication of anesthesia      Diabetes (H)      Gastroesophageal reflux disease      Hypertension      Pacemaker          CURRENT OUTPATIENT MEDICATIONS     Current Outpatient Medications   Medication Sig     acetaminophen (TYLENOL) 500 MG tablet Take 500-1,000 mg by mouth every 6 hours as needed for mild pain     apixaban ANTICOAGULANT (ELIQUIS) 5 MG tablet Take 5 mg by mouth 2 times daily      isosorbide mononitrate (IMDUR) 30 MG 24 hr tablet TK 1 T PO ONCE D     lisinopril-hydrochlorothiazide (PRINZIDE/ZESTORETIC) 20-25 MG tablet Take 1 tablet by mouth     metFORMIN (GLUCOPHAGE) 1000 MG tablet Take 500 mg by mouth daily      metoprolol succinate ER (TOPROL-XL) 50 MG 24 hr tablet Take 50 mg by mouth daily Take one and a half tabs daily     omeprazole 20 MG tablet Take 20 mg by mouth daily     Current Facility-Administered Medications   Medication     lidocaine (XYLOCAINE) 2 % external gel        ALLERGIES      Allergies   Allergen Reactions     Sulfa Drugs Rash        REVIEW OF SYSTEMS   As above in the HPI, o/w complete 12-point ROS was negative.     PHYSICAL EXAM   Wt 71.7 kg (158 lb)   BMI 22.67 kg/m    Limited physical exam during video visit due to COVID19 restrictions  Elderly male in no acute distress  Breathing comfortably, no tachypnea  Speech and hearing normal  Pleasant mood and congruent affect  Moving all extremities, no focal neurologic deficits apparent      LABORATORY AND IMAGING STUDIES     Recent Labs   Lab Test 01/27/21  1038 12/16/20  1046 11/03/20  1130 09/23/20  1032 08/12/20  1014    139 136 137 136   POTASSIUM 4.0 3.7 3.9 3.9 4.1   CHLORIDE 105 106 103 105 103   CO2 28 29 28 28 27   ANIONGAP 5 4 5 4 6   BUN 27 22 26 24 32*   CR 1.20 1.24 1.13 1.20 1.17   * 147* 170* 148* 151*   SAAD 9.4 9.1 9.1 9.0 9.4     No results for input(s): MAG, PHOS in the last 02565 hours.  Recent Labs   Lab Test 01/27/21  1038 12/16/20  1046 11/03/20  1130 07/16/20  2120  07/15/20  1216   WBC 8.2 8.1 10.7 10.5 6.6   HGB 14.4 14.8 14.0 13.9 13.8    232 213 201 210   MCV 96 96 96 97 99   NEUTROPHIL 67.4 65.0 68.9 76.2  --      Recent Labs   Lab Test 01/27/21  1038 12/16/20  1046 11/03/20  1130   BILITOTAL 0.4 0.6 0.5   ALKPHOS 99 85 89   ALT 16 19 16   AST 14 14 12   ALBUMIN 3.8 4.1 3.8    211 169     TSH   Date Value Ref Range Status   01/27/2021 0.62 0.40 - 4.00 mU/L Final   12/16/2020 0.84 0.40 - 4.00 mU/L Final   11/03/2020 0.56 0.40 - 4.00 mU/L Final     No results for input(s): CEA in the last 61212 hours.  Results for orders placed or performed during the hospital encounter of 02/09/21   CT Chest/Abdomen/Pelvis w Contrast    Narrative    CT CHEST/ABDOMEN/PELVIS WITH CONTRAST 2/9/2021 12:21 PM    CLINICAL HISTORY: Urothelial cancer - response on immunotherapy;  Malignant neoplasm of lateral wall of urinary bladder (H).    TECHNIQUE: CT scan of the chest, abdomen, and pelvis was performed  following injection of IV contrast. Multiplanar reformats were  obtained. Dose reduction techniques were used.     CONTRAST:  78mL Isovue-370    COMPARISON: November 3, 2020, October 9, 2019    FINDINGS:   LUNGS AND PLEURA: Bibasilar atelectasis and/or fibrosis have increased  since comparison study. Nodule in the posterior right costophrenic  sulcus measures 1.1 x 0.8 cm, not definitely seen previously although  there was some motion artifact present on the most recent comparison  study, this is stable since 2019. Smaller nodule inferior and lateral  to this is also stable. 8 mm nodule in the right upper lobe measures  0.8 cm today previously 7 mm, it is unclear if this is significantly  changed. A few scattered smaller nodules are not significantly changed  from previous.    MEDIASTINUM/AXILLAE: Multiple nonenlarged lymph nodes are noted in the  mediastinum and hilum, similar to previous.    HEPATOBILIARY: No significant mass or bile duct dilatation. No  calcified gallstones.      PANCREAS: No significant mass, duct dilatation, or inflammatory  change.    SPLEEN: Normal size.    ADRENAL GLANDS: No significant nodules.    KIDNEYS/BLADDER: No significant mass, stones, or hydronephrosis. No  suspicious filling defects within the intrarenal collecting systems or  opacified ureters to suggest a urothelial malignancy. Much of the  bladder is obscured by streak artifact, no definite suspicious filling  defects.    BOWEL: No obstruction or inflammatory change.    PELVIC ORGANS: Essentially the bladder and prostate are largely  obscured by streak artifact from the bilateral hip arthroplasties. No  gross mass lesions evident.    ADDITIONAL FINDINGS: No ascites.    MUSCULOSKELETAL: Survey of the visualized bony structures demonstrates  no destructive bony lesions.      Impression    IMPRESSION:  1.  Pulmonary nodules on the right are not significantly changed since the past two comparison studies.  2.  No metastatic disease demonstrated in the abdomen and pelvis.  3.  No new lesions.    JANESSA HUTCHISON MD          ASSESSMENT AND PLAN   1. High-grade muscle invasive bladder cancer in a patient not a candidate for either surgery or radiation therapy  2. Prostate cancer treated with brachii therapy followed by external beam radiation therapy  3. Hypertension, diabetes mellitus type 2, atrial fibrillation on chronic anticoagulation with apixaban    I had a lengthy discussion with patient at this clinic visit. He has been started on pembrolizumab for his muscle invasive bladder cancer on 8/13/20. He has tolerated this well. He has not noticed any side effects on therapy. There is nothing to suggest auto-immune disease. He denies any hematuria.     I reviewed actual images with him. I shared the images over video with him. His bladder tumor cannot be assessed on the CT portion due to the bilateral hip replacements. Monitoring response to this disease is going to be a little difficult as it is only  accurately measured on a PET-CT and we cannot use the PET scan for continued response assessment. I would follow him with CT scans. As long as he is not having a clear disease progression, we could continue on therapy as current. He is being followed up cystoscopy. His last cystoscopy did show evidence of papillary bladder cancer. Our plan was to continue as long as he is gaining clinical benefit. I feel dr. Costello has the best assessment of his bladder tumor as we cannot see it on scans. I will directly speak to him before discontinuing therapy. If there is a chance that it could be slowing disease as he does not have any more hematuria we could continue for another 6 months and then stop. But if he feels it is completely unresponsive, then we should quit.     I will have him follow with Polo Valderrama for the next infusion and I will see him in 3 months with labs including urine cytology and CT chest, abdomen and pelvis prior to visit.       Video-Visit Details    Type of service:  Video Visit  Originating Location (pt. Location): Home  Distant Location (provider location):  [facility]  Platform used for Video Visit: Kristi   Patient had difficulty connecting with [place] but could connect with me. I could share images and review with him.     35 minutes spent on the date of the encounter doing chart review, history and exam, documentation and further activities as noted above      Imtiaz Ellis    Hematologist and Medical Oncologist  [facility]

## 2021-02-11 NOTE — Clinical Note
2/11/2021         RE: Santos Marti  [address]  [facility] [address]        Dear Colleague,    Thank you for referring your patient, Santos Marti, to the [facility]. Please see a copy of my visit note below.    Satnos is a 89 year old who is being evaluated via a billable video visit.      How would you like to obtain your AVS? MyChart  If the video visit is dropped, the invitation should be resent by # [phone]  Will anyone else be joining your video visit? Yes: wife. How would they like to receive their invitation? Send to e-mail at: [email or web address]  {If patient encounters technical issues they should call [phone]}    Video Start Time:    Video-Visit Details    Type of service:  Video Visit    Video End Time:   Originating Location (pt. 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  He was on apixaban for atrial fibrillation.  TURBT done on 10/22/2019 revealed normal muscle invasive bladder cancer  -He was followed with surveillance cystoscopies every 3 months.  His cystoscopy evaluation was negative for any tumors or raised erythema on 3/11/2020, however a follow-up exam on 7/8/2020 revealed raised erythematous area in the left lateral bladder wall.  He had TURBT under anesthesia on 7/15/2020 which is revealed high-grade muscle invasive urothelial carcinoma.  -Santos was diagnosed with  prostate cancer in October 2004.  He underwent brachii therapy followed by external beam radiation therapy administered at the [facility].  -Due to his previous radiation for his prostate cancer he is not a candidate for bladder radiation.  He is not a candidate for cystectomy due to his advanced age and also previous extensive radiation to the prostate.  - He was started on pembrolizumab for his locally advanced bladder cancer since 8/13/20.     CURRENT INTERVENTIONS:  Pembrolizumab 400 mg IV every 6 weeks since 8/13/20    FARTUN   Santos Marti is being followed for muscle invasive bladder cancer    Patient is being followed on therapy for his muscle invasive bladder cancer. He has been started on pembrolizumab 5 months ago. He has not noticed any side effects on therapy. He denies any recurrent hematuria. He has good appetite and fair energy. No side effects suggestive of autoimmune disease.       PAST MEDICAL HISTORY     Past Medical History:   Diagnosis Date     Arthritis      Complication of anesthesia      Diabetes (H)      Gastroesophageal reflux disease      Hypertension      Pacemaker          CURRENT OUTPATIENT MEDICATIONS     Current Outpatient Medications   Medication Sig     acetaminophen (TYLENOL) 500 MG tablet Take 500-1,000 mg by mouth every 6 hours as needed for mild pain     apixaban ANTICOAGULANT (ELIQUIS) 5 MG tablet Take 5 mg by mouth 2 times daily      isosorbide mononitrate (IMDUR) 30 MG 24 hr tablet TK 1 T PO ONCE D     lisinopril-hydrochlorothiazide (PRINZIDE/ZESTORETIC) 20-25 MG tablet Take 1 tablet by mouth     metFORMIN (GLUCOPHAGE) 1000 MG tablet Take 500 mg by mouth daily      metoprolol succinate ER (TOPROL-XL) 50 MG 24 hr tablet Take 50 mg by mouth daily Take one and a half tabs daily     omeprazole 20 MG tablet Take 20 mg by mouth daily     Current Facility-Administered Medications   Medication     lidocaine (XYLOCAINE) 2 % external gel        ALLERGIES       Allergies   Allergen Reactions     Sulfa Drugs Rash        REVIEW OF SYSTEMS   As above in the HPI, o/w complete 12-point ROS was negative.     PHYSICAL EXAM   Wt 71.7 kg (158 lb)   BMI 22.67 kg/m    Limited physical exam during video visit due to COVID19 restrictions  Elderly male in no acute distress  Breathing comfortably, no tachypnea  Speech and hearing normal  Pleasant mood and congruent affect  Moving all extremities, no focal neurologic deficits apparent      LABORATORY AND IMAGING STUDIES     Recent Labs   Lab Test 01/27/21  1038 12/16/20  1046 11/03/20  1130 09/23/20  1032 08/12/20  1014    139 136 137 136   POTASSIUM 4.0 3.7 3.9 3.9 4.1   CHLORIDE 105 106 103 105 103   CO2 28 29 28 28 27   ANIONGAP 5 4 5 4 6   BUN 27 22 26 24 32*   CR 1.20 1.24 1.13 1.20 1.17   * 147* 170* 148* 151*   SAAD 9.4 9.1 9.1 9.0 9.4     No results for input(s): MAG, PHOS in the last 94044 hours.  Recent Labs   Lab Test 01/27/21  1038 12/16/20  1046 11/03/20  1130 07/16/20  2120 07/15/20  1216   WBC 8.2 8.1 10.7 10.5 6.6   HGB 14.4 14.8 14.0 13.9 13.8    232 213 201 210   MCV 96 96 96 97 99   NEUTROPHIL 67.4 65.0 68.9 76.2  --      Recent Labs   Lab Test 01/27/21  1038 12/16/20  1046 11/03/20  1130   BILITOTAL 0.4 0.6 0.5   ALKPHOS 99 85 89   ALT 16 19 16   AST 14 14 12   ALBUMIN 3.8 4.1 3.8    211 169     TSH   Date Value Ref Range Status   01/27/2021 0.62 0.40 - 4.00 mU/L Final   12/16/2020 0.84 0.40 - 4.00 mU/L Final   11/03/2020 0.56 0.40 - 4.00 mU/L Final     No results for input(s): CEA in the last 43050 hours.  Results for orders placed or performed during the hospital encounter of 02/09/21   CT Chest/Abdomen/Pelvis w Contrast    Narrative    CT CHEST/ABDOMEN/PELVIS WITH CONTRAST 2/9/2021 12:21 PM    CLINICAL HISTORY: Urothelial cancer - response on immunotherapy;  Malignant neoplasm of lateral wall of urinary bladder (H).    TECHNIQUE: CT scan of the chest, abdomen, and pelvis was  performed  following injection of IV contrast. Multiplanar reformats were  obtained. Dose reduction techniques were used.     CONTRAST:  78mL Isovue-370    COMPARISON: November 3, 2020, October 9, 2019    FINDINGS:   LUNGS AND PLEURA: Bibasilar atelectasis and/or fibrosis have increased  since comparison study. Nodule in the posterior right costophrenic  sulcus measures 1.1 x 0.8 cm, not definitely seen previously although  there was some motion artifact present on the most recent comparison  study, this is stable since 2019. Smaller nodule inferior and lateral  to this is also stable. 8 mm nodule in the right upper lobe measures  0.8 cm today previously 7 mm, it is unclear if this is significantly  changed. A few scattered smaller nodules are not significantly changed  from previous.    MEDIASTINUM/AXILLAE: Multiple nonenlarged lymph nodes are noted in the  mediastinum and hilum, similar to previous.    HEPATOBILIARY: No significant mass or bile duct dilatation. No  calcified gallstones.     PANCREAS: No significant mass, duct dilatation, or inflammatory  change.    SPLEEN: Normal size.    ADRENAL GLANDS: No significant nodules.    KIDNEYS/BLADDER: No significant mass, stones, or hydronephrosis. No  suspicious filling defects within the intrarenal collecting systems or  opacified ureters to suggest a urothelial malignancy. Much of the  bladder is obscured by streak artifact, no definite suspicious filling  defects.    BOWEL: No obstruction or inflammatory change.    PELVIC ORGANS: Essentially the bladder and prostate are largely  obscured by streak artifact from the bilateral hip arthroplasties. No  gross mass lesions evident.    ADDITIONAL FINDINGS: No ascites.    MUSCULOSKELETAL: Survey of the visualized bony structures demonstrates  no destructive bony lesions.      Impression    IMPRESSION:  1.  Pulmonary nodules on the right are not significantly changed since the past two comparison studies.  2.  No  metastatic disease demonstrated in the abdomen and pelvis.  3.  No new lesions.    JANESSA HUTCHISON MD          ASSESSMENT AND PLAN   1. High-grade muscle invasive bladder cancer in a patient not a candidate for either surgery or radiation therapy  2. Prostate cancer treated with brachii therapy followed by external beam radiation therapy  3. Hypertension, diabetes mellitus type 2, atrial fibrillation on chronic anticoagulation with apixaban    I had a lengthy discussion with patient at this clinic visit. He has been started on pembrolizumab for his muscle invasive bladder cancer on 8/13/20. He has tolerated this well. He has not noticed any side effects on therapy. There is nothing to suggest auto-immune disease. He denies any hematuria.     I reviewed actual images with him. I shared the images over video with him. His bladder tumor cannot be assessed on the CT portion due to the bilateral hip replacements. Monitoring response to this disease is going to be a little difficult as it is only accurately measured on a PET-CT and we cannot use the PET scan for continued response assessment. I would follow him with CT scans. As long as he is not having a clear disease progression, we could continue on therapy as current. He is being followed up cystoscopy. His last cystoscopy did show evidence of papillary bladder cancer. Our plan was to continue as long as he is gaining clinical benefit. I feel dr. Costello has the best assessment of his bladder tumor as we cannot see it on scans. I will directly speak to him before discontinuing therapy. If there is a chance that it could be slowing disease as he does not have any more hematuria we could continue for another 6 months and then stop. But if he feels it is completely unresponsive, then we should quit.     I will have him follow with Polo Valderrama for the next infusion and I will see him in 3 months with labs including urine cytology and CT chest, abdomen and pelvis prior to  visit.       Video-Visit Details    Type of service:  Video Visit  Originating Location (pt. Location): Home  Distant Location (provider location):  [facility]  Platform used for Video Visit: Kristi   Patient had difficulty connecting with [place] but could connect with me. I could share images and review with him.     35 minutes spent on the date of the encounter doing chart review, history and exam, documentation and further activities as noted above      Imtiaz Ellis    Hematologist and Medical Oncologist  [facility]        Again, thank you for allowing me to participate in the care of your patient.        Sincerely,        Imtiaz Ellis MD

## 2021-02-12 LAB
COPATH REPORT: NORMAL
COPATH REPORT: NORMAL

## 2021-02-17 LAB — COPATH REPORT: NORMAL

## 2021-02-26 LAB — COPATH REPORT: NORMAL

## 2021-03-05 ENCOUNTER — OFFICE VISIT (OUTPATIENT)
Dept: UROLOGY | Facility: CLINIC | Age: 86
End: 2021-03-05
Payer: MEDICARE

## 2021-03-05 VITALS
BODY MASS INDEX: 21.47 KG/M2 | DIASTOLIC BLOOD PRESSURE: 78 MMHG | HEART RATE: 70 BPM | SYSTOLIC BLOOD PRESSURE: 128 MMHG | WEIGHT: 150 LBS | OXYGEN SATURATION: 97 % | HEIGHT: 70 IN

## 2021-03-05 DIAGNOSIS — C61 PROSTATE CANCER (H): ICD-10-CM

## 2021-03-05 DIAGNOSIS — Z79.2 PROPHYLACTIC ANTIBIOTIC: ICD-10-CM

## 2021-03-05 DIAGNOSIS — C67.0 MALIGNANT NEOPLASM OF TRIGONE OF URINARY BLADDER (H): Primary | ICD-10-CM

## 2021-03-05 LAB
ALBUMIN UR-MCNC: 30 MG/DL
APPEARANCE UR: CLEAR
BILIRUB UR QL STRIP: NEGATIVE
COLOR UR AUTO: YELLOW
GLUCOSE UR STRIP-MCNC: NEGATIVE MG/DL
HGB UR QL STRIP: ABNORMAL
KETONES UR STRIP-MCNC: NEGATIVE MG/DL
LEUKOCYTE ESTERASE UR QL STRIP: NEGATIVE
NITRATE UR QL: NEGATIVE
PH UR STRIP: 5 PH (ref 5–7)
SOURCE: ABNORMAL
SP GR UR STRIP: 1.02 (ref 1–1.03)
UROBILINOGEN UR STRIP-ACNC: 0.2 EU/DL (ref 0.2–1)

## 2021-03-05 PROCEDURE — 81003 URINALYSIS AUTO W/O SCOPE: CPT | Performed by: UROLOGY

## 2021-03-05 PROCEDURE — 52234 CYSTOSCOPY AND TREATMENT: CPT | Performed by: UROLOGY

## 2021-03-05 PROCEDURE — 99212 OFFICE O/P EST SF 10 MIN: CPT | Mod: 25 | Performed by: UROLOGY

## 2021-03-05 RX ORDER — CIPROFLOXACIN 500 MG/1
500 TABLET, FILM COATED ORAL ONCE
Qty: 1 TABLET | Refills: 0 | Status: SHIPPED | OUTPATIENT
Start: 2021-03-05 | End: 2021-03-05

## 2021-03-05 RX ORDER — LIDOCAINE HYDROCHLORIDE 20 MG/ML
JELLY TOPICAL ONCE
Status: COMPLETED | OUTPATIENT
Start: 2021-03-05 | End: 2021-03-05

## 2021-03-05 RX ADMIN — LIDOCAINE HYDROCHLORIDE 20 ML: 20 JELLY TOPICAL at 12:30

## 2021-03-05 ASSESSMENT — MIFFLIN-ST. JEOR: SCORE: 1351.65

## 2021-03-05 ASSESSMENT — PAIN SCALES - GENERAL: PAINLEVEL: NO PAIN (0)

## 2021-03-05 NOTE — NURSING NOTE
"Chief Complaint   Patient presents with     Bladder Cancer     Patient here today for Cystsocopy Fulguration       Blood pressure 128/78, pulse 70, height 1.778 m (5' 10\"), weight 68 kg (150 lb), SpO2 97 %. Body mass index is 21.52 kg/m .    Patient Active Problem List   Diagnosis     History of gross hematuria     Malignant neoplasm of lateral wall of urinary bladder (H)     Chronic kidney disease, stage 3       Allergies   Allergen Reactions     Sulfa Drugs Rash       Current Outpatient Medications   Medication Sig Dispense Refill     acetaminophen (TYLENOL) 500 MG tablet Take 500-1,000 mg by mouth every 6 hours as needed for mild pain       ciprofloxacin (CIPRO) 500 MG tablet Take 1 tablet (500 mg) by mouth once for 1 dose 1 tablet 0     isosorbide mononitrate (IMDUR) 30 MG 24 hr tablet TK 1 T PO ONCE D  0     metFORMIN (GLUCOPHAGE) 1000 MG tablet Take 500 mg by mouth daily        metoprolol succinate ER (TOPROL-XL) 50 MG 24 hr tablet Take 50 mg by mouth daily Take one and a half tabs daily       omeprazole 20 MG tablet Take 20 mg by mouth daily       apixaban ANTICOAGULANT (ELIQUIS) 5 MG tablet Take 5 mg by mouth 2 times daily        lisinopril-hydrochlorothiazide (PRINZIDE/ZESTORETIC) 20-25 MG tablet Take 1 tablet by mouth         Social History     Tobacco Use     Smoking status: Former Smoker     Packs/day: 0.00     Smokeless tobacco: Never Used     Tobacco comment: quit 1980's   Substance Use Topics     Alcohol use: Yes     Comment: 4 drinks per week     Drug use: Never       UA RESULTS:  Recent Labs   Lab Test 03/05/21  1241 07/16/20  2120 07/16/20 2120   COLOR Yellow   < > Light Yellow   APPEARANCE Clear   < > Clear   URINEGLC Negative   < > Negative   URINEBILI Negative   < > Negative   URINEKETONE Negative   < > Negative   SG 1.025   < > 1.014   UBLD Moderate*   < > Moderate*   URINEPH 5.0   < > 5.5   PROTEIN 30*   < > 50*   UROBILINOGEN 0.2   < >  --    NITRITE Negative   < > Negative   LEUKEST " Negative   < > Small*   RBCU  --   --  71*   WBCU  --   --  20*    < > = values in this interval not displayed.     Prior to the start of the procedure and with procedural staff participation, I verbally confirmed the patient s identity using two indicators, relevant allergies, that the procedure was appropriate and matched the consent or emergent situation, and that the correct equipment/implants were available. Immediately prior to starting the procedure I conducted the Time Out with the procedural staff and re-confirmed the patient s name, procedure, and site/side. I have wiped the patient off with the povidone-Iodine solution, draped them,  used Lidocaine hydrochloride jelly, and instilled sterile water into the bladder. (The Joint Commission universal protocol was followed.)  Yes    Sedation (Moderate or Deep): None      The following medication was given:     MEDICATION:  Lidocaine 1% Soln  ROUTE: BLADDER  SITE: URETHRAL  DOSE: 10ML  LOT #: [id number]  : [employer]  EXPIRATION DATE: 05/24  NDC#: 97985-599-60   Was there drug waste? Yes  Amount of drug waste (mL): 10ML.  Reason for waste:  Single use vial  Multi-dose vial: No    The following medication was given:     MEDICATION:  SODIUM BICARBONATE   ROUTE: BLADDER  SITE: URETHRAL  DOSE: 40ML  LOT #: [id number]  : [employer]  EXPIRATION DATE: 09/2022  NDC#: 98962-798-6   Was there drug waste? Yes  Amount of drug waste (mL): 10ML.  Reason for waste:  Single use vial  Multi-dose vial: No        5mL 2% lidocaine hydrochloride Urojet instilled into urethra.    NDC# [address]-7  Lot #: [id number]  Expiration Date:  09/22      Kirti Tanner FARHAT  3/5/2021  2:17 PM

## 2021-03-05 NOTE — LETTER
3/5/2021       RE: Santos Marti  [address]  [facility] [address]     Dear Colleague,    Thank you for referring your patient, Santos Marti, to the [facility] UROLOGY [facility] [place] [facility]. Please see a copy of my visit note below.    Katheryn Marti is an 89-year-old male with a history of prostate cancer and bladder cancer.  He returns today for cystoscopy and fulguration of a small papillary tumor above the right ureteral orifice.  Other past medical history, medications and allergies reviewed.  He held his Eliquis yesterday  Exam: Alert and oriented, normal external genitalia.  Flexible cystoscopy-normal urethra, radiation changes in prostate and bladder.  Single papillary tumor a centimeter above right ureteral orifice.  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  History of prostate cancer-PSA less than 0.2  Plan: Office cystoscopy in 3 months.  Antibiotic x1 today      Again, thank you for allowing me to participate in the care of your patient.      Sincerely,    Maximilian Costello MD

## 2021-03-05 NOTE — PROGRESS NOTES
Katheryn Marti is an 89-year-old male with a history of prostate cancer and bladder cancer.  He returns today for cystoscopy and fulguration of a small papillary tumor above the right ureteral orifice.  Other past medical history, medications and allergies reviewed.  He held his Eliquis yesterday  Exam: Alert and oriented, normal external genitalia.  Flexible cystoscopy-normal urethra, radiation changes in prostate and bladder.  Single papillary tumor a centimeter above right ureteral orifice.  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  History of prostate cancer-PSA less than 0.2  Plan: Office cystoscopy in 3 months.  Antibiotic x1 today

## 2021-03-05 NOTE — PATIENT INSTRUCTIONS
"                             AFTER YOUR CYSTOSCOPY  ?  ?  You have just completed a cystoscopy, or \"cysto\", which allowed your physician to learn more about your bladder (or to remove a stent placed after surgery). We suggest that you continue to avoid caffeine, fruit juice, and alcohol for the next 24 hours, however, you are encouraged to return to your normal activities.  ?  ?  A few things that are considered normal after your cystoscopy:  ?  * small amount of bleeding (or spotting) that clears within the next 24 hours  ?  * slight burning sensation with urination  ?  * sensation of needing to void (urinate) more frequently  ?  * the feeling of \"air\" in your urine  ?  * mild discomfort that is relieved with Tylenol    * bladder spasms  ?  ?  ?  Please contact our office promptly if you:  ?  * develop a fever above 101 degrees  ?  * are unable to urinate  ?  * develop bright red blood that does not stop  ?  * experience severe pain or swelling  ?  ?  ?  And of course, please contact our office with any concerns or questions [phone]  ?        "

## 2021-03-10 ENCOUNTER — HOSPITAL ENCOUNTER (OUTPATIENT)
Facility: CLINIC | Age: 86
Setting detail: SPECIMEN
Discharge: HOME | End: 2021-03-10
Attending: INTERNAL MEDICINE | Admitting: INTERNAL MEDICINE
Payer: MEDICARE

## 2021-03-10 ENCOUNTER — CARE COORDINATION (OUTPATIENT)
Dept: ONCOLOGY | Facility: CLINIC | Age: 86
End: 2021-03-10

## 2021-03-10 DIAGNOSIS — R53.83 OTHER FATIGUE: ICD-10-CM

## 2021-03-10 DIAGNOSIS — Z91.89 AT RISK FOR INJURY FROM CHEMOTHERAPY: ICD-10-CM

## 2021-03-10 DIAGNOSIS — Z85.46 PERSONAL HISTORY OF MALIGNANT NEOPLASM OF PROSTATE: ICD-10-CM

## 2021-03-10 DIAGNOSIS — C67.2 MALIGNANT NEOPLASM OF LATERAL WALL OF URINARY BLADDER (H): ICD-10-CM

## 2021-03-10 LAB
ALBUMIN SERPL-MCNC: 3.7 G/DL (ref 3.4–5)
ALP SERPL-CCNC: 84 U/L (ref 40–150)
ALT SERPL W P-5'-P-CCNC: 18 U/L (ref 0–70)
ANION GAP SERPL CALCULATED.3IONS-SCNC: 4 MMOL/L (ref 3–14)
AST SERPL W P-5'-P-CCNC: 11 U/L (ref 0–45)
BASOPHILS # BLD AUTO: 0 10E9/L (ref 0–0.2)
BASOPHILS NFR BLD AUTO: 0.1 %
BILIRUB SERPL-MCNC: 0.5 MG/DL (ref 0.2–1.3)
BUN SERPL-MCNC: 22 MG/DL (ref 7–30)
CALCIUM SERPL-MCNC: 9.1 MG/DL (ref 8.5–10.1)
CHLORIDE SERPL-SCNC: 104 MMOL/L (ref 94–109)
CO2 SERPL-SCNC: 30 MMOL/L (ref 20–32)
CREAT SERPL-MCNC: 1.26 MG/DL (ref 0.66–1.25)
DIFFERENTIAL METHOD BLD: NORMAL
EOSINOPHIL # BLD AUTO: 0.1 10E9/L (ref 0–0.7)
EOSINOPHIL NFR BLD AUTO: 1 %
ERYTHROCYTE [DISTWIDTH] IN BLOOD BY AUTOMATED COUNT: 12.8 % (ref 10–15)
GFR SERPL CREATININE-BSD FRML MDRD: 50 ML/MIN/{1.73_M2}
GLUCOSE SERPL-MCNC: 202 MG/DL (ref 70–99)
HCT VFR BLD AUTO: 42 % (ref 40–53)
HGB BLD-MCNC: 13.9 G/DL (ref 13.3–17.7)
IMM GRANULOCYTES # BLD: 0 10E9/L (ref 0–0.4)
IMM GRANULOCYTES NFR BLD: 0.6 %
LDH SERPL L TO P-CCNC: 166 U/L (ref 85–227)
LYMPHOCYTES # BLD AUTO: 1.5 10E9/L (ref 0.8–5.3)
LYMPHOCYTES NFR BLD AUTO: 21.8 %
MCH RBC QN AUTO: 31.2 PG (ref 26.5–33)
MCHC RBC AUTO-ENTMCNC: 33.1 G/DL (ref 31.5–36.5)
MCV RBC AUTO: 94 FL (ref 78–100)
MONOCYTES # BLD AUTO: 0.6 10E9/L (ref 0–1.3)
MONOCYTES NFR BLD AUTO: 8.5 %
NEUTROPHILS # BLD AUTO: 4.7 10E9/L (ref 1.6–8.3)
NEUTROPHILS NFR BLD AUTO: 68 %
NRBC # BLD AUTO: 0 10*3/UL
NRBC BLD AUTO-RTO: 0 /100
PLATELET # BLD AUTO: 198 10E9/L (ref 150–450)
POTASSIUM SERPL-SCNC: 4.2 MMOL/L (ref 3.4–5.3)
PROT SERPL-MCNC: 7.2 G/DL (ref 6.8–8.8)
PSA SERPL-MCNC: <0.01 UG/L (ref 0–4)
RBC # BLD AUTO: 4.46 10E12/L (ref 4.4–5.9)
SODIUM SERPL-SCNC: 138 MMOL/L (ref 133–144)
TSH SERPL DL<=0.005 MIU/L-ACNC: 0.66 MU/L (ref 0.4–4)
WBC # BLD AUTO: 6.8 10E9/L (ref 4–11)

## 2021-03-10 PROCEDURE — 83615 LACTATE (LD) (LDH) ENZYME: CPT | Performed by: INTERNAL MEDICINE

## 2021-03-10 PROCEDURE — 85025 COMPLETE CBC W/AUTO DIFF WBC: CPT | Performed by: INTERNAL MEDICINE

## 2021-03-10 PROCEDURE — 84443 ASSAY THYROID STIM HORMONE: CPT | Performed by: INTERNAL MEDICINE

## 2021-03-10 PROCEDURE — 80053 COMPREHEN METABOLIC PANEL: CPT | Performed by: INTERNAL MEDICINE

## 2021-03-10 PROCEDURE — 88120 CYTP URNE 3-5 PROBES EA SPEC: CPT | Mod: 26 | Performed by: PATHOLOGY

## 2021-03-10 PROCEDURE — 36415 COLL VENOUS BLD VENIPUNCTURE: CPT

## 2021-03-10 PROCEDURE — 84153 ASSAY OF PSA TOTAL: CPT | Performed by: INTERNAL MEDICINE

## 2021-03-10 PROCEDURE — 88120 CYTP URNE 3-5 PROBES EA SPEC: CPT | Mod: TC | Performed by: INTERNAL MEDICINE

## 2021-03-10 NOTE — PROGRESS NOTES
Santos was in the clinic for a lab draw today.   Requested to speak with the nurse about dry patches on his legs.     He has had these for a year or more, these patches tend to appear and itch more in dry cold weather and then go away when he goes to [place] in the winter.    He did not go to [place] this year and the dry scaly patches remain.    He questioned if the keytruda could be causing this.  He is using lotion on these areas and has a humidifier at home.     Reviewed with DANIEL Cuellar- Santos had an appointment with tomorrow before keytruda.  Advised topical hydrocortisone cream and to make an appointment with Dermatology    Santos verbalized understanding.   Will keep appointments for tomorrow.

## 2021-03-10 NOTE — PROGRESS NOTES
Medical Assistant Note:  Santos Marti presents today for lab draw.    Patient seen by provider today: No.   present during visit today: Not Applicable.    Concerns: No Concerns.    Procedure:  Lab draw site: LAC, Needle type: Bf, Gauge: 21. Gauze and coban applied    Post Assessment:  Labs drawn without difficulty: Yes.    Discharge Plan:  Departure Mode: Ambulatory.    Face to Face Time: 5.    Tanya Ceja CMA

## 2021-03-11 ENCOUNTER — INFUSION THERAPY VISIT (OUTPATIENT)
Dept: INFUSION THERAPY | Facility: CLINIC | Age: 86
End: 2021-03-11
Attending: INTERNAL MEDICINE
Payer: MEDICARE

## 2021-03-11 ENCOUNTER — ONCOLOGY VISIT (OUTPATIENT)
Dept: ONCOLOGY | Facility: CLINIC | Age: 86
End: 2021-03-11
Attending: NURSE PRACTITIONER
Payer: MEDICARE

## 2021-03-11 VITALS
DIASTOLIC BLOOD PRESSURE: 89 MMHG | RESPIRATION RATE: 16 BRPM | HEART RATE: 74 BPM | OXYGEN SATURATION: 97 % | BODY MASS INDEX: 23.45 KG/M2 | WEIGHT: 163.4 LBS | TEMPERATURE: 97.8 F | SYSTOLIC BLOOD PRESSURE: 133 MMHG

## 2021-03-11 DIAGNOSIS — C67.2 MALIGNANT NEOPLASM OF LATERAL WALL OF URINARY BLADDER (H): Primary | ICD-10-CM

## 2021-03-11 PROCEDURE — 250N000011 HC RX IP 250 OP 636: Performed by: NURSE PRACTITIONER

## 2021-03-11 PROCEDURE — 96413 CHEMO IV INFUSION 1 HR: CPT

## 2021-03-11 PROCEDURE — G0463 HOSPITAL OUTPT CLINIC VISIT: HCPCS | Mod: 25

## 2021-03-11 PROCEDURE — 258N000003 HC RX IP 258 OP 636: Performed by: NURSE PRACTITIONER

## 2021-03-11 PROCEDURE — 99214 OFFICE O/P EST MOD 30 MIN: CPT | Performed by: NURSE PRACTITIONER

## 2021-03-11 RX ORDER — NALOXONE HYDROCHLORIDE 0.4 MG/ML
.1-.4 INJECTION, SOLUTION INTRAMUSCULAR; INTRAVENOUS; SUBCUTANEOUS
Status: CANCELLED | OUTPATIENT
Start: 2021-03-11

## 2021-03-11 RX ORDER — MEPERIDINE HYDROCHLORIDE 25 MG/ML
25 INJECTION INTRAMUSCULAR; INTRAVENOUS; SUBCUTANEOUS EVERY 30 MIN PRN
Status: CANCELLED | OUTPATIENT
Start: 2021-03-11

## 2021-03-11 RX ORDER — EPINEPHRINE 1 MG/ML
0.3 INJECTION, SOLUTION INTRAMUSCULAR; SUBCUTANEOUS EVERY 5 MIN PRN
Status: CANCELLED | OUTPATIENT
Start: 2021-03-11

## 2021-03-11 RX ORDER — METHYLPREDNISOLONE SODIUM SUCCINATE 125 MG/2ML
125 INJECTION, POWDER, LYOPHILIZED, FOR SOLUTION INTRAMUSCULAR; INTRAVENOUS
Status: CANCELLED
Start: 2021-03-11

## 2021-03-11 RX ORDER — HEPARIN SODIUM,PORCINE 10 UNIT/ML
5 VIAL (ML) INTRAVENOUS
Status: CANCELLED | OUTPATIENT
Start: 2021-03-11

## 2021-03-11 RX ORDER — ALBUTEROL SULFATE 0.83 MG/ML
2.5 SOLUTION RESPIRATORY (INHALATION)
Status: CANCELLED | OUTPATIENT
Start: 2021-03-11

## 2021-03-11 RX ORDER — HEPARIN SODIUM (PORCINE) LOCK FLUSH IV SOLN 100 UNIT/ML 100 UNIT/ML
5 SOLUTION INTRAVENOUS
Status: CANCELLED | OUTPATIENT
Start: 2021-03-11

## 2021-03-11 RX ORDER — ALBUTEROL SULFATE 90 UG/1
1-2 AEROSOL, METERED RESPIRATORY (INHALATION)
Status: CANCELLED
Start: 2021-03-11

## 2021-03-11 RX ORDER — TRIAMCINOLONE ACETONIDE 1 MG/G
OINTMENT TOPICAL 2 TIMES DAILY
Qty: 30 G | Refills: 3 | Status: SHIPPED | OUTPATIENT
Start: 2021-03-11

## 2021-03-11 RX ORDER — SODIUM CHLORIDE 9 MG/ML
1000 INJECTION, SOLUTION INTRAVENOUS CONTINUOUS PRN
Status: CANCELLED
Start: 2021-03-11

## 2021-03-11 RX ORDER — DIPHENHYDRAMINE HYDROCHLORIDE 50 MG/ML
50 INJECTION INTRAMUSCULAR; INTRAVENOUS
Status: CANCELLED
Start: 2021-03-11

## 2021-03-11 RX ORDER — HEPARIN SODIUM (PORCINE) LOCK FLUSH IV SOLN 100 UNIT/ML 100 UNIT/ML
5 SOLUTION INTRAVENOUS
Status: DISCONTINUED | OUTPATIENT
Start: 2021-03-11 | End: 2021-03-11 | Stop reason: CLARIF

## 2021-03-11 RX ORDER — LORAZEPAM 2 MG/ML
0.5 INJECTION INTRAMUSCULAR EVERY 4 HOURS PRN
Status: CANCELLED
Start: 2021-03-11

## 2021-03-11 RX ADMIN — SODIUM CHLORIDE 400 MG: 9 INJECTION, SOLUTION INTRAVENOUS at 10:35

## 2021-03-11 RX ADMIN — SODIUM CHLORIDE 250 ML: 9 INJECTION, SOLUTION INTRAVENOUS at 10:34

## 2021-03-11 ASSESSMENT — PAIN SCALES - GENERAL: PAINLEVEL: NO PAIN (0)

## 2021-03-11 NOTE — PROGRESS NOTES
"Oncology Rooming Note    March 11, 2021 9:35 AM   Santos Marti is a 89 year old male who presents for:    Chief Complaint   Patient presents with     Oncology Clinic Visit     Initial Vitals: /89   Pulse 74   Temp 97.8  F (36.6  C) (Oral)   Resp 16   Wt 74.1 kg (163 lb 6.4 oz)   SpO2 97%   BMI 23.45 kg/m   Estimated body mass index is 23.45 kg/m  as calculated from the following:    Height as of 3/5/21: 1.778 m (5' 10\").    Weight as of this encounter: 74.1 kg (163 lb 6.4 oz). Body surface area is 1.91 meters squared.  No Pain (0) Comment: Data Unavailable   No LMP for male patient.  Allergies reviewed: Yes  Medications reviewed: Yes    Medications: Medication refills not needed today.  Pharmacy name entered into Secure[employer]:    [id number] - [place], [place] - [address] [facility] [address] [facility] [address] [facility] - [place], [place] - [address]    Clinical concerns:  NP was notified.      Gaby Luque CMA            "

## 2021-03-11 NOTE — PROGRESS NOTES
Infusion Nursing Note:  Santos Marti presents today for keytruda.    Patient seen by provider today: Yes: Yonas   present during visit today: Not Applicable.    Note: N/A.  Patient did meet criteria for an asymptomatic covid-19 PCR test in infusion today. Patient declined the covid-19 test.    Intravenous Access:  Peripheral IV placed.    Treatment Conditions:  Results reviewed, labs MET treatment parameters, ok to proceed with treatment.      Post Infusion Assessment:  Patient tolerated infusion without incident.  Blood return noted pre and post infusion.  Site patent and intact, free from redness, edema or discomfort.  No evidence of extravasations.  Access discontinued per protocol.       Discharge Plan:   Discharge instructions reviewed with: Patient.  Patient and/or family verbalized understanding of discharge instructions and all questions answered.  Copy of AVS reviewed with patient and/or family.  Patient will return 4/21/21 for next appointment.  Patient discharged in stable condition accompanied by: self.  Departure Mode: Ambulatory.    Estuardo Starr RN

## 2021-03-11 NOTE — LETTER
"    3/11/2021         RE: Santos Marti  [address]  [facility] [address]        Dear Colleague,    Thank you for referring your patient, Santos Marti, to the [facility]. Please see a copy of my visit note below.    Oncology Rooming Note    March 11, 2021 9:35 AM   Santos Marti is a 89 year old male who presents for:    Chief Complaint   Patient presents with     Oncology Clinic Visit     Initial Vitals: /89   Pulse 74   Temp 97.8  F (36.6  C) (Oral)   Resp 16   Wt 74.1 kg (163 lb 6.4 oz)   SpO2 97%   BMI 23.45 kg/m   Estimated body mass index is 23.45 kg/m  as calculated from the following:    Height as of 3/5/21: 1.778 m (5' 10\").    Weight as of this encounter: 74.1 kg (163 lb 6.4 oz). Body surface area is 1.91 meters squared.  No Pain (0) Comment: Data Unavailable   No LMP for male patient.  Allergies reviewed: Yes  Medications reviewed: Yes    Medications: Medication refills not needed today.  Pharmacy name entered into [employer]:    [id number] - [place], [place] - [address] AT [address] [facility] [address] [facility] - [place], [place] - [address]    Clinical concerns:  NP was notified.      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  Patient had previous radiation to the prostate  Locally advanced bladder cancer pt met with Dr. Ellis who recommended treatment with Keytruda every 6 weeks      Interval History:  Patient reports he has been tolerating treatment well he does report some skin rash right leg.  Denies skin lesions.  Fever chills sweats, denies pain        Review of Systems:  14 point ROS of systems including Constitutional, Eyes, Respiratory, Cardiovascular, " Gastroenterology, Genitourinary, Integumentary, Muscularskeletal, Psychiatric were all negative except for pertinent positives noted in my HPI.      Physical Examination:  Physical Exam  HENT:      Head: Normocephalic.      Right Ear: Tympanic membrane normal.      Nose: Nose normal.      Mouth/Throat:      Mouth: Mucous membranes are moist.   Eyes:      Pupils: Pupils are equal, round, and reactive to light.   Neck:      Musculoskeletal: Normal range of motion.   Cardiovascular:      Rate and Rhythm: Normal rate.      Pulses: Normal pulses.   Pulmonary:      Effort: Pulmonary effort is normal.   Abdominal:      General: Abdomen is flat.   Musculoskeletal: Normal range of motion.   Skin:     General: Skin is warm.      Comments: 1 small spot of erythema noted in the right leg, no vesicles or lesions.   Neurological:      General: No focal deficit present.      Mental Status: He is alert.         Laboratory Data:  CBC and CMP results reviewed      Assessment and Plan:      This is a 89-year-old male with    high-grade muscle invasive bladder cancer-patient is not a candidate for surgery or radiation therapy.  Patient had previous radiation to the prostate  Locally advanced bladder cancer -pt met with Dr. Ellis who recommended treatment with Keytruda every 6 weeks  Labs reviewed patient has been tolerating treatment well   -continue with immunotherapy  -Schedule for Keytruda every 6 weeks  - CT scan and urine cytology in May  Follow-up with   in  May      Skin rash   small spot of erythema noted in the right leg.  Hydrocortisone cream as needed  If hydrocortisone cream does not work I prescribed Kenalog as needed-side effects discussed  Patient advised to call clinic if worsening symptoms    Prostate cancer diagnosed in 2004 treated with brachytherapy and followed [facility]       Patient is advised to call our clinic or go to emergency room in the event of fever chills  sweats cough shortness of breath chest pain sore throat nausea vomiting diarrhea abdominal pain or bleeding  Or any changes in health condition      CHUCK Dumont CNP  [facility]     Chart documentation with Dragon Voice recognition Software. Although reviewed after completion, some words and grammatical errors may remain.            Again, thank you for allowing me to participate in the care of your patient.        Sincerely,        CHUCK Dumont CNP

## 2021-03-11 NOTE — PROGRESS NOTES
Oncology/Hematology Visit Note  Mar 11, 2021    Reason for Visit: follow up of high-grade muscle invasive bladder cancer-patient is not a candidate for surgery or radiation therapy.  Patient had previous radiation to the prostate  Locally advanced bladder cancer pt met with Dr. Ellis who recommended treatment with Keytruda every 6 weeks      Interval History:  Patient reports he has been tolerating treatment well he does report some skin rash right leg.  Denies skin lesions.  Fever chills sweats, denies pain        Review of Systems:  14 point ROS of systems including Constitutional, Eyes, Respiratory, Cardiovascular, Gastroenterology, Genitourinary, Integumentary, Muscularskeletal, Psychiatric were all negative except for pertinent positives noted in my HPI.      Physical Examination:  Physical Exam  HENT:      Head: Normocephalic.      Right Ear: Tympanic membrane normal.      Nose: Nose normal.      Mouth/Throat:      Mouth: Mucous membranes are moist.   Eyes:      Pupils: Pupils are equal, round, and reactive to light.   Neck:      Musculoskeletal: Normal range of motion.   Cardiovascular:      Rate and Rhythm: Normal rate.      Pulses: Normal pulses.   Pulmonary:      Effort: Pulmonary effort is normal.   Abdominal:      General: Abdomen is flat.   Musculoskeletal: Normal range of motion.   Skin:     General: Skin is warm.      Comments: 1 small spot of erythema noted in the right leg, no vesicles or lesions.   Neurological:      General: No focal deficit present.      Mental Status: He is alert.         Laboratory Data:  CBC and CMP results reviewed      Assessment and Plan:      This is a 89-year-old male with    high-grade muscle invasive bladder cancer-patient is not a candidate for surgery or radiation therapy.  Patient had previous radiation to the prostate  Locally advanced bladder cancer -pt met with Dr. Ellis who recommended treatment with Keytruda every 6 weeks  Labs reviewed patient has been  tolerating treatment well   -continue with immunotherapy  -Schedule for Keytruda every 6 weeks  - CT scan and urine cytology in May  Follow-up with   in  May      Skin rash   small spot of erythema noted in the right leg.  Hydrocortisone cream as needed  If hydrocortisone cream does not work I prescribed Kenalog as needed-side effects discussed  Patient advised to call clinic if worsening symptoms    Prostate cancer diagnosed in 2004 treated with brachytherapy and followed [facility]     Elevated creatinine  Creatinine 1.26  We will give IV fluids    proper hydration discussed  Patient denies urinary symptoms        Patient is advised to call our clinic or go to emergency room in the event of fever chills sweats cough shortness of breath chest pain sore throat nausea vomiting diarrhea abdominal pain or bleeding  Or any changes in health condition      CHUCK Dumont [facility]     Chart documentation with Dragon Voice recognition Software. Although reviewed after completion, some words and grammatical errors may remain.

## 2021-03-19 ENCOUNTER — TELEPHONE (OUTPATIENT)
Dept: ONCOLOGY | Facility: CLINIC | Age: 86
End: 2021-03-19

## 2021-03-19 NOTE — TELEPHONE ENCOUNTER
Follow up call made to Santos about dry, scaly, itchy patches on his legs mentioned at a clinic visit earlier in the month.   He was able to see dermatology this week .   Advised to continue with moisturizers and hydrocortisone cream, states it is slightly better but that it should improve with warmer weather.   No other concerns at this time.

## 2021-03-23 LAB — COPATH REPORT: NORMAL

## 2021-04-21 ENCOUNTER — INFUSION THERAPY VISIT (OUTPATIENT)
Dept: INFUSION THERAPY | Facility: CLINIC | Age: 86
End: 2021-04-21
Attending: NURSE PRACTITIONER
Payer: MEDICARE

## 2021-04-21 ENCOUNTER — HOSPITAL ENCOUNTER (OUTPATIENT)
Facility: CLINIC | Age: 86
Setting detail: SPECIMEN
Discharge: HOME | End: 2021-04-21
Attending: NURSE PRACTITIONER | Admitting: NURSE PRACTITIONER
Payer: MEDICARE

## 2021-04-21 DIAGNOSIS — C67.2 MALIGNANT NEOPLASM OF LATERAL WALL OF URINARY BLADDER (H): Primary | ICD-10-CM

## 2021-04-21 LAB
ALBUMIN SERPL-MCNC: 3.8 G/DL (ref 3.4–5)
ALP SERPL-CCNC: 88 U/L (ref 40–150)
ALT SERPL W P-5'-P-CCNC: 17 U/L (ref 0–70)
ANION GAP SERPL CALCULATED.3IONS-SCNC: 5 MMOL/L (ref 3–14)
AST SERPL W P-5'-P-CCNC: 10 U/L (ref 0–45)
BASOPHILS # BLD AUTO: 0 10E9/L (ref 0–0.2)
BASOPHILS NFR BLD AUTO: 0.3 %
BILIRUB SERPL-MCNC: 0.5 MG/DL (ref 0.2–1.3)
BUN SERPL-MCNC: 22 MG/DL (ref 7–30)
CALCIUM SERPL-MCNC: 9.1 MG/DL (ref 8.5–10.1)
CHLORIDE SERPL-SCNC: 104 MMOL/L (ref 94–109)
CO2 SERPL-SCNC: 29 MMOL/L (ref 20–32)
CREAT SERPL-MCNC: 1.05 MG/DL (ref 0.66–1.25)
DIFFERENTIAL METHOD BLD: NORMAL
EOSINOPHIL # BLD AUTO: 0.1 10E9/L (ref 0–0.7)
EOSINOPHIL NFR BLD AUTO: 2.1 %
ERYTHROCYTE [DISTWIDTH] IN BLOOD BY AUTOMATED COUNT: 12.8 % (ref 10–15)
GFR SERPL CREATININE-BSD FRML MDRD: 62 ML/MIN/{1.73_M2}
GLUCOSE SERPL-MCNC: 158 MG/DL (ref 70–99)
HCT VFR BLD AUTO: 44.3 % (ref 40–53)
HGB BLD-MCNC: 14.5 G/DL (ref 13.3–17.7)
IMM GRANULOCYTES # BLD: 0 10E9/L (ref 0–0.4)
IMM GRANULOCYTES NFR BLD: 0.6 %
LDH SERPL L TO P-CCNC: 186 U/L (ref 85–227)
LYMPHOCYTES # BLD AUTO: 1.8 10E9/L (ref 0.8–5.3)
LYMPHOCYTES NFR BLD AUTO: 28 %
MCH RBC QN AUTO: 30.9 PG (ref 26.5–33)
MCHC RBC AUTO-ENTMCNC: 32.7 G/DL (ref 31.5–36.5)
MCV RBC AUTO: 94 FL (ref 78–100)
MONOCYTES # BLD AUTO: 0.5 10E9/L (ref 0–1.3)
MONOCYTES NFR BLD AUTO: 7.6 %
NEUTROPHILS # BLD AUTO: 4 10E9/L (ref 1.6–8.3)
NEUTROPHILS NFR BLD AUTO: 61.4 %
NRBC # BLD AUTO: 0 10*3/UL
NRBC BLD AUTO-RTO: 0 /100
PLATELET # BLD AUTO: 261 10E9/L (ref 150–450)
POTASSIUM SERPL-SCNC: 3.9 MMOL/L (ref 3.4–5.3)
PROT SERPL-MCNC: 7.4 G/DL (ref 6.8–8.8)
RBC # BLD AUTO: 4.7 10E12/L (ref 4.4–5.9)
SODIUM SERPL-SCNC: 138 MMOL/L (ref 133–144)
TSH SERPL DL<=0.005 MIU/L-ACNC: 0.67 MU/L (ref 0.4–4)
WBC # BLD AUTO: 6.6 10E9/L (ref 4–11)

## 2021-04-21 PROCEDURE — 83615 LACTATE (LD) (LDH) ENZYME: CPT | Performed by: NURSE PRACTITIONER

## 2021-04-21 PROCEDURE — 80053 COMPREHEN METABOLIC PANEL: CPT | Performed by: NURSE PRACTITIONER

## 2021-04-21 PROCEDURE — 36415 COLL VENOUS BLD VENIPUNCTURE: CPT

## 2021-04-21 PROCEDURE — 84443 ASSAY THYROID STIM HORMONE: CPT | Performed by: NURSE PRACTITIONER

## 2021-04-21 PROCEDURE — 85025 COMPLETE CBC W/AUTO DIFF WBC: CPT | Performed by: NURSE PRACTITIONER

## 2021-04-21 RX ORDER — HEPARIN SODIUM (PORCINE) LOCK FLUSH IV SOLN 100 UNIT/ML 100 UNIT/ML
5 SOLUTION INTRAVENOUS
Status: CANCELLED | OUTPATIENT
Start: 2021-04-22

## 2021-04-21 RX ORDER — SODIUM CHLORIDE 9 MG/ML
1000 INJECTION, SOLUTION INTRAVENOUS CONTINUOUS PRN
Status: CANCELLED
Start: 2021-04-22

## 2021-04-21 RX ORDER — MEPERIDINE HYDROCHLORIDE 25 MG/ML
25 INJECTION INTRAMUSCULAR; INTRAVENOUS; SUBCUTANEOUS EVERY 30 MIN PRN
Status: CANCELLED | OUTPATIENT
Start: 2021-04-22

## 2021-04-21 RX ORDER — EPINEPHRINE 1 MG/ML
0.3 INJECTION, SOLUTION INTRAMUSCULAR; SUBCUTANEOUS EVERY 5 MIN PRN
Status: CANCELLED | OUTPATIENT
Start: 2021-04-22

## 2021-04-21 RX ORDER — ALBUTEROL SULFATE 90 UG/1
1-2 AEROSOL, METERED RESPIRATORY (INHALATION)
Status: CANCELLED
Start: 2021-04-22

## 2021-04-21 RX ORDER — DIPHENHYDRAMINE HYDROCHLORIDE 50 MG/ML
50 INJECTION INTRAMUSCULAR; INTRAVENOUS
Status: CANCELLED
Start: 2021-04-22

## 2021-04-21 RX ORDER — NALOXONE HYDROCHLORIDE 0.4 MG/ML
.1-.4 INJECTION, SOLUTION INTRAMUSCULAR; INTRAVENOUS; SUBCUTANEOUS
Status: CANCELLED | OUTPATIENT
Start: 2021-04-22

## 2021-04-21 RX ORDER — ALBUTEROL SULFATE 0.83 MG/ML
2.5 SOLUTION RESPIRATORY (INHALATION)
Status: CANCELLED | OUTPATIENT
Start: 2021-04-22

## 2021-04-21 RX ORDER — METHYLPREDNISOLONE SODIUM SUCCINATE 125 MG/2ML
125 INJECTION, POWDER, LYOPHILIZED, FOR SOLUTION INTRAMUSCULAR; INTRAVENOUS
Status: CANCELLED
Start: 2021-04-22

## 2021-04-21 RX ORDER — LORAZEPAM 2 MG/ML
0.5 INJECTION INTRAMUSCULAR EVERY 4 HOURS PRN
Status: CANCELLED
Start: 2021-04-22

## 2021-04-21 RX ORDER — HEPARIN SODIUM,PORCINE 10 UNIT/ML
5 VIAL (ML) INTRAVENOUS
Status: CANCELLED | OUTPATIENT
Start: 2021-04-22

## 2021-04-22 ENCOUNTER — ONCOLOGY VISIT (OUTPATIENT)
Dept: ONCOLOGY | Facility: CLINIC | Age: 86
End: 2021-04-22
Attending: NURSE PRACTITIONER
Payer: MEDICARE

## 2021-04-22 ENCOUNTER — INFUSION THERAPY VISIT (OUTPATIENT)
Dept: INFUSION THERAPY | Facility: CLINIC | Age: 86
End: 2021-04-22
Attending: NURSE PRACTITIONER
Payer: MEDICARE

## 2021-04-22 VITALS
DIASTOLIC BLOOD PRESSURE: 91 MMHG | BODY MASS INDEX: 23.45 KG/M2 | HEIGHT: 70 IN | TEMPERATURE: 97.5 F | SYSTOLIC BLOOD PRESSURE: 144 MMHG | OXYGEN SATURATION: 100 % | HEART RATE: 70 BPM | RESPIRATION RATE: 16 BRPM | WEIGHT: 163.8 LBS

## 2021-04-22 DIAGNOSIS — C67.2 MALIGNANT NEOPLASM OF LATERAL WALL OF URINARY BLADDER (H): Primary | ICD-10-CM

## 2021-04-22 PROCEDURE — 96413 CHEMO IV INFUSION 1 HR: CPT

## 2021-04-22 PROCEDURE — G0463 HOSPITAL OUTPT CLINIC VISIT: HCPCS | Mod: 25

## 2021-04-22 PROCEDURE — 99214 OFFICE O/P EST MOD 30 MIN: CPT | Performed by: NURSE PRACTITIONER

## 2021-04-22 PROCEDURE — 250N000011 HC RX IP 250 OP 636: Performed by: NURSE PRACTITIONER

## 2021-04-22 PROCEDURE — 258N000003 HC RX IP 258 OP 636: Performed by: NURSE PRACTITIONER

## 2021-04-22 RX ADMIN — SODIUM CHLORIDE 400 MG: 9 INJECTION, SOLUTION INTRAVENOUS at 10:39

## 2021-04-22 RX ADMIN — SODIUM CHLORIDE 250 ML: 9 INJECTION, SOLUTION INTRAVENOUS at 10:38

## 2021-04-22 ASSESSMENT — PAIN SCALES - GENERAL: PAINLEVEL: NO PAIN (0)

## 2021-04-22 ASSESSMENT — MIFFLIN-ST. JEOR: SCORE: 1414.24

## 2021-04-22 NOTE — LETTER
"    4/22/2021         RE: Santos Marti  [address]  [facility] [address]        Dear Colleague,    Thank you for referring your patient, Santos Marti, to the [facility]. Please see a copy of my visit note below.    Oncology Rooming Note    April 22, 2021 10:05 AM   Santos Marti is a 89 year old male who presents for:    Chief Complaint   Patient presents with     Oncology Clinic Visit     Malignant neoplasm of lateral wall of urinary bladder      Initial Vitals: BP (!) 144/91   Pulse 70   Temp 97.5  F (36.4  C) (Oral)   Resp 16   Ht 1.778 m (5' 10\")   Wt 74.3 kg (163 lb 12.8 oz)   SpO2 100%   BMI 23.50 kg/m   Estimated body mass index is 23.5 kg/m  as calculated from the following:    Height as of this encounter: 1.778 m (5' 10\").    Weight as of this encounter: 74.3 kg (163 lb 12.8 oz). Body surface area is 1.92 meters squared.  No Pain (0) Comment: Data Unavailable   No LMP for male patient.  Allergies reviewed: Yes  Medications reviewed: Yes    Medications: Medication refills not needed today.  Pharmacy name entered into [employer]:    [facility] #[id number] - [place], [place] - [address] AT [facility] & [address] [place] [address] PHARM[address] [facility] - [place], [place] - [address]    Clinical concerns: None       Kimberly Huerta MA                    Oncology/Hematology Visit Note  Apr 22, 2021    Reason for Visit: follow up of high-grade muscle invasive bladder cancer-patient is not a candidate for surgery or radiation therapy.  Patient had previous radiation to the prostate  Locally advanced bladder cancer pt met with Dr. Ellis who recommended treatment with Keytruda every 6 weeks      Interval History:    Patient reports he has been tolerating treatment well.  He denies fever chills sweats cough shortness of breath chest pain nausea vomiting diarrhea abdominal pain " bleeding      Review of Systems:  14 point ROS of systems including Constitutional, Eyes, Respiratory, Cardiovascular, Gastroenterology, Genitourinary, Integumentary, Muscularskeletal, Psychiatric were all negative except for pertinent positives noted in my HPI.      Physical Examination:  Physical Exam  HENT:      Head: Normocephalic.      Right Ear: Tympanic membrane normal.      Nose: Nose normal.      Mouth/Throat:      Mouth: Mucous membranes are moist.   Eyes:      Pupils: Pupils are equal, round, and reactive to light.   Neck:      Musculoskeletal: Normal range of motion.   Cardiovascular:      Rate and Rhythm: Normal rate.      Pulses: Normal pulses.   Pulmonary:      Effort: Pulmonary effort is normal.   Abdominal:      General: Abdomen is flat.   Musculoskeletal: Normal range of motion.   Skin:     General: Skin is warm.      Comments: 1 small spot of erythema noted in the right leg, no vesicles or lesions.   Neurological:      General: No focal deficit present.      Mental Status: He is alert.         Laboratory Data:  CBC and CMP results reviewed      Assessment and Plan:      This is a 89-year-old male with    high-grade muscle invasive bladder cancer-patient is not a candidate for surgery or radiation therapy.  Patient had previous radiation to the prostate  Locally advanced bladder cancer -pt met with Dr. Ellis who recommended treatment with Keytruda every 6 weeks  Labs reviewed patient has been tolerating treatment well   -continue with immunotherapy  -Schedule for Keytruda every 6 weeks  - CT scan and urine cytology in May  Follow-up with   in  May      Prostate cancer diagnosed in 2004 treated with brachytherapy and followed [facility]       Patient is advised to call our clinic or go to emergency room in the event of fever chills sweats cough shortness of breath chest pain sore throat nausea vomiting diarrhea abdominal pain or bleeding  Or any changes  in health condition      CHUCK Dumont CNP  [facility]     Chart documentation with Dragon Voice recognition Software. Although reviewed after completion, some words and grammatical errors may remain.            Again, thank you for allowing me to participate in the care of your patient.        Sincerely,        CHUCK Dumont CNP

## 2021-04-22 NOTE — PROGRESS NOTES
Oncology/Hematology Visit Note  Apr 22, 2021    Reason for Visit: follow up of high-grade muscle invasive bladder cancer-patient is not a candidate for surgery or radiation therapy.  Patient had previous radiation to the prostate  Locally advanced bladder cancer pt met with Dr. Ellis who recommended treatment with Keytruda every 6 weeks      Interval History:    Patient reports he has been tolerating treatment well.  He denies fever chills sweats cough shortness of breath chest pain nausea vomiting diarrhea abdominal pain bleeding      Review of Systems:  14 point ROS of systems including Constitutional, Eyes, Respiratory, Cardiovascular, Gastroenterology, Genitourinary, Integumentary, Muscularskeletal, Psychiatric were all negative except for pertinent positives noted in my HPI.      Physical Examination:  Physical Exam  HENT:      Head: Normocephalic.      Right Ear: Tympanic membrane normal.      Nose: Nose normal.      Mouth/Throat:      Mouth: Mucous membranes are moist.   Eyes:      Pupils: Pupils are equal, round, and reactive to light.   Neck:      Musculoskeletal: Normal range of motion.   Cardiovascular:      Rate and Rhythm: Normal rate.      Pulses: Normal pulses.   Pulmonary:      Effort: Pulmonary effort is normal.   Abdominal:      General: Abdomen is flat.   Musculoskeletal: Normal range of motion.   Skin:     General: Skin is warm.      Comments: 1 small spot of erythema noted in the right leg, no vesicles or lesions.   Neurological:      General: No focal deficit present.      Mental Status: He is alert.         Laboratory Data:  CBC and CMP results reviewed      Assessment and Plan:      This is a 89-year-old male with    high-grade muscle invasive bladder cancer-patient is not a candidate for surgery or radiation therapy.  Patient had previous radiation to the prostate  Locally advanced bladder cancer -pt met with Dr. Ellis who recommended treatment with Keytruda every 6 weeks  Labs reviewed  patient has been tolerating treatment well   -continue with immunotherapy  -Schedule for Keytruda every 6 weeks  - CT scan and urine cytology in May  Follow-up with   in  May      Prostate cancer diagnosed in 2004 treated with brachytherapy and followed by [facility]       Patient is advised to call our clinic or go to emergency room in the event of fever chills sweats cough shortness of breath chest pain sore throat nausea vomiting diarrhea abdominal pain or bleeding  Or any changes in health condition      CHUCK Dumont [facility]     Chart documentation with Dragon Voice recognition Software. Although reviewed after completion, some words and grammatical errors may remain.

## 2021-04-22 NOTE — PROGRESS NOTES
"Oncology Rooming Note    April 22, 2021 10:05 AM   Santos Marti is a 89 year old male who presents for:    Chief Complaint   Patient presents with     Oncology Clinic Visit     Malignant neoplasm of lateral wall of urinary bladder      Initial Vitals: BP (!) 144/91   Pulse 70   Temp 97.5  F (36.4  C) (Oral)   Resp 16   Ht 1.778 m (5' 10\")   Wt 74.3 kg (163 lb 12.8 oz)   SpO2 100%   BMI 23.50 kg/m   Estimated body mass index is 23.5 kg/m  as calculated from the following:    Height as of this encounter: 1.778 m (5' 10\").    Weight as of this encounter: 74.3 kg (163 lb 12.8 oz). Body surface area is 1.92 meters squared.  No Pain (0) Comment: Data Unavailable   No LMP for male patient.  Allergies reviewed: Yes  Medications reviewed: Yes    Medications: Medication refills not needed today.  Pharmacy name entered into [email or web address]:    [id number] - [place], [place] - [address] AT [facility] & [address] [place] [address] PHARMACY [facility] - [place], [place] - [address] [place] [address] PHARMACY - [place], [place] - [address]    Clinical concerns: None       Kimberly Huerta MA              "

## 2021-04-22 NOTE — PROGRESS NOTES
Infusion Nursing Note:  Santos Marti presents today for C7D1 keytruda.    Patient seen by provider today: Yes: Polo   present during visit today: Not Applicable.    Note: N/A.  Patient did not meet criteria for an asymptomatic covid-19 PCR test in infusion today.   Intravenous Access:  Peripheral IV placed.    Treatment Conditions:  Lab Results   Component Value Date     04/21/2021                   Lab Results   Component Value Date    POTASSIUM 3.9 04/21/2021           No results found for: MAG         Lab Results   Component Value Date    CR 1.05 04/21/2021                   Lab Results   Component Value Date    SAAD 9.1 04/21/2021                Lab Results   Component Value Date    BILITOTAL 0.5 04/21/2021           Lab Results   Component Value Date    ALBUMIN 3.8 04/21/2021                    Lab Results   Component Value Date    ALT 17 04/21/2021           Lab Results   Component Value Date    AST 10 04/21/2021       Results reviewed, labs MET treatment parameters, ok to proceed with treatment.      Post Infusion Assessment:  Patient tolerated infusion without incident.  Blood return noted pre and post infusion.  Site patent and intact, free from redness, edema or discomfort.  No evidence of extravasations.  Access discontinued per protocol.       Discharge Plan:   Discharge instructions reviewed with: Patient.  Patient and/or family verbalized understanding of discharge instructions and all questions answered.  Copy of AVS reviewed with patient and/or family.  Patient discharged in stable condition accompanied by: self.  Departure Mode: Ambulatory.    Marta Welch RN

## 2021-05-06 ENCOUNTER — HOSPITAL ENCOUNTER (OUTPATIENT)
Dept: CT IMAGING | Facility: CLINIC | Age: 86
End: 2021-05-06
Attending: INTERNAL MEDICINE
Payer: MEDICARE

## 2021-05-06 ENCOUNTER — HOSPITAL ENCOUNTER (OUTPATIENT)
Dept: LAB | Facility: CLINIC | Age: 86
End: 2021-05-06
Attending: INTERNAL MEDICINE
Payer: MEDICARE

## 2021-05-06 DIAGNOSIS — C67.2 MALIGNANT NEOPLASM OF LATERAL WALL OF URINARY BLADDER (H): ICD-10-CM

## 2021-05-06 DIAGNOSIS — R53.83 OTHER FATIGUE: ICD-10-CM

## 2021-05-06 DIAGNOSIS — Z91.89 AT RISK FOR INJURY FROM CHEMOTHERAPY: ICD-10-CM

## 2021-05-06 DIAGNOSIS — N18.31 STAGE 3A CHRONIC KIDNEY DISEASE (H): ICD-10-CM

## 2021-05-06 LAB
ALBUMIN SERPL-MCNC: 3.6 G/DL (ref 3.4–5)
ALP SERPL-CCNC: 80 U/L (ref 40–150)
ALT SERPL W P-5'-P-CCNC: 16 U/L (ref 0–70)
ANION GAP SERPL CALCULATED.3IONS-SCNC: 3 MMOL/L (ref 3–14)
AST SERPL W P-5'-P-CCNC: 11 U/L (ref 0–45)
BASOPHILS # BLD AUTO: 0 10E9/L (ref 0–0.2)
BASOPHILS NFR BLD AUTO: 0.2 %
BILIRUB SERPL-MCNC: 0.5 MG/DL (ref 0.2–1.3)
BUN SERPL-MCNC: 22 MG/DL (ref 7–30)
CALCIUM SERPL-MCNC: 9.2 MG/DL (ref 8.5–10.1)
CHLORIDE SERPL-SCNC: 105 MMOL/L (ref 94–109)
CO2 SERPL-SCNC: 29 MMOL/L (ref 20–32)
CREAT SERPL-MCNC: 1.09 MG/DL (ref 0.66–1.25)
DIFFERENTIAL METHOD BLD: ABNORMAL
EOSINOPHIL # BLD AUTO: 0.1 10E9/L (ref 0–0.7)
EOSINOPHIL NFR BLD AUTO: 1.4 %
ERYTHROCYTE [DISTWIDTH] IN BLOOD BY AUTOMATED COUNT: 12.9 % (ref 10–15)
GFR SERPL CREATININE-BSD FRML MDRD: 60 ML/MIN/{1.73_M2}
GLUCOSE SERPL-MCNC: 216 MG/DL (ref 70–99)
HCT VFR BLD AUTO: 40.5 % (ref 40–53)
HGB BLD-MCNC: 13.5 G/DL (ref 13.3–17.7)
IMM GRANULOCYTES # BLD: 0 10E9/L (ref 0–0.4)
IMM GRANULOCYTES NFR BLD: 0.5 %
LDH SERPL L TO P-CCNC: 155 U/L (ref 85–227)
LYMPHOCYTES # BLD AUTO: 1.5 10E9/L (ref 0.8–5.3)
LYMPHOCYTES NFR BLD AUTO: 18.5 %
MCH RBC QN AUTO: 31.5 PG (ref 26.5–33)
MCHC RBC AUTO-ENTMCNC: 33.3 G/DL (ref 31.5–36.5)
MCV RBC AUTO: 94 FL (ref 78–100)
MONOCYTES # BLD AUTO: 0.6 10E9/L (ref 0–1.3)
MONOCYTES NFR BLD AUTO: 7.7 %
NEUTROPHILS # BLD AUTO: 6 10E9/L (ref 1.6–8.3)
NEUTROPHILS NFR BLD AUTO: 71.7 %
NRBC # BLD AUTO: 0 10*3/UL
NRBC BLD AUTO-RTO: 0 /100
PLATELET # BLD AUTO: 227 10E9/L (ref 150–450)
POTASSIUM SERPL-SCNC: 4 MMOL/L (ref 3.4–5.3)
PROT SERPL-MCNC: 7.2 G/DL (ref 6.8–8.8)
RBC # BLD AUTO: 4.29 10E12/L (ref 4.4–5.9)
SODIUM SERPL-SCNC: 137 MMOL/L (ref 133–144)
TSH SERPL DL<=0.005 MIU/L-ACNC: 0.7 MU/L (ref 0.4–4)
WBC # BLD AUTO: 8.3 10E9/L (ref 4–11)

## 2021-05-06 PROCEDURE — 83615 LACTATE (LD) (LDH) ENZYME: CPT | Performed by: INTERNAL MEDICINE

## 2021-05-06 PROCEDURE — 71260 CT THORAX DX C+: CPT | Mod: ME

## 2021-05-06 PROCEDURE — 84443 ASSAY THYROID STIM HORMONE: CPT | Performed by: INTERNAL MEDICINE

## 2021-05-06 PROCEDURE — 250N000009 HC RX 250: Performed by: INTERNAL MEDICINE

## 2021-05-06 PROCEDURE — 80053 COMPREHEN METABOLIC PANEL: CPT | Performed by: INTERNAL MEDICINE

## 2021-05-06 PROCEDURE — 85025 COMPLETE CBC W/AUTO DIFF WBC: CPT | Performed by: INTERNAL MEDICINE

## 2021-05-06 PROCEDURE — 250N000011 HC RX IP 250 OP 636: Performed by: INTERNAL MEDICINE

## 2021-05-06 RX ORDER — IOPAMIDOL 755 MG/ML
80 INJECTION, SOLUTION INTRAVASCULAR ONCE
Status: COMPLETED | OUTPATIENT
Start: 2021-05-06 | End: 2021-05-06

## 2021-05-06 RX ADMIN — IOPAMIDOL 80 ML: 755 INJECTION, SOLUTION INTRAVENOUS at 11:57

## 2021-05-06 RX ADMIN — SODIUM CHLORIDE 63 ML: 9 INJECTION, SOLUTION INTRAVENOUS at 11:57

## 2021-05-13 ENCOUNTER — VIRTUAL VISIT (OUTPATIENT)
Dept: ONCOLOGY | Facility: CLINIC | Age: 86
End: 2021-05-13
Attending: INTERNAL MEDICINE
Payer: MEDICARE

## 2021-05-13 VITALS — BODY MASS INDEX: 22.24 KG/M2 | WEIGHT: 155 LBS

## 2021-05-13 DIAGNOSIS — D47.2 MGUS (MONOCLONAL GAMMOPATHY OF UNKNOWN SIGNIFICANCE): ICD-10-CM

## 2021-05-13 DIAGNOSIS — Z87.898 HISTORY OF GROSS HEMATURIA: ICD-10-CM

## 2021-05-13 DIAGNOSIS — N18.31 STAGE 3A CHRONIC KIDNEY DISEASE (H): ICD-10-CM

## 2021-05-13 DIAGNOSIS — C91.10 CLL (CHRONIC LYMPHOCYTIC LEUKEMIA) (H): ICD-10-CM

## 2021-05-13 DIAGNOSIS — C67.2 MALIGNANT NEOPLASM OF LATERAL WALL OF URINARY BLADDER (H): Primary | ICD-10-CM

## 2021-05-13 PROCEDURE — 99214 OFFICE O/P EST MOD 30 MIN: CPT | Mod: 95 | Performed by: INTERNAL MEDICINE

## 2021-05-13 RX ORDER — MEPERIDINE HYDROCHLORIDE 25 MG/ML
25 INJECTION INTRAMUSCULAR; INTRAVENOUS; SUBCUTANEOUS EVERY 30 MIN PRN
Status: CANCELLED | OUTPATIENT
Start: 2021-06-03

## 2021-05-13 RX ORDER — HEPARIN SODIUM (PORCINE) LOCK FLUSH IV SOLN 100 UNIT/ML 100 UNIT/ML
5 SOLUTION INTRAVENOUS
Status: CANCELLED | OUTPATIENT
Start: 2021-06-03

## 2021-05-13 RX ORDER — NALOXONE HYDROCHLORIDE 0.4 MG/ML
.1-.4 INJECTION, SOLUTION INTRAMUSCULAR; INTRAVENOUS; SUBCUTANEOUS
Status: CANCELLED | OUTPATIENT
Start: 2021-06-03

## 2021-05-13 RX ORDER — METHYLPREDNISOLONE SODIUM SUCCINATE 125 MG/2ML
125 INJECTION, POWDER, LYOPHILIZED, FOR SOLUTION INTRAMUSCULAR; INTRAVENOUS
Status: CANCELLED
Start: 2021-06-03

## 2021-05-13 RX ORDER — LORAZEPAM 2 MG/ML
0.5 INJECTION INTRAMUSCULAR EVERY 4 HOURS PRN
Status: CANCELLED
Start: 2021-06-03

## 2021-05-13 RX ORDER — EPINEPHRINE 1 MG/ML
0.3 INJECTION, SOLUTION INTRAMUSCULAR; SUBCUTANEOUS EVERY 5 MIN PRN
Status: CANCELLED | OUTPATIENT
Start: 2021-06-03

## 2021-05-13 RX ORDER — DIPHENHYDRAMINE HYDROCHLORIDE 50 MG/ML
50 INJECTION INTRAMUSCULAR; INTRAVENOUS
Status: CANCELLED
Start: 2021-06-03

## 2021-05-13 RX ORDER — ALBUTEROL SULFATE 90 UG/1
1-2 AEROSOL, METERED RESPIRATORY (INHALATION)
Status: CANCELLED
Start: 2021-06-03

## 2021-05-13 RX ORDER — ALBUTEROL SULFATE 0.83 MG/ML
2.5 SOLUTION RESPIRATORY (INHALATION)
Status: CANCELLED | OUTPATIENT
Start: 2021-06-03

## 2021-05-13 RX ORDER — SODIUM CHLORIDE 9 MG/ML
1000 INJECTION, SOLUTION INTRAVENOUS CONTINUOUS PRN
Status: CANCELLED
Start: 2021-06-03

## 2021-05-13 RX ORDER — HEPARIN SODIUM,PORCINE 10 UNIT/ML
5 VIAL (ML) INTRAVENOUS
Status: CANCELLED | OUTPATIENT
Start: 2021-06-03

## 2021-05-13 ASSESSMENT — PAIN SCALES - GENERAL: PAINLEVEL: NO PAIN (0)

## 2021-05-13 NOTE — PROGRESS NOTES
Santos is a 89 year old who is being evaluated via a billable video visit.      How would you like to obtain your AVS? MyChart  If the video visit is dropped, the invitation should be resent by: Text to cell phone:  [phone]  Will anyone else be joining your video visit? Yes: wife. How would they like to receive their invitation? Send to e-mail at: [email or web address]      Video Start Time:    Video-Visit Details    Type of service:  Video Visit    Video End Time   Originating Location (pt. Location): Home    Distant Location (provider location):  [facility] [place]     Platform used for Video Visit: Kristi Ceja CMA

## 2021-05-13 NOTE — LETTER
5/13/2021         RE: Santos Marti  [address]  [facility] [address]        Dear Colleague,    Thank you for referring your patient, Santos Marti, to the [facility]. Please see a copy of my visit note below.    Santos is a 89 year old who is being evaluated via a billable video visit.      How would you like to obtain your AVS? MyChart  If the video visit is dropped, the invitation should be resent by: Text to cell phone:  [phone]  Will anyone else be joining your video visit? Yes: wife. How would they like to receive their invitation? Send to e-mail at: [email or web address]      Video Start Time:    Video-Visit Details    Type of service:  Video Visit    Video End Time   Originating Location (pt. 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  He was on apixaban for atrial fibrillation.  TURBT done on 10/22/2019 revealed normal muscle invasive bladder cancer  -He was followed with surveillance cystoscopies every 3 months.  His cystoscopy evaluation was negative for any tumors or raised erythema on 3/11/2020, however a follow-up exam on 7/8/2020 revealed raised erythematous area in the left lateral bladder wall.  He had TURBT under anesthesia on 7/15/2020 which is revealed high-grade muscle invasive urothelial carcinoma.  -Santos was diagnosed with prostate cancer in October 2004.  He underwent brachii  therapy followed by external beam radiation therapy administered at the [facility].  -Due to his previous radiation for his prostate cancer he is not a candidate for bladder radiation.  He is not a candidate for cystectomy due to his advanced age and also previous extensive radiation to the prostate.  - He was started on pembrolizumab for his locally advanced bladder cancer since 8/13/20.     CURRENT INTERVENTIONS:  Pembrolizumab 400 mg IV every 6 weeks since 8/13/20    SUBJECTIVE   Santos Marti is being followed for muscle invasive bladder cancer    Patient is being followed on therapy for his muscle invasive bladder cancer. He has been started on pembrolizumab since August. He has not noticed any side effects on therapy other than itching.  He has itching on both of his hands and legs and trunk.  He has been applying cream on it which does not seem to help.  He has followed up with dermatology and they agreed with the choice of the cream you being used.      He denies any recurrent hematuria. He has good appetite and fair energy. No side effects suggestive of autoimmune disease other than his itching.       PAST MEDICAL HISTORY     Past Medical History:   Diagnosis Date     Arthritis      Complication of anesthesia      Diabetes (H)      Gastroesophageal reflux disease      Hypertension      Pacemaker          CURRENT OUTPATIENT MEDICATIONS     Current Outpatient Medications   Medication Sig     acetaminophen (TYLENOL) 500 MG tablet Take 500-1,000 mg by mouth every 6 hours as needed for mild pain     apixaban ANTICOAGULANT (ELIQUIS) 5 MG tablet Take 5 mg by mouth 2 times daily      isosorbide mononitrate (IMDUR) 30 MG 24 hr tablet TK 1 T PO ONCE D     lisinopril-hydrochlorothiazide (PRINZIDE/ZESTORETIC) 20-25 MG tablet Take 1 tablet by mouth     metFORMIN (GLUCOPHAGE) 1000 MG tablet Take 500 mg by mouth daily      metoprolol succinate ER (TOPROL-XL) 50 MG 24 hr tablet Take 50 mg by mouth daily Take  one and a half tabs daily     omeprazole 20 MG tablet Take 20 mg by mouth daily     triamcinolone (KENALOG) 0.1 % external ointment Apply topically 2 times daily     Current Facility-Administered Medications   Medication     lidocaine (XYLOCAINE) 2 % external gel        ALLERGIES      Allergies   Allergen Reactions     Sulfa Drugs Rash        REVIEW OF SYSTEMS   As above in the HPI, o/w complete 12-point ROS was negative.     PHYSICAL EXAM   Wt 70.3 kg (155 lb)   BMI 22.24 kg/m    Limited physical exam during video visit due to COVID19 restrictions  Elderly male in no acute distress  Breathing comfortably, no tachypnea  Speech and hearing normal  Pleasant mood and congruent affect  Moving all extremities, no focal neurologic deficits apparent      LABORATORY AND IMAGING STUDIES     Recent Labs   Lab Test 05/06/21  1110 04/21/21  1035 03/10/21  1113 01/27/21  1038 12/16/20  1046    138 138 138 139   POTASSIUM 4.0 3.9 4.2 4.0 3.7   CHLORIDE 105 104 104 105 106   CO2 29 29 30 28 29   ANIONGAP 3 5 4 5 4   BUN 22 22 22 27 22   CR 1.09 1.05 1.26* 1.20 1.24   * 158* 202* 185* 147*   SAAD 9.2 9.1 9.1 9.4 9.1     No results for input(s): MAG, PHOS in the last 12130 hours.  Recent Labs   Lab Test 05/06/21  1110 04/21/21  1035 03/10/21  1113 01/27/21  1038 12/16/20  1046   WBC 8.3 6.6 6.8 8.2 8.1   HGB 13.5 14.5 13.9 14.4 14.8    261 198 231 232   MCV 94 94 94 96 96   NEUTROPHIL 71.7 61.4 68.0 67.4 65.0     Recent Labs   Lab Test 05/06/21  1110 04/21/21  1035 03/10/21  1113   BILITOTAL 0.5 0.5 0.5   ALKPHOS 80 88 84   ALT 16 17 18   AST 11 10 11   ALBUMIN 3.6 3.8 3.7    186 166     TSH   Date Value Ref Range Status   05/06/2021 0.70 0.40 - 4.00 mU/L Final   04/21/2021 0.67 0.40 - 4.00 mU/L Final   03/10/2021 0.66 0.40 - 4.00 mU/L Final     No results for input(s): CEA in the last 46281 hours.  Results for orders placed or performed during the hospital encounter of 05/06/21   CT Chest/Abdomen/Pelvis w  Contrast    Narrative    CT CHEST/ABDOMEN/PELVIS WITH CONTRAST  5/6/2021 12:23 PM    CLINICAL HISTORY: Bladder cancer, invasive; assess treatment response.  Bladder cancer on immunotherapy with pembrolizumab. Malignant neoplasm  of lateral wall of urinary bladder (H). Stage 3a chronic kidney  disease.    TECHNIQUE: CT scan of the chest, abdomen, and pelvis was performed  following injection of IV contrast. Multiplanar reformats were  obtained. Dose reduction techniques were used.   CONTRAST:  80 mL Isovue-370    COMPARISON: February 9, 2021    FINDINGS:   LUNGS AND PLEURA: Stable 1.1 x 0.8 cm nodule in the costophrenic  sulcus on the right, image 187 series 6. 8 mm nodule right upper lobe,  image 95, stable. Some areas of atelectasis and/or fibrosis appear  stable. No effusions. A few additional smaller nodules are not  significantly changed. No new nodules.    MEDIASTINUM/AXILLAE: No lymphadenopathy. No thoracic aortic aneurysms.    HEPATOBILIARY: No significant mass or bile duct dilatation. No  calcified gallstones.     PANCREAS: No significant mass, duct dilatation, or inflammatory  change.    SPLEEN: Normal size.    ADRENAL GLANDS: No significant nodules.    KIDNEYS/BLADDER: No significant mass, stones, or hydronephrosis.  Delayed imaging in the bladder is unremarkable.    BOWEL: No obstruction or inflammatory change.    PELVIC ORGANS: No pelvic masses.    ADDITIONAL FINDINGS: No ascites. No adenopathy demonstrated.    MUSCULOSKELETAL: Survey of the visualized bony structures demonstrates  no destructive bony lesions. Stable midthoracic spine compression  deformity.      Impression    IMPRESSION:  1.  Stable exam without progressive malignancy demonstrated in the  chest, abdomen, and pelvis.  2.  Stable pulmonary nodules.  3.  No new lesions.    JANESSA HUTCHISON MD          ASSESSMENT AND PLAN   1. High-grade muscle invasive bladder cancer in a patient not a candidate for either surgery or radiation  therapy  2. Prostate cancer treated with brachii therapy followed by external beam radiation therapy  3. Hypertension, diabetes mellitus type 2, atrial fibrillation on chronic anticoagulation with apixaban    I had a lengthy discussion with patient at this video telemedicine clinic visit. He has been started on pembrolizumab for his muscle invasive bladder cancer on 8/13/20. He has tolerated this well. He has not noticed any side effects on therapy other than itching. He denies any hematuria.     He has itching almost all over his body.  He has been applying triamcinolone cream as recommended.  He has not seen a big change.  He has followed up with dermatology and they did not have any additional recommendations.  I think his itching is secondary to pembrolizumab immunotherapy.  Definitely holding therapy would help, as would systemic steroids.  I would try to continue therapy as long as feasible.  I would check with dermatology if they have any other suggestions in terms of creams and lotions.  In addition to the steroid creams he could keep his skin moisturized.  We will try a low-dose steroid prior to completely quitting immunotherapy.    I have reviewed all of the labs done prior to this clinic visit.  Labs are all completely normal including electrolytes, renal function, hepatic panel, complete blood count and differential.      I reviewed actual images from his restaging CT scan. His bladder tumor cannot be assessed on the CT portion due to the bilateral hip replacements. Monitoring response to this disease is going to be a little difficult as it is only accurately measured on a PET-CT and we cannot use the PET scan for continued response assessment. I would follow him with CT scans. As long as he is not having a clear disease progression, we could continue on therapy as current. He is being followed up with cystoscopy.  He has another cystoscopy scheduled on June 7.  I would plan to continue therapy for 2  years unless he has unacceptable side effects or disease progression.    I will have him follow with Pol[facility] Valderrama for the next infusion and I will see him in 3 months with labs including urine cytology and CT chest, abdomen and pelvis prior to visit.       Video-Visit Details    Type of service:  Video Visit  Originating Location (pt. Location): Home  Distant Location (provider location):  [facility]  Platform used for Video Visit: [employer]     25 minutes spent on the date of the encounter doing chart review, history and exam, documentation and further activities as noted above      Imtiaz Ellis    Hematologist and Medical Oncologist  [facility]        Again, thank you for allowing me to participate in the care of your patient.        Sincerely,        Imtiaz Ellis MD

## 2021-05-13 NOTE — PROGRESS NOTES
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  He was on apixaban for atrial fibrillation.  TURBT done on 10/22/2019 revealed normal muscle invasive bladder cancer  -He was followed with surveillance cystoscopies every 3 months.  His cystoscopy evaluation was negative for any tumors or raised erythema on 3/11/2020, however a follow-up exam on 7/8/2020 revealed raised erythematous area in the left lateral bladder wall.  He had TURBT under anesthesia on 7/15/2020 which is revealed high-grade muscle invasive urothelial carcinoma.  -Santos was diagnosed with prostate cancer in October 2004.  He underwent brachii therapy followed by external beam radiation therapy administered at the [facility].  -Due to his previous radiation for his prostate cancer he is not a candidate for bladder radiation.  He is not a candidate for cystectomy due to his advanced age and also previous extensive radiation to the prostate.  - He was started on pembrolizumab for his locally advanced bladder cancer since 8/13/20.     CURRENT INTERVENTIONS:  Pembrolizumab 400 mg IV every 6 weeks since 8/13/20    SUBJECTIVE   Santos Marti is being followed for muscle invasive bladder cancer    Patient is being followed on therapy for his muscle invasive bladder cancer. He has been started on pembrolizumab since August. He has not noticed any side effects on therapy other than itching.  He has itching on both of his hands and legs and trunk.  He has been applying cream on it which does not seem to help.  He has followed up with dermatology and they agreed  with the choice of the cream you being used.      He denies any recurrent hematuria. He has good appetite and fair energy. No side effects suggestive of autoimmune disease other than his itching.       PAST MEDICAL HISTORY     Past Medical History:   Diagnosis Date     Arthritis      Complication of anesthesia      Diabetes (H)      Gastroesophageal reflux disease      Hypertension      Pacemaker          CURRENT OUTPATIENT MEDICATIONS     Current Outpatient Medications   Medication Sig     acetaminophen (TYLENOL) 500 MG tablet Take 500-1,000 mg by mouth every 6 hours as needed for mild pain     apixaban ANTICOAGULANT (ELIQUIS) 5 MG tablet Take 5 mg by mouth 2 times daily      isosorbide mononitrate (IMDUR) 30 MG 24 hr tablet TK 1 T PO ONCE D     lisinopril-hydrochlorothiazide (PRINZIDE/ZESTORETIC) 20-25 MG tablet Take 1 tablet by mouth     metFORMIN (GLUCOPHAGE) 1000 MG tablet Take 500 mg by mouth daily      metoprolol succinate ER (TOPROL-XL) 50 MG 24 hr tablet Take 50 mg by mouth daily Take one and a half tabs daily     omeprazole 20 MG tablet Take 20 mg by mouth daily     triamcinolone (KENALOG) 0.1 % external ointment Apply topically 2 times daily     Current Facility-Administered Medications   Medication     lidocaine (XYLOCAINE) 2 % external gel        ALLERGIES      Allergies   Allergen Reactions     Sulfa Drugs Rash        REVIEW OF SYSTEMS   As above in the HPI, o/w complete 12-point ROS was negative.     PHYSICAL EXAM   Wt 70.3 kg (155 lb)   BMI 22.24 kg/m    Limited physical exam during video visit due to COVID19 restrictions  Elderly male in no acute distress  Breathing comfortably, no tachypnea  Speech and hearing normal  Pleasant mood and congruent affect  Moving all extremities, no focal neurologic deficits apparent      LABORATORY AND IMAGING STUDIES     Recent Labs   Lab Test 05/06/21  1110 04/21/21  1035 03/10/21  1113 01/27/21  1038 12/16/20  1046    138 138 138 139   POTASSIUM 4.0 3.9 4.2  4.0 3.7   CHLORIDE 105 104 104 105 106   CO2 29 29 30 28 29   ANIONGAP 3 5 4 5 4   BUN 22 22 22 27 22   CR 1.09 1.05 1.26* 1.20 1.24   * 158* 202* 185* 147*   SAAD 9.2 9.1 9.1 9.4 9.1     No results for input(s): MAG, PHOS in the last 87175 hours.  Recent Labs   Lab Test 05/06/21  1110 04/21/21  1035 03/10/21  1113 01/27/21  1038 12/16/20  1046   WBC 8.3 6.6 6.8 8.2 8.1   HGB 13.5 14.5 13.9 14.4 14.8    261 198 231 232   MCV 94 94 94 96 96   NEUTROPHIL 71.7 61.4 68.0 67.4 65.0     Recent Labs   Lab Test 05/06/21  1110 04/21/21  1035 03/10/21  1113   BILITOTAL 0.5 0.5 0.5   ALKPHOS 80 88 84   ALT 16 17 18   AST 11 10 11   ALBUMIN 3.6 3.8 3.7    186 166     TSH   Date Value Ref Range Status   05/06/2021 0.70 0.40 - 4.00 mU/L Final   04/21/2021 0.67 0.40 - 4.00 mU/L Final   03/10/2021 0.66 0.40 - 4.00 mU/L Final     No results for input(s): CEA in the last 92060 hours.  Results for orders placed or performed during the hospital encounter of 05/06/21   CT Chest/Abdomen/Pelvis w Contrast    Narrative    CT CHEST/ABDOMEN/PELVIS WITH CONTRAST  5/6/2021 12:23 PM    CLINICAL HISTORY: Bladder cancer, invasive; assess treatment response.  Bladder cancer on immunotherapy with pembrolizumab. Malignant neoplasm  of lateral wall of urinary bladder (H). Stage 3a chronic kidney  disease.    TECHNIQUE: CT scan of the chest, abdomen, and pelvis was performed  following injection of IV contrast. Multiplanar reformats were  obtained. Dose reduction techniques were used.   CONTRAST:  80 mL Isovue-370    COMPARISON: February 9, 2021    FINDINGS:   LUNGS AND PLEURA: Stable 1.1 x 0.8 cm nodule in the costophrenic  sulcus on the right, image 187 series 6. 8 mm nodule right upper lobe,  image 95, stable. Some areas of atelectasis and/or fibrosis appear  stable. No effusions. A few additional smaller nodules are not  significantly changed. No new nodules.    MEDIASTINUM/AXILLAE: No lymphadenopathy. No thoracic aortic  aneurysms.    HEPATOBILIARY: No significant mass or bile duct dilatation. No  calcified gallstones.     PANCREAS: No significant mass, duct dilatation, or inflammatory  change.    SPLEEN: Normal size.    ADRENAL GLANDS: No significant nodules.    KIDNEYS/BLADDER: No significant mass, stones, or hydronephrosis.  Delayed imaging in the bladder is unremarkable.    BOWEL: No obstruction or inflammatory change.    PELVIC ORGANS: No pelvic masses.    ADDITIONAL FINDINGS: No ascites. No adenopathy demonstrated.    MUSCULOSKELETAL: Survey of the visualized bony structures demonstrates  no destructive bony lesions. Stable midthoracic spine compression  deformity.      Impression    IMPRESSION:  1.  Stable exam without progressive malignancy demonstrated in the  chest, abdomen, and pelvis.  2.  Stable pulmonary nodules.  3.  No new lesions.    JANESSA HUTCHISON MD          ASSESSMENT AND PLAN   1. High-grade muscle invasive bladder cancer in a patient not a candidate for either surgery or radiation therapy  2. Prostate cancer treated with brachii therapy followed by external beam radiation therapy  3. Hypertension, diabetes mellitus type 2, atrial fibrillation on chronic anticoagulation with apixaban    I had a lengthy discussion with patient at this video telemedicine clinic visit. He has been started on pembrolizumab for his muscle invasive bladder cancer on 8/13/20. He has tolerated this well. He has not noticed any side effects on therapy other than itching. He denies any hematuria.     He has itching almost all over his body.  He has been applying triamcinolone cream as recommended.  He has not seen a big change.  He has followed up with dermatology and they did not have any additional recommendations.  I think his itching is secondary to pembrolizumab immunotherapy.  Definitely holding therapy would help, as would systemic steroids.  I would try to continue therapy as long as feasible.  I would check with dermatology if  they have any other suggestions in terms of creams and lotions.  In addition to the steroid creams he could keep his skin moisturized.  We will try a low-dose steroid prior to completely quitting immunotherapy.    I have reviewed all of the labs done prior to this clinic visit.  Labs are all completely normal including electrolytes, renal function, hepatic panel, complete blood count and differential.      I reviewed actual images from his restaging CT scan. His bladder tumor cannot be assessed on the CT portion due to the bilateral hip replacements. Monitoring response to this disease is going to be a little difficult as it is only accurately measured on a PET-CT and we cannot use the PET scan for continued response assessment. I would follow him with CT scans. As long as he is not having a clear disease progression, we could continue on therapy as current. He is being followed up with cystoscopy.  He has another cystoscopy scheduled on June 7.  I would plan to continue therapy for 2 years unless he has unacceptable side effects or disease progression.    I will have him follow with [facility] Valderrama for the next infusion and I will see him in 3 months with labs including urine cytology and CT chest, abdomen and pelvis prior to visit.       Video-Visit Details    Type of service:  Video Visit  Originating Location (pt. Location): Home  Distant Location (provider location):  [facility]  Platform used for Video Visit: [employer]     25 minutes spent on the date of the encounter doing chart review, history and exam, documentation and further activities as noted above      Imtiaz Ellis    Hematologist and Medical Oncologist  [facility]

## 2021-05-13 NOTE — LETTER
5/13/2021         RE: Santos Marti  [address]  [facility] [address]        Dear Colleague,    Thank you for referring your patient, Santos Marti, to the [facility]. Please see a copy of my visit note below.    Santos is a 89 year old who is being evaluated via a billable video visit.      How would you like to obtain your AVS? MyChart  If the video visit is dropped, the invitation should be resent by: Text to cell phone:  [phone]  Will anyone else be joining your video visit? Yes: wife. How would they like to receive their invitation? Send to e-mail at: [email or web address]      Video Start Time:    Video-Visit Details    Type of service:  Video Visit    Video End Time   Originating Location (pt. 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  He was on apixaban for atrial fibrillation.  TURBT done on 10/22/2019 revealed normal muscle invasive bladder cancer  -He was followed with surveillance cystoscopies every 3 months.  His cystoscopy evaluation was negative for any tumors or raised erythema on 3/11/2020, however a follow-up exam on 7/8/2020 revealed raised erythematous area in the left lateral bladder wall.  He had TURBT under anesthesia on 7/15/2020 which is revealed high-grade muscle invasive urothelial carcinoma.  -Santos was diagnosed with prostate cancer in October 2004.  He underwent brachii  therapy followed by external beam radiation therapy administered at the [facility].  -Due to his previous radiation for his prostate cancer he is not a candidate for bladder radiation.  He is not a candidate for cystectomy due to his advanced age and also previous extensive radiation to the prostate.  - He was started on pembrolizumab for his locally advanced bladder cancer since 8/13/20.     CURRENT INTERVENTIONS:  Pembrolizumab 400 mg IV every 6 weeks since 8/13/20    SUBJECTIVE   Santos Marti is being followed for muscle invasive bladder cancer    Patient is being followed on therapy for his muscle invasive bladder cancer. He has been started on pembrolizumab since August. He has not noticed any side effects on therapy other than itching.  He has itching on both of his hands and legs and trunk.  He has been applying cream on it which does not seem to help.  He has followed up with dermatology and they agreed with the choice of the cream you being used.      He denies any recurrent hematuria. He has good appetite and fair energy. No side effects suggestive of autoimmune disease other than his itching.       PAST MEDICAL HISTORY     Past Medical History:   Diagnosis Date     Arthritis      Complication of anesthesia      Diabetes (H)      Gastroesophageal reflux disease      Hypertension      Pacemaker          CURRENT OUTPATIENT MEDICATIONS     Current Outpatient Medications   Medication Sig     acetaminophen (TYLENOL) 500 MG tablet Take 500-1,000 mg by mouth every 6 hours as needed for mild pain     apixaban ANTICOAGULANT (ELIQUIS) 5 MG tablet Take 5 mg by mouth 2 times daily      isosorbide mononitrate (IMDUR) 30 MG 24 hr tablet TK 1 T PO ONCE D     lisinopril-hydrochlorothiazide (PRINZIDE/ZESTORETIC) 20-25 MG tablet Take 1 tablet by mouth     metFORMIN (GLUCOPHAGE) 1000 MG tablet Take 500 mg by mouth daily      metoprolol succinate ER (TOPROL-XL) 50 MG 24 hr tablet Take 50 mg by mouth daily Take  one and a half tabs daily     omeprazole 20 MG tablet Take 20 mg by mouth daily     triamcinolone (KENALOG) 0.1 % external ointment Apply topically 2 times daily     Current Facility-Administered Medications   Medication     lidocaine (XYLOCAINE) 2 % external gel        ALLERGIES      Allergies   Allergen Reactions     Sulfa Drugs Rash        REVIEW OF SYSTEMS   As above in the HPI, o/w complete 12-point ROS was negative.     PHYSICAL EXAM   Wt 70.3 kg (155 lb)   BMI 22.24 kg/m    Limited physical exam during video visit due to COVID19 restrictions  Elderly male in no acute distress  Breathing comfortably, no tachypnea  Speech and hearing normal  Pleasant mood and congruent affect  Moving all extremities, no focal neurologic deficits apparent      LABORATORY AND IMAGING STUDIES     Recent Labs   Lab Test 05/06/21  1110 04/21/21  1035 03/10/21  1113 01/27/21  1038 12/16/20  1046    138 138 138 139   POTASSIUM 4.0 3.9 4.2 4.0 3.7   CHLORIDE 105 104 104 105 106   CO2 29 29 30 28 29   ANIONGAP 3 5 4 5 4   BUN 22 22 22 27 22   CR 1.09 1.05 1.26* 1.20 1.24   * 158* 202* 185* 147*   SAAD 9.2 9.1 9.1 9.4 9.1     No results for input(s): MAG, PHOS in the last 67480 hours.  Recent Labs   Lab Test 05/06/21  1110 04/21/21  1035 03/10/21  1113 01/27/21  1038 12/16/20  1046   WBC 8.3 6.6 6.8 8.2 8.1   HGB 13.5 14.5 13.9 14.4 14.8    261 198 231 232   MCV 94 94 94 96 96   NEUTROPHIL 71.7 61.4 68.0 67.4 65.0     Recent Labs   Lab Test 05/06/21  1110 04/21/21  1035 03/10/21  1113   BILITOTAL 0.5 0.5 0.5   ALKPHOS 80 88 84   ALT 16 17 18   AST 11 10 11   ALBUMIN 3.6 3.8 3.7    186 166     TSH   Date Value Ref Range Status   05/06/2021 0.70 0.40 - 4.00 mU/L Final   04/21/2021 0.67 0.40 - 4.00 mU/L Final   03/10/2021 0.66 0.40 - 4.00 mU/L Final     No results for input(s): CEA in the last 46873 hours.  Results for orders placed or performed during the hospital encounter of 05/06/21   CT Chest/Abdomen/Pelvis w  Contrast    Narrative    CT CHEST/ABDOMEN/PELVIS WITH CONTRAST  5/6/2021 12:23 PM    CLINICAL HISTORY: Bladder cancer, invasive; assess treatment response.  Bladder cancer on immunotherapy with pembrolizumab. Malignant neoplasm  of lateral wall of urinary bladder (H). Stage 3a chronic kidney  disease.    TECHNIQUE: CT scan of the chest, abdomen, and pelvis was performed  following injection of IV contrast. Multiplanar reformats were  obtained. Dose reduction techniques were used.   CONTRAST:  80 mL Isovue-370    COMPARISON: February 9, 2021    FINDINGS:   LUNGS AND PLEURA: Stable 1.1 x 0.8 cm nodule in the costophrenic  sulcus on the right, image 187 series 6. 8 mm nodule right upper lobe,  image 95, stable. Some areas of atelectasis and/or fibrosis appear  stable. No effusions. A few additional smaller nodules are not  significantly changed. No new nodules.    MEDIASTINUM/AXILLAE: No lymphadenopathy. No thoracic aortic aneurysms.    HEPATOBILIARY: No significant mass or bile duct dilatation. No  calcified gallstones.     PANCREAS: No significant mass, duct dilatation, or inflammatory  change.    SPLEEN: Normal size.    ADRENAL GLANDS: No significant nodules.    KIDNEYS/BLADDER: No significant mass, stones, or hydronephrosis.  Delayed imaging in the bladder is unremarkable.    BOWEL: No obstruction or inflammatory change.    PELVIC ORGANS: No pelvic masses.    ADDITIONAL FINDINGS: No ascites. No adenopathy demonstrated.    MUSCULOSKELETAL: Survey of the visualized bony structures demonstrates  no destructive bony lesions. Stable midthoracic spine compression  deformity.      Impression    IMPRESSION:  1.  Stable exam without progressive malignancy demonstrated in the  chest, abdomen, and pelvis.  2.  Stable pulmonary nodules.  3.  No new lesions.    JANESSA HUTCHISON MD          ASSESSMENT AND PLAN   1. High-grade muscle invasive bladder cancer in a patient not a candidate for either surgery or radiation  therapy  2. Prostate cancer treated with brachii therapy followed by external beam radiation therapy  3. Hypertension, diabetes mellitus type 2, atrial fibrillation on chronic anticoagulation with apixaban    I had a lengthy discussion with patient at this video telemedicine clinic visit. He has been started on pembrolizumab for his muscle invasive bladder cancer on 8/13/20. He has tolerated this well. He has not noticed any side effects on therapy other than itching. He denies any hematuria.     He has itching almost all over his body.  He has been applying triamcinolone cream as recommended.  He has not seen a big change.  He has followed up with dermatology and they did not have any additional recommendations.  I think his itching is secondary to pembrolizumab immunotherapy.  Definitely holding therapy would help, as would systemic steroids.  I would try to continue therapy as long as feasible.  I would check with dermatology if they have any other suggestions in terms of creams and lotions.  In addition to the steroid creams he could keep his skin moisturized.  We will try a low-dose steroid prior to completely quitting immunotherapy.    I have reviewed all of the labs done prior to this clinic visit.  Labs are all completely normal including electrolytes, renal function, hepatic panel, complete blood count and differential.      I reviewed actual images from his restaging CT scan. His bladder tumor cannot be assessed on the CT portion due to the bilateral hip replacements. Monitoring response to this disease is going to be a little difficult as it is only accurately measured on a PET-CT and we cannot use the PET scan for continued response assessment. I would follow him with CT scans. As long as he is not having a clear disease progression, we could continue on therapy as current. He is being followed up with cystoscopy.  He has another cystoscopy scheduled on June 7.  I would plan to continue therapy for 2  years unless he has unacceptable side effects or disease progression.    I will have him follow with Polo Valderrama for the next infusion and I will see him in 3 months with labs including urine cytology and CT chest, abdomen and pelvis prior to visit.       Video-Visit Details    Type of service:  Video Visit  Originating Location (pt. Location): Home  Distant Location (provider location):  [facility]  Platform used for Video Visit: Project Playlist     25 minutes spent on the date of the encounter doing chart review, history and exam, documentation and further activities as noted above      Imtiaz Ellis    Hematologist and Medical Oncologist  [facility]        Again, thank you for allowing me to participate in the care of your patient.        Sincerely,        Imtiaz Ellis MD

## 2021-06-02 ENCOUNTER — VIRTUAL VISIT (OUTPATIENT)
Dept: ONCOLOGY | Facility: CLINIC | Age: 86
End: 2021-06-02
Attending: NURSE PRACTITIONER
Payer: MEDICARE

## 2021-06-02 ENCOUNTER — HOSPITAL ENCOUNTER (OUTPATIENT)
Facility: CLINIC | Age: 86
Setting detail: SPECIMEN
Discharge: HOME | End: 2021-06-02
Attending: INTERNAL MEDICINE | Admitting: INTERNAL MEDICINE
Payer: MEDICARE

## 2021-06-02 ENCOUNTER — INFUSION THERAPY VISIT (OUTPATIENT)
Dept: INFUSION THERAPY | Facility: CLINIC | Age: 86
End: 2021-06-02
Attending: INTERNAL MEDICINE
Payer: MEDICARE

## 2021-06-02 DIAGNOSIS — C67.2 MALIGNANT NEOPLASM OF LATERAL WALL OF URINARY BLADDER (H): Primary | ICD-10-CM

## 2021-06-02 DIAGNOSIS — Z91.89 AT RISK FOR INJURY FROM CHEMOTHERAPY: ICD-10-CM

## 2021-06-02 DIAGNOSIS — R53.83 OTHER FATIGUE: ICD-10-CM

## 2021-06-02 LAB
ALBUMIN SERPL-MCNC: 3.7 G/DL (ref 3.4–5)
ALP SERPL-CCNC: 85 U/L (ref 40–150)
ALT SERPL W P-5'-P-CCNC: 16 U/L (ref 0–70)
ANION GAP SERPL CALCULATED.3IONS-SCNC: 3 MMOL/L (ref 3–14)
AST SERPL W P-5'-P-CCNC: 11 U/L (ref 0–45)
BASOPHILS # BLD AUTO: 0 10E9/L (ref 0–0.2)
BASOPHILS NFR BLD AUTO: 0.1 %
BILIRUB SERPL-MCNC: 0.9 MG/DL (ref 0.2–1.3)
BUN SERPL-MCNC: 23 MG/DL (ref 7–30)
CALCIUM SERPL-MCNC: 9.8 MG/DL (ref 8.5–10.1)
CHLORIDE SERPL-SCNC: 103 MMOL/L (ref 94–109)
CO2 SERPL-SCNC: 32 MMOL/L (ref 20–32)
CREAT SERPL-MCNC: 1.21 MG/DL (ref 0.66–1.25)
DIFFERENTIAL METHOD BLD: NORMAL
EOSINOPHIL # BLD AUTO: 0.1 10E9/L (ref 0–0.7)
EOSINOPHIL NFR BLD AUTO: 1.2 %
ERYTHROCYTE [DISTWIDTH] IN BLOOD BY AUTOMATED COUNT: 12.8 % (ref 10–15)
GFR SERPL CREATININE-BSD FRML MDRD: 52 ML/MIN/{1.73_M2}
GLUCOSE SERPL-MCNC: 221 MG/DL (ref 70–99)
HCT VFR BLD AUTO: 44.5 % (ref 40–53)
HGB BLD-MCNC: 14.5 G/DL (ref 13.3–17.7)
IMM GRANULOCYTES # BLD: 0 10E9/L (ref 0–0.4)
IMM GRANULOCYTES NFR BLD: 0.3 %
LDH SERPL L TO P-CCNC: 178 U/L (ref 85–227)
LYMPHOCYTES # BLD AUTO: 1.5 10E9/L (ref 0.8–5.3)
LYMPHOCYTES NFR BLD AUTO: 16.6 %
MCH RBC QN AUTO: 31.1 PG (ref 26.5–33)
MCHC RBC AUTO-ENTMCNC: 32.6 G/DL (ref 31.5–36.5)
MCV RBC AUTO: 96 FL (ref 78–100)
MONOCYTES # BLD AUTO: 0.6 10E9/L (ref 0–1.3)
MONOCYTES NFR BLD AUTO: 6.3 %
NEUTROPHILS # BLD AUTO: 6.8 10E9/L (ref 1.6–8.3)
NEUTROPHILS NFR BLD AUTO: 75.5 %
NRBC # BLD AUTO: 0 10*3/UL
NRBC BLD AUTO-RTO: 0 /100
PLATELET # BLD AUTO: 239 10E9/L (ref 150–450)
POTASSIUM SERPL-SCNC: 4.1 MMOL/L (ref 3.4–5.3)
PROT SERPL-MCNC: 7.7 G/DL (ref 6.8–8.8)
RBC # BLD AUTO: 4.66 10E12/L (ref 4.4–5.9)
SODIUM SERPL-SCNC: 138 MMOL/L (ref 133–144)
TSH SERPL DL<=0.005 MIU/L-ACNC: 0.43 MU/L (ref 0.4–4)
WBC # BLD AUTO: 9.1 10E9/L (ref 4–11)

## 2021-06-02 PROCEDURE — 85025 COMPLETE CBC W/AUTO DIFF WBC: CPT | Performed by: INTERNAL MEDICINE

## 2021-06-02 PROCEDURE — 84443 ASSAY THYROID STIM HORMONE: CPT | Performed by: INTERNAL MEDICINE

## 2021-06-02 PROCEDURE — 80053 COMPREHEN METABOLIC PANEL: CPT | Performed by: INTERNAL MEDICINE

## 2021-06-02 PROCEDURE — 99442 PR PHYSICIAN TELEPHONE EVALUATION 11-20 MIN: CPT | Mod: 95 | Performed by: NURSE PRACTITIONER

## 2021-06-02 PROCEDURE — 83615 LACTATE (LD) (LDH) ENZYME: CPT | Performed by: INTERNAL MEDICINE

## 2021-06-02 NOTE — PROGRESS NOTES
Santos is a 89 year old who is being evaluated via a billable telephone visit.      What phone number would you like to be contacted at? [phone]  How would you like to obtain your AVS? Jeff  Phone call duration: 5 minutes

## 2021-06-02 NOTE — LETTER
"    6/2/2021         RE: Santos Marti  [address]  [facility] [address]        Dear Colleague,    Thank you for referring your patient, Santos Marti, to the [facility]. Please see a copy of my visit note below.    Santos is a 89 year old who is being evaluated via a billable telephone visit.      What phone number would you like to be contacted at? [phone]  How would you like to obtain your AVS? MyChart  Phone call duration: 5 minutes    The patient has been notified of following:      \"This telephone visit will be conducted via a call between you and your physician/provider. We have found that certain health care needs can be provided without the need for a physical exam.  This service lets us provide the care you need with a short phone conversation during the COVID-19 pandemic.  If a prescription is necessary we can send it directly to your pharmacy.  If lab work is needed we can place an order for that and you can then stop by our lab to have the test done at a later time.     Telephone visits are billed at different rates depending on your insurance coverage. During this emergency period, for some insurers they may be billed the same as an in-person visit.  Please reach out to your insurance provider with any questions.     If during the course of the call the physician/provider feels a telephone visit is not appropriate, you will not be charged for this service.\"     Patient has given verbal consent for Telephone visit?  Yes       Telephone visit 20 minutes  Oncology/Hematology Visit Note  Jun 2, 2021    Reason for Visit: follow up of high-grade muscle invasive bladder cancer-patient is not a candidate for surgery or radiation therapy.  Patient had previous radiation to the prostate  Locally advanced bladder cancer pt met with Dr. Ellis who recommended treatment with Keytruda every 6 weeks      Interval History:  Pt is seeing  Dermatologist for skin rash.  " He was prescribed Kenalog cream.  Patient is wondering if in the long-term if Kenalog does not help with the next step will be  Patient reports he has been tolerating treatment well.  He denies fever chills sweats cough shortness of breath chest pain nausea vomiting diarrhea abdominal pain bleeding      Review of Systems:  14 point ROS of systems including Constitutional, Eyes, Respiratory, Cardiovascular, Gastroenterology, Genitourinary, Integumentary, Muscularskeletal, Psychiatric were all negative except for pertinent positives noted in my HPI.      Physical Examination:  Telephone visit No audible wheezing or cough  Laboratory Data:  CBC and CMP results reviewed      Assessment and Plan:      This is a 89-year-old male with    high-grade muscle invasive bladder cancer-patient is not a candidate for surgery or radiation therapy.  Patient had previous radiation to the prostate  Locally advanced bladder cancer -pt met with Dr. Ellis who recommended treatment with Keytruda every 6 weeks  Labs reviewed patient has been tolerating treatment well   -continue with immunotherapy  -Schedule for Keytruda every 6 weeks  Patient has follow-up appointment with Dr Ellis in August       Skin rash  I informed patient that this is a common side effect from immunotherapy  Continue with Kenalog cream  Continue follow-up with dermatologist.  Asked patient to discuss phototherapy since I have a patient who is responding well to phototherapy with immunotherapy induced skin rash  Patient will talk to dermatologist      Prostate cancer diagnosed in 2004 treated with brachytherapy and followed [facility]       Patient is advised to call our clinic or go to emergency room in the event of fever chills sweats cough shortness of breath chest pain sore throat nausea vomiting diarrhea abdominal pain or bleeding  Or any changes in health condition      CHUCK Dumont [facility]  [place]     Chart documentation with Dragon Voice recognition Software. Although reviewed after completion, some words and grammatical errors may remain.            Again, thank you for allowing me to participate in the care of your patient.        Sincerely,        CHUCK Dumont CNP

## 2021-06-02 NOTE — PROGRESS NOTES
"The patient has been notified of following:      \"This telephone visit will be conducted via a call between you and your physician/provider. We have found that certain health care needs can be provided without the need for a physical exam.  This service lets us provide the care you need with a short phone conversation during the COVID-19 pandemic.  If a prescription is necessary we can send it directly to your pharmacy.  If lab work is needed we can place an order for that and you can then stop by our lab to have the test done at a later time.     Telephone visits are billed at different rates depending on your insurance coverage. During this emergency period, for some insurers they may be billed the same as an in-person visit.  Please reach out to your insurance provider with any questions.     If during the course of the call the physician/provider feels a telephone visit is not appropriate, you will not be charged for this service.\"     Patient has given verbal consent for Telephone visit?  Yes       Telephone visit 20 minutes  Oncology/Hematology Visit Note  Jun 2, 2021    Reason for Visit: follow up of high-grade muscle invasive bladder cancer-patient is not a candidate for surgery or radiation therapy.  Patient had previous radiation to the prostate  Locally advanced bladder cancer pt met with Dr. Ellis who recommended treatment with Keytruda every 6 weeks      Interval History:  Pt is seeing  Dermatologist for skin rash.  He was prescribed Kenalog cream.  Patient is wondering if in the long-term if Kenalog does not help with the next step will be  Patient reports he has been tolerating treatment well.  He denies fever chills sweats cough shortness of breath chest pain nausea vomiting diarrhea abdominal pain bleeding      Review of Systems:  14 point ROS of systems including Constitutional, Eyes, Respiratory, Cardiovascular, Gastroenterology, Genitourinary, Integumentary, Muscularskeletal, Psychiatric were all " negative except for pertinent positives noted in my HPI.      Physical Examination:  Telephone visit No audible wheezing or cough  Laboratory Data:  CBC and CMP results reviewed      Assessment and Plan:      This is a 89-year-old male with    high-grade muscle invasive bladder cancer-patient is not a candidate for surgery or radiation therapy.  Patient had previous radiation to the prostate  Locally advanced bladder cancer -pt met with Dr. Ellis who recommended treatment with Keytruda every 6 weeks  Labs reviewed patient has been tolerating treatment well   -continue with immunotherapy  -Schedule for Keytruda every 6 weeks  Patient has follow-up appointment with Dr Ellis in August       Skin rash  I informed patient that this is a common side effect from immunotherapy  Continue with Kenalog cream  Continue follow-up with dermatologist.  Asked patient to discuss phototherapy since I have a patient who is responding well to phototherapy with immunotherapy induced skin rash  Patient will talk to dermatologist      Prostate cancer diagnosed in 2004 treated with brachytherapy and followed [facility]       Patient is advised to call our clinic or go to emergency room in the event of fever chills sweats cough shortness of breath chest pain sore throat nausea vomiting diarrhea abdominal pain or bleeding  Or any changes in health condition      CHUCK Dumont [facility] [place]     Chart documentation with Dragon Voice recognition Software. Although reviewed after completion, some words and grammatical errors may remain.

## 2021-06-02 NOTE — PROGRESS NOTES
Medical Assistant Note:  Santos Marti presents today for blood draw.    Patient seen by provider today: Yes: Polo.   present during visit today: Not Applicable.    Concerns: No Concerns.    Procedure:  Lab draw site: LAC, Needle type: BF, Gauge: 23g.    Post Assessment:  Labs drawn without difficulty: Yes.    Discharge Plan:  Departure Mode: Ambulatory.    Face to Face Time: 5.    Kimberly Huerta MA

## 2021-06-02 NOTE — LETTER
"    6/2/2021         RE: Santos Marti  [address]  [facility] [address]        Dear Colleague,    Thank you for referring your patient, Santos Marti, to the [facility]. Please see a copy of my visit note below.    Santos is a 89 year old who is being evaluated via a billable telephone visit.      What phone number would you like to be contacted at? [phone]  How would you like to obtain your AVS? MyChart  Phone call duration: 5 minutes    The patient has been notified of following:      \"This telephone visit will be conducted via a call between you and your physician/provider. We have found that certain health care needs can be provided without the need for a physical exam.  This service lets us provide the care you need with a short phone conversation during the COVID-19 pandemic.  If a prescription is necessary we can send it directly to your pharmacy.  If lab work is needed we can place an order for that and you can then stop by our lab to have the test done at a later time.     Telephone visits are billed at different rates depending on your insurance coverage. During this emergency period, for some insurers they may be billed the same as an in-person visit.  Please reach out to your insurance provider with any questions.     If during the course of the call the physician/provider feels a telephone visit is not appropriate, you will not be charged for this service.\"     Patient has given verbal consent for Telephone visit?  Yes       Telephone visit 20 minutes  Oncology/Hematology Visit Note  Jun 2, 2021    Reason for Visit: follow up of high-grade muscle invasive bladder cancer-patient is not a candidate for surgery or radiation therapy.  Patient had previous radiation to the prostate  Locally advanced bladder cancer pt met with Dr. Ellis who recommended treatment with Keytruda every 6 weeks      Interval History:  Pt is seeing  Dermatologist for skin rash.  " He was prescribed Kenalog cream.  Patient is wondering if in the long-term if Kenalog does not help with the next step will be  Patient reports he has been tolerating treatment well.  He denies fever chills sweats cough shortness of breath chest pain nausea vomiting diarrhea abdominal pain bleeding      Review of Systems:  14 point ROS of systems including Constitutional, Eyes, Respiratory, Cardiovascular, Gastroenterology, Genitourinary, Integumentary, Muscularskeletal, Psychiatric were all negative except for pertinent positives noted in my HPI.      Physical Examination:  Telephone visit No audible wheezing or cough  Laboratory Data:  CBC and CMP results reviewed      Assessment and Plan:      This is a 89-year-old male with    high-grade muscle invasive bladder cancer-patient is not a candidate for surgery or radiation therapy.  Patient had previous radiation to the prostate  Locally advanced bladder cancer -pt met with Dr. Ellis who recommended treatment with Keytruda every 6 weeks  Labs reviewed patient has been tolerating treatment well   -continue with immunotherapy  -Schedule for Keytruda every 6 weeks  Patient has follow-up appointment with Dr Ellis in August       Skin rash  I informed patient that this is a common side effect from immunotherapy  Continue with Kenalog cream  Continue follow-up with dermatologist.  Asked patient to discuss phototherapy since I have a patient who is responding well to phototherapy with immunotherapy induced skin rash  Patient will talk to dermatologist      Prostate cancer diagnosed in 2004 treated with brachytherapy and followed [facility]       Patient is advised to call our clinic or go to emergency room in the event of fever chills sweats cough shortness of breath chest pain sore throat nausea vomiting diarrhea abdominal pain or bleeding  Or any changes in health condition      CHUCK Dumont [facility]-  [place]     Chart documentation with Dragon Voice recognition Software. Although reviewed after completion, some words and grammatical errors may remain.            Again, thank you for allowing me to participate in the care of your patient.        Sincerely,        CHUCK Dumont CNP

## 2021-06-03 ENCOUNTER — INFUSION THERAPY VISIT (OUTPATIENT)
Dept: INFUSION THERAPY | Facility: CLINIC | Age: 86
End: 2021-06-03
Attending: NURSE PRACTITIONER
Payer: MEDICARE

## 2021-06-03 VITALS
BODY MASS INDEX: 23.04 KG/M2 | TEMPERATURE: 97.4 F | RESPIRATION RATE: 16 BRPM | DIASTOLIC BLOOD PRESSURE: 79 MMHG | OXYGEN SATURATION: 97 % | HEART RATE: 90 BPM | WEIGHT: 160.6 LBS | SYSTOLIC BLOOD PRESSURE: 147 MMHG

## 2021-06-03 DIAGNOSIS — C67.9 MALIGNANT NEOPLASM OF URINARY BLADDER, UNSPECIFIED SITE (H): Primary | ICD-10-CM

## 2021-06-03 DIAGNOSIS — C67.2 MALIGNANT NEOPLASM OF LATERAL WALL OF URINARY BLADDER (H): Primary | ICD-10-CM

## 2021-06-03 PROCEDURE — 258N000003 HC RX IP 258 OP 636: Performed by: INTERNAL MEDICINE

## 2021-06-03 PROCEDURE — 250N000011 HC RX IP 250 OP 636: Performed by: INTERNAL MEDICINE

## 2021-06-03 PROCEDURE — 96413 CHEMO IV INFUSION 1 HR: CPT

## 2021-06-03 RX ADMIN — SODIUM CHLORIDE 250 ML: 9 INJECTION, SOLUTION INTRAVENOUS at 09:56

## 2021-06-03 RX ADMIN — SODIUM CHLORIDE 400 MG: 9 INJECTION, SOLUTION INTRAVENOUS at 09:56

## 2021-06-03 ASSESSMENT — PAIN SCALES - GENERAL: PAINLEVEL: NO PAIN (0)

## 2021-06-03 NOTE — PROGRESS NOTES
Infusion Nursing Note:  Santos Marti presents today for C8D1 keytruda   Patient seen by provider today: No   present during visit today: Not Applicable.    Note: N/A.    Intravenous Access:  Peripheral IV placed.    Treatment Conditions:  Lab Results   Component Value Date    HGB 14.5 06/02/2021     Lab Results   Component Value Date    WBC 9.1 06/02/2021      Lab Results   Component Value Date    ANEU 6.8 06/02/2021     Lab Results   Component Value Date     06/02/2021      Lab Results   Component Value Date     06/02/2021                   Lab Results   Component Value Date    POTASSIUM 4.1 06/02/2021           No results found for: MAG         Lab Results   Component Value Date    CR 1.21 06/02/2021                   Lab Results   Component Value Date    SAAD 9.8 06/02/2021                Lab Results   Component Value Date    BILITOTAL 0.9 06/02/2021           Lab Results   Component Value Date    ALBUMIN 3.7 06/02/2021                    Lab Results   Component Value Date    ALT 16 06/02/2021           Lab Results   Component Value Date    AST 11 06/02/2021       Results reviewed, labs MET treatment parameters, ok to proceed with treatment.      Post Infusion Assessment:  Patient tolerated infusion without incident.  Blood return noted pre and post infusion.  Site patent and intact, free from redness, edema or discomfort.  No evidence of extravasations.  Access discontinued per protocol.       Discharge Plan:   Copy of AVS reviewed with patient and/or family.  Patient will return as prev chad for next appointment.  Patient discharged in stable condition accompanied by: self.  Departure Mode: Ambulatory.      Christian Pace RN

## 2021-06-04 ENCOUNTER — TELEPHONE (OUTPATIENT)
Dept: ONCOLOGY | Facility: CLINIC | Age: 86
End: 2021-06-04

## 2021-06-04 NOTE — TELEPHONE ENCOUNTER
Periodic follow up call made after keytruda infusion on 6/3/21.  Santos states infusions are going well. No concerns.   He is aware to call if he has any questions/ concerns or side effects.

## 2021-06-05 NOTE — ADDENDUM NOTE
Addendum  created 10/29/19 1915 by Abelino Garcia MD    Attestation recorded in Intraprocedure, Intraprocedure Attestations filed      
MEDICINE

## 2021-06-07 ENCOUNTER — OFFICE VISIT (OUTPATIENT)
Dept: UROLOGY | Facility: CLINIC | Age: 86
End: 2021-06-07
Payer: MEDICARE

## 2021-06-07 VITALS
OXYGEN SATURATION: 97 % | WEIGHT: 160 LBS | HEIGHT: 70 IN | HEART RATE: 75 BPM | DIASTOLIC BLOOD PRESSURE: 70 MMHG | BODY MASS INDEX: 22.9 KG/M2 | SYSTOLIC BLOOD PRESSURE: 130 MMHG

## 2021-06-07 DIAGNOSIS — C67.9 MALIGNANT NEOPLASM OF URINARY BLADDER, UNSPECIFIED SITE (H): Primary | ICD-10-CM

## 2021-06-07 LAB
ALBUMIN UR-MCNC: 100 MG/DL
APPEARANCE UR: CLEAR
BILIRUB UR QL STRIP: NEGATIVE
COLOR UR AUTO: YELLOW
GLUCOSE UR STRIP-MCNC: NEGATIVE MG/DL
HGB UR QL STRIP: ABNORMAL
KETONES UR STRIP-MCNC: ABNORMAL MG/DL
LEUKOCYTE ESTERASE UR QL STRIP: NEGATIVE
NITRATE UR QL: NEGATIVE
PH UR STRIP: 5 PH (ref 5–7)
SOURCE: ABNORMAL
SP GR UR STRIP: 1.02 (ref 1–1.03)
UROBILINOGEN UR STRIP-ACNC: 1 EU/DL (ref 0.2–1)

## 2021-06-07 PROCEDURE — 52000 CYSTOURETHROSCOPY: CPT | Performed by: UROLOGY

## 2021-06-07 PROCEDURE — 99213 OFFICE O/P EST LOW 20 MIN: CPT | Mod: 25 | Performed by: UROLOGY

## 2021-06-07 PROCEDURE — 81003 URINALYSIS AUTO W/O SCOPE: CPT | Mod: QW | Performed by: UROLOGY

## 2021-06-07 RX ORDER — LIDOCAINE HYDROCHLORIDE 20 MG/ML
JELLY TOPICAL ONCE
Status: DISCONTINUED | OUTPATIENT
Start: 2021-06-07 | End: 2021-06-07 | Stop reason: HOSPADM

## 2021-06-07 ASSESSMENT — MIFFLIN-ST. JEOR: SCORE: 1397.01

## 2021-06-07 ASSESSMENT — PAIN SCALES - GENERAL: PAINLEVEL: NO PAIN (0)

## 2021-06-07 NOTE — PATIENT INSTRUCTIONS
"AFTER YOUR CYSTOSCOPY  ?  ?  You have just completed a cystoscopy, or \"cysto\", which allowed your physician to learn more about your bladder (or to remove a stent placed after surgery). We suggest that you continue to avoid caffeine, fruit juice, and alcohol for the next 24 hours, however, you are encouraged to return to your normal activities.  ?  ?  A few things that are considered normal after your cystoscopy:  ?  * small amount of bleeding (or spotting) that clears within the next 24 hours  ?  * slight burning sensation with urination  ?  * sensation of needing to void (urinate) more frequently  ?  * the feeling of \"air\" in your urine  ?  * mild discomfort that is relieved with Tylenol    * bladder spasms  ?  ?  ?  Please contact our office promptly if you:  ?  * develop a fever above 101 degrees  ?  * are unable to urinate  ?  * develop bright red blood that does not stop  ?  * experience severe pain or swelling  ?  ?  ?  And of course, please contact our office with any concerns or questions [phone]  ?      AFTER YOUR CYSTOSCOPY        You have just completed a cystoscopy, or \"cysto\", which allowed your physician to learn more about your bladder (or to remove a stent placed after surgery). We suggest that you continue to avoid caffeine, fruit juice, and alcohol for the next 24 hours, however, you are encouraged to return to your normal activities.         A few things that are considered normal after your cystoscopy:     * Small amount of bleeding (or spotting) that clears within the next 24 hours     * Slight burning sensation with urination     * Sensation to of needing to avoid more frequently     * The feeling of \"air\" in your urine     * Mild discomfort that is relieved with Tylenol        Please contact our office promptly if you:     * Develop a fever above 101 degrees     * Are unable to urinate     * Develop bright red blood that does not stop     * Severe pain or swelling         Please contact " our office with any concerns or questions @[facility].

## 2021-06-07 NOTE — LETTER
6/7/2021       RE: Santos Marti  [address]  [facility] [address]     Dear Colleague,    Thank you for referring your patient, Santos Marti, to the [facility] [place] at [facility]. Please see a copy of my visit note below.    [place]   CHIEF COMPLAINT   It was my pleasure to see Santos Marti who is a 89 year old male for follow-up of prostate cancer and bladder cancer.      HPI   Santos Marti is a very pleasant 89 year old male who is a prior patient of Dr. Costello with history of prostate cancer and muscle invasive bladder cancer.  He also follows with Dr. Ellis.  His initial diagnosis was in October 2019.  He was started on pembrolizumab for locally advanced bladder cancer on 8/13/2020.  He has a history of prostate cancer diagnosed in October 2004 for which [facility]  He is been deemed not a surgical candidate or a radiation candidate for his bladder cancer given his prior treatment for his prostate cancer.    He was last seen by Dr. Costello on 3/5/2021 for office cystoscopy with fulguration of a small papillary tumor above the right ureteral orifice.    TODAY 6/7/21:  Follow-up today for surveillance cystoscopy  Doing well with no issues    PHYSICAL EXAM  Patient is a 89 year old  male   Vitals: There were no vitals taken for this visit.  There is no height or weight on file to calculate BMI.  General Appearance Adult:   Alert, no acute distress, oriented  HENT: throat/mouth:normal, good dentition  Lungs: no respiratory distress, or pursed lip breathing  Heart: No obvious jugular venous distension present  Abdomen: soft, nontender, no organomegaly or masses  Musculoskeltal: extremities normal, no peripheral edema  Skin: no suspicious lesions or rashes  Neuro: Alert, oriented, speech and mentation normal  Psych: affect and mood normal  Gait:  Normal  : penis, scrotum, testes normal    PSA   Date Value Ref Range Status   03/10/2021 <0.01 0 - 4 ug/L Final     Comment:     Assay Method:  Chemiluminescence using Siemens Vista analyzer      UA RESULTS:  Recent Labs   Lab Test 06/07/21  1030 07/16/20  2120 07/16/20  2120   COLOR Yellow   < > Light Yellow   APPEARANCE Clear   < > Clear   URINEGLC Negative   < > Negative   URINEBILI Negative   < > Negative   URINEKETONE Trace*   < > Negative   SG 1.025   < > 1.014   UBLD Trace*   < > Moderate*   URINEPH 5.0   < > 5.5   PROTEIN 100*   < > 50*   UROBILINOGEN 1.0   < >  --    NITRITE Negative   < > Negative   LEUKEST Negative   < > Small*   RBCU  --   --  71*   WBCU  --   --  20*    < > = values in this interval not displayed.      IMAGING  All pertinent imaging reviewed:    All imaging studies reviewed by me.  I personally reviewed these imaging films.  A formal report from radiology will follow.    CT CHEST/ABD/PEL 5/6/21:  FINDINGS:   LUNGS AND PLEURA: Stable 1.1 x 0.8 cm nodule in the costophrenic  sulcus on the right, image 187 series 6. 8 mm nodule right upper lobe,  image 95, stable. Some areas of atelectasis and/or fibrosis appear  stable. No effusions. A few additional smaller nodules are not  significantly changed. No new nodules.     MEDIASTINUM/AXILLAE: No lymphadenopathy. No thoracic aortic aneurysms.     HEPATOBILIARY: No significant mass or bile duct dilatation. No  calcified gallstones.      PANCREAS: No significant mass, duct dilatation, or inflammatory  change.     SPLEEN: Normal size.     ADRENAL GLANDS: No significant nodules.     KIDNEYS/BLADDER: No significant mass, stones, or hydronephrosis.  Delayed imaging in the bladder is unremarkable.     BOWEL: No obstruction or inflammatory change.     PELVIC ORGANS: No pelvic masses.     ADDITIONAL FINDINGS: No ascites. No adenopathy demonstrated.     MUSCULOSKELETAL: Survey of the visualized bony structures demonstrates  no destructive bony  lesions. Stable midthoracic spine compression  deformity.                                                                      IMPRESSION:  1.  Stable exam without progressive malignancy demonstrated in the  chest, abdomen, and pelvis.  2.  Stable pulmonary nodules.  3.  No new lesions.       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 Ellis    Prostate cancer  -PSA from March was undetectable      Time spent: 15 minutes spent on the date of the encounter doing chart review, history and exam, documentation and further activities as noted above.  This was in addition to cystoscopy time    Manoj Mistry MD   Urology  [facility]  [facility] Phone: [phone]  [facility] Phone: [phone]

## 2021-06-07 NOTE — PROCEDURES
CYSTOSCOPY PROCEDURE NOTE:    Santos Marti is a 89 year old male  who presents with muscle invasive bladder cancer for cystoscop.    Pt ID verified with patient: Yes     Procedure verified with patient: Yes     Procedure confirmed with physician and support staff: Yes     Consent form confirmed with physician and support staff.    Sign In  History and Physical Exam reviewed .  Informed Consent Discussed: Yes   Sign in Communication: Yes   Time Out:  Team Confirms the Correct Patient, Correct Procedure; Yes , Correct Site and Site Marking, Correct Position (if applicable).    Affirmation of Time Out: Yes   Sign Out:  Sign Out Discussion: Yes   Physician: Manoj Mistry MD    A urinalysis was performed revealing no evidence of infection.    The benefits, risks, alternatives of the cystoscopy procedure and personnel were discussed with the patient. The verbal consent was obtained and the patient agrees to proceed.      Description of procedure:   After fully informed, voluntary consent was obtained, the patient was brought into the procedure room, identified and placed in a supine position on the cystoscopy table.  The groin/scrotum were prepped with betadine and draped in a sterile fashion.  Urojet lidocaine gel was introduced.  A 15F flexible cystoscope was inserted into the urethra, and the bladder and urethra wereexamined in a systematic manner.  The patient tolerated the procedure well and there were no complications.      Cystoscopic findings:  The urethra was normal without strictures.  The prostate was 3-cm long and demonstrated mild bilobar hypertrophy and evidence of prior radiation.  There was a slight narrowing at the prostate apex/membranous urethra.  There was no median lobe.  The external sphincter coapted and the bladder neck was normal. The bladder was  entered and careful pan endoscopy was carried out. The posterior, superior and lateral walls and dome of the bladder were all well visualized  and the scope was retroflexed upon itself..  There was moderate trabeculation.  There were no neoplasms, stones, or diverticula identifed.  The ureteric orifices were normal in position and number and effluxing clear urine.  The area of prior fulguration was identified with no evidence of recurrence    Assessment/Plan:   Santos Marti is a 89 year old male with a history of muscle invasive bladder cancer and history of prostate cancer     -See clinic note    Manoj Mistry MD

## 2021-06-07 NOTE — PROGRESS NOTES
[place]   CHIEF COMPLAINT   It was my pleasure to see Santos Marti who is a 89 year old male for follow-up of prostate cancer and bladder cancer.      HPI   Santos Marti is a very pleasant 89 year old male who is a prior patient of Dr. Costello with history of prostate cancer and muscle invasive bladder cancer.  He also follows with Dr. Ellis.  His initial diagnosis was in October 2019.  He was started on pembrolizumab for locally advanced bladder cancer on 8/13/2020.  He has a history of prostate cancer diagnosed in October 2004 for which [facility]  He is been deemed not a surgical candidate or a radiation candidate for his bladder cancer given his prior treatment for his prostate cancer.    He was last seen by Dr. Costello on 3/5/2021 for office cystoscopy with fulguration of a small papillary tumor above the right ureteral orifice.    TODAY 6/7/21:  Follow-up today for surveillance cystoscopy  Doing well with no issues    PHYSICAL EXAM  Patient is a 89 year old  male   Vitals: There were no vitals taken for this visit.  There is no height or weight on file to calculate BMI.  General Appearance Adult:   Alert, no acute distress, oriented  HENT: throat/mouth:normal, good dentition  Lungs: no respiratory distress, or pursed lip breathing  Heart: No obvious jugular venous distension present  Abdomen: soft, nontender, no organomegaly or masses  Musculoskeltal: extremities normal, no peripheral edema  Skin: no suspicious lesions or rashes  Neuro: Alert, oriented, speech and mentation normal  Psych: affect and mood normal  Gait: Normal  : penis, scrotum, testes normal    PSA   Date Value Ref Range Status   03/10/2021 <0.01 0 - 4 ug/L Final     Comment:     Assay Method:  Chemiluminescence using Siemens Vista analyzer      UA RESULTS:  Recent Labs   Lab Test 06/07/21  1030 07/16/20 2120 07/16/20 2120   COLOR Yellow   < > Light Yellow   APPEARANCE  Clear   < > Clear   URINEGLC Negative   < > Negative   URINEBILI Negative   < > Negative   URINEKETONE Trace*   < > Negative   SG 1.025   < > 1.014   UBLD Trace*   < > Moderate*   URINEPH 5.0   < > 5.5   PROTEIN 100*   < > 50*   UROBILINOGEN 1.0   < >  --    NITRITE Negative   < > Negative   LEUKEST Negative   < > Small*   RBCU  --   --  71*   WBCU  --   --  20*    < > = values in this interval not displayed.      IMAGING  All pertinent imaging reviewed:    All imaging studies reviewed by me.  I personally reviewed these imaging films.  A formal report from radiology will follow.    CT CHEST/ABD/PEL 5/6/21:  FINDINGS:   LUNGS AND PLEURA: Stable 1.1 x 0.8 cm nodule in the costophrenic  sulcus on the right, image 187 series 6. 8 mm nodule right upper lobe,  image 95, stable. Some areas of atelectasis and/or fibrosis appear  stable. No effusions. A few additional smaller nodules are not  significantly changed. No new nodules.     MEDIASTINUM/AXILLAE: No lymphadenopathy. No thoracic aortic aneurysms.     HEPATOBILIARY: No significant mass or bile duct dilatation. No  calcified gallstones.      PANCREAS: No significant mass, duct dilatation, or inflammatory  change.     SPLEEN: Normal size.     ADRENAL GLANDS: No significant nodules.     KIDNEYS/BLADDER: No significant mass, stones, or hydronephrosis.  Delayed imaging in the bladder is unremarkable.     BOWEL: No obstruction or inflammatory change.     PELVIC ORGANS: No pelvic masses.     ADDITIONAL FINDINGS: No ascites. No adenopathy demonstrated.     MUSCULOSKELETAL: Survey of the visualized bony structures demonstrates  no destructive bony lesions. Stable midthoracic spine compression  deformity.                                                                      IMPRESSION:  1.  Stable exam without progressive malignancy demonstrated in the  chest, abdomen, and pelvis.  2.  Stable pulmonary nodules.  3.  No new lesions.       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 Ellis    Prostate cancer  -PSA from March was undetectable      Time spent: 15 minutes spent on the date of the encounter doing chart review, history and exam, documentation and further activities as noted above.  This was in addition to cystoscopy time    Manoj Mistry MD   Urology  [facility]  [facility] Phone: [phone]  [facility] Phone: [phone]

## 2021-06-07 NOTE — NURSING NOTE
Chief Complaint   Patient presents with     Malignat neoplasm of urinary bladder     Here for a in office cystoscopy     Prior to the start of the procedure and with procedural staff participation, I verbally confirmed the patient s identity using two indicators, relevant allergies, that the procedure was appropriate and matched the consent or emergent situation, and that the correct equipment/implants were available. Immediately prior to starting the procedure I conducted the Time Out with the procedural staff and re-confirmed the patient s name, procedure, and site/side. I have wiped the patient off with the povidone-Iodine solution, draped them,  used Lidocaine hydrochloride jelly, and instilled sterile water into the bladder. (The Joint Commission universal protocol was followed.)  Yes    Sedation (Moderate or Deep): Urojet    5mL 2% lidocaine hydrochloride Urojet instilled into urethra.    NDC# [address]-9  Lot #: [id number]  Expiration Date:  7-22    Bridgette Yanez

## 2021-06-29 ENCOUNTER — ANCILLARY PROCEDURE (OUTPATIENT)
Dept: PET IMAGING | Facility: CLINIC | Age: 86
End: 2021-06-29
Attending: INTERNAL MEDICINE
Payer: MEDICARE

## 2021-06-29 DIAGNOSIS — Z08 ENCOUNTER FOR FOLLOW-UP EXAMINATION AFTER COMPLETED TREATMENT FOR MALIGNANT NEOPLASM: ICD-10-CM

## 2021-06-29 DIAGNOSIS — C67.2 MALIGNANT NEOPLASM OF LATERAL WALL OF URINARY BLADDER (H): ICD-10-CM

## 2021-06-29 LAB
CREAT BLD-MCNC: 1.2 MG/DL (ref 0.5–1.2)
GFR SERPL CREATININE-BSD FRML MDRD: 53 ML/MIN/{1.73_M2}
GFRB: NORMAL
GLUCOSE SERPL-MCNC: 155 MG/DL (ref 70–99)

## 2021-06-29 RX ORDER — FUROSEMIDE 10 MG/ML
40 INJECTION INTRAMUSCULAR; INTRAVENOUS ONCE
Status: COMPLETED | OUTPATIENT
Start: 2021-06-29 | End: 2021-06-29

## 2021-06-29 RX ORDER — IOPAMIDOL 755 MG/ML
50-125 INJECTION, SOLUTION INTRAVASCULAR ONCE
Status: COMPLETED | OUTPATIENT
Start: 2021-06-29 | End: 2021-06-29

## 2021-06-29 RX ADMIN — FUROSEMIDE 40 MG: 10 INJECTION INTRAMUSCULAR; INTRAVENOUS at 11:54

## 2021-06-29 RX ADMIN — IOPAMIDOL 95 ML: 755 INJECTION, SOLUTION INTRAVASCULAR at 11:54

## 2021-06-29 NOTE — DISCHARGE INSTRUCTIONS

## 2021-07-14 ENCOUNTER — LAB (OUTPATIENT)
Dept: INFUSION THERAPY | Facility: CLINIC | Age: 86
End: 2021-07-14
Attending: INTERNAL MEDICINE
Payer: MEDICARE

## 2021-07-14 ENCOUNTER — HOSPITAL ENCOUNTER (OUTPATIENT)
Facility: CLINIC | Age: 86
Discharge: HOME | End: 2021-07-14
Attending: INTERNAL MEDICINE | Admitting: INTERNAL MEDICINE
Payer: MEDICARE

## 2021-07-14 DIAGNOSIS — Z91.89 AT RISK FOR INJURY FROM CHEMOTHERAPY: ICD-10-CM

## 2021-07-14 DIAGNOSIS — R53.83 OTHER FATIGUE: ICD-10-CM

## 2021-07-14 DIAGNOSIS — C67.2 MALIGNANT NEOPLASM OF LATERAL WALL OF URINARY BLADDER (H): ICD-10-CM

## 2021-07-14 LAB
ALBUMIN SERPL-MCNC: 3.6 G/DL (ref 3.4–5)
ALP SERPL-CCNC: 81 U/L (ref 40–150)
ALT SERPL W P-5'-P-CCNC: 17 U/L (ref 0–70)
ANION GAP SERPL CALCULATED.3IONS-SCNC: 6 MMOL/L (ref 3–14)
AST SERPL W P-5'-P-CCNC: 19 U/L (ref 0–45)
BASOPHILS # BLD AUTO: 0 10E3/UL (ref 0–0.2)
BASOPHILS NFR BLD AUTO: 0 %
BILIRUB SERPL-MCNC: 0.5 MG/DL (ref 0.2–1.3)
BUN SERPL-MCNC: 19 MG/DL (ref 7–30)
CALCIUM SERPL-MCNC: 9.2 MG/DL (ref 8.5–10.1)
CHLORIDE BLD-SCNC: 103 MMOL/L (ref 94–109)
CO2 SERPL-SCNC: 27 MMOL/L (ref 20–32)
CREAT SERPL-MCNC: 1.11 MG/DL (ref 0.66–1.25)
EOSINOPHIL # BLD AUTO: 0.1 10E3/UL (ref 0–0.7)
EOSINOPHIL NFR BLD AUTO: 1 %
ERYTHROCYTE [DISTWIDTH] IN BLOOD BY AUTOMATED COUNT: 13.2 % (ref 10–15)
GFR SERPL CREATININE-BSD FRML MDRD: 59 ML/MIN/1.73M2
GLUCOSE BLD-MCNC: 213 MG/DL (ref 70–99)
HCT VFR BLD AUTO: 41.6 % (ref 40–53)
HGB BLD-MCNC: 14 G/DL (ref 13.3–17.7)
IMM GRANULOCYTES # BLD: 0 10E3/UL
IMM GRANULOCYTES NFR BLD: 0 %
LDH SERPL L TO P-CCNC: 242 U/L (ref 85–227)
LYMPHOCYTES # BLD AUTO: 1.8 10E3/UL (ref 0.8–5.3)
LYMPHOCYTES NFR BLD AUTO: 24 %
MCH RBC QN AUTO: 31.9 PG (ref 26.5–33)
MCHC RBC AUTO-ENTMCNC: 33.7 G/DL (ref 31.5–36.5)
MCV RBC AUTO: 95 FL (ref 78–100)
MONOCYTES # BLD AUTO: 0.6 10E3/UL (ref 0–1.3)
MONOCYTES NFR BLD AUTO: 7 %
NEUTROPHILS # BLD AUTO: 5.2 10E3/UL (ref 1.6–8.3)
NEUTROPHILS NFR BLD AUTO: 68 %
NRBC # BLD AUTO: 0 10E3/UL
NRBC BLD AUTO-RTO: 0 /100
PLATELET # BLD AUTO: 237 10E3/UL (ref 150–450)
POTASSIUM BLD-SCNC: 3.8 MMOL/L (ref 3.4–5.3)
PROT SERPL-MCNC: 7.6 G/DL (ref 6.8–8.8)
RBC # BLD AUTO: 4.39 10E6/UL (ref 4.4–5.9)
SODIUM SERPL-SCNC: 136 MMOL/L (ref 133–144)
TSH SERPL DL<=0.005 MIU/L-ACNC: 0.98 MU/L (ref 0.4–4)
WBC # BLD AUTO: 7.7 10E3/UL (ref 4–11)

## 2021-07-14 PROCEDURE — 36415 COLL VENOUS BLD VENIPUNCTURE: CPT

## 2021-07-14 PROCEDURE — 84443 ASSAY THYROID STIM HORMONE: CPT

## 2021-07-14 PROCEDURE — 83615 LACTATE (LD) (LDH) ENZYME: CPT

## 2021-07-14 PROCEDURE — 85025 COMPLETE CBC W/AUTO DIFF WBC: CPT

## 2021-07-14 PROCEDURE — 80053 COMPREHEN METABOLIC PANEL: CPT

## 2021-07-14 RX ORDER — HEPARIN SODIUM,PORCINE 10 UNIT/ML
5 VIAL (ML) INTRAVENOUS
Status: CANCELLED | OUTPATIENT
Start: 2021-07-15

## 2021-07-14 RX ORDER — DIPHENHYDRAMINE HYDROCHLORIDE 50 MG/ML
50 INJECTION INTRAMUSCULAR; INTRAVENOUS
Status: CANCELLED
Start: 2021-07-15

## 2021-07-14 RX ORDER — EPINEPHRINE 1 MG/ML
0.3 INJECTION, SOLUTION INTRAMUSCULAR; SUBCUTANEOUS EVERY 5 MIN PRN
Status: CANCELLED | OUTPATIENT
Start: 2021-07-15

## 2021-07-14 RX ORDER — ALBUTEROL SULFATE 0.83 MG/ML
2.5 SOLUTION RESPIRATORY (INHALATION)
Status: CANCELLED | OUTPATIENT
Start: 2021-07-15

## 2021-07-14 RX ORDER — SODIUM CHLORIDE 9 MG/ML
1000 INJECTION, SOLUTION INTRAVENOUS CONTINUOUS PRN
Status: CANCELLED
Start: 2021-07-15

## 2021-07-14 RX ORDER — HEPARIN SODIUM (PORCINE) LOCK FLUSH IV SOLN 100 UNIT/ML 100 UNIT/ML
5 SOLUTION INTRAVENOUS
Status: CANCELLED | OUTPATIENT
Start: 2021-07-15

## 2021-07-14 RX ORDER — MEPERIDINE HYDROCHLORIDE 25 MG/ML
25 INJECTION INTRAMUSCULAR; INTRAVENOUS; SUBCUTANEOUS EVERY 30 MIN PRN
Status: CANCELLED | OUTPATIENT
Start: 2021-07-15

## 2021-07-14 RX ORDER — NALOXONE HYDROCHLORIDE 0.4 MG/ML
.1-.4 INJECTION, SOLUTION INTRAMUSCULAR; INTRAVENOUS; SUBCUTANEOUS
Status: CANCELLED | OUTPATIENT
Start: 2021-07-15

## 2021-07-14 RX ORDER — ALBUTEROL SULFATE 90 UG/1
1-2 AEROSOL, METERED RESPIRATORY (INHALATION)
Status: CANCELLED
Start: 2021-07-15

## 2021-07-14 RX ORDER — LORAZEPAM 2 MG/ML
0.5 INJECTION INTRAMUSCULAR EVERY 4 HOURS PRN
Status: CANCELLED
Start: 2021-07-15

## 2021-07-14 RX ORDER — METHYLPREDNISOLONE SODIUM SUCCINATE 125 MG/2ML
125 INJECTION, POWDER, LYOPHILIZED, FOR SOLUTION INTRAMUSCULAR; INTRAVENOUS
Status: CANCELLED
Start: 2021-07-15

## 2021-07-14 NOTE — PROGRESS NOTES
Medical Assistant Note:  Santos Marti presents today for blood draw.    Patient seen by provider today: No.   present during visit today: Not Applicable.    Concerns: No Concerns.    Procedure:  Lab draw site: lac, Needle type: bf, Gauge: 23.    Post Assessment:  Labs drawn without difficulty: Yes.    Discharge Plan:  Departure Mode: Ambulatory.    Face to Face Time: 5 min.    Gaby Luque, CMA

## 2021-07-15 ENCOUNTER — ONCOLOGY VISIT (OUTPATIENT)
Dept: ONCOLOGY | Facility: CLINIC | Age: 86
End: 2021-07-15
Attending: NURSE PRACTITIONER
Payer: MEDICARE

## 2021-07-15 ENCOUNTER — INFUSION THERAPY VISIT (OUTPATIENT)
Dept: INFUSION THERAPY | Facility: CLINIC | Age: 86
End: 2021-07-15
Attending: INTERNAL MEDICINE
Payer: MEDICARE

## 2021-07-15 VITALS
RESPIRATION RATE: 16 BRPM | HEART RATE: 91 BPM | DIASTOLIC BLOOD PRESSURE: 98 MMHG | TEMPERATURE: 98.7 F | WEIGHT: 161 LBS | SYSTOLIC BLOOD PRESSURE: 153 MMHG | BODY MASS INDEX: 23.1 KG/M2

## 2021-07-15 DIAGNOSIS — C67.2 MALIGNANT NEOPLASM OF LATERAL WALL OF URINARY BLADDER (H): Primary | ICD-10-CM

## 2021-07-15 PROCEDURE — 258N000003 HC RX IP 258 OP 636: Performed by: NURSE PRACTITIONER

## 2021-07-15 PROCEDURE — 99213 OFFICE O/P EST LOW 20 MIN: CPT | Performed by: NURSE PRACTITIONER

## 2021-07-15 PROCEDURE — 250N000011 HC RX IP 250 OP 636: Performed by: NURSE PRACTITIONER

## 2021-07-15 PROCEDURE — 96413 CHEMO IV INFUSION 1 HR: CPT

## 2021-07-15 PROCEDURE — G0463 HOSPITAL OUTPT CLINIC VISIT: HCPCS | Mod: 25

## 2021-07-15 RX ADMIN — SODIUM CHLORIDE 250 ML: 9 INJECTION, SOLUTION INTRAVENOUS at 10:59

## 2021-07-15 RX ADMIN — SODIUM CHLORIDE 400 MG: 9 INJECTION, SOLUTION INTRAVENOUS at 10:59

## 2021-07-15 ASSESSMENT — PAIN SCALES - GENERAL: PAINLEVEL: NO PAIN (0)

## 2021-07-15 NOTE — PROGRESS NOTES
Oncology Rooming Note    July 15, 2021 10:10 AM   Santos Marti is a 89 year old male who presents for:    Chief Complaint   Patient presents with     Oncology Clinic Visit       Medications: Medication refills not needed today.  Pharmacy name entered into [employer]:    [id number] - [place], [place] - [address] AT [facility] & [address] [place] [address] PHARMACY [facility] - [address] [facility]ACY - [place], [place] - [address]    Clinical concerns:  NP was notified.      Gaby Luque, [facility]

## 2021-07-15 NOTE — LETTER
7/15/2021         RE: Santos Marti  [address]  [facility] [address]        Dear Colleague,    Thank you for referring your patient, Santos Marti, to the [facility]. Please see a copy of my visit note below.    Oncology Rooming Note    July 15, 2021 10:10 AM   Santos Marti is a 89 year old male who presents for:    Chief Complaint   Patient presents with     Oncology Clinic Visit       Medications: Medication refills not needed today.  Pharmacy name entered into [employer]:    [id number] - [place], [place] - [address] AT [facility] [address] [place] [address] PHARMACY [facility] - [place], [place] - [address] PHARMACY - [place], [place] - [address]    Clinical concerns:  NP was notified.      Gaby Luque, [facility]                Oncology/Hematology Visit Note  Jul 15, 2021    Reason for Visit: follow up of high-grade muscle invasive bladder cancer-patient is not a candidate for surgery or radiation therapy.  Patient had previous radiation to the prostate  Locally advanced bladder cancer pt met with Dr. Ellis who recommended treatment with Keytruda every 6 weeks      Interval History:  Patient reports he is feeling well.  Reports he has been tolerating treatment well  Denies fever chills sweats cough shortness of breath chest pain nausea vomiting diarrhea abdominal pain bleeding  Reports good energy and appetite      Review of Systems:  14 point ROS of systems including Constitutional, Eyes, Respiratory, Cardiovascular, Gastroenterology, Genitourinary, Integumentary, Muscularskeletal, Psychiatric were all negative except for pertinent positives noted in my HPI.      Physical Examination:  Telephone visit No audible wheezing or cough  Laboratory Data:  CBC and CMP results reviewed      Assessment and Plan:      This is a 89-year-old male with    high-grade muscle invasive bladder cancer-patient is not a candidate for  surgery or radiation therapy.  Patient had previous radiation to the prostate  Locally advanced bladder cancer -pt met with Dr. Ellis who recommended treatment with Keytruda every 6 weeks  Labs reviewed patient has been tolerating treatment well   -continue with immunotherapy  -Schedule for Keytruda every 6 weeks  Patient has follow-up appointment with Dr Ellis in August 06/2021-no evidence of recurrence on cystoscopy  -Continue follow-up with urologist and cystoscopy every 3 months      Prostate cancer diagnosed in 2004 treated with brachytherapy and followed by [facility]   PSA March 2021 was undetectable        Patient is advised to call our clinic or go to emergency room in the event of fever chills sweats cough shortness of breath chest pain sore throat nausea vomiting diarrhea abdominal pain or bleeding  Or any changes in health condition      CHUCK Dumont CNP  [facility]     Chart documentation with Dragon Voice recognition Software. Although reviewed after completion, some words and grammatical errors may remain.            Again, thank you for allowing me to participate in the care of your patient.        Sincerely,        CHUCK Dumont CNP

## 2021-07-15 NOTE — PROGRESS NOTES
Infusion Nursing Note:  Santos Marti presents today for C9D1 Keytruda.    Patient seen by provider today: Yes: Jannie   present during visit today: Not Applicable.    Note: N/A.    Intravenous Access:  Peripheral IV placed.    Treatment Conditions:  Lab Results   Component Value Date    HGB 14.0 07/14/2021    HGB 14.5 06/02/2021     Lab Results   Component Value Date    WBC 7.7 07/14/2021    WBC 9.1 06/02/2021      Lab Results   Component Value Date    ANEU 6.8 06/02/2021     Lab Results   Component Value Date     07/14/2021     06/02/2021      Lab Results   Component Value Date     07/14/2021     06/02/2021                   Lab Results   Component Value Date    POTASSIUM 3.8 07/14/2021    POTASSIUM 4.1 06/02/2021           No results found for: MAG         Lab Results   Component Value Date    CR 1.11 07/14/2021    CR 1.21 06/02/2021                   Lab Results   Component Value Date    SAAD 9.2 07/14/2021    SAAD 9.8 06/02/2021                Lab Results   Component Value Date    BILITOTAL 0.5 07/14/2021    BILITOTAL 0.9 06/02/2021           Lab Results   Component Value Date    ALBUMIN 3.6 07/14/2021    ALBUMIN 3.7 06/02/2021                    Lab Results   Component Value Date    ALT 17 07/14/2021    ALT 16 06/02/2021           Lab Results   Component Value Date    AST 19 07/14/2021    AST 11 06/02/2021       Results reviewed, labs MET treatment parameters, ok to proceed with treatment.      Post Infusion Assessment:  Patient tolerated infusion without incident.  Site patent and intact, free from redness, edema or discomfort.  No evidence of extravasations.  Access discontinued per protocol.       Discharge Plan:   Patient and/or family verbalized understanding of discharge instructions and all questions answered.  AVS to patient via [employer].  Patient will return 8/24 for next appointment.   Patient discharged in stable condition accompanied by: self.  Departure  Mode: Ambulatory.      Paula Buckley RN

## 2021-07-15 NOTE — PROGRESS NOTES
Oncology/Hematology Visit Note  Jul 15, 2021    Reason for Visit: follow up of high-grade muscle invasive bladder cancer-patient is not a candidate for surgery or radiation therapy.  Patient had previous radiation to the prostate  Locally advanced bladder cancer pt met with Dr. Ellis who recommended treatment with Keytruda every 6 weeks      Interval History:  Patient reports he is feeling well.  Reports he has been tolerating treatment well  Denies fever chills sweats cough shortness of breath chest pain nausea vomiting diarrhea abdominal pain bleeding  Reports good energy and appetite      Review of Systems:  14 point ROS of systems including Constitutional, Eyes, Respiratory, Cardiovascular, Gastroenterology, Genitourinary, Integumentary, Muscularskeletal, Psychiatric were all negative except for pertinent positives noted in my HPI.      Physical Examination:  Telephone visit No audible wheezing or cough  Laboratory Data:  CBC and CMP results reviewed      Assessment and Plan:      This is a 89-year-old male with    high-grade muscle invasive bladder cancer-patient is not a candidate for surgery or radiation therapy.  Patient had previous radiation to the prostate  Locally advanced bladder cancer -pt met with Dr. Ellis who recommended treatment with Keytruda every 6 weeks  Labs reviewed patient has been tolerating treatment well   -continue with immunotherapy  -Schedule for Keytruda every 6 weeks  Patient has follow-up appointment with Dr Ellis in August 06/2021-no evidence of recurrence on cystoscopy  -Continue follow-up with urologist and cystoscopy every 3 months      Prostate cancer diagnosed in 2004 treated with brachytherapy and followed [facility]   PSA March 2021 was undetectable        Patient is advised to call our clinic or go to emergency room in the event of fever chills sweats cough shortness of breath chest pain sore throat nausea vomiting diarrhea abdominal  pain or bleeding  Or any changes in health condition      CHUCK Dumont CNP  [facility]     Chart documentation with Dragon Voice recognition Software. Although reviewed after completion, some words and grammatical errors may remain.

## 2021-07-26 NOTE — PROGRESS NOTES
"[facility] Lab calling today with questions on lab orders. Cytology ordered but was just drawn recently. Per 1/28 note,   \"Urine for cytology sent down per treatment orders (not enough collected yesterday); lab called stating there still wasn't enough urine collected (needed 30 mls). Patient was already discharged by then. Lab will run the FISH and the cytology will be run at lab appt on 2/9.\"    FISH/Cytology both still pending. Writer requested the lab re-run Cytology as planned (standing order) and follow-up to ensure the FISH is still InProcess as it is not resulted yet at this time.     Kaitlin Hernandez, BSN, RN, PHN, OCN  Oncology Care Coordinator  [facility]      "
New order for FISH placed per request of the lab.     Elaine Oviedo RN    
tachycardic

## 2021-08-24 ENCOUNTER — LAB (OUTPATIENT)
Dept: LAB | Facility: CLINIC | Age: 86
End: 2021-08-24
Attending: INTERNAL MEDICINE
Payer: MEDICARE

## 2021-08-24 ENCOUNTER — HOSPITAL ENCOUNTER (OUTPATIENT)
Dept: CT IMAGING | Facility: CLINIC | Age: 86
End: 2021-08-24
Attending: INTERNAL MEDICINE
Payer: MEDICARE

## 2021-08-24 DIAGNOSIS — D47.2 MGUS (MONOCLONAL GAMMOPATHY OF UNKNOWN SIGNIFICANCE): ICD-10-CM

## 2021-08-24 DIAGNOSIS — Z91.89 AT RISK FOR INJURY FROM CHEMOTHERAPY: ICD-10-CM

## 2021-08-24 DIAGNOSIS — R53.83 OTHER FATIGUE: ICD-10-CM

## 2021-08-24 DIAGNOSIS — C91.10 CLL (CHRONIC LYMPHOCYTIC LEUKEMIA) (H): ICD-10-CM

## 2021-08-24 DIAGNOSIS — C67.2 MALIGNANT NEOPLASM OF LATERAL WALL OF URINARY BLADDER (H): ICD-10-CM

## 2021-08-24 DIAGNOSIS — Z87.898 HISTORY OF GROSS HEMATURIA: ICD-10-CM

## 2021-08-24 DIAGNOSIS — N18.31 STAGE 3A CHRONIC KIDNEY DISEASE (H): ICD-10-CM

## 2021-08-24 LAB
ALBUMIN SERPL-MCNC: 3.2 G/DL (ref 3.4–5)
ALP SERPL-CCNC: 74 U/L (ref 40–150)
ALT SERPL W P-5'-P-CCNC: 14 U/L (ref 0–70)
ANION GAP SERPL CALCULATED.3IONS-SCNC: 4 MMOL/L (ref 3–14)
AST SERPL W P-5'-P-CCNC: 12 U/L (ref 0–45)
BASOPHILS # BLD AUTO: 0 10E3/UL (ref 0–0.2)
BASOPHILS NFR BLD AUTO: 0 %
BILIRUB SERPL-MCNC: 0.4 MG/DL (ref 0.2–1.3)
BUN SERPL-MCNC: 27 MG/DL (ref 7–30)
CALCIUM SERPL-MCNC: 8.7 MG/DL (ref 8.5–10.1)
CHLORIDE BLD-SCNC: 105 MMOL/L (ref 94–109)
CO2 SERPL-SCNC: 29 MMOL/L (ref 20–32)
CREAT BLD-MCNC: 1.2 MG/DL (ref 0.7–1.3)
CREAT SERPL-MCNC: 1.15 MG/DL (ref 0.66–1.25)
EOSINOPHIL # BLD AUTO: 0.1 10E3/UL (ref 0–0.7)
EOSINOPHIL NFR BLD AUTO: 1 %
ERYTHROCYTE [DISTWIDTH] IN BLOOD BY AUTOMATED COUNT: 13.2 % (ref 10–15)
GFR SERPL CREATININE-BSD FRML MDRD: 53 ML/MIN/1.73M2
GFR SERPL CREATININE-BSD FRML MDRD: 56 ML/MIN/1.73M2
GLUCOSE BLD-MCNC: 162 MG/DL (ref 70–99)
HCT VFR BLD AUTO: 37.6 % (ref 40–53)
HGB BLD-MCNC: 12.2 G/DL (ref 13.3–17.7)
IMM GRANULOCYTES # BLD: 0.1 10E3/UL
IMM GRANULOCYTES NFR BLD: 1 %
LDH SERPL L TO P-CCNC: 160 U/L (ref 85–227)
LYMPHOCYTES # BLD AUTO: 1.5 10E3/UL (ref 0.8–5.3)
LYMPHOCYTES NFR BLD AUTO: 19 %
MCH RBC QN AUTO: 31.1 PG (ref 26.5–33)
MCHC RBC AUTO-ENTMCNC: 32.4 G/DL (ref 31.5–36.5)
MCV RBC AUTO: 96 FL (ref 78–100)
MONOCYTES # BLD AUTO: 0.6 10E3/UL (ref 0–1.3)
MONOCYTES NFR BLD AUTO: 8 %
NEUTROPHILS # BLD AUTO: 5.7 10E3/UL (ref 1.6–8.3)
NEUTROPHILS NFR BLD AUTO: 71 %
NRBC # BLD AUTO: 0 10E3/UL
NRBC BLD AUTO-RTO: 0 /100
PLATELET # BLD AUTO: 186 10E3/UL (ref 150–450)
POTASSIUM BLD-SCNC: 4 MMOL/L (ref 3.4–5.3)
PROT SERPL-MCNC: 6.3 G/DL (ref 6.8–8.8)
RBC # BLD AUTO: 3.92 10E6/UL (ref 4.4–5.9)
SODIUM SERPL-SCNC: 138 MMOL/L (ref 133–144)
T4 FREE SERPL-MCNC: 0.94 NG/DL (ref 0.76–1.46)
TSH SERPL DL<=0.005 MIU/L-ACNC: 0.37 MU/L (ref 0.4–4)
WBC # BLD AUTO: 8 10E3/UL (ref 4–11)

## 2021-08-24 PROCEDURE — 250N000011 HC RX IP 250 OP 636: Performed by: INTERNAL MEDICINE

## 2021-08-24 PROCEDURE — 84443 ASSAY THYROID STIM HORMONE: CPT

## 2021-08-24 PROCEDURE — 85025 COMPLETE CBC W/AUTO DIFF WBC: CPT

## 2021-08-24 PROCEDURE — 84439 ASSAY OF FREE THYROXINE: CPT

## 2021-08-24 PROCEDURE — 83615 LACTATE (LD) (LDH) ENZYME: CPT

## 2021-08-24 PROCEDURE — 80053 COMPREHEN METABOLIC PANEL: CPT

## 2021-08-24 PROCEDURE — 250N000009 HC RX 250: Performed by: INTERNAL MEDICINE

## 2021-08-24 PROCEDURE — 36415 COLL VENOUS BLD VENIPUNCTURE: CPT

## 2021-08-24 PROCEDURE — 88112 CYTOPATH CELL ENHANCE TECH: CPT | Mod: TC

## 2021-08-24 PROCEDURE — 82565 ASSAY OF CREATININE: CPT

## 2021-08-24 PROCEDURE — 74177 CT ABD & PELVIS W/CONTRAST: CPT | Mod: ME

## 2021-08-24 RX ORDER — IOPAMIDOL 755 MG/ML
80 INJECTION, SOLUTION INTRAVASCULAR ONCE
Status: COMPLETED | OUTPATIENT
Start: 2021-08-24 | End: 2021-08-24

## 2021-08-24 RX ADMIN — IOPAMIDOL 80 ML: 755 INJECTION, SOLUTION INTRAVENOUS at 12:15

## 2021-08-24 RX ADMIN — SODIUM CHLORIDE 85 ML: 9 INJECTION, SOLUTION INTRAVENOUS at 12:15

## 2021-08-25 LAB
PATH REPORT.COMMENTS IMP SPEC: NORMAL
PATH REPORT.FINAL DX SPEC: NORMAL
PATH REPORT.GROSS SPEC: NORMAL
PATH REPORT.RELEVANT HX SPEC: NORMAL

## 2021-08-25 PROCEDURE — 88112 CYTOPATH CELL ENHANCE TECH: CPT | Mod: 26 | Performed by: PATHOLOGY

## 2021-08-26 ENCOUNTER — INFUSION THERAPY VISIT (OUTPATIENT)
Dept: INFUSION THERAPY | Facility: CLINIC | Age: 86
End: 2021-08-26
Attending: INTERNAL MEDICINE
Payer: MEDICARE

## 2021-08-26 ENCOUNTER — ONCOLOGY VISIT (OUTPATIENT)
Dept: ONCOLOGY | Facility: CLINIC | Age: 86
End: 2021-08-26
Attending: INTERNAL MEDICINE
Payer: MEDICARE

## 2021-08-26 VITALS
OXYGEN SATURATION: 98 % | DIASTOLIC BLOOD PRESSURE: 90 MMHG | WEIGHT: 158 LBS | RESPIRATION RATE: 16 BRPM | BODY MASS INDEX: 22.67 KG/M2 | HEART RATE: 76 BPM | SYSTOLIC BLOOD PRESSURE: 152 MMHG | TEMPERATURE: 98.5 F

## 2021-08-26 DIAGNOSIS — C67.2 MALIGNANT NEOPLASM OF LATERAL WALL OF URINARY BLADDER (H): Primary | ICD-10-CM

## 2021-08-26 DIAGNOSIS — C91.10 CLL (CHRONIC LYMPHOCYTIC LEUKEMIA) (H): ICD-10-CM

## 2021-08-26 DIAGNOSIS — N18.31 STAGE 3A CHRONIC KIDNEY DISEASE (H): ICD-10-CM

## 2021-08-26 DIAGNOSIS — D47.2 MGUS (MONOCLONAL GAMMOPATHY OF UNKNOWN SIGNIFICANCE): ICD-10-CM

## 2021-08-26 DIAGNOSIS — Z87.898 HISTORY OF GROSS HEMATURIA: ICD-10-CM

## 2021-08-26 PROCEDURE — G0463 HOSPITAL OUTPT CLINIC VISIT: HCPCS | Mod: 25

## 2021-08-26 PROCEDURE — 250N000011 HC RX IP 250 OP 636: Performed by: INTERNAL MEDICINE

## 2021-08-26 PROCEDURE — G0463 HOSPITAL OUTPT CLINIC VISIT: HCPCS

## 2021-08-26 PROCEDURE — 96413 CHEMO IV INFUSION 1 HR: CPT

## 2021-08-26 PROCEDURE — 258N000003 HC RX IP 258 OP 636: Performed by: INTERNAL MEDICINE

## 2021-08-26 PROCEDURE — 99214 OFFICE O/P EST MOD 30 MIN: CPT | Performed by: INTERNAL MEDICINE

## 2021-08-26 RX ORDER — ALBUTEROL SULFATE 0.83 MG/ML
2.5 SOLUTION RESPIRATORY (INHALATION)
Status: CANCELLED | OUTPATIENT
Start: 2021-08-26

## 2021-08-26 RX ORDER — LORAZEPAM 2 MG/ML
0.5 INJECTION INTRAMUSCULAR EVERY 4 HOURS PRN
Status: CANCELLED
Start: 2021-08-26

## 2021-08-26 RX ORDER — ALBUTEROL SULFATE 90 UG/1
1-2 AEROSOL, METERED RESPIRATORY (INHALATION)
Status: CANCELLED
Start: 2021-08-26

## 2021-08-26 RX ORDER — EPINEPHRINE 1 MG/ML
0.3 INJECTION, SOLUTION INTRAMUSCULAR; SUBCUTANEOUS EVERY 5 MIN PRN
Status: CANCELLED | OUTPATIENT
Start: 2021-08-26

## 2021-08-26 RX ORDER — HEPARIN SODIUM (PORCINE) LOCK FLUSH IV SOLN 100 UNIT/ML 100 UNIT/ML
5 SOLUTION INTRAVENOUS
Status: CANCELLED | OUTPATIENT
Start: 2021-08-26

## 2021-08-26 RX ORDER — MEPERIDINE HYDROCHLORIDE 25 MG/ML
25 INJECTION INTRAMUSCULAR; INTRAVENOUS; SUBCUTANEOUS EVERY 30 MIN PRN
Status: CANCELLED | OUTPATIENT
Start: 2021-08-26

## 2021-08-26 RX ORDER — HEPARIN SODIUM,PORCINE 10 UNIT/ML
5 VIAL (ML) INTRAVENOUS
Status: CANCELLED | OUTPATIENT
Start: 2021-08-26

## 2021-08-26 RX ORDER — DIPHENHYDRAMINE HYDROCHLORIDE 50 MG/ML
50 INJECTION INTRAMUSCULAR; INTRAVENOUS
Status: CANCELLED
Start: 2021-08-26

## 2021-08-26 RX ORDER — METHYLPREDNISOLONE SODIUM SUCCINATE 125 MG/2ML
125 INJECTION, POWDER, LYOPHILIZED, FOR SOLUTION INTRAMUSCULAR; INTRAVENOUS
Status: CANCELLED
Start: 2021-08-26

## 2021-08-26 RX ORDER — SODIUM CHLORIDE 9 MG/ML
1000 INJECTION, SOLUTION INTRAVENOUS CONTINUOUS PRN
Status: CANCELLED
Start: 2021-08-26

## 2021-08-26 RX ORDER — NALOXONE HYDROCHLORIDE 0.4 MG/ML
.1-.4 INJECTION, SOLUTION INTRAMUSCULAR; INTRAVENOUS; SUBCUTANEOUS
Status: CANCELLED | OUTPATIENT
Start: 2021-08-26

## 2021-08-26 RX ADMIN — SODIUM CHLORIDE 400 MG: 9 INJECTION, SOLUTION INTRAVENOUS at 10:47

## 2021-08-26 RX ADMIN — SODIUM CHLORIDE 250 ML: 9 INJECTION, SOLUTION INTRAVENOUS at 10:47

## 2021-08-26 ASSESSMENT — PAIN SCALES - GENERAL: PAINLEVEL: NO PAIN (0)

## 2021-08-26 NOTE — PROGRESS NOTES
Infusion Nursing Note:  Santos Marti presents today for C10D1 Keytruda.    Patient seen by provider today: Yes: Dr Ellis   present during visit today: Not Applicable.    Note: N/A.      Intravenous Access:  Peripheral IV placed.    Treatment Conditions:  Lab Results   Component Value Date    HGB 12.2 08/24/2021    HGB 14.5 06/02/2021     Lab Results   Component Value Date    WBC 8.0 08/24/2021    WBC 9.1 06/02/2021      Lab Results   Component Value Date    ANEU 6.8 06/02/2021     Lab Results   Component Value Date     08/24/2021     06/02/2021      Lab Results   Component Value Date     08/24/2021     06/02/2021                   Lab Results   Component Value Date    POTASSIUM 4.0 08/24/2021    POTASSIUM 4.1 06/02/2021           No results found for: MAG         Lab Results   Component Value Date    CR 1.15 08/24/2021    CR 1.21 06/02/2021                   Lab Results   Component Value Date    SAAD 8.7 08/24/2021    SAAD 9.8 06/02/2021                Lab Results   Component Value Date    BILITOTAL 0.4 08/24/2021    BILITOTAL 0.9 06/02/2021           Lab Results   Component Value Date    ALBUMIN 3.2 08/24/2021    ALBUMIN 3.7 06/02/2021                    Lab Results   Component Value Date    ALT 14 08/24/2021    ALT 16 06/02/2021           Lab Results   Component Value Date    AST 12 08/24/2021    AST 11 06/02/2021       Results reviewed, labs MET treatment parameters, ok to proceed with treatment.      Post Infusion Assessment:  Patient tolerated infusion without incident.  Site patent and intact, free from redness, edema or discomfort.  Access discontinued per protocol.       Discharge Plan:   Discharge instructions reviewed with: Patient.  Patient and/or family verbalized understanding of discharge instructions and all questions answered.  Patient discharged in stable condition accompanied by: self.  Departure Mode: Ambulatory.      Olivia Gallo,  RN

## 2021-08-26 NOTE — PROGRESS NOTES
"Oncology Rooming Note    August 26, 2021 10:02 AM   Sanots Marti is a 89 year old male who presents for:    Chief Complaint   Patient presents with     Oncology Clinic Visit     Initial Vitals: BP (!) 152/90   Pulse 76   Temp 98.5  F (36.9  C) (Oral)   Resp 16   Wt 71.7 kg (158 lb)   SpO2 98%   BMI 22.67 kg/m   Estimated body mass index is 22.67 kg/m  as calculated from the following:    Height as of 6/7/21: 1.778 m (5' 10\").    Weight as of this encounter: 71.7 kg (158 lb). Body surface area is 1.88 meters squared.  No Pain (0) Comment: Data Unavailable   No LMP for male patient.  Allergies reviewed: Yes  Medications reviewed: Yes    Medications: Medication refills not needed today.  Pharmacy name entered into [employer]:    [id number] - [place], [place] - [address] [facility] [address] [facility] [place], [place] - [address] [place] [address] PHARMACY - [place], [place] - [address]    Clinical concerns: no      Lani Muhammad CMA            "

## 2021-08-26 NOTE — PROGRESS NOTES
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  He was on apixaban for atrial fibrillation.  TURBT done on 10/22/2019 revealed normal muscle invasive bladder cancer  -He was followed with surveillance cystoscopies every 3 months.  His cystoscopy evaluation was negative for any tumors or raised erythema on 3/11/2020, however a follow-up exam on 7/8/2020 revealed raised erythematous area in the left lateral bladder wall.  He had TURBT under anesthesia on 7/15/2020 which is revealed high-grade muscle invasive urothelial carcinoma.  -Santos was diagnosed with prostate cancer in October 2004.  He underwent brachii therapy followed by external beam radiation therapy administered at the [facility].  -Due to his previous radiation for his prostate cancer he is not a candidate for bladder radiation.  He is not a candidate for cystectomy due to his advanced age and also previous extensive radiation to the prostate.  - He was started on pembrolizumab for his locally advanced bladder cancer since 8/13/20.     CURRENT INTERVENTIONS:  Pembrolizumab 400 mg IV every 6 weeks since 8/13/20    SUBJECTIVE   Santos Marti is being followed for muscle invasive bladder cancer    Patient is being followed on therapy for his muscle invasive bladder cancer. He has been started on pembrolizumab since August. He has not noticed any side effects on therapy other than itching.  He has itching on both of his hands and legs and trunk.  He has been applying cream on it which does seem to help some.  He has followed up with dermatology and they agreed  with the choice of the cream being used.      He denies any recurrent hematuria. He has good appetite and fair energy. No side effects suggestive of autoimmune disease other than his itching.       PAST MEDICAL HISTORY     Past Medical History:   Diagnosis Date     Arthritis      Complication of anesthesia      Diabetes (H)      Gastroesophageal reflux disease      Hypertension      Pacemaker          CURRENT OUTPATIENT MEDICATIONS     Current Outpatient Medications   Medication Sig     acetaminophen (TYLENOL) 500 MG tablet Take 500-1,000 mg by mouth every 6 hours as needed for mild pain     apixaban ANTICOAGULANT (ELIQUIS) 5 MG tablet Take 5 mg by mouth 2 times daily      isosorbide mononitrate (IMDUR) 30 MG 24 hr tablet TK 1 T PO ONCE D     lisinopril-hydrochlorothiazide (PRINZIDE/ZESTORETIC) 20-25 MG tablet Take 1 tablet by mouth     metFORMIN (GLUCOPHAGE) 1000 MG tablet Take 500 mg by mouth daily      metoprolol succinate ER (TOPROL-XL) 50 MG 24 hr tablet Take 50 mg by mouth daily Take one and a half tabs daily     omeprazole 20 MG tablet Take 20 mg by mouth daily     triamcinolone (KENALOG) 0.1 % external ointment Apply topically 2 times daily     Current Facility-Administered Medications   Medication     lidocaine (XYLOCAINE) 2 % external gel        ALLERGIES      Allergies   Allergen Reactions     Sulfa Drugs Rash        REVIEW OF SYSTEMS   As above in the HPI, o/w complete 12-point ROS was negative.     PHYSICAL EXAM   BP (!) 152/90   Pulse 76   Temp 98.5  F (36.9  C) (Oral)   Resp 16   Wt 71.7 kg (158 lb)   SpO2 98%   BMI 22.67 kg/m    Limited physical exam during video visit due to COVID19 restrictions  Elderly male in no acute distress  Breathing comfortably, no tachypnea  Speech and hearing normal  Pleasant mood and congruent affect  Moving all extremities, no focal neurologic deficits apparent      LABORATORY AND IMAGING STUDIES     Recent Labs   Lab Test 08/24/21  1225 08/24/21  1205  07/14/21  1127 06/29/21  1035 06/02/21  1152 05/06/21  1110 04/21/21  1035     --  136  --  138 137 138   POTASSIUM 4.0  --  3.8  --  4.1 4.0 3.9   CHLORIDE 105  --  103  --  103 105 104   CO2 29  --  27  --  32 29 29   ANIONGAP 4  --  6  --  3 3 5   BUN 27  --  19  --  23 22 22   CR 1.15 1.2 1.11  --  1.21 1.09 1.05   *  --  213* 155* 221* 216* 158*   SAAD 8.7  --  9.2  --  9.8 9.2 9.1     No results for input(s): MAG, PHOS in the last 15502 hours.  Recent Labs   Lab Test 08/24/21  1225 07/14/21  1127 06/02/21  1152 05/06/21  1110 04/21/21  1035   WBC 8.0 7.7 9.1 8.3 6.6   HGB 12.2* 14.0 14.5 13.5 14.5    237 239 227 261   MCV 96 95 96 94 94   NEUTROPHIL 71 68 75.5 71.7 61.4     Recent Labs   Lab Test 08/24/21  1225 07/14/21  1127 06/02/21  1152   BILITOTAL 0.4 0.5 0.9   ALKPHOS 74 81 85   ALT 14 17 16   AST 12 19 11   ALBUMIN 3.2* 3.6 3.7    242* 178     TSH   Date Value Ref Range Status   08/24/2021 0.37 (L) 0.40 - 4.00 mU/L Final   07/14/2021 0.98 0.40 - 4.00 mU/L Final   06/02/2021 0.43 0.40 - 4.00 mU/L Final   05/06/2021 0.70 0.40 - 4.00 mU/L Final   04/21/2021 0.67 0.40 - 4.00 mU/L Final     No results for input(s): CEA in the last 62397 hours.  Results for orders placed or performed during the hospital encounter of 08/24/21   CT Chest/Abdomen/Pelvis w Contrast    Narrative    CT CHEST/ABDOMEN/PELVIS WITH CONTRAST 8/24/2021 12:27 PM    CLINICAL HISTORY: Bladder cancer, invasive, assess treatment response;  bladder cancer on immunotherapy. Malignant neoplasm of lateral wall of  urinary bladder (H). Stage 3a chronic kidney disease. History of gross  hematuria. CLL (chronic lymphocytic leukemia) (H). MGUS (monoclonal  gammopathy of unknown significance).    TECHNIQUE: CT scan of the chest, abdomen, and pelvis was performed  following injection of IV contrast. Multiplanar reformats were  obtained. Dose reduction techniques were used.   CONTRAST:  80mL [id number]-[id number]    COMPARISON: May 6,  2021.    FINDINGS:   LUNGS AND PLEURA: Previously seen costophrenic sulcus nodule  posteriorly on the right is not well seen today probably due to motion  artifact. 7 mm nodule in the right upper lobe image 104 series 6 is  stable. No new nodules. No effusions.    MEDIASTINUM/AXILLAE: No lymphadenopathy. No thoracic aortic aneurysms.  There are extensive coronary vascular calcifications and/or stents  consistent with coronary artery disease.    HEPATOBILIARY: No significant mass or bile duct dilatation. No  calcified gallstones.     PANCREAS: No significant mass, duct dilatation, or inflammatory  change.    SPLEEN: Normal size.    ADRENAL GLANDS: No significant nodules.    KIDNEYS/BLADDER: No significant mass, stones, or hydronephrosis.  Bladder is largely obscured by streak artifact from the hip  arthroplasties.    BOWEL: No obstruction or inflammatory change.    PELVIC ORGANS: No pelvic masses.    ADDITIONAL FINDINGS: No ascites.    MUSCULOSKELETAL: Survey of the visualized bony structures demonstrates  no destructive bony lesions.      Impression    IMPRESSION:  1.  No progressive malignancy demonstrated in the chest, abdomen, and pelvis.  2.  One stable pulmonary nodule, one nodule not well seen probably due  to motion artifact in the posterior right costophrenic sulcus.  3.  No new lesions.     JANESSA HUTCHISON MD         SYSTEM ID:  [id number]          ASSESSMENT AND PLAN   1. High-grade muscle invasive bladder cancer in a patient not a candidate for either surgery or radiation therapy  2. Prostate cancer treated with brachii therapy followed by external beam radiation therapy  3. Hypertension, diabetes mellitus type 2, atrial fibrillation on chronic anticoagulation with apixaban    I had a lengthy discussion with patient at this clinic visit. He has been started on pembrolizumab for his muscle invasive bladder cancer on 8/13/20. He has tolerated this well. He has not noticed any side effects on therapy other than  itching. He denies any hematuria.     He has itching almost all over his body.  He has been applying triamcinolone cream as recommended.  He has not seen a big change.  He has followed up with dermatology and they did not have any additional recommendations.  I think his itching is secondary to pembrolizumab immunotherapy.  Definitely holding therapy would help, as would systemic steroids.  I would try to continue therapy as long as feasible.  In addition to the steroid creams he could keep his skin moisturized.  He wondered if he can try benadryl. I did explain he could check if it helps.     I have reviewed all of the labs done prior to this clinic visit.  Labs are all completely normal including electrolytes, renal function, hepatic panel, complete blood count and differential except for mild normocytic anemia and hypoalbuminemia.      I reviewed actual images from his restaging CT scan. His bladder tumor cannot be assessed on the CT portion due to the bilateral hip replacements. Monitoring response to this disease is going to be a little difficult as it is only accurately measured on a PET-CT and we cannot use the PET scan for continued response assessment. I would follow him with CT scans. As long as he is not having a clear disease progression, we could continue on therapy as current. He is being followed up with cystoscopy. I would plan to continue therapy for 2 years unless he has unacceptable side effects or disease progression.    I will have him follow with Polo Valderrama for the next infusion and I will see him in 3 months with labs including urine cytology and CT chest, abdomen and pelvis prior to visit.       25 minutes spent on the date of the encounter doing chart review, history and exam, documentation and further activities as noted above      Imtiaz Ellis    Hematologist and Medical Oncologist  [facility]

## 2021-08-26 NOTE — LETTER
"    8/26/2021         RE: Santos Marti  [address]  [facility] [address]        Dear Colleague,    Thank you for referring your patient, Santos Marti, to the [facility]. Please see a copy of my visit note below.    Oncology Rooming Note    August 26, 2021 10:02 AM   Santos Marti is a 89 year old male who presents for:    Chief Complaint   Patient presents with     Oncology Clinic Visit     Initial Vitals: BP (!) 152/90   Pulse 76   Temp 98.5  F (36.9  C) (Oral)   Resp 16   Wt 71.7 kg (158 lb)   SpO2 98%   BMI 22.67 kg/m   Estimated body mass index is 22.67 kg/m  as calculated from the following:    Height as of 6/7/21: 1.778 m (5' 10\").    Weight as of this encounter: 71.7 kg (158 lb). Body surface area is 1.88 meters squared.  No Pain (0) Comment: Data Unavailable   No LMP for male patient.  Allergies reviewed: Yes  Medications reviewed: Yes    Medications: Medication refills not needed today.  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  He was on apixaban for atrial fibrillation.  TURBT done on 10/22/2019 revealed normal muscle invasive bladder cancer  -He was followed with surveillance cystoscopies every 3 months.  His cystoscopy evaluation " was negative for any tumors or raised erythema on 3/11/2020, however a follow-up exam on 7/8/2020 revealed raised erythematous area in the left lateral bladder wall.  He had TURBT under anesthesia on 7/15/2020 which is revealed high-grade muscle invasive urothelial carcinoma.  -Santos was diagnosed with prostate cancer in October 2004.  He underwent brachii therapy followed by external beam radiation therapy administered at the [facility].  -Due to his previous radiation for his prostate cancer he is not a candidate for bladder radiation.  He is not a candidate for cystectomy due to his advanced age and also previous extensive radiation to the prostate.  - He was started on pembrolizumab for his locally advanced bladder cancer since 8/13/20.     CURRENT INTERVENTIONS:  Pembrolizumab 400 mg IV every 6 weeks since 8/13/20    SUBJECTIVE   Santos Marti is being followed for muscle invasive bladder cancer    Patient is being followed on therapy for his muscle invasive bladder cancer. He has been started on pembrolizumab since August. He has not noticed any side effects on therapy other than itching.  He has itching on both of his hands and legs and trunk.  He has been applying cream on it which does seem to help some.  He has followed up with dermatology and they agreed with the choice of the cream being used.      He denies any recurrent hematuria. He has good appetite and fair energy. No side effects suggestive of autoimmune disease other than his itching.       PAST MEDICAL HISTORY     Past Medical History:   Diagnosis Date     Arthritis      Complication of anesthesia      Diabetes (H)      Gastroesophageal reflux disease      Hypertension      Pacemaker          CURRENT OUTPATIENT MEDICATIONS     Current Outpatient Medications   Medication Sig     acetaminophen (TYLENOL) 500 MG tablet Take 500-1,000 mg by mouth every 6 hours as needed for mild pain     apixaban ANTICOAGULANT (ELIQUIS) 5 MG tablet Take  5 mg by mouth 2 times daily      isosorbide mononitrate (IMDUR) 30 MG 24 hr tablet TK 1 T PO ONCE D     lisinopril-hydrochlorothiazide (PRINZIDE/ZESTORETIC) 20-25 MG tablet Take 1 tablet by mouth     metFORMIN (GLUCOPHAGE) 1000 MG tablet Take 500 mg by mouth daily      metoprolol succinate ER (TOPROL-XL) 50 MG 24 hr tablet Take 50 mg by mouth daily Take one and a half tabs daily     omeprazole 20 MG tablet Take 20 mg by mouth daily     triamcinolone (KENALOG) 0.1 % external ointment Apply topically 2 times daily     Current Facility-Administered Medications   Medication     lidocaine (XYLOCAINE) 2 % external gel        ALLERGIES      Allergies   Allergen Reactions     Sulfa Drugs Rash        REVIEW OF SYSTEMS   As above in the HPI, o/w complete 12-point ROS was negative.     PHYSICAL EXAM   BP (!) 152/90   Pulse 76   Temp 98.5  F (36.9  C) (Oral)   Resp 16   Wt 71.7 kg (158 lb)   SpO2 98%   BMI 22.67 kg/m    Limited physical exam during video visit due to COVID19 restrictions  Elderly male in no acute distress  Breathing comfortably, no tachypnea  Speech and hearing normal  Pleasant mood and congruent affect  Moving all extremities, no focal neurologic deficits apparent      LABORATORY AND IMAGING STUDIES     Recent Labs   Lab Test 08/24/21  1225 08/24/21  1205 07/14/21  1127 06/29/21  1035 06/02/21  1152 05/06/21  1110 04/21/21  1035     --  136  --  138 137 138   POTASSIUM 4.0  --  3.8  --  4.1 4.0 3.9   CHLORIDE 105  --  103  --  103 105 104   CO2 29  --  27  --  32 29 29   ANIONGAP 4  --  6  --  3 3 5   BUN 27  --  19  --  23 22 22   CR 1.15 1.2 1.11  --  1.21 1.09 1.05   *  --  213* 155* 221* 216* 158*   SAAD 8.7  --  9.2  --  9.8 9.2 9.1     No results for input(s): MAG, PHOS in the last 34810 hours.  Recent Labs   Lab Test 08/24/21  1225 07/14/21  1127 06/02/21  1152 05/06/21  1110 04/21/21  1035   WBC 8.0 7.7 9.1 8.3 6.6   HGB 12.2* 14.0 14.5 13.5 14.5    237 239 227 261   MCV 96 95  96 94 94   NEUTROPHIL 71 68 75.5 71.7 61.4     Recent Labs   Lab Test 08/24/21  1225 07/14/21  1127 06/02/21  1152   BILITOTAL 0.4 0.5 0.9   ALKPHOS 74 81 85   ALT 14 17 16   AST 12 19 11   ALBUMIN 3.2* 3.6 3.7    242* 178     TSH   Date Value Ref Range Status   08/24/2021 0.37 (L) 0.40 - 4.00 mU/L Final   07/14/2021 0.98 0.40 - 4.00 mU/L Final   06/02/2021 0.43 0.40 - 4.00 mU/L Final   05/06/2021 0.70 0.40 - 4.00 mU/L Final   04/21/2021 0.67 0.40 - 4.00 mU/L Final     No results for input(s): CEA in the last 50041 hours.  Results for orders placed or performed during the hospital encounter of 08/24/21   CT Chest/Abdomen/Pelvis w Contrast    Narrative    CT CHEST/ABDOMEN/PELVIS WITH CONTRAST 8/24/2021 12:27 PM    CLINICAL HISTORY: Bladder cancer, invasive, assess treatment response;  bladder cancer on immunotherapy. Malignant neoplasm of lateral wall of  urinary bladder (H). Stage 3a chronic kidney disease. History of gross  hematuria. CLL (chronic lymphocytic leukemia) (H). MGUS (monoclonal  gammopathy of unknown significance).    TECHNIQUE: CT scan of the chest, abdomen, and pelvis was performed  following injection of IV contrast. Multiplanar reformats were  obtained. Dose reduction techniques were used.   CONTRAST:  80mL Isovue-370    COMPARISON: May 6, 2021.    FINDINGS:   LUNGS AND PLEURA: Previously seen costophrenic sulcus nodule  posteriorly on the right is not well seen today probably due to motion  artifact. 7 mm nodule in the right upper lobe image 104 series 6 is  stable. No new nodules. No effusions.    MEDIASTINUM/AXILLAE: No lymphadenopathy. No thoracic aortic aneurysms.  There are extensive coronary vascular calcifications and/or stents  consistent with coronary artery disease.    HEPATOBILIARY: No significant mass or bile duct dilatation. No  calcified gallstones.     PANCREAS: No significant mass, duct dilatation, or inflammatory  change.    SPLEEN: Normal size.    ADRENAL GLANDS: No  significant nodules.    KIDNEYS/BLADDER: No significant mass, stones, or hydronephrosis.  Bladder is largely obscured by streak artifact from the hip  arthroplasties.    BOWEL: No obstruction or inflammatory change.    PELVIC ORGANS: No pelvic masses.    ADDITIONAL FINDINGS: No ascites.    MUSCULOSKELETAL: Survey of the visualized bony structures demonstrates  no destructive bony lesions.      Impression    IMPRESSION:  1.  No progressive malignancy demonstrated in the chest, abdomen, and pelvis.  2.  One stable pulmonary nodule, one nodule not well seen probably due  to motion artifact in the posterior right costophrenic sulcus.  3.  No new lesions.     JANESSA HUTCHISON MD         SYSTEM ID:  [id number]          ASSESSMENT AND PLAN   1. High-grade muscle invasive bladder cancer in a patient not a candidate for either surgery or radiation therapy  2. Prostate cancer treated with brachii therapy followed by external beam radiation therapy  3. Hypertension, diabetes mellitus type 2, atrial fibrillation on chronic anticoagulation with apixaban    I had a lengthy discussion with patient at this clinic visit. He has been started on pembrolizumab for his muscle invasive bladder cancer on 8/13/20. He has tolerated this well. He has not noticed any side effects on therapy other than itching. He denies any hematuria.     He has itching almost all over his body.  He has been applying triamcinolone cream as recommended.  He has not seen a big change.  He has followed up with dermatology and they did not have any additional recommendations.  I think his itching is secondary to pembrolizumab immunotherapy.  Definitely holding therapy would help, as would systemic steroids.  I would try to continue therapy as long as feasible.  In addition to the steroid creams he could keep his skin moisturized.  He wondered if he can try benadryl. I did explain he could check if it helps.     I have reviewed all of the labs done prior to this clinic  visit.  Labs are all completely normal including electrolytes, renal function, hepatic panel, complete blood count and differential except for mild normocytic anemia and hypoalbuminemia.      I reviewed actual images from his restaging CT scan. His bladder tumor cannot be assessed on the CT portion due to the bilateral hip replacements. Monitoring response to this disease is going to be a little difficult as it is only accurately measured on a PET-CT and we cannot use the PET scan for continued response assessment. I would follow him with CT scans. As long as he is not having a clear disease progression, we could continue on therapy as current. He is being followed up with cystoscopy. I would plan to continue therapy for 2 years unless he has unacceptable side effects or disease progression.    I will have him follow with Polo Valderrama for the next infusion and I will see him in 3 months with labs including urine cytology and CT chest, abdomen and pelvis prior to visit.       25 minutes spent on the date of the encounter doing chart review, history and exam, documentation and further activities as noted above      Imtiaz Ellis    Hematologist and Medical Oncologist  [facility]        Again, thank you for allowing me to participate in the care of your patient.        Sincerely,        Imtiaz Ellis MD

## 2021-09-04 ENCOUNTER — HEALTH MAINTENANCE LETTER (OUTPATIENT)
Age: 86
End: 2021-09-04

## 2021-09-08 ENCOUNTER — OFFICE VISIT (OUTPATIENT)
Dept: UROLOGY | Facility: CLINIC | Age: 86
End: 2021-09-08
Payer: MEDICARE

## 2021-09-08 VITALS
HEART RATE: 75 BPM | OXYGEN SATURATION: 98 % | SYSTOLIC BLOOD PRESSURE: 130 MMHG | HEIGHT: 70 IN | BODY MASS INDEX: 22.19 KG/M2 | WEIGHT: 155 LBS | DIASTOLIC BLOOD PRESSURE: 80 MMHG

## 2021-09-08 DIAGNOSIS — C67.9 MALIGNANT NEOPLASM OF URINARY BLADDER, UNSPECIFIED SITE (H): Primary | ICD-10-CM

## 2021-09-08 LAB
ALBUMIN UR-MCNC: 30 MG/DL
APPEARANCE UR: CLEAR
BILIRUB UR QL STRIP: NEGATIVE
COLOR UR AUTO: YELLOW
GLUCOSE UR STRIP-MCNC: NEGATIVE MG/DL
HGB UR QL STRIP: ABNORMAL
KETONES UR STRIP-MCNC: NEGATIVE MG/DL
LEUKOCYTE ESTERASE UR QL STRIP: ABNORMAL
NITRATE UR QL: NEGATIVE
PH UR STRIP: 5.5 [PH] (ref 5–7)
SP GR UR STRIP: 1.02 (ref 1–1.03)
UROBILINOGEN UR STRIP-ACNC: 1 E.U./DL

## 2021-09-08 PROCEDURE — 99213 OFFICE O/P EST LOW 20 MIN: CPT | Mod: 25 | Performed by: UROLOGY

## 2021-09-08 PROCEDURE — 52000 CYSTOURETHROSCOPY: CPT | Performed by: UROLOGY

## 2021-09-08 PROCEDURE — 81003 URINALYSIS AUTO W/O SCOPE: CPT | Mod: QW | Performed by: UROLOGY

## 2021-09-08 RX ORDER — LIDOCAINE HYDROCHLORIDE 20 MG/ML
JELLY TOPICAL ONCE
Status: DISCONTINUED | OUTPATIENT
Start: 2021-09-08 | End: 2021-09-08 | Stop reason: HOSPADM

## 2021-09-08 ASSESSMENT — MIFFLIN-ST. JEOR: SCORE: 1369.33

## 2021-09-08 ASSESSMENT — PAIN SCALES - GENERAL: PAINLEVEL: NO PAIN (0)

## 2021-09-08 NOTE — LETTER
"9/8/2021       RE: Santos Marti  [address]  [facility] [address]     Dear Colleague,    Thank you for referring your patient, Santos Marti, to the [facility]. Please see a copy of my visit note below.    [place]   CHIEF COMPLAINT   It was my pleasure to see Santos Marti who is a 89 year old male for follow-up of prostate cancer and bladder cancer.      HPI   Santos Marti is a very pleasant 89 year old male who is a prior patient of Dr. Costello with history of prostate cancer and muscle invasive bladder cancer.  He also follows with Dr. Ellis.  His initial diagnosis was in October 2019.  He was started on pembrolizumab for locally advanced bladder cancer on 8/13/2020.  He has a history of prostate cancer diagnosed in October 2004 for which [facility]  He is been deemed not a surgical candidate or a radiation candidate for his bladder cancer given his prior treatment for his prostate cancer.    He was last seen by Dr. Costello on 3/5/2021 for office cystoscopy with fulguration of a small papillary tumor above the right ureteral orifice.    6/7/21:  Follow-up today for surveillance cystoscopy  Doing well with no issues    TODAY 9/8/21:  Follow-up today for surveillance cystoscopy  He has been doing fairly well  He notes that he is dry overnight however has some daytime frequency and urgency and uses pads  No gross hematuria    PHYSICAL EXAM  Patient is a 89 year old  male   Vitals: Blood pressure 130/80, pulse 75, height 1.778 m (5' 10\"), weight 70.3 kg (155 lb), SpO2 98 %.  Body mass index is 22.24 kg/m .  General Appearance Adult:   Alert, no acute distress, oriented  HENT: throat/mouth:normal, good dentition  Lungs: no respiratory distress, or pursed lip breathing  Heart: No obvious jugular venous distension present  Abdomen: soft, " nontender, no organomegaly or masses  Musculoskeltal: extremities normal, no peripheral edema  Skin: no suspicious lesions or rashes  Neuro: Alert, oriented, speech and mentation normal  Psych: affect and mood normal  Gait: Normal  : penis, scrotum, testes normal    PSA   Date Value Ref Range Status   03/10/2021 <0.01 0 - 4 ug/L Final     Comment:     Assay Method:  Chemiluminescence using Siemens Vista analyzer      UA RESULTS:  Recent Labs   Lab Test 06/07/21  1030 07/16/20  2120 07/16/20  2120   COLOR Yellow   < > Light Yellow   APPEARANCE Clear   < > Clear   URINEGLC Negative   < > Negative   URINEBILI Negative   < > Negative   URINEKETONE Trace*   < > Negative   SG 1.025   < > 1.014   UBLD Trace*   < > Moderate*   URINEPH 5.0   < > 5.5   PROTEIN 100*   < > 50*   UROBILINOGEN 1.0   < >  --    NITRITE Negative   < > Negative   LEUKEST Negative   < > Small*   RBCU  --   --  71*   WBCU  --   --  20*    < > = values in this interval not displayed.     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   -Tumor invades lamina propria and muscularis propria.   -Urothelial (transitional cell) carcinoma in-situ: Not identified.   -Lymph/vascular space invasion: Not identified.   -Sampling includes: Urothelium, lamina propria, and muscularis propria.      IMAGING  All pertinent imaging reviewed:    All imaging studies reviewed by me.  I personally reviewed these imaging films.  A formal report from radiology will follow.    CT CHEST/ABD/PEL 8/24/21:  FINDINGS:   LUNGS AND PLEURA: Previously seen costophrenic sulcus nodule  posteriorly on the right is not well seen today probably due to motion  artifact. 7 mm nodule in the right upper lobe image 104 series 6 is  stable. No new nodules. No effusions.     MEDIASTINUM/AXILLAE: No lymphadenopathy. No  thoracic aortic aneurysms.  There are extensive coronary vascular calcifications and/or stents  consistent with coronary artery disease.     HEPATOBILIARY: No significant mass or bile duct dilatation. No  calcified gallstones.      PANCREAS: No significant mass, duct dilatation, or inflammatory  change.     SPLEEN: Normal size.     ADRENAL GLANDS: No significant nodules.     KIDNEYS/BLADDER: No significant mass, stones, or hydronephrosis.  Bladder is largely obscured by streak artifact from the hip  arthroplasties.     BOWEL: No obstruction or inflammatory change.     PELVIC ORGANS: No pelvic masses.     ADDITIONAL FINDINGS: No ascites.     MUSCULOSKELETAL: Survey of the visualized bony structures demonstrates  no destructive bony lesions.                                                                      IMPRESSION:  1.  No progressive malignancy demonstrated in the chest, abdomen, and  pelvis.  2.  One stable pulmonary nodule, one nodule not well seen probably due  to motion artifact in the posterior right costophrenic sulcus.  3.  No new lesions.        ASSESSMENT and PLAN  89-year-old man with history of prostate cancer status post combined brachii and external beam radiation therapy in 2004 and muscle invasive bladder cancer on pembrolizumab who presents for surveillance cystoscopy    Muscle invasive bladder cancer  -No evidence of recurrence on today's cystoscopy  -Follow-up in 3 months for next surveillance cystoscopy  -Reviewed his prior biopsy results and the pathology report  -I reviewed his recent CT chest abdomen pelvis and agree with radiology interpretation  -Continue to follow with Dr. Ellis    Prostate cancer  -PSA from March was undetectable      Time spent: 15 minutes spent on the date of the encounter doing chart review, history and exam, documentation and further activities as noted above.  This was in addition to cystoscopy time    Manoj Mistry MD   Urology  [facility]  Physicians  [facility] Phone: [phone]  [facility] Phone: [phone]

## 2021-09-08 NOTE — NURSING NOTE
Chief Complaint   Patient presents with     Maligant neoplasm of urinary bladder     Here for a in office cystoscopy     Prior to the start of the procedure and with procedural staff participation, I verbally confirmed the patient s identity using two indicators, relevant allergies, that the procedure was appropriate and matched the consent or emergent situation, and that the correct equipment/implants were available. Immediately prior to starting the procedure I conducted the Time Out with the procedural staff and re-confirmed the patient s name, procedure, and site/side. I have wiped the patient off with the povidone-Iodine solution, draped them,  used Lidocaine hydrochloride jelly, and instilled sterile water into the bladder. (The Joint Commission universal protocol was followed.)  Yes    Sedation (Moderate or Deep): Urojet    5mL 2% lidocaine hydrochloride Urojet instilled into urethra.    NDC# [address]-6  Lot #: [id number]  Expiration Date:  7-22    Bridgette Yanez

## 2021-09-08 NOTE — PATIENT INSTRUCTIONS
"AFTER YOUR CYSTOSCOPY  ?  ?  You have just completed a cystoscopy, or \"cysto\", which allowed your physician to learn more about your bladder (or to remove a stent placed after surgery). We suggest that you continue to avoid caffeine, fruit juice, and alcohol for the next 24 hours, however, you are encouraged to return to your normal activities.  ?  ?  A few things that are considered normal after your cystoscopy:  ?  * small amount of bleeding (or spotting) that clears within the next 24 hours  ?  * slight burning sensation with urination  ?  * sensation of needing to void (urinate) more frequently  ?  * the feeling of \"air\" in your urine  ?  * mild discomfort that is relieved with Tylenol    * bladder spasms  ?  ?  ?  Please contact our office promptly if you:  ?  * develop a fever above 101 degrees  ?  * are unable to urinate  ?  * develop bright red blood that does not stop  ?  * experience severe pain or swelling  ?  ?  ?  And of course, please contact our office with any concerns or questions [phone]  ?      AFTER YOUR CYSTOSCOPY        You have just completed a cystoscopy, or \"cysto\", which allowed your physician to learn more about your bladder (or to remove a stent placed after surgery). We suggest that you continue to avoid caffeine, fruit juice, and alcohol for the next 24 hours, however, you are encouraged to return to your normal activities.         A few things that are considered normal after your cystoscopy:     * Small amount of bleeding (or spotting) that clears within the next 24 hours     * Slight burning sensation with urination     * Sensation to of needing to avoid more frequently     * The feeling of \"air\" in your urine     * Mild discomfort that is relieved with Tylenol        Please contact our office promptly if you:     * Develop a fever above 101 degrees     * Are unable to urinate     * Develop bright red blood that does not stop     * Severe pain or swelling         Please contact " our office with any concerns or questions @[facility].

## 2021-09-08 NOTE — PROGRESS NOTES
"[facility]   CHIEF COMPLAINT   It was my pleasure to see Santos Marti who is a 89 year old male for follow-up of prostate cancer and bladder cancer.      HPI   Santos Marti is a very pleasant 89 year old male who is a prior patient of Dr. Costello with history of prostate cancer and muscle invasive bladder cancer.  He also follows with Dr. Ellis.  His initial diagnosis was in October 2019.  He was started on pembrolizumab for locally advanced bladder cancer on 8/13/2020.  He has a history of prostate cancer diagnosed in October 2004 for which [facility]  He is been deemed not a surgical candidate or a radiation candidate for his bladder cancer given his prior treatment for his prostate cancer.    He was last seen by Dr. Costello on 3/5/2021 for office cystoscopy with fulguration of a small papillary tumor above the right ureteral orifice.    6/7/21:  Follow-up today for surveillance cystoscopy  Doing well with no issues    TODAY 9/8/21:  Follow-up today for surveillance cystoscopy  He has been doing fairly well  He notes that he is dry overnight however has some daytime frequency and urgency and uses pads  No gross hematuria    PHYSICAL EXAM  Patient is a 89 year old  male   Vitals: Blood pressure 130/80, pulse 75, height 1.778 m (5' 10\"), weight 70.3 kg (155 lb), SpO2 98 %.  Body mass index is 22.24 kg/m .  General Appearance Adult:   Alert, no acute distress, oriented  HENT: throat/mouth:normal, good dentition  Lungs: no respiratory distress, or pursed lip breathing  Heart: No obvious jugular venous distension present  Abdomen: soft, nontender, no organomegaly or masses  Musculoskeltal: extremities normal, no peripheral edema  Skin: no suspicious lesions or rashes  Neuro: Alert, oriented, speech and mentation normal  Psych: affect and mood normal  Gait: Normal  : penis, scrotum, testes normal    PSA   Date Value Ref Range Status   03/10/2021 <0.01 " 0 - 4 ug/L Final     Comment:     Assay Method:  Chemiluminescence using Siemens Vista analyzer      UA RESULTS:  Recent Labs   Lab Test 06/07/21  1030 07/16/20  2120 07/16/20  2120   COLOR Yellow   < > Light Yellow   APPEARANCE Clear   < > Clear   URINEGLC Negative   < > Negative   URINEBILI Negative   < > Negative   URINEKETONE Trace*   < > Negative   SG 1.025   < > 1.014   UBLD Trace*   < > Moderate*   URINEPH 5.0   < > 5.5   PROTEIN 100*   < > 50*   UROBILINOGEN 1.0   < >  --    NITRITE Negative   < > Negative   LEUKEST Negative   < > Small*   RBCU  --   --  71*   WBCU  --   --  20*    < > = values in this interval not displayed.     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   -Tumor invades lamina propria and muscularis propria.   -Urothelial (transitional cell) carcinoma in-situ: Not identified.   -Lymph/vascular space invasion: Not identified.   -Sampling includes: Urothelium, lamina propria, and muscularis propria.      IMAGING  All pertinent imaging reviewed:    All imaging studies reviewed by me.  I personally reviewed these imaging films.  A formal report from radiology will follow.    CT CHEST/ABD/PEL 8/24/21:  FINDINGS:   LUNGS AND PLEURA: Previously seen costophrenic sulcus nodule  posteriorly on the right is not well seen today probably due to motion  artifact. 7 mm nodule in the right upper lobe image 104 series 6 is  stable. No new nodules. No effusions.     MEDIASTINUM/AXILLAE: No lymphadenopathy. No thoracic aortic aneurysms.  There are extensive coronary vascular calcifications and/or stents  consistent with coronary artery disease.     HEPATOBILIARY: No significant mass or bile duct dilatation. No  calcified gallstones.      PANCREAS: No significant mass, duct dilatation, or inflammatory  change.     SPLEEN: Normal  size.     ADRENAL GLANDS: No significant nodules.     KIDNEYS/BLADDER: No significant mass, stones, or hydronephrosis.  Bladder is largely obscured by streak artifact from the hip  arthroplasties.     BOWEL: No obstruction or inflammatory change.     PELVIC ORGANS: No pelvic masses.     ADDITIONAL FINDINGS: No ascites.     MUSCULOSKELETAL: Survey of the visualized bony structures demonstrates  no destructive bony lesions.                                                                      IMPRESSION:  1.  No progressive malignancy demonstrated in the chest, abdomen, and  pelvis.  2.  One stable pulmonary nodule, one nodule not well seen probably due  to motion artifact in the posterior right costophrenic sulcus.  3.  No new lesions.        ASSESSMENT and PLAN  89-year-old man with history of prostate cancer status post combined brachii and external beam radiation therapy in 2004 and muscle invasive bladder cancer on pembrolizumab who presents for surveillance cystoscopy    Muscle invasive bladder cancer  -No evidence of recurrence on today's cystoscopy  -Follow-up in 3 months for next surveillance cystoscopy  -Reviewed his prior biopsy results and the pathology report  -I reviewed his recent CT chest abdomen pelvis and agree with radiology interpretation  -Continue to follow with Dr. Ellis    Prostate cancer  -PSA from March was undetectable      Time spent: 15 minutes spent on the date of the encounter doing chart review, history and exam, documentation and further activities as noted above.  This was in addition to cystoscopy time    Manoj Mistry MD   Urology  [facility]  [facility] Phone: [phone]  [facility] Phone: [phone]

## 2021-09-08 NOTE — PROCEDURES
CYSTOSCOPY PROCEDURE NOTE:    Santos Marti is a 89 year old male  who presents with muscle invasive bladder cancer for cystoscop.    Pt ID verified with patient: Yes     Procedure verified with patient: Yes     Procedure confirmed with physician and support staff: Yes     Consent form confirmed with physician and support staff.    Sign In  History and Physical Exam reviewed .  Informed Consent Discussed: Yes   Sign in Communication: Yes   Time Out:  Team Confirms the Correct Patient, Correct Procedure; Yes , Correct Site and Site Marking, Correct Position (if applicable).    Affirmation of Time Out: Yes   Sign Out:  Sign Out Discussion: Yes   Physician: Manoj Mistry MD    A urinalysis was performed revealing no evidence of infection.    The benefits, risks, alternatives of the cystoscopy procedure and personnel were discussed with the patient. The verbal consent was obtained and the patient agrees to proceed.      Description of procedure:   After fully informed, voluntary consent was obtained, the patient was brought into the procedure room, identified and placed in a supine position on the cystoscopy table.  The groin/scrotum were prepped with betadine and draped in a sterile fashion.  Urojet lidocaine gel was introduced.  A 15F flexible cystoscope was inserted into the urethra, and the bladder and urethra wereexamined in a systematic manner.  The patient tolerated the procedure well and there were no complications.      Cystoscopic findings:  The urethra was normal without strictures.  The prostate was 3-cm long and demonstrated mild bilobar hypertrophy and evidence of prior radiation.  There was a slight narrowing at the prostate apex/membranous urethra.  There was no median lobe.  The external sphincter coapted and the bladder neck was normal. The bladder was  entered and careful pan endoscopy was carried out. The posterior, superior and lateral walls and dome of the bladder were all well visualized  and the scope was retroflexed upon itself.  There was moderate trabeculation.  There were no neoplasms, stones, or diverticula identifed.  The ureteric orifices were normal in position and number and effluxing clear urine.  The area of prior fulguration was identified with no evidence of recurrence    Assessment/Plan:   Santos Marti is a 89 year old male with a history of muscle invasive bladder cancer and history of prostate cancer     -See clinic note    Manoj Mistry MD

## 2021-10-06 ENCOUNTER — LAB (OUTPATIENT)
Dept: INFUSION THERAPY | Facility: CLINIC | Age: 86
End: 2021-10-06
Attending: INTERNAL MEDICINE
Payer: MEDICARE

## 2021-10-06 DIAGNOSIS — C67.2 MALIGNANT NEOPLASM OF LATERAL WALL OF URINARY BLADDER (H): Primary | ICD-10-CM

## 2021-10-06 LAB
ALBUMIN SERPL-MCNC: 3.3 G/DL (ref 3.4–5)
ALP SERPL-CCNC: 80 U/L (ref 40–150)
ALT SERPL W P-5'-P-CCNC: 17 U/L (ref 0–70)
ANION GAP SERPL CALCULATED.3IONS-SCNC: 7 MMOL/L (ref 3–14)
AST SERPL W P-5'-P-CCNC: 13 U/L (ref 0–45)
BILIRUB SERPL-MCNC: 0.5 MG/DL (ref 0.2–1.3)
BUN SERPL-MCNC: 23 MG/DL (ref 7–30)
CALCIUM SERPL-MCNC: 8.9 MG/DL (ref 8.5–10.1)
CHLORIDE BLD-SCNC: 100 MMOL/L (ref 94–109)
CO2 SERPL-SCNC: 27 MMOL/L (ref 20–32)
CREAT SERPL-MCNC: 1.36 MG/DL (ref 0.66–1.25)
GFR SERPL CREATININE-BSD FRML MDRD: 45 ML/MIN/1.73M2
GLUCOSE BLD-MCNC: 180 MG/DL (ref 70–99)
POTASSIUM BLD-SCNC: 3.5 MMOL/L (ref 3.4–5.3)
PROT SERPL-MCNC: 7.8 G/DL (ref 6.8–8.8)
SODIUM SERPL-SCNC: 134 MMOL/L (ref 133–144)
TSH SERPL DL<=0.005 MIU/L-ACNC: 0.42 MU/L (ref 0.4–4)

## 2021-10-06 PROCEDURE — 36415 COLL VENOUS BLD VENIPUNCTURE: CPT | Performed by: INTERNAL MEDICINE

## 2021-10-06 PROCEDURE — 82040 ASSAY OF SERUM ALBUMIN: CPT | Performed by: INTERNAL MEDICINE

## 2021-10-06 PROCEDURE — 84443 ASSAY THYROID STIM HORMONE: CPT | Performed by: INTERNAL MEDICINE

## 2021-10-07 ENCOUNTER — INFUSION THERAPY VISIT (OUTPATIENT)
Dept: INFUSION THERAPY | Facility: CLINIC | Age: 86
End: 2021-10-07
Attending: INTERNAL MEDICINE
Payer: MEDICARE

## 2021-10-07 ENCOUNTER — ONCOLOGY VISIT (OUTPATIENT)
Dept: ONCOLOGY | Facility: CLINIC | Age: 86
End: 2021-10-07
Attending: INTERNAL MEDICINE
Payer: MEDICARE

## 2021-10-07 VITALS
DIASTOLIC BLOOD PRESSURE: 80 MMHG | OXYGEN SATURATION: 96 % | WEIGHT: 154.4 LBS | SYSTOLIC BLOOD PRESSURE: 125 MMHG | RESPIRATION RATE: 16 BRPM | BODY MASS INDEX: 22.15 KG/M2 | HEART RATE: 61 BPM | TEMPERATURE: 97.7 F

## 2021-10-07 DIAGNOSIS — C67.2 MALIGNANT NEOPLASM OF LATERAL WALL OF URINARY BLADDER (H): Primary | ICD-10-CM

## 2021-10-07 PROCEDURE — 96413 CHEMO IV INFUSION 1 HR: CPT

## 2021-10-07 PROCEDURE — 99214 OFFICE O/P EST MOD 30 MIN: CPT | Performed by: NURSE PRACTITIONER

## 2021-10-07 PROCEDURE — 250N000011 HC RX IP 250 OP 636: Performed by: NURSE PRACTITIONER

## 2021-10-07 PROCEDURE — G0463 HOSPITAL OUTPT CLINIC VISIT: HCPCS | Mod: 25

## 2021-10-07 PROCEDURE — 258N000003 HC RX IP 258 OP 636: Performed by: NURSE PRACTITIONER

## 2021-10-07 RX ORDER — SODIUM CHLORIDE 9 MG/ML
1000 INJECTION, SOLUTION INTRAVENOUS CONTINUOUS PRN
Status: CANCELLED
Start: 2021-10-07

## 2021-10-07 RX ORDER — METHYLPREDNISOLONE SODIUM SUCCINATE 125 MG/2ML
125 INJECTION, POWDER, LYOPHILIZED, FOR SOLUTION INTRAMUSCULAR; INTRAVENOUS
Status: CANCELLED
Start: 2021-10-07

## 2021-10-07 RX ORDER — ALBUTEROL SULFATE 90 UG/1
1-2 AEROSOL, METERED RESPIRATORY (INHALATION)
Status: CANCELLED
Start: 2021-10-07

## 2021-10-07 RX ORDER — ALBUTEROL SULFATE 0.83 MG/ML
2.5 SOLUTION RESPIRATORY (INHALATION)
Status: CANCELLED | OUTPATIENT
Start: 2021-10-07

## 2021-10-07 RX ORDER — HEPARIN SODIUM (PORCINE) LOCK FLUSH IV SOLN 100 UNIT/ML 100 UNIT/ML
5 SOLUTION INTRAVENOUS
Status: CANCELLED | OUTPATIENT
Start: 2021-10-07

## 2021-10-07 RX ORDER — LORAZEPAM 2 MG/ML
0.5 INJECTION INTRAMUSCULAR EVERY 4 HOURS PRN
Status: CANCELLED
Start: 2021-10-07

## 2021-10-07 RX ORDER — NALOXONE HYDROCHLORIDE 0.4 MG/ML
.1-.4 INJECTION, SOLUTION INTRAMUSCULAR; INTRAVENOUS; SUBCUTANEOUS
Status: CANCELLED | OUTPATIENT
Start: 2021-10-07

## 2021-10-07 RX ORDER — HEPARIN SODIUM,PORCINE 10 UNIT/ML
5 VIAL (ML) INTRAVENOUS
Status: CANCELLED | OUTPATIENT
Start: 2021-10-07

## 2021-10-07 RX ORDER — EPINEPHRINE 1 MG/ML
0.3 INJECTION, SOLUTION INTRAMUSCULAR; SUBCUTANEOUS EVERY 5 MIN PRN
Status: CANCELLED | OUTPATIENT
Start: 2021-10-07

## 2021-10-07 RX ORDER — MEPERIDINE HYDROCHLORIDE 25 MG/ML
25 INJECTION INTRAMUSCULAR; INTRAVENOUS; SUBCUTANEOUS EVERY 30 MIN PRN
Status: CANCELLED | OUTPATIENT
Start: 2021-10-07

## 2021-10-07 RX ORDER — DIPHENHYDRAMINE HYDROCHLORIDE 50 MG/ML
50 INJECTION INTRAMUSCULAR; INTRAVENOUS
Status: CANCELLED
Start: 2021-10-07

## 2021-10-07 RX ADMIN — SODIUM CHLORIDE 1000 ML: 9 INJECTION, SOLUTION INTRAVENOUS at 10:41

## 2021-10-07 RX ADMIN — SODIUM CHLORIDE 400 MG: 9 INJECTION, SOLUTION INTRAVENOUS at 11:46

## 2021-10-07 NOTE — LETTER
"    10/7/2021         RE: Santos Marti  [address]  [facility] [address]        Dear Colleague,    Thank you for referring your patient, Santos Marti, to the [facility]. Please see a copy of my visit note below.    Oncology Rooming Note    October 7, 2021 10:12 AM   Santos Marti is a 89 year old male who presents for:    Chief Complaint   Patient presents with     Oncology Clinic Visit     Initial Vitals: There were no vitals taken for this visit. Estimated body mass index is 22.24 kg/m  as calculated from the following:    Height as of 9/8/21: 1.778 m (5' 10\").    Weight as of 9/8/21: 70.3 kg (155 lb). There is no height or weight on file to calculate BSA.  Data Unavailable Comment: Data Unavailable   No LMP for male patient.  Allergies reviewed: Yes  Medications reviewed: Yes    Medications: Medication refills not needed today.  Pharmacy name entered into Zy[employer]:    [id number] - [place], [place] - [address] AT [address] PHARMACY [facility] - [place], [place] - [address] [place] [address] PHARMACY - [place], [place] - [address]    Clinical concerns: no       Chelly Acevedo                Oncology/Hematology Visit Note  Oct 7, 2021    Reason for Visit: follow up of high-grade muscle invasive bladder cancer-patient is not a candidate for surgery or radiation therapy.  Patient had previous radiation to the prostate  Locally advanced bladder cancer pt met with Dr. Ellis who recommended treatment with Keytruda every 6 weeks      Interval History:  Patient reports he is feeling well.  Reports he has been tolerating treatment well  Denies fever chills sweats cough shortness of breath chest pain nausea vomiting diarrhea abdominal pain bleeding  Reports good energy and appetite      Review of Systems:  14 point ROS of systems including Constitutional, Eyes, Respiratory, Cardiovascular, Gastroenterology, " Genitourinary, Integumentary, Muscularskeletal, Psychiatric were all negative except for pertinent positives noted in my HPI.      Physical Examination:  Physical Exam  HENT:      Head: Normocephalic.      Right Ear: Tympanic membrane normal.      Nose: Nose normal.      Mouth/Throat:      Mouth: Mucous membranes are moist.   Eyes:      Pupils: Pupils are equal, round, and reactive to light.   Cardiovascular:      Rate and Rhythm: Normal rate.      Pulses: Normal pulses.   Pulmonary:      Effort: Pulmonary effort is normal.   Abdominal:      General: Abdomen is flat.   Musculoskeletal:         General: Normal range of motion.      Cervical back: Normal range of motion.   Neurological:      Mental Status: He is alert.         Laboratory Data:  CBC and CMP results reviewed      Assessment and Plan:      This is a 89 -year-old male with    high-grade muscle invasive bladder cancer-patient is not a candidate for surgery or radiation therapy.  Patient had previous radiation to the prostate  Locally advanced bladder cancer -pt met with Dr. Ellis who recommended treatment with Keytruda every 6 weeks  Labs reviewed patient has been tolerating treatment well   -continue with immunotherapy  -Schedule for Keytruda every 6 weeks  -Patient has follow-up appointment with Dr. Ellis with next cycle of treatment and to review the CT scan results  -Continue follow-up with urologist and cystoscopy every 3 months      Prostate cancer diagnosed in 2004 treated with brachytherapy and followed [facility]   PSA March 2021 was undetectable      Elevated creatinine   patient denies urinary symptoms  Give 1 L NS bolus hydration today  Recheck creatinine today after the bolus hydration    Pruritus  -He tried Benadryl and steroids cream  He has dermatologist-advised patient to contact dermatologist          Patient is advised to call our clinic or go to emergency room in the event of fever chills sweats  cough shortness of breath chest pain sore throat nausea vomiting diarrhea abdominal pain or bleeding  Or any changes in health condition      CHUCK Dumont CNP  [facility]     Chart documentation with Dragon Voice recognition Software. Although reviewed after completion, some words and grammatical errors may remain.            Again, thank you for allowing me to participate in the care of your patient.        Sincerely,        CHUCK Dumont CNP

## 2021-10-07 NOTE — PROGRESS NOTES
Infusion Nursing Note:  Santos Marti presents today for Cycle 11 Day 1 Keytruda    Patient seen by provider today: Yes: Polo Valderrama NP.   present during visit today: Not Applicable.    Note: N/A.      Intravenous Access:  Implanted Port.    Treatment Conditions:  Lab Results   Component Value Date     10/06/2021    POTASSIUM 3.5 10/06/2021    CR 1.36 (H) 10/06/2021    SAAD 8.9 10/06/2021    BILITOTAL 0.5 10/06/2021    ALBUMIN 3.3 (L) 10/06/2021    ALT 17 10/06/2021    AST 13 10/06/2021     Results reviewed, labs MET treatment parameters, ok to proceed with treatment.      Post Infusion Assessment:  Patient tolerated infusion without incident.  Blood return noted pre and post infusion.  Site patent and intact, free from redness, edema or discomfort.  No evidence of extravasations.  Access discontinued per protocol.       Discharge Plan:   Patient declined prescription refills.  Discharge instructions reviewed with: Patient.  Patient verbalized understanding of discharge instructions and all questions answered.  AVS to patient via [employer].  Patient will return 11/18/21 for next appointment.   Patient discharged in stable condition accompanied by: self.  Departure Mode: Ambulatory.      Emani Rehman RN

## 2021-10-07 NOTE — PROGRESS NOTES
Oncology/Hematology Visit Note  Oct 7, 2021    Reason for Visit: follow up of high-grade muscle invasive bladder cancer-patient is not a candidate for surgery or radiation therapy.  Patient had previous radiation to the prostate  Locally advanced bladder cancer pt met with Dr. Ellis who recommended treatment with Keytruda every 6 weeks      Interval History:  Patient reports he is feeling well.  Reports he has been tolerating treatment well  Denies fever chills sweats cough shortness of breath chest pain nausea vomiting diarrhea abdominal pain bleeding  Reports good energy and appetite      Review of Systems:  14 point ROS of systems including Constitutional, Eyes, Respiratory, Cardiovascular, Gastroenterology, Genitourinary, Integumentary, Muscularskeletal, Psychiatric were all negative except for pertinent positives noted in my HPI.      Physical Examination:  Physical Exam  HENT:      Head: Normocephalic.      Right Ear: Tympanic membrane normal.      Nose: Nose normal.      Mouth/Throat:      Mouth: Mucous membranes are moist.   Eyes:      Pupils: Pupils are equal, round, and reactive to light.   Cardiovascular:      Rate and Rhythm: Normal rate.      Pulses: Normal pulses.   Pulmonary:      Effort: Pulmonary effort is normal.   Abdominal:      General: Abdomen is flat.   Musculoskeletal:         General: Normal range of motion.      Cervical back: Normal range of motion.   Neurological:      Mental Status: He is alert.         Laboratory Data:  CBC and CMP results reviewed      Assessment and Plan:      This is a 89 -year-old male with    high-grade muscle invasive bladder cancer-patient is not a candidate for surgery or radiation therapy.  Patient had previous radiation to the prostate  Locally advanced bladder cancer -pt met with Dr. Ellis who recommended treatment with Keytruda every 6 weeks  Labs reviewed patient has been tolerating treatment well   -continue with immunotherapy  -Schedule for Keytruda every 6  weeks  -Patient has follow-up appointment with Dr. Ellis with next cycle of treatment and to review the CT scan results  -Continue follow-up with urologist and cystoscopy every 3 months      Prostate cancer diagnosed in 2004 treated with brachytherapy and followed by [facility]   PSA March 2021 was undetectable      Elevated creatinine   patient denies urinary symptoms  Give 1 L NS bolus hydration today  Recheck creatinine today after the bolus hydration    Pruritus  -He tried Benadryl and steroids cream  He has dermatologist-advised patient to contact dermatologist          Patient is advised to call our clinic or go to emergency room in the event of fever chills sweats cough shortness of breath chest pain sore throat nausea vomiting diarrhea abdominal pain or bleeding  Or any changes in health condition      CHUCK Dumont [facility] [place]     Chart documentation with Dragon Voice recognition Software. Although reviewed after completion, some words and grammatical errors may remain.

## 2021-10-07 NOTE — PROGRESS NOTES
"Oncology Rooming Note    October 7, 2021 10:12 AM   Santos Marti is a 89 year old male who presents for:    Chief Complaint   Patient presents with     Oncology Clinic Visit     Initial Vitals: There were no vitals taken for this visit. Estimated body mass index is 22.24 kg/m  as calculated from the following:    Height as of 9/8/21: 1.778 m (5' 10\").    Weight as of 9/8/21: 70.3 kg (155 lb). There is no height or weight on file to calculate BSA.  Data Unavailable Comment: Data Unavailable   No LMP for male patient.  Allergies reviewed: Yes  Medications reviewed: Yes    Medications: Medication refills not needed today.  Pharmacy name entered into Cytheris:    [id number] - [place], [place] - [address] AT [facility] & [address] [place] [address] PHARMACY [facility] - [place], [place] - [address] [place] [address] PHARMACY - [place], [place] - [address]    Clinical concerns: no       Chelly Acevedo            "

## 2021-11-08 ENCOUNTER — HOSPITAL ENCOUNTER (OUTPATIENT)
Dept: CT IMAGING | Facility: CLINIC | Age: 86
Discharge: HOME | End: 2021-11-08
Attending: INTERNAL MEDICINE | Admitting: INTERNAL MEDICINE
Payer: MEDICARE

## 2021-11-08 DIAGNOSIS — C67.2 MALIGNANT NEOPLASM OF LATERAL WALL OF URINARY BLADDER (H): ICD-10-CM

## 2021-11-08 DIAGNOSIS — Z87.898 HISTORY OF GROSS HEMATURIA: ICD-10-CM

## 2021-11-08 DIAGNOSIS — C91.10 CLL (CHRONIC LYMPHOCYTIC LEUKEMIA) (H): ICD-10-CM

## 2021-11-08 DIAGNOSIS — N18.31 STAGE 3A CHRONIC KIDNEY DISEASE (H): ICD-10-CM

## 2021-11-08 DIAGNOSIS — D47.2 MGUS (MONOCLONAL GAMMOPATHY OF UNKNOWN SIGNIFICANCE): ICD-10-CM

## 2021-11-08 LAB
CREAT BLD-MCNC: 1.3 MG/DL (ref 0.7–1.3)
GFR SERPL CREATININE-BSD FRML MDRD: 48 ML/MIN/1.73M2

## 2021-11-08 PROCEDURE — 250N000009 HC RX 250: Performed by: INTERNAL MEDICINE

## 2021-11-08 PROCEDURE — 250N000011 HC RX IP 250 OP 636: Performed by: INTERNAL MEDICINE

## 2021-11-08 PROCEDURE — 71260 CT THORAX DX C+: CPT

## 2021-11-08 PROCEDURE — 82565 ASSAY OF CREATININE: CPT

## 2021-11-08 RX ORDER — IOPAMIDOL 755 MG/ML
76 INJECTION, SOLUTION INTRAVASCULAR ONCE
Status: COMPLETED | OUTPATIENT
Start: 2021-11-08 | End: 2021-11-08

## 2021-11-08 RX ADMIN — IOPAMIDOL 76 ML: 755 INJECTION, SOLUTION INTRAVENOUS at 11:35

## 2021-11-08 RX ADMIN — SODIUM CHLORIDE 62 ML: 9 INJECTION, SOLUTION INTRAVENOUS at 11:36

## 2021-11-11 ENCOUNTER — ONCOLOGY VISIT (OUTPATIENT)
Dept: ONCOLOGY | Facility: CLINIC | Age: 86
End: 2021-11-11
Attending: INTERNAL MEDICINE
Payer: MEDICARE

## 2021-11-11 VITALS
SYSTOLIC BLOOD PRESSURE: 167 MMHG | BODY MASS INDEX: 22.56 KG/M2 | TEMPERATURE: 97.6 F | HEART RATE: 76 BPM | HEIGHT: 70 IN | WEIGHT: 157.6 LBS | OXYGEN SATURATION: 97 % | RESPIRATION RATE: 16 BRPM | DIASTOLIC BLOOD PRESSURE: 113 MMHG

## 2021-11-11 DIAGNOSIS — C67.2 MALIGNANT NEOPLASM OF LATERAL WALL OF URINARY BLADDER (H): Primary | ICD-10-CM

## 2021-11-11 DIAGNOSIS — D47.2 MGUS (MONOCLONAL GAMMOPATHY OF UNKNOWN SIGNIFICANCE): ICD-10-CM

## 2021-11-11 DIAGNOSIS — N18.31 STAGE 3A CHRONIC KIDNEY DISEASE (H): ICD-10-CM

## 2021-11-11 DIAGNOSIS — C91.10 CLL (CHRONIC LYMPHOCYTIC LEUKEMIA) (H): ICD-10-CM

## 2021-11-11 DIAGNOSIS — Z87.898 HISTORY OF GROSS HEMATURIA: ICD-10-CM

## 2021-11-11 PROCEDURE — 99213 OFFICE O/P EST LOW 20 MIN: CPT | Performed by: INTERNAL MEDICINE

## 2021-11-11 RX ORDER — LORAZEPAM 2 MG/ML
0.5 INJECTION INTRAMUSCULAR EVERY 4 HOURS PRN
Status: CANCELLED
Start: 2021-11-18

## 2021-11-11 RX ORDER — ALBUTEROL SULFATE 0.83 MG/ML
2.5 SOLUTION RESPIRATORY (INHALATION)
Status: CANCELLED | OUTPATIENT
Start: 2021-11-18

## 2021-11-11 RX ORDER — HEPARIN SODIUM,PORCINE 10 UNIT/ML
5 VIAL (ML) INTRAVENOUS
Status: CANCELLED | OUTPATIENT
Start: 2021-11-18

## 2021-11-11 RX ORDER — EPINEPHRINE 1 MG/ML
0.3 INJECTION, SOLUTION INTRAMUSCULAR; SUBCUTANEOUS EVERY 5 MIN PRN
Status: CANCELLED | OUTPATIENT
Start: 2021-11-18

## 2021-11-11 RX ORDER — MEPERIDINE HYDROCHLORIDE 25 MG/ML
25 INJECTION INTRAMUSCULAR; INTRAVENOUS; SUBCUTANEOUS EVERY 30 MIN PRN
Status: CANCELLED | OUTPATIENT
Start: 2021-11-18

## 2021-11-11 RX ORDER — ALBUTEROL SULFATE 90 UG/1
1-2 AEROSOL, METERED RESPIRATORY (INHALATION)
Status: CANCELLED
Start: 2021-11-18

## 2021-11-11 RX ORDER — NALOXONE HYDROCHLORIDE 0.4 MG/ML
.1-.4 INJECTION, SOLUTION INTRAMUSCULAR; INTRAVENOUS; SUBCUTANEOUS
Status: CANCELLED | OUTPATIENT
Start: 2021-11-18

## 2021-11-11 RX ORDER — SODIUM CHLORIDE 9 MG/ML
1000 INJECTION, SOLUTION INTRAVENOUS CONTINUOUS PRN
Status: CANCELLED
Start: 2021-11-18

## 2021-11-11 RX ORDER — HEPARIN SODIUM (PORCINE) LOCK FLUSH IV SOLN 100 UNIT/ML 100 UNIT/ML
5 SOLUTION INTRAVENOUS
Status: CANCELLED | OUTPATIENT
Start: 2021-11-18

## 2021-11-11 RX ORDER — DIPHENHYDRAMINE HYDROCHLORIDE 50 MG/ML
50 INJECTION INTRAMUSCULAR; INTRAVENOUS
Status: CANCELLED
Start: 2021-11-18

## 2021-11-11 RX ORDER — METHYLPREDNISOLONE SODIUM SUCCINATE 125 MG/2ML
125 INJECTION, POWDER, LYOPHILIZED, FOR SOLUTION INTRAMUSCULAR; INTRAVENOUS
Status: CANCELLED
Start: 2021-11-18

## 2021-11-11 ASSESSMENT — PAIN SCALES - GENERAL: PAINLEVEL: NO PAIN (0)

## 2021-11-11 ASSESSMENT — MIFFLIN-ST. JEOR: SCORE: 1381.12

## 2021-11-11 NOTE — PROGRESS NOTES
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  He was on apixaban for atrial fibrillation.  TURBT done on 10/22/2019 revealed normal muscle invasive bladder cancer  -He was followed with surveillance cystoscopies every 3 months.  His cystoscopy evaluation was negative for any tumors or raised erythema on 3/11/2020, however a follow-up exam on 7/8/2020 revealed raised erythematous area in the left lateral bladder wall.  He had TURBT under anesthesia on 7/15/2020 which is revealed high-grade muscle invasive urothelial carcinoma.  -Santos was diagnosed with prostate cancer in October 2004.  He underwent brachii therapy followed by external beam radiation therapy administered at the [facility].  -Due to his previous radiation for his prostate cancer he is not a candidate for bladder radiation.  He is not a candidate for cystectomy due to his advanced age and also previous extensive radiation to the prostate.  - He was started on pembrolizumab for his locally advanced bladder cancer since 8/13/20.     CURRENT INTERVENTIONS:  Pembrolizumab 400 mg IV every 6 weeks since 8/13/20    SUBJECTIVE   Santos Marti is being followed for muscle invasive bladder cancer    Patient is being followed on therapy for his muscle invasive bladder cancer. He is accompanied by his wife. He has been started on pembrolizumab since August. He has not noticed any side effects on therapy other than itching.  He has itching on both of his hands and legs and trunk.  He has been applying cream on it which does seem to help some.  He has followed up  "with dermatology and they agreed with the choice of the cream being used.      He denies any recurrent hematuria. He has good appetite and fair energy. No side effects suggestive of autoimmune disease other than his itching.       PAST MEDICAL HISTORY     Past Medical History:   Diagnosis Date     Arthritis      Complication of anesthesia      Diabetes (H)      Gastroesophageal reflux disease      Hypertension      Pacemaker          CURRENT OUTPATIENT MEDICATIONS     Current Outpatient Medications   Medication Sig     acetaminophen (TYLENOL) 500 MG tablet Take 500-1,000 mg by mouth every 6 hours as needed for mild pain     apixaban ANTICOAGULANT (ELIQUIS) 5 MG tablet Take 5 mg by mouth 2 times daily      isosorbide mononitrate (IMDUR) 30 MG 24 hr tablet TK 1 T PO ONCE D     lisinopril-hydrochlorothiazide (PRINZIDE/ZESTORETIC) 20-25 MG tablet Take 1 tablet by mouth     metFORMIN (GLUCOPHAGE) 1000 MG tablet Take 500 mg by mouth daily      metoprolol succinate ER (TOPROL-XL) 50 MG 24 hr tablet Take 50 mg by mouth daily Take one and a half tabs daily     omeprazole 20 MG tablet Take 20 mg by mouth daily     triamcinolone (KENALOG) 0.1 % external ointment Apply topically 2 times daily     Current Facility-Administered Medications   Medication     lidocaine (XYLOCAINE) 2 % external gel        ALLERGIES      Allergies   Allergen Reactions     Sulfa Drugs Rash        REVIEW OF SYSTEMS   As above in the HPI, o/w complete 12-point ROS was negative.     PHYSICAL EXAM   BP (!) 167/113   Pulse 76   Temp 97.6  F (36.4  C) (Oral)   Resp 16   Ht 1.778 m (5' 10\")   Wt 71.5 kg (157 lb 9.6 oz)   SpO2 97%   BMI 22.61 kg/m    Limited physical exam during video visit due to COVID19 restrictions  Elderly male in no acute distress  Breathing comfortably, no tachypnea  Speech and hearing normal  Pleasant mood and congruent affect  Moving all extremities, no focal neurologic deficits apparent      LABORATORY AND IMAGING STUDIES "     Recent Labs   Lab Test 11/08/21  1132 10/06/21  0924 08/24/21  1225 08/24/21  1205 07/14/21  1127 06/29/21  1035 06/02/21  1152 05/06/21  1110   NA  --  134 138  --  136  --  138 137   POTASSIUM  --  3.5 4.0  --  3.8  --  4.1 4.0   CHLORIDE  --  100 105  --  103  --  103 105   CO2  --  27 29  --  27  --  32 29   ANIONGAP  --  7 4  --  6  --  3 3   BUN  --  23 27  --  19  --  23 22   CR 1.3 1.36* 1.15 1.2 1.11  --  1.21 1.09   GLC  --  180* 162*  --  213* 155* 221* 216*   SAAD  --  8.9 8.7  --  9.2  --  9.8 9.2     No results for input(s): MAG, PHOS in the last 30123 hours.  Recent Labs   Lab Test 08/24/21  1225 07/14/21  1127 06/02/21  1152 05/06/21  1110 04/21/21  1035   WBC 8.0 7.7 9.1 8.3 6.6   HGB 12.2* 14.0 14.5 13.5 14.5    237 239 227 261   MCV 96 95 96 94 94   NEUTROPHIL 71 68 75.5 71.7 61.4     Recent Labs   Lab Test 10/06/21  0924 08/24/21  1225 07/14/21  1127 06/02/21  1152   BILITOTAL 0.5 0.4 0.5 0.9   ALKPHOS 80 74 81 85   ALT 17 14 17 16   AST 13 12 19 11   ALBUMIN 3.3* 3.2* 3.6 3.7   LDH  --  160 242* 178     TSH   Date Value Ref Range Status   10/06/2021 0.42 0.40 - 4.00 mU/L Final   08/24/2021 0.37 (L) 0.40 - 4.00 mU/L Final   07/14/2021 0.98 0.40 - 4.00 mU/L Final   06/02/2021 0.43 0.40 - 4.00 mU/L Final   05/06/2021 0.70 0.40 - 4.00 mU/L Final   04/21/2021 0.67 0.40 - 4.00 mU/L Final     No results for input(s): CEA in the last 18878 hours.  Results for orders placed or performed during the hospital encounter of 11/08/21   CT Chest/Abdomen/Pelvis w Contrast    Narrative    CT CHEST/ABDOMEN/PELVIS W CONTRAST 11/8/2021 11:50 AM    CLINICAL HISTORY: Muscle invasive bladder cancer on pembrolizumab;  Malignant neoplasm of lateral wall of urinary bladder (H); Stage 3a  chronic kidney disease (H); History of gross hematuria; CLL (chronic  lymphocytic leukemia) (H); MGUS (monoclonal gammopathy of unknown  significance)    TECHNIQUE: CT scan of the chest, abdomen, and pelvis was  performed  following injection of IV contrast. Multiplanar reformats were  obtained. Dose reduction techniques were used.   CONTRAST:  76mL [id number]    COMPARISON: 8/24/2021, and 6/29/2021    FINDINGS:   LUNGS AND PLEURA: Few small pulmonary nodules. For example, there is a  10 mm right posterior lower lobe nodule (series 6, image 186) that is  stable since 6/29/2021, right upper lobe 7 mm nodule (series 6, image  91) and 7 mm nodule nodular in the lingula (series 6, image 115). No  new or enlarging nodules. No infiltrate or pleural effusion.    MEDIASTINUM/AXILLAE: No lymphadenopathy. Pacemaker. Mild cardiomegaly.  No thoracic aortic aneurysm. Severe coronary artery calcifications. No  pericardial effusion.    HEPATOBILIARY: Normal.    PANCREAS: Normal.    SPLEEN: Normal.    ADRENAL GLANDS: Normal.    KIDNEYS/BLADDER: Normal kidneys. The urinary bladder cannot be  evaluated due to streak artifact from bilateral total hip  arthroplasties.    BOWEL: No obstruction or inflammatory change.    PELVIC ORGANS: Evaluation significantly limited by streak artifact  from bilateral total hip arthroplasties. Brachytherapy seeds in the  prostate gland.    ADDITIONAL FINDINGS: No lymphadenopathy in the abdomen and pelvis. No  abdominal aortic aneurysm. No free fluid or fluid collections.    MUSCULOSKELETAL: Bilateral shoulder and hip arthroplasties. Mild  degenerative changes in the spine. No suspicious lesions in the bones.      Impression    IMPRESSION:  1.  Stable pulmonary nodules, including the largest 11 mm nodule in  the right lower lobe since at least 6/29/2021.  2.  No metastatic disease in the abdomen and pelvis. Evaluation of the  pelvis is slightly limited by streak artifact from bilateral total hip  arthroplasties.    AMADEO SMITH MD         SYSTEM ID:  [id number]          ASSESSMENT AND PLAN   1. High-grade muscle invasive bladder cancer in a patient not a candidate for either surgery or radiation  therapy  2. Prostate cancer treated with brachii therapy followed by external beam radiation therapy  3. Hypertension, diabetes mellitus type 2, atrial fibrillation on chronic anticoagulation with apixaban    I had a lengthy discussion with patient at this clinic visit. He has been started on pembrolizumab for his muscle invasive bladder cancer on 8/13/20. He has tolerated this well. He has not noticed any side effects on therapy other than itching. He denies any hematuria.     He has itching almost all over his body.  He has been applying triamcinolone cream as recommended.  He has not seen a big change.  He has followed up with dermatology and they did not have any additional recommendations.  I think his itching is secondary to pembrolizumab immunotherapy.  Definitely holding therapy would help, as would systemic steroids.  I would try to continue therapy as long as feasible.  In addition to the steroid creams he could keep his skin moisturized.     I have reviewed all of the labs done prior to this clinic visit.  Labs are all completely normal including electrolytes, renal function, hepatic panel except for mild hypoalbuminemia.      I reviewed actual images from his restaging CT scan. His bladder tumor cannot be assessed on the CT portion due to the bilateral hip replacements. Monitoring response to this disease is going to be a little difficult as it is only accurately measured on a PET-CT and we cannot use the PET scan for continued response assessment. I would follow him with CT scans. As long as he is not having a clear disease progression, we could continue on therapy as current. He is being followed up with cystoscopy. I would plan to continue therapy for 2 years unless he has unacceptable side effects or disease progression.    He had several questions on Covid vaccine misinformation in the community.  I do agree about the insane propaganda online.  I attribute this to politicization of public health  issue which has sowed doubts in the mindset of millions.  People with no experience in infectious disease and in particular viral infection epidemics are giving comments as they were leading experts in the field.  Majority of them have no idea of how virus infections work and would not be able to define a disease, pathology or pandemic/epidemic.  We can do what is within her means and should always wear masks for protection.  He has taken the third-booster dose of COVID-19 vaccine.  He has taken his influenza immunization as he should.    I will have him follow with [facility] for the next infusion and I will see him in 3 months with labs including urine cytology and CT chest, abdomen and pelvis prior to visit.       25 minutes spent on the date of the encounter doing chart review, history and exam, documentation and further activities as noted above      Imtiaz Ellis    Hematologist and Medical Oncologist  [facility]

## 2021-11-11 NOTE — PROGRESS NOTES
"Oncology Rooming Note    November 11, 2021 10:07 AM   Santos Marti is a 89 year old male who presents for:    Chief Complaint   Patient presents with     Oncology Clinic Visit     Malignant neoplasm of lateral wall of urinary bladder      Initial Vitals: BP (!) 167/113   Pulse 76   Temp 97.6  F (36.4  C) (Oral)   Resp 16   Ht 1.778 m (5' 10\")   Wt 71.5 kg (157 lb 9.6 oz)   SpO2 97%   BMI 22.61 kg/m   Estimated body mass index is 22.61 kg/m  as calculated from the following:    Height as of this encounter: 1.778 m (5' 10\").    Weight as of this encounter: 71.5 kg (157 lb 9.6 oz). Body surface area is 1.88 meters squared.  No Pain (0) Comment: Data Unavailable   No LMP for male patient.  Allergies reviewed: Yes  Medications reviewed: Yes    Medications: Medication refills not needed today.  Pharmacy name entered into [employer]:    [id number] - [place], [place] - [address] AT [facility] [address] [place] [address] PHARMACY [facility] - [place], [place] - [address] [place] [address] PHARMACY - [place], [place] - [address]    Clinical concerns: None       Kimberly Huerta MA              "

## 2021-11-11 NOTE — LETTER
"    11/11/2021         RE: Santos Marti  [address]  [facility] [address]        Dear Colleague,    Thank you for referring your patient, Santos Marti, to the [facility]. Please see a copy of my visit note below.    Oncology Rooming Note    November 11, 2021 10:07 AM   Santos Marti is a 89 year old male who presents for:    Chief Complaint   Patient presents with     Oncology Clinic Visit     Malignant neoplasm of lateral wall of urinary bladder      Initial Vitals: BP (!) 167/113   Pulse 76   Temp 97.6  F (36.4  C) (Oral)   Resp 16   Ht 1.778 m (5' 10\")   Wt 71.5 kg (157 lb 9.6 oz)   SpO2 97%   BMI 22.61 kg/m   Estimated body mass index is 22.61 kg/m  as calculated from the following:    Height as of this encounter: 1.778 m (5' 10\").    Weight as of this encounter: 71.5 kg (157 lb 9.6 oz). Body surface area is 1.88 meters squared.  No Pain (0) Comment: Data Unavailable   No LMP for male patient.  Allergies reviewed: Yes  Medications reviewed: Yes    Medications: Medication refills not needed today.  Pharmacy name entered into Jibo:    [facility] #[id number] - [place], [place] - [address] AT [facility] & [address] [place] [address] PHARMACY [facility] - [address] [facility]ARMACY - [place], [place] - [address]    Clinical concerns: None       Kimberly Huerta MA                [facility]  HEMATOLOGY AND ONCOLOGY    FOLLOW-UP VISIT NOTE    PATIENT NAME: Santos Marti MRN # [id number]  DATE OF VISIT: Nov 11, 2021 YOB: 1931    REFERRING PROVIDER: Maximilian Costello    CANCER TYPE: High-grade invasive carcinoma, 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  He was on apixaban for atrial fibrillation.  TURBT done on 10/22/2019 revealed normal muscle " invasive bladder cancer  -He was followed with surveillance cystoscopies every 3 months.  His cystoscopy evaluation was negative for any tumors or raised erythema on 3/11/2020, however a follow-up exam on 7/8/2020 revealed raised erythematous area in the left lateral bladder wall.  He had TURBT under anesthesia on 7/15/2020 which is revealed high-grade muscle invasive urothelial carcinoma.  -Santos was diagnosed with prostate cancer in October 2004.  He underwent brachii therapy followed by external beam radiation therapy administered at the [facility].  -Due to his previous radiation for his prostate cancer he is not a candidate for bladder radiation.  He is not a candidate for cystectomy due to his advanced age and also previous extensive radiation to the prostate.  - He was started on pembrolizumab for his locally advanced bladder cancer since 8/13/20.     CURRENT INTERVENTIONS:  Pembrolizumab 400 mg IV every 6 weeks since 8/13/20    SUBJECTIVE   Santos Marti is being followed for muscle invasive bladder cancer    Patient is being followed on therapy for his muscle invasive bladder cancer. He is accompanied by his wife. He has been started on pembrolizumab since August. He has not noticed any side effects on therapy other than itching.  He has itching on both of his hands and legs and trunk.  He has been applying cream on it which does seem to help some.  He has followed up with dermatology and they agreed with the choice of the cream being used.      He denies any recurrent hematuria. He has good appetite and fair energy. No side effects suggestive of autoimmune disease other than his itching.       PAST MEDICAL HISTORY     Past Medical History:   Diagnosis Date     Arthritis      Complication of anesthesia      Diabetes (H)      Gastroesophageal reflux disease      Hypertension      Pacemaker          CURRENT OUTPATIENT MEDICATIONS     Current Outpatient Medications   Medication Sig      "acetaminophen (TYLENOL) 500 MG tablet Take 500-1,000 mg by mouth every 6 hours as needed for mild pain     apixaban ANTICOAGULANT (ELIQUIS) 5 MG tablet Take 5 mg by mouth 2 times daily      isosorbide mononitrate (IMDUR) 30 MG 24 hr tablet TK 1 T PO ONCE D     lisinopril-hydrochlorothiazide (PRINZIDE/ZESTORETIC) 20-25 MG tablet Take 1 tablet by mouth     metFORMIN (GLUCOPHAGE) 1000 MG tablet Take 500 mg by mouth daily      metoprolol succinate ER (TOPROL-XL) 50 MG 24 hr tablet Take 50 mg by mouth daily Take one and a half tabs daily     omeprazole 20 MG tablet Take 20 mg by mouth daily     triamcinolone (KENALOG) 0.1 % external ointment Apply topically 2 times daily     Current Facility-Administered Medications   Medication     lidocaine (XYLOCAINE) 2 % external gel        ALLERGIES      Allergies   Allergen Reactions     Sulfa Drugs Rash        REVIEW OF SYSTEMS   As above in the HPI, o/w complete 12-point ROS was negative.     PHYSICAL EXAM   BP (!) 167/113   Pulse 76   Temp 97.6  F (36.4  C) (Oral)   Resp 16   Ht 1.778 m (5' 10\")   Wt 71.5 kg (157 lb 9.6 oz)   SpO2 97%   BMI 22.61 kg/m    Limited physical exam during video visit due to COVID19 restrictions  Elderly male in no acute distress  Breathing comfortably, no tachypnea  Speech and hearing normal  Pleasant mood and congruent affect  Moving all extremities, no focal neurologic deficits apparent      LABORATORY AND IMAGING STUDIES     Recent Labs   Lab Test 11/08/21  1132 10/06/21  0924 08/24/21  1225 08/24/21  1205 07/14/21  1127 06/29/21  1035 06/02/21  1152 05/06/21  1110   NA  --  134 138  --  136  --  138 137   POTASSIUM  --  3.5 4.0  --  3.8  --  4.1 4.0   CHLORIDE  --  100 105  --  103  --  103 105   CO2  --  27 29  --  27  --  32 29   ANIONGAP  --  7 4  --  6  --  3 3   BUN  --  23 27  --  19  --  23 22   CR 1.3 1.36* 1.15 1.2 1.11  --  1.21 1.09   GLC  --  180* 162*  --  213* 155* 221* 216*   SAAD  --  8.9 8.7  --  9.2  --  9.8 9.2     No " results for input(s): MAG, PHOS in the last 18544 hours.  Recent Labs   Lab Test 08/24/21  1225 07/14/21  1127 06/02/21  1152 05/06/21  1110 04/21/21  1035   WBC 8.0 7.7 9.1 8.3 6.6   HGB 12.2* 14.0 14.5 13.5 14.5    237 239 227 261   MCV 96 95 96 94 94   NEUTROPHIL 71 68 75.5 71.7 61.4     Recent Labs   Lab Test 10/06/21  0924 08/24/21  1225 07/14/21  1127 06/02/21  1152   BILITOTAL 0.5 0.4 0.5 0.9   ALKPHOS 80 74 81 85   ALT 17 14 17 16   AST 13 12 19 11   ALBUMIN 3.3* 3.2* 3.6 3.7   LDH  --  160 242* 178     TSH   Date Value Ref Range Status   10/06/2021 0.42 0.40 - 4.00 mU/L Final   08/24/2021 0.37 (L) 0.40 - 4.00 mU/L Final   07/14/2021 0.98 0.40 - 4.00 mU/L Final   06/02/2021 0.43 0.40 - 4.00 mU/L Final   05/06/2021 0.70 0.40 - 4.00 mU/L Final   04/21/2021 0.67 0.40 - 4.00 mU/L Final     No results for input(s): CEA in the last 22663 hours.  Results for orders placed or performed during the hospital encounter of 11/08/21   CT Chest/Abdomen/Pelvis w Contrast    Narrative    CT CHEST/ABDOMEN/PELVIS W CONTRAST 11/8/2021 11:50 AM    CLINICAL HISTORY: Muscle invasive bladder cancer on pembrolizumab;  Malignant neoplasm of lateral wall of urinary bladder (H); Stage 3a  chronic kidney disease (H); History of gross hematuria; CLL (chronic  lymphocytic leukemia) (H); MGUS (monoclonal gammopathy of unknown  significance)    TECHNIQUE: CT scan of the chest, abdomen, and pelvis was performed  following injection of IV contrast. Multiplanar reformats were  obtained. Dose reduction techniques were used.   CONTRAST:  76mL Isovue-370    COMPARISON: 8/24/2021, and 6/29/2021    FINDINGS:   LUNGS AND PLEURA: Few small pulmonary nodules. For example, there is a  10 mm right posterior lower lobe nodule (series 6, image 186) that is  stable since 6/29/2021, right upper lobe 7 mm nodule (series 6, image  91) and 7 mm nodule nodular in the lingula (series 6, image 115). No  new or enlarging nodules. No infiltrate or pleural  effusion.    MEDIASTINUM/AXILLAE: No lymphadenopathy. Pacemaker. Mild cardiomegaly.  No thoracic aortic aneurysm. Severe coronary artery calcifications. No  pericardial effusion.    HEPATOBILIARY: Normal.    PANCREAS: Normal.    SPLEEN: Normal.    ADRENAL GLANDS: Normal.    KIDNEYS/BLADDER: Normal kidneys. The urinary bladder cannot be  evaluated due to streak artifact from bilateral total hip  arthroplasties.    BOWEL: No obstruction or inflammatory change.    PELVIC ORGANS: Evaluation significantly limited by streak artifact  from bilateral total hip arthroplasties. Brachytherapy seeds in the  prostate gland.    ADDITIONAL FINDINGS: No lymphadenopathy in the abdomen and pelvis. No  abdominal aortic aneurysm. No free fluid or fluid collections.    MUSCULOSKELETAL: Bilateral shoulder and hip arthroplasties. Mild  degenerative changes in the spine. No suspicious lesions in the bones.      Impression    IMPRESSION:  1.  Stable pulmonary nodules, including the largest 11 mm nodule in  the right lower lobe since at least 6/29/2021.  2.  No metastatic disease in the abdomen and pelvis. Evaluation of the  pelvis is slightly limited by streak artifact from bilateral total hip  arthroplasties.    AMADEO SMITH MD         SYSTEM ID:  [id number]          ASSESSMENT AND PLAN   1. High-grade muscle invasive bladder cancer in a patient not a candidate for either surgery or radiation therapy  2. Prostate cancer treated with brachii therapy followed by external beam radiation therapy  3. Hypertension, diabetes mellitus type 2, atrial fibrillation on chronic anticoagulation with apixaban    I had a lengthy discussion with patient at this clinic visit. He has been started on pembrolizumab for his muscle invasive bladder cancer on 8/13/20. He has tolerated this well. He has not noticed any side effects on therapy other than itching. He denies any hematuria.     He has itching almost all over his body.  He has been applying  triamcinolone cream as recommended.  He has not seen a big change.  He has followed up with dermatology and they did not have any additional recommendations.  I think his itching is secondary to pembrolizumab immunotherapy.  Definitely holding therapy would help, as would systemic steroids.  I would try to continue therapy as long as feasible.  In addition to the steroid creams he could keep his skin moisturized.     I have reviewed all of the labs done prior to this clinic visit.  Labs are all completely normal including electrolytes, renal function, hepatic panel except for mild hypoalbuminemia.      I reviewed actual images from his restaging CT scan. His bladder tumor cannot be assessed on the CT portion due to the bilateral hip replacements. Monitoring response to this disease is going to be a little difficult as it is only accurately measured on a PET-CT and we cannot use the PET scan for continued response assessment. I would follow him with CT scans. As long as he is not having a clear disease progression, we could continue on therapy as current. He is being followed up with cystoscopy. I would plan to continue therapy for 2 years unless he has unacceptable side effects or disease progression.    He had several questions on Covid vaccine misinformation in the community.  I do agree about the insane propaganda online.  I attribute this to politicization of public health issue which has sowed doubts in the mindset of millions.  People with no experience in infectious disease and in particular viral infection epidemics are giving comments as they were leading experts in the field.  Majority of them have no idea of how virus infections work and would not be able to define a disease, pathology or pandemic/epidemic.  We can do what is within her means and should always wear masks for protection.  He has taken the third-booster dose of COVID-19 vaccine.  He has taken his influenza immunization as he should.    I  will have him follow with Polo Valderrama for the next infusion and I will see him in 3 months with labs including urine cytology and CT chest, abdomen and pelvis prior to visit.       25 minutes spent on the date of the encounter doing chart review, history and exam, documentation and further activities as noted above      Imtiaz Ellis    Hematologist and Medical Oncologist  [facility]        Again, thank you for allowing me to participate in the care of your patient.        Sincerely,        Imtiaz Ellis MD

## 2021-11-17 ENCOUNTER — LAB (OUTPATIENT)
Dept: INFUSION THERAPY | Facility: CLINIC | Age: 86
End: 2021-11-17
Attending: INTERNAL MEDICINE
Payer: MEDICARE

## 2021-11-17 DIAGNOSIS — C67.2 MALIGNANT NEOPLASM OF LATERAL WALL OF URINARY BLADDER (H): Primary | ICD-10-CM

## 2021-11-17 LAB
ALBUMIN SERPL-MCNC: 3.8 G/DL (ref 3.4–5)
ALP SERPL-CCNC: 93 U/L (ref 40–150)
ALT SERPL W P-5'-P-CCNC: 20 U/L (ref 0–70)
ANION GAP SERPL CALCULATED.3IONS-SCNC: 5 MMOL/L (ref 3–14)
AST SERPL W P-5'-P-CCNC: 14 U/L (ref 0–45)
BILIRUB SERPL-MCNC: 0.3 MG/DL (ref 0.2–1.3)
BUN SERPL-MCNC: 19 MG/DL (ref 7–30)
CALCIUM SERPL-MCNC: 9.3 MG/DL (ref 8.5–10.1)
CHLORIDE BLD-SCNC: 103 MMOL/L (ref 94–109)
CO2 SERPL-SCNC: 30 MMOL/L (ref 20–32)
CREAT SERPL-MCNC: 1.1 MG/DL (ref 0.66–1.25)
GFR SERPL CREATININE-BSD FRML MDRD: 59 ML/MIN/1.73M2
GLUCOSE BLD-MCNC: 157 MG/DL (ref 70–99)
HOLD SPECIMEN: NORMAL
POTASSIUM BLD-SCNC: 4 MMOL/L (ref 3.4–5.3)
PROT SERPL-MCNC: 7.7 G/DL (ref 6.8–8.8)
SODIUM SERPL-SCNC: 138 MMOL/L (ref 133–144)
TSH SERPL DL<=0.005 MIU/L-ACNC: 1.69 MU/L (ref 0.4–4)

## 2021-11-17 PROCEDURE — 80053 COMPREHEN METABOLIC PANEL: CPT | Performed by: INTERNAL MEDICINE

## 2021-11-17 PROCEDURE — 36415 COLL VENOUS BLD VENIPUNCTURE: CPT | Performed by: INTERNAL MEDICINE

## 2021-11-17 PROCEDURE — 84443 ASSAY THYROID STIM HORMONE: CPT | Performed by: INTERNAL MEDICINE

## 2021-11-18 ENCOUNTER — INFUSION THERAPY VISIT (OUTPATIENT)
Dept: INFUSION THERAPY | Facility: CLINIC | Age: 86
End: 2021-11-18
Attending: INTERNAL MEDICINE
Payer: MEDICARE

## 2021-11-18 VITALS
HEIGHT: 70 IN | DIASTOLIC BLOOD PRESSURE: 85 MMHG | TEMPERATURE: 97.5 F | HEART RATE: 56 BPM | BODY MASS INDEX: 22.48 KG/M2 | RESPIRATION RATE: 16 BRPM | SYSTOLIC BLOOD PRESSURE: 157 MMHG | WEIGHT: 157 LBS

## 2021-11-18 DIAGNOSIS — C67.2 MALIGNANT NEOPLASM OF LATERAL WALL OF URINARY BLADDER (H): Primary | ICD-10-CM

## 2021-11-18 PROCEDURE — 96413 CHEMO IV INFUSION 1 HR: CPT

## 2021-11-18 PROCEDURE — 258N000003 HC RX IP 258 OP 636: Performed by: INTERNAL MEDICINE

## 2021-11-18 PROCEDURE — 250N000011 HC RX IP 250 OP 636: Performed by: INTERNAL MEDICINE

## 2021-11-18 RX ADMIN — SODIUM CHLORIDE 1000 ML: 9 INJECTION, SOLUTION INTRAVENOUS at 10:04

## 2021-11-18 RX ADMIN — SODIUM CHLORIDE 400 MG: 9 INJECTION, SOLUTION INTRAVENOUS at 10:31

## 2021-11-18 ASSESSMENT — MIFFLIN-ST. JEOR: SCORE: 1378.4

## 2021-11-18 NOTE — PROGRESS NOTES
Infusion Nursing Note:  Santos Marti presents today for keytruda.    Patient seen by provider today: No   present during visit today: Not Applicable.    Note: Pt saw Rhonda last week-notified provider of itching since starting keytruda at his appt.      Intravenous Access:  Peripheral IV placed.    Treatment Conditions:  Lab Results   Component Value Date     11/17/2021    POTASSIUM 4.0 11/17/2021    CR 1.10 11/17/2021    SAAD 9.3 11/17/2021    BILITOTAL 0.3 11/17/2021    ALBUMIN 3.8 11/17/2021    ALT 20 11/17/2021    AST 14 11/17/2021     Results reviewed, labs MET treatment parameters, ok to proceed with treatment.      Post Infusion Assessment:  Patient tolerated infusion without incident.  Blood return noted pre and post infusion.  Site patent and intact, free from redness, edema or discomfort.  No evidence of extravasations.  Access discontinued per protocol.       Discharge Plan:   Discharge instructions reviewed with: Patient.  Patient and/or family verbalized understanding of discharge instructions and all questions answered.  Copy of AVS mailed to patient and/or family.  Patient will return in 6 weeks for next appointment.  Patient discharged in stable condition accompanied by: self.  Departure Mode: Ambulatory.      Lacy Smith RN

## 2021-12-08 ENCOUNTER — OFFICE VISIT (OUTPATIENT)
Dept: UROLOGY | Facility: CLINIC | Age: 86
End: 2021-12-08
Payer: MEDICARE

## 2021-12-08 VITALS
WEIGHT: 150 LBS | SYSTOLIC BLOOD PRESSURE: 140 MMHG | BODY MASS INDEX: 21.47 KG/M2 | DIASTOLIC BLOOD PRESSURE: 70 MMHG | HEIGHT: 70 IN

## 2021-12-08 DIAGNOSIS — R39.9 UTI SYMPTOMS: ICD-10-CM

## 2021-12-08 DIAGNOSIS — C67.9 MALIGNANT NEOPLASM OF URINARY BLADDER, UNSPECIFIED SITE (H): Primary | ICD-10-CM

## 2021-12-08 LAB
ALBUMIN UR-MCNC: 100 MG/DL
APPEARANCE UR: CLEAR
BILIRUB UR QL STRIP: NEGATIVE
COLOR UR AUTO: YELLOW
GLUCOSE UR STRIP-MCNC: NEGATIVE MG/DL
HGB UR QL STRIP: ABNORMAL
KETONES UR STRIP-MCNC: ABNORMAL MG/DL
LEUKOCYTE ESTERASE UR QL STRIP: ABNORMAL
NITRATE UR QL: POSITIVE
PH UR STRIP: 5.5 [PH] (ref 5–7)
SP GR UR STRIP: 1.02 (ref 1–1.03)
UROBILINOGEN UR STRIP-ACNC: 1 E.U./DL

## 2021-12-08 PROCEDURE — 87086 URINE CULTURE/COLONY COUNT: CPT | Performed by: UROLOGY

## 2021-12-08 PROCEDURE — 81003 URINALYSIS AUTO W/O SCOPE: CPT | Mod: QW | Performed by: UROLOGY

## 2021-12-08 PROCEDURE — 87186 SC STD MICRODIL/AGAR DIL: CPT | Performed by: UROLOGY

## 2021-12-08 RX ORDER — LIDOCAINE HYDROCHLORIDE 20 MG/ML
JELLY TOPICAL ONCE
Status: DISCONTINUED | OUTPATIENT
Start: 2021-12-08 | End: 2021-12-08 | Stop reason: CLARIF

## 2021-12-08 ASSESSMENT — MIFFLIN-ST. JEOR: SCORE: 1346.65

## 2021-12-08 ASSESSMENT — PAIN SCALES - GENERAL: PAINLEVEL: NO PAIN (0)

## 2021-12-08 NOTE — NURSING NOTE
Chief Complaint   Patient presents with     Malignant neoplasm of urinary bladder         Bridgette Yanez

## 2021-12-08 NOTE — LETTER
Date:December 16, 2021      Provider requested that no letter be sent. Do not send.       [facility]

## 2021-12-08 NOTE — LETTER
12/8/2021       RE: [place] [facility]  [address] [address]     Dear Colleague,    Thank you for referring your patient, Santos Marti, to the [facility] [place] at [facility]. Please see a copy of my visit note below.    Urine positive - will reschedule.  No charge visit.  Manoj Mistry M.D.        Again, thank you for allowing me to participate in the care of your patient.      Sincerely,    Manoj Mistry MD

## 2021-12-10 ENCOUNTER — TELEPHONE (OUTPATIENT)
Dept: UROLOGY | Facility: CLINIC | Age: 86
End: 2021-12-10
Payer: MEDICARE

## 2021-12-10 DIAGNOSIS — N39.0 URINARY TRACT INFECTION: Primary | ICD-10-CM

## 2021-12-10 LAB — BACTERIA UR CULT: ABNORMAL

## 2021-12-10 RX ORDER — CIPROFLOXACIN 500 MG/1
500 TABLET, FILM COATED ORAL 2 TIMES DAILY
Qty: 10 TABLET | Refills: 0 | Status: SHIPPED | OUTPATIENT
Start: 2021-12-10 | End: 2021-12-15

## 2021-12-10 NOTE — TELEPHONE ENCOUNTER
Spoke with Santos and gave him the results and Rx information as below. He expressed understanding. Rx sent to pt's pharmacy.  INDIA Borrego RN      ----- Message from Manoj Mistry MD sent at 12/9/2021  4:59 PM CST -----  Please contact the patient to inform them of their UCx results.    Plan  -Please send in a prescription for antibiotics per protocol    Thanks,  Manoj Mistry MD

## 2021-12-29 ENCOUNTER — LAB (OUTPATIENT)
Dept: INFUSION THERAPY | Facility: CLINIC | Age: 86
End: 2021-12-29
Attending: INTERNAL MEDICINE
Payer: MEDICARE

## 2021-12-29 DIAGNOSIS — C67.2 MALIGNANT NEOPLASM OF LATERAL WALL OF URINARY BLADDER (H): Primary | ICD-10-CM

## 2021-12-29 LAB
ALBUMIN SERPL-MCNC: 3.8 G/DL (ref 3.4–5)
ALP SERPL-CCNC: 85 U/L (ref 40–150)
ALT SERPL W P-5'-P-CCNC: 18 U/L (ref 0–70)
ANION GAP SERPL CALCULATED.3IONS-SCNC: 3 MMOL/L (ref 3–14)
AST SERPL W P-5'-P-CCNC: 13 U/L (ref 0–45)
BASOPHILS # BLD AUTO: 0 10E3/UL (ref 0–0.2)
BASOPHILS NFR BLD AUTO: 0 %
BILIRUB SERPL-MCNC: 0.6 MG/DL (ref 0.2–1.3)
BUN SERPL-MCNC: 19 MG/DL (ref 7–30)
CALCIUM SERPL-MCNC: 9.3 MG/DL (ref 8.5–10.1)
CHLORIDE BLD-SCNC: 103 MMOL/L (ref 94–109)
CO2 SERPL-SCNC: 30 MMOL/L (ref 20–32)
CREAT SERPL-MCNC: 1.16 MG/DL (ref 0.66–1.25)
EOSINOPHIL # BLD AUTO: 0.1 10E3/UL (ref 0–0.7)
EOSINOPHIL NFR BLD AUTO: 2 %
ERYTHROCYTE [DISTWIDTH] IN BLOOD BY AUTOMATED COUNT: 13.1 % (ref 10–15)
GFR SERPL CREATININE-BSD FRML MDRD: 60 ML/MIN/1.73M2
GLUCOSE BLD-MCNC: 197 MG/DL (ref 70–99)
HCT VFR BLD AUTO: 45.7 % (ref 40–53)
HGB BLD-MCNC: 14.7 G/DL (ref 13.3–17.7)
IMM GRANULOCYTES # BLD: 0 10E3/UL
IMM GRANULOCYTES NFR BLD: 0 %
LYMPHOCYTES # BLD AUTO: 1.5 10E3/UL (ref 0.8–5.3)
LYMPHOCYTES NFR BLD AUTO: 22 %
MCH RBC QN AUTO: 31.7 PG (ref 26.5–33)
MCHC RBC AUTO-ENTMCNC: 32.2 G/DL (ref 31.5–36.5)
MCV RBC AUTO: 99 FL (ref 78–100)
MONOCYTES # BLD AUTO: 0.5 10E3/UL (ref 0–1.3)
MONOCYTES NFR BLD AUTO: 8 %
NEUTROPHILS # BLD AUTO: 4.5 10E3/UL (ref 1.6–8.3)
NEUTROPHILS NFR BLD AUTO: 68 %
NRBC # BLD AUTO: 0 10E3/UL
NRBC BLD AUTO-RTO: 0 /100
PLATELET # BLD AUTO: 185 10E3/UL (ref 150–450)
POTASSIUM BLD-SCNC: 4.3 MMOL/L (ref 3.4–5.3)
PROT SERPL-MCNC: 7.5 G/DL (ref 6.8–8.8)
RBC # BLD AUTO: 4.64 10E6/UL (ref 4.4–5.9)
SODIUM SERPL-SCNC: 136 MMOL/L (ref 133–144)
TSH SERPL DL<=0.005 MIU/L-ACNC: 0.68 MU/L (ref 0.4–4)
WBC # BLD AUTO: 6.7 10E3/UL (ref 4–11)

## 2021-12-29 PROCEDURE — 85004 AUTOMATED DIFF WBC COUNT: CPT | Performed by: INTERNAL MEDICINE

## 2021-12-29 PROCEDURE — 36415 COLL VENOUS BLD VENIPUNCTURE: CPT

## 2021-12-29 PROCEDURE — 84443 ASSAY THYROID STIM HORMONE: CPT | Performed by: INTERNAL MEDICINE

## 2021-12-29 PROCEDURE — 80053 COMPREHEN METABOLIC PANEL: CPT | Performed by: INTERNAL MEDICINE

## 2021-12-29 NOTE — PROGRESS NOTES
Medical Assistant Note:  Santos Marti presents today for lab draw.    Patient seen by provider today: No.   present during visit today: Not Applicable.    Concerns: No Concerns.    Procedure:  Lab draw site: RAC, Needle type: BF, Gauge: 21. Gauze and coban applied    Post Assessment:  Labs drawn without difficulty: Yes.    Discharge Plan:  Departure Mode: Ambulatory.    Face to Face Time: 5.    Tanya Ceja CMA

## 2021-12-30 ENCOUNTER — ONCOLOGY VISIT (OUTPATIENT)
Dept: ONCOLOGY | Facility: CLINIC | Age: 86
End: 2021-12-30
Attending: INTERNAL MEDICINE
Payer: MEDICARE

## 2021-12-30 ENCOUNTER — INFUSION THERAPY VISIT (OUTPATIENT)
Dept: INFUSION THERAPY | Facility: CLINIC | Age: 86
End: 2021-12-30
Attending: INTERNAL MEDICINE
Payer: MEDICARE

## 2021-12-30 VITALS
OXYGEN SATURATION: 89 % | DIASTOLIC BLOOD PRESSURE: 96 MMHG | BODY MASS INDEX: 21.9 KG/M2 | SYSTOLIC BLOOD PRESSURE: 152 MMHG | RESPIRATION RATE: 16 BRPM | WEIGHT: 153 LBS | HEART RATE: 61 BPM | TEMPERATURE: 97.4 F | HEIGHT: 70 IN

## 2021-12-30 VITALS — SYSTOLIC BLOOD PRESSURE: 149 MMHG | DIASTOLIC BLOOD PRESSURE: 86 MMHG

## 2021-12-30 DIAGNOSIS — C67.2 MALIGNANT NEOPLASM OF LATERAL WALL OF URINARY BLADDER (H): Primary | ICD-10-CM

## 2021-12-30 PROCEDURE — 258N000003 HC RX IP 258 OP 636: Performed by: NURSE PRACTITIONER

## 2021-12-30 PROCEDURE — 99214 OFFICE O/P EST MOD 30 MIN: CPT | Performed by: NURSE PRACTITIONER

## 2021-12-30 PROCEDURE — 96413 CHEMO IV INFUSION 1 HR: CPT

## 2021-12-30 PROCEDURE — G0463 HOSPITAL OUTPT CLINIC VISIT: HCPCS | Mod: 25

## 2021-12-30 PROCEDURE — 250N000011 HC RX IP 250 OP 636: Performed by: NURSE PRACTITIONER

## 2021-12-30 RX ORDER — ALBUTEROL SULFATE 90 UG/1
1-2 AEROSOL, METERED RESPIRATORY (INHALATION)
Status: CANCELLED
Start: 2021-12-30

## 2021-12-30 RX ORDER — EPINEPHRINE 1 MG/ML
0.3 INJECTION, SOLUTION INTRAMUSCULAR; SUBCUTANEOUS EVERY 5 MIN PRN
Status: CANCELLED | OUTPATIENT
Start: 2021-12-30

## 2021-12-30 RX ORDER — HEPARIN SODIUM (PORCINE) LOCK FLUSH IV SOLN 100 UNIT/ML 100 UNIT/ML
5 SOLUTION INTRAVENOUS
Status: CANCELLED | OUTPATIENT
Start: 2021-12-30

## 2021-12-30 RX ORDER — METHYLPREDNISOLONE SODIUM SUCCINATE 125 MG/2ML
125 INJECTION, POWDER, LYOPHILIZED, FOR SOLUTION INTRAMUSCULAR; INTRAVENOUS
Status: CANCELLED
Start: 2021-12-30

## 2021-12-30 RX ORDER — SODIUM CHLORIDE 9 MG/ML
1000 INJECTION, SOLUTION INTRAVENOUS CONTINUOUS PRN
Status: CANCELLED
Start: 2021-12-30

## 2021-12-30 RX ORDER — ALBUTEROL SULFATE 0.83 MG/ML
2.5 SOLUTION RESPIRATORY (INHALATION)
Status: CANCELLED | OUTPATIENT
Start: 2021-12-30

## 2021-12-30 RX ORDER — HEPARIN SODIUM,PORCINE 10 UNIT/ML
5 VIAL (ML) INTRAVENOUS
Status: CANCELLED | OUTPATIENT
Start: 2021-12-30

## 2021-12-30 RX ORDER — LORAZEPAM 2 MG/ML
0.5 INJECTION INTRAMUSCULAR EVERY 4 HOURS PRN
Status: CANCELLED
Start: 2021-12-30

## 2021-12-30 RX ORDER — DIPHENHYDRAMINE HYDROCHLORIDE 50 MG/ML
50 INJECTION INTRAMUSCULAR; INTRAVENOUS
Status: CANCELLED
Start: 2021-12-30

## 2021-12-30 RX ORDER — HEPARIN SODIUM (PORCINE) LOCK FLUSH IV SOLN 100 UNIT/ML 100 UNIT/ML
5 SOLUTION INTRAVENOUS
Status: DISCONTINUED | OUTPATIENT
Start: 2021-12-30 | End: 2021-12-30 | Stop reason: HOSPADM

## 2021-12-30 RX ORDER — NALOXONE HYDROCHLORIDE 0.4 MG/ML
.1-.4 INJECTION, SOLUTION INTRAMUSCULAR; INTRAVENOUS; SUBCUTANEOUS
Status: CANCELLED | OUTPATIENT
Start: 2021-12-30

## 2021-12-30 RX ORDER — MEPERIDINE HYDROCHLORIDE 25 MG/ML
25 INJECTION INTRAMUSCULAR; INTRAVENOUS; SUBCUTANEOUS EVERY 30 MIN PRN
Status: CANCELLED | OUTPATIENT
Start: 2021-12-30

## 2021-12-30 RX ADMIN — SODIUM CHLORIDE 1000 ML: 9 INJECTION, SOLUTION INTRAVENOUS at 11:21

## 2021-12-30 RX ADMIN — SODIUM CHLORIDE 400 MG: 9 INJECTION, SOLUTION INTRAVENOUS at 11:24

## 2021-12-30 ASSESSMENT — MIFFLIN-ST. JEOR: SCORE: 1360.25

## 2021-12-30 ASSESSMENT — PAIN SCALES - GENERAL: PAINLEVEL: NO PAIN (0)

## 2021-12-30 NOTE — LETTER
"    12/30/2021         RE: Santos Marti  [address]  [facility] [address]        Dear Colleague,    Thank you for referring your patient, Santos Marti, to the [facility]. Please see a copy of my visit note below.    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    Weight as of this encounter: 69.4 kg (153 lb). Body surface area is 1.85 meters squared.  No Pain (0) Comment: Data Unavailable   No LMP for male patient.  Allergies reviewed: Yes  Medications reviewed: Yes    Medications: Medication refills not needed today.  Pharmacy name entered into Broadcast.mobi:    [facility] #[id number] - [place], [place] - [address] AT [address] PHARMACY [facility] - [address] [facility]ARMACY - [place], [place] - [address]    Clinical concerns: None       Kimberly Huerta MA                  Oncology/Hematology Visit Note  Dec 30, 2021    Reason for Visit: follow up of high-grade muscle invasive bladder cancer-patient is not a candidate for surgery or radiation therapy.  Patient had previous radiation to the prostate  Locally advanced bladder cancer pt met with Dr. Ellis who recommended treatment with Keytruda every 6 weeks dose       Interval History:  Patient reports he is feeling well.  Reports he has been tolerating Keytruda well.  Denies fever chills sweats cough shortness of breath chest pain nausea vomiting diarrhea " abdominal pain or bleeding  -Patient reports he is going to [place] and will return in March.  He does not want me to schedule infusion in [place] in February.    Review of Systems:  14 point ROS of systems including Constitutional, Eyes, Respiratory, Cardiovascular, Gastroenterology, Genitourinary, Integumentary, Muscularskeletal, Psychiatric were all negative except for pertinent positives noted in my HPI.      Physical Examination:  Physical Exam  HENT:      Head: Normocephalic.      Right Ear: Tympanic membrane normal.      Nose: Nose normal.      Mouth/Throat:      Mouth: Mucous membranes are moist.   Eyes:      Pupils: Pupils are equal, round, and reactive to light.   Cardiovascular:      Rate and Rhythm: Normal rate.      Pulses: Normal pulses.   Pulmonary:      Effort: Pulmonary effort is normal.   Abdominal:      General: Abdomen is flat.   Musculoskeletal:         General: Normal range of motion.      Cervical back: Normal range of motion.   Neurological:      Mental Status: He is alert.         Laboratory Data:  CBC and CMP results reviewed      Assessment and Plan:      This is a 89 -year-old male with    high-grade muscle invasive bladder cancer-patient is not a candidate for surgery or radiation therapy.  Patient had previous radiation to the prostate  Locally advanced bladder cancer -pt met with Dr. Ellis who recommended treatment with Keytruda every 6 weeks  Labs reviewed patient has been tolerating treatment well   -continue with immunotherapy  -Schedule for Keytruda every 6 weeks  -Continue follow-up with urologist and cystoscopy every 3 months-Next cystoscopy scheduled in January  -Patient reports he is going to [place] and will stay there until first week of March  -I informed patient that he is due for next Keytruda in February and that we can schedule him to get Keytruda infusion in [place] however patient refuses appointment.  Reports he does not want to deal with infusion while in [place].  -We  will schedule Keytruda infusion in March and follow-up with        Prostate cancer diagnosed in 2004 treated with brachytherapy and followed [facility]   PSA March 2021 was undetectable      Patient is advised to call our clinic or go to emergency room in the event of fever chills sweats cough shortness of breath chest pain sore throat nausea vomiting diarrhea abdominal pain or bleeding  Or any changes in health condition      CHUCK Dumont CNP  [facility]     Chart documentation with Dragon Voice recognition Software. Although reviewed after completion, some words and grammatical errors may remain.            Again, thank you for allowing me to participate in the care of your patient.        Sincerely,        CHUCK Dumont CNP

## 2021-12-30 NOTE — PROGRESS NOTES
Infusion Nursing Note:  Santos Marti presents today for C13D1 Keytruda.    Patient seen by provider today: Yes: seen by Polo Valderrama NP   present during visit today: Not Applicable.    Note: Patient reports to tolerating Keytruda.  His largest complaint is his consistent itching that doesn't seem to be controlled with antihistamines and topical creams.    Intravenous Access:  Peripheral IV placed.    Treatment Conditions:  Lab Results   Component Value Date    HGB 14.7 12/29/2021    WBC 6.7 12/29/2021    ANEU 6.8 06/02/2021    ANEUTAUTO 4.5 12/29/2021     12/29/2021      Lab Results   Component Value Date     12/29/2021    POTASSIUM 4.3 12/29/2021    CR 1.16 12/29/2021    SAAD 9.3 12/29/2021    BILITOTAL 0.6 12/29/2021    ALBUMIN 3.8 12/29/2021    ALT 18 12/29/2021    AST 13 12/29/2021     Results reviewed, labs MET treatment parameters, ok to proceed with treatment.      Post Infusion Assessment:  Patient tolerated infusion without incident.  Blood return noted pre and post infusion.  Site patent and intact, free from redness, edema or discomfort.  No evidence of extravasations.  Access discontinued per protocol.       Discharge Plan:   Patient declined prescription refills.  Discharge instructions reviewed with: Patient.  Patient and/or family verbalized understanding of discharge instructions and all questions answered.  Copy of AVS reviewed with patient and/or family.  Patient will return plan to return in March for his next infusion. Scheduling to notify patient of next infusions.  Patient discharged in stable condition accompanied by: self.  Departure Mode: Ambulatory.      Bridgette Rios RN

## 2021-12-30 NOTE — PROGRESS NOTES
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    Weight as of this encounter: 69.4 kg (153 lb). Body surface area is 1.85 meters squared.  No Pain (0) Comment: Data Unavailable   No LMP for male patient.  Allergies reviewed: Yes  Medications reviewed: Yes    Medications: Medication refills not needed today.  Pharmacy name entered into OpenVPN:    [facility] #[id number] - [place], [place] - [address] AT [facility] & [address] [place] [address] PHARMACY [facility] - [place], [place] - [address] [place] [address] PHARMACY - [place], [place] - [address]    Clinical concerns: None       Kimberly Huerta MA              "

## 2021-12-30 NOTE — PROGRESS NOTES
Oncology/Hematology Visit Note  Dec 30, 2021    Reason for Visit: follow up of high-grade muscle invasive bladder cancer-patient is not a candidate for surgery or radiation therapy.  Patient had previous radiation to the prostate  Locally advanced bladder cancer pt met with Dr. Ellis who recommended treatment with Keytruda every 6 weeks dose       Interval History:  Patient reports he is feeling well.  Reports he has been tolerating Keytruda well.  Denies fever chills sweats cough shortness of breath chest pain nausea vomiting diarrhea abdominal pain or bleeding  -Patient reports he is going to [place] and will return in March.  He does not want me to schedule infusion in [place] in February.    Review of Systems:  14 point ROS of systems including Constitutional, Eyes, Respiratory, Cardiovascular, Gastroenterology, Genitourinary, Integumentary, Muscularskeletal, Psychiatric were all negative except for pertinent positives noted in my HPI.      Physical Examination:  Physical Exam  HENT:      Head: Normocephalic.      Right Ear: Tympanic membrane normal.      Nose: Nose normal.      Mouth/Throat:      Mouth: Mucous membranes are moist.   Eyes:      Pupils: Pupils are equal, round, and reactive to light.   Cardiovascular:      Rate and Rhythm: Normal rate.      Pulses: Normal pulses.   Pulmonary:      Effort: Pulmonary effort is normal.   Abdominal:      General: Abdomen is flat.   Musculoskeletal:         General: Normal range of motion.      Cervical back: Normal range of motion.   Neurological:      Mental Status: He is alert.         Laboratory Data:  CBC and CMP results reviewed      Assessment and Plan:      This is a 89 -year-old male with    high-grade muscle invasive bladder cancer-patient is not a candidate for surgery or radiation therapy.  Patient had previous radiation to the prostate  Locally advanced bladder cancer -pt met with Dr. Ellis who recommended treatment with Keytruda every 6 weeks  Labs  reviewed patient has been tolerating treatment well   -continue with immunotherapy  -Schedule for Keytruda every 6 weeks  -Continue follow-up with urologist and cystoscopy every 3 months-Next cystoscopy scheduled in January  -Patient reports he is going to [place] and will stay there until first week of March  -I informed patient that he is due for next Keytruda in February and that we can schedule him to get Keytruda infusion in [place] however patient refuses appointment.  Reports he does not want to deal with infusion while in [place].  -We will schedule Keytruda infusion in March and follow-up with        Prostate cancer diagnosed in 2004 treated with brachytherapy and followed by [facility]   PSA March 2021 was undetectable      Patient is advised to call our clinic or go to emergency room in the event of fever chills sweats cough shortness of breath chest pain sore throat nausea vomiting diarrhea abdominal pain or bleeding  Or any changes in health condition      CHUCK Dumont [facility]     Chart documentation with Dragon Voice recognition Software. Although reviewed after completion, some words and grammatical errors may remain.

## 2022-01-12 ENCOUNTER — OFFICE VISIT (OUTPATIENT)
Dept: UROLOGY | Facility: CLINIC | Age: 87
End: 2022-01-12
Payer: MEDICARE

## 2022-01-12 VITALS
SYSTOLIC BLOOD PRESSURE: 130 MMHG | HEART RATE: 72 BPM | HEIGHT: 70 IN | DIASTOLIC BLOOD PRESSURE: 80 MMHG | BODY MASS INDEX: 22.19 KG/M2 | WEIGHT: 155 LBS

## 2022-01-12 DIAGNOSIS — C61 PROSTATE CANCER (H): ICD-10-CM

## 2022-01-12 DIAGNOSIS — C67.2 MALIGNANT NEOPLASM OF LATERAL WALL OF URINARY BLADDER (H): Primary | ICD-10-CM

## 2022-01-12 LAB
ALBUMIN UR-MCNC: ABNORMAL MG/DL
APPEARANCE UR: CLEAR
BILIRUB UR QL STRIP: NEGATIVE
COLOR UR AUTO: YELLOW
GLUCOSE UR STRIP-MCNC: NEGATIVE MG/DL
HGB UR QL STRIP: NEGATIVE
KETONES UR STRIP-MCNC: ABNORMAL MG/DL
LEUKOCYTE ESTERASE UR QL STRIP: NEGATIVE
NITRATE UR QL: NEGATIVE
PH UR STRIP: 5 [PH] (ref 5–7)
SP GR UR STRIP: >=1.03 (ref 1–1.03)
UROBILINOGEN UR STRIP-ACNC: 0.2 E.U./DL

## 2022-01-12 PROCEDURE — 52000 CYSTOURETHROSCOPY: CPT | Performed by: UROLOGY

## 2022-01-12 PROCEDURE — 88112 CYTOPATH CELL ENHANCE TECH: CPT | Performed by: PATHOLOGY

## 2022-01-12 PROCEDURE — 99213 OFFICE O/P EST LOW 20 MIN: CPT | Mod: 25 | Performed by: UROLOGY

## 2022-01-12 PROCEDURE — 81003 URINALYSIS AUTO W/O SCOPE: CPT | Mod: QW | Performed by: UROLOGY

## 2022-01-12 RX ORDER — LIDOCAINE HYDROCHLORIDE 20 MG/ML
JELLY TOPICAL ONCE
Status: COMPLETED | OUTPATIENT
Start: 2022-01-12 | End: 2022-01-12

## 2022-01-12 RX ADMIN — LIDOCAINE HYDROCHLORIDE 5 ML: 20 JELLY TOPICAL at 16:05

## 2022-01-12 ASSESSMENT — PAIN SCALES - GENERAL: PAINLEVEL: NO PAIN (0)

## 2022-01-12 ASSESSMENT — MIFFLIN-ST. JEOR: SCORE: 1369.33

## 2022-01-12 NOTE — PROGRESS NOTES
"[facility]   CHIEF COMPLAINT   It was my pleasure to see Santos Marti who is a 89 year old male for follow-up of prostate cancer and bladder cancer.      HPI   Santos Marti is a very pleasant 89 year old male who is a prior patient of Dr. Costello with history of prostate cancer and muscle invasive bladder cancer.  He also follows with Dr. Ellis.  His initial diagnosis was in October 2019.  He was started on pembrolizumab for locally advanced bladder cancer on 8/13/2020.  He has a history of prostate cancer diagnosed in October 2004 for which he [facility].  He is been deemed not a surgical candidate or a radiation candidate for his bladder cancer given his prior treatment for his prostate cancer.    He was last seen by Dr. Costello on 3/5/2021 for office cystoscopy with fulguration of a small papillary tumor above the right ureteral orifice.    6/7/21:  Follow-up today for surveillance cystoscopy  Doing well with no issues    9/8/21:  Follow-up today for surveillance cystoscopy  He has been doing fairly well  He notes that he is dry overnight however has some daytime frequency and urgency and uses pads  No gross hematuria  Cystoscopy with no evidence of disease    TODAY 1/12/22:  He returns for surveillance cystoscopy  He has been doing well with no change in symptoms    PHYSICAL EXAM  Patient is a 89 year old  male   Vitals: Blood pressure 130/80, pulse 72, height 1.778 m (5' 10\"), weight 70.3 kg (155 lb).  Body mass index is 22.24 kg/m .  General Appearance Adult:   Alert, no acute distress, oriented  HENT: throat/mouth:normal, good dentition  Lungs: no respiratory distress, or pursed lip breathing  Heart: No obvious jugular venous distension present  Abdomen: soft, nontender, no organomegaly or masses  Musculoskeltal: extremities normal, no peripheral edema  Skin: no suspicious lesions or rashes  Neuro: Alert, oriented, speech and mentation " normal  Psych: affect and mood normal  Gait: Normal  : penis, scrotum, testes normal    PSA   Date Value Ref Range Status   03/10/2021 <0.01 0 - 4 ug/L Final     Comment:     Assay Method:  Chemiluminescence using Siemens Vista analyzer      UA RESULTS:  Recent Labs   Lab Test 01/12/22  1522 07/22/20  0810 07/16/20  2120   COLOR Yellow   < > Light Yellow   APPEARANCE Clear   < > Clear   URINEGLC Negative   < > Negative   URINEBILI Negative   < > Negative   URINEKETONE Trace*   < > Negative   SG >=1.030   < > 1.014   UBLD Negative   < > Moderate*   URINEPH 5.0   < > 5.5   PROTEIN Trace*   < > 50*   UROBILINOGEN 0.2   < >  --    NITRITE Negative   < > Negative   LEUKEST Negative   < > Small*   RBCU  --   --  71*   WBCU  --   --  20*    < > = values in this interval not displayed.      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   -Tumor invades lamina propria and muscularis propria.   -Urothelial (transitional cell) carcinoma in-situ: Not identified.   -Lymph/vascular space invasion: Not identified.   -Sampling includes: Urothelium, lamina propria, and muscularis propria.      IMAGING  All pertinent imaging reviewed:    All imaging studies reviewed by me.  I personally reviewed these imaging films.  A formal report from radiology will follow.    CT CHEST/ABD/PEL 11/8/21:  FINDINGS:   LUNGS AND PLEURA: Few small pulmonary nodules. For example, there is a  10 mm right posterior lower lobe nodule (series 6, image 186) that is  stable since 6/29/2021, right upper lobe 7 mm nodule (series 6, image  91) and 7 mm nodule nodular in the lingula (series 6, image 115). No  new or enlarging nodules. No infiltrate or pleural effusion.     MEDIASTINUM/AXILLAE: No lymphadenopathy. Pacemaker. Mild cardiomegaly.  No thoracic aortic aneurysm. Severe coronary  artery calcifications. No  pericardial effusion.     HEPATOBILIARY: Normal.     PANCREAS: Normal.     SPLEEN: Normal.     ADRENAL GLANDS: Normal.     KIDNEYS/BLADDER: Normal kidneys. The urinary bladder cannot be  evaluated due to streak artifact from bilateral total hip  arthroplasties.     BOWEL: No obstruction or inflammatory change.     PELVIC ORGANS: Evaluation significantly limited by streak artifact  from bilateral total hip arthroplasties. Brachytherapy seeds in the  prostate gland.     ADDITIONAL FINDINGS: No lymphadenopathy in the abdomen and pelvis. No  abdominal aortic aneurysm. No free fluid or fluid collections.     MUSCULOSKELETAL: Bilateral shoulder and hip arthroplasties. Mild  degenerative changes in the spine. No suspicious lesions in the bones.                                                                      IMPRESSION:  1.  Stable pulmonary nodules, including the largest 11 mm nodule in  the right lower lobe since at least 6/29/2021.  2.  No metastatic disease in the abdomen and pelvis. Evaluation of the  pelvis is slightly limited by streak artifact from bilateral total hip  arthroplasties.     CYSTOSCOPY PROCEDURE NOTE:     Santos Marti is a 89 year old male  who presents with muscle invasive bladder cancer for cystoscop.     Pt ID verified with patient: Yes      Procedure verified with patient: Yes      Procedure confirmed with physician and support staff: Yes      Consent form confirmed with physician and support staff.     Sign In  History and Physical Exam reviewed .  Informed Consent Discussed: Yes   Sign in Communication: Yes   Time Out:  Team Confirms the Correct Patient, Correct Procedure; Yes , Correct Site and Site Marking, Correct Position (if applicable).     Affirmation of Time Out: Yes   Sign Out:  Sign Out Discussion: Yes   Physician: Manoj Mistry MD     A urinalysis was performed revealing no evidence of infection.     The benefits, risks, alternatives of the  cystoscopy procedure and personnel were discussed with the patient. The verbal consent was obtained and the patient agrees to proceed.        Description of procedure:   After fully informed, voluntary consent was obtained, the patient was brought into the procedure room, identified and placed in a supine position on the cystoscopy table.  The groin/scrotum were prepped with betadine and draped in a sterile fashion.  Urojet lidocaine gel was introduced.  A 15F flexible cystoscope was inserted into the urethra, and the bladder and urethra wereexamined in a systematic manner.  The patient tolerated the procedure well and there were no complications.       Cystoscopic findings:  The urethra was normal without strictures.  The prostate was 3-cm long and demonstrated mild bilobar hypertrophy and evidence of prior radiation.  There was a slight narrowing at the prostate apex/membranous urethra.  There was no median lobe.  The external sphincter coapted and the bladder neck was normal. The bladder was  entered and careful pan endoscopy was carried out. The posterior, superior and lateral walls and dome of the bladder were all well visualized and the scope was retroflexed upon itself.  There was moderate trabeculation.  There were no neoplasms, stones, or diverticula identifed.  The ureteric orifices were normal in position and number and effluxing clear urine.  The area of prior fulguration was identified with no evidence of recurrence    ASSESSMENT and PLAN  89-year-old man with history of prostate cancer status post combined brachii and external beam radiation therapy in 2004 and muscle invasive bladder cancer on pembrolizumab who presents for surveillance cystoscopy    Muscle invasive bladder cancer  -No evidence of recurrence on today's cystoscopy  -Follow-up in 3 months for next surveillance cystoscopy  -Reviewed his prior biopsy results and the pathology report  -I reviewed his recent CT chest abdomen pelvis from  11/2021 and agree with radiology interpretation  -Continue to follow with Dr. Ellis    Prostate cancer  -PSA from March was undetectable      Time spent: 15 minutes spent on the date of the encounter doing chart review, history and exam, documentation and further activities as noted above.  This was in addition to cystoscopy time    Manoj Mistry MD   Urology  [facility]  [facility] Phone: [phone]  [facility] Phone: [phone]

## 2022-01-12 NOTE — LETTER
"1/12/2022       RE: Santos Marti  [address]  [place] [address]     Dear Colleague,    Thank you for referring your patient, Santos Marti, to the [facility] at [facility]. Please see a copy of my visit note below.    [place]   CHIEF COMPLAINT   It was my pleasure to see Santos Marti who is a 89 year old male for follow-up of prostate cancer and bladder cancer.      HPI   Santos Marti is a very pleasant 89 year old male who is a prior patient of Dr. Costello with history of prostate cancer and muscle invasive bladder cancer.  He also follows with Dr. Ellis.  His initial diagnosis was in October 2019.  He was started on pembrolizumab for locally advanced bladder cancer on 8/13/2020.  He has a history of prostate cancer diagnosed in October 2004 for which he [facility]  He is been deemed not a surgical candidate or a radiation candidate for his bladder cancer given his prior treatment for his prostate cancer.    He was last seen by Dr. Costello on 3/5/2021 for office cystoscopy with fulguration of a small papillary tumor above the right ureteral orifice.    6/7/21:  Follow-up today for surveillance cystoscopy  Doing well with no issues    9/8/21:  Follow-up today for surveillance cystoscopy  He has been doing fairly well  He notes that he is dry overnight however has some daytime frequency and urgency and uses pads  No gross hematuria  Cystoscopy with no evidence of disease    TODAY 1/12/22:  He returns for surveillance cystoscopy  He has been doing well with no change in symptoms    PHYSICAL EXAM  Patient is a 89 year old  male   Vitals: Blood pressure 130/80, pulse 72, height 1.778 m (5' 10\"), weight 70.3 kg (155 lb).  Body mass index is 22.24 kg/m .  General Appearance Adult:   Alert, no acute distress, oriented  HENT: throat/mouth:normal, good " dentition  Lungs: no respiratory distress, or pursed lip breathing  Heart: No obvious jugular venous distension present  Abdomen: soft, nontender, no organomegaly or masses  Musculoskeltal: extremities normal, no peripheral edema  Skin: no suspicious lesions or rashes  Neuro: Alert, oriented, speech and mentation normal  Psych: affect and mood normal  Gait: Normal  : penis, scrotum, testes normal    PSA   Date Value Ref Range Status   03/10/2021 <0.01 0 - 4 ug/L Final     Comment:     Assay Method:  Chemiluminescence using Siemens Vista analyzer      UA RESULTS:  Recent Labs   Lab Test 01/12/22  1522 07/22/20  0810 07/16/20  2120   COLOR Yellow   < > Light Yellow   APPEARANCE Clear   < > Clear   URINEGLC Negative   < > Negative   URINEBILI Negative   < > Negative   URINEKETONE Trace*   < > Negative   SG >=1.030   < > 1.014   UBLD Negative   < > Moderate*   URINEPH 5.0   < > 5.5   PROTEIN Trace*   < > 50*   UROBILINOGEN 0.2   < >  --    NITRITE Negative   < > Negative   LEUKEST Negative   < > Small*   RBCU  --   --  71*   WBCU  --   --  20*    < > = values in this interval not displayed.      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   -Tumor invades lamina propria and muscularis propria.   -Urothelial (transitional cell) carcinoma in-situ: Not identified.   -Lymph/vascular space invasion: Not identified.   -Sampling includes: Urothelium, lamina propria, and muscularis propria.      IMAGING  All pertinent imaging reviewed:    All imaging studies reviewed by me.  I personally reviewed these imaging films.  A formal report from radiology will follow.    CT CHEST/ABD/PEL 11/8/21:  FINDINGS:   LUNGS AND PLEURA: Few small pulmonary nodules. For example, there is a  10 mm right posterior lower lobe nodule (series 6, image 186) that is  stable  since 6/29/2021, right upper lobe 7 mm nodule (series 6, image  91) and 7 mm nodule nodular in the lingula (series 6, image 115). No  new or enlarging nodules. No infiltrate or pleural effusion.     MEDIASTINUM/AXILLAE: No lymphadenopathy. Pacemaker. Mild cardiomegaly.  No thoracic aortic aneurysm. Severe coronary artery calcifications. No  pericardial effusion.     HEPATOBILIARY: Normal.     PANCREAS: Normal.     SPLEEN: Normal.     ADRENAL GLANDS: Normal.     KIDNEYS/BLADDER: Normal kidneys. The urinary bladder cannot be  evaluated due to streak artifact from bilateral total hip  arthroplasties.     BOWEL: No obstruction or inflammatory change.     PELVIC ORGANS: Evaluation significantly limited by streak artifact  from bilateral total hip arthroplasties. Brachytherapy seeds in the  prostate gland.     ADDITIONAL FINDINGS: No lymphadenopathy in the abdomen and pelvis. No  abdominal aortic aneurysm. No free fluid or fluid collections.     MUSCULOSKELETAL: Bilateral shoulder and hip arthroplasties. Mild  degenerative changes in the spine. No suspicious lesions in the bones.                                                                      IMPRESSION:  1.  Stable pulmonary nodules, including the largest 11 mm nodule in  the right lower lobe since at least 6/29/2021.  2.  No metastatic disease in the abdomen and pelvis. Evaluation of the  pelvis is slightly limited by streak artifact from bilateral total hip  arthroplasties.     CYSTOSCOPY PROCEDURE NOTE:     Santos Marti is a 89 year old male  who presents with muscle invasive bladder cancer for cystoscop.     Pt ID verified with patient: Yes      Procedure verified with patient: Yes      Procedure confirmed with physician and support staff: Yes      Consent form confirmed with physician and support staff.     Sign In  History and Physical Exam reviewed .  Informed Consent Discussed: Yes   Sign in Communication: Yes   Time Out:  Team Confirms the Correct  Patient, Correct Procedure; Yes , Correct Site and Site Marking, Correct Position (if applicable).     Affirmation of Time Out: Yes   Sign Out:  Sign Out Discussion: Yes   Physician: Manoj Mistry MD     A urinalysis was performed revealing no evidence of infection.     The benefits, risks, alternatives of the cystoscopy procedure and personnel were discussed with the patient. The verbal consent was obtained and the patient agrees to proceed.        Description of procedure:   After fully informed, voluntary consent was obtained, the patient was brought into the procedure room, identified and placed in a supine position on the cystoscopy table.  The groin/scrotum were prepped with betadine and draped in a sterile fashion.  Urojet lidocaine gel was introduced.  A 15F flexible cystoscope was inserted into the urethra, and the bladder and urethra wereexamined in a systematic manner.  The patient tolerated the procedure well and there were no complications.       Cystoscopic findings:  The urethra was normal without strictures.  The prostate was 3-cm long and demonstrated mild bilobar hypertrophy and evidence of prior radiation.  There was a slight narrowing at the prostate apex/membranous urethra.  There was no median lobe.  The external sphincter coapted and the bladder neck was normal. The bladder was  entered and careful pan endoscopy was carried out. The posterior, superior and lateral walls and dome of the bladder were all well visualized and the scope was retroflexed upon itself.  There was moderate trabeculation.  There were no neoplasms, stones, or diverticula identifed.  The ureteric orifices were normal in position and number and effluxing clear urine.  The area of prior fulguration was identified with no evidence of recurrence    ASSESSMENT and PLAN  89-year-old man with history of prostate cancer status post combined brachii and external beam radiation therapy in 2004 and muscle invasive bladder cancer on  pembrolizumab who presents for surveillance cystoscopy    Muscle invasive bladder cancer  -No evidence of recurrence on today's cystoscopy  -Follow-up in 3 months for next surveillance cystoscopy  -Reviewed his prior biopsy results and the pathology report  -I reviewed his recent CT chest abdomen pelvis from 11/2021 and agree with radiology interpretation  -Continue to follow with Dr. Ellis    Prostate cancer  -PSA from March was undetectable      Time spent: 15 minutes spent on the date of the encounter doing chart review, history and exam, documentation and further activities as noted above.  This was in addition to cystoscopy time    Manoj Mistry MD   Urology  [facility]  [facility] Phone: [phone]  [facility] Phone: [phone]

## 2022-01-12 NOTE — NURSING NOTE
Chief Complaint   Patient presents with     malignant neoplasm of lateral wall of urinary bladder     in office cystoscopy today   Prior to the start of the procedure and with procedural staff participation, I verbally confirmed the patient s identity using two indicators, relevant allergies, that the procedure was appropriate and matched the consent or emergent situation, and that the correct equipment/implants were available. Immediately prior to starting the procedure I conducted the Time Out with the procedural staff and re-confirmed the patient s name, procedure, and site/side. I have wiped the patient off with the povidone-Iodine solution, draped them,  used Lidocaine hydrochloride jelly, and instilled sterile water into the bladder. (The Joint Commission universal protocol was followed.)  Yes    Sedation (Moderate or Deep): None  5mL 2% lidocaine hydrochloride Urojet instilled into urethra.    NDC# [address]-1  Lot #: [id number]  Expiration Date:  7-23  Emani Moore LPN    
No

## 2022-01-12 NOTE — PATIENT INSTRUCTIONS
"AFTER YOUR CYSTOSCOPY  ?  ?  You have just completed a cystoscopy, or \"cysto\", which allowed your physician to learn more about your bladder (or to remove a stent placed after surgery). We suggest that you continue to avoid caffeine, fruit juice, and alcohol for the next 24 hours, however, you are encouraged to return to your normal activities.  ?  ?  A few things that are considered normal after your cystoscopy:  ?  * small amount of bleeding (or spotting) that clears within the next 24 hours  ?  * slight burning sensation with urination  ?  * sensation of needing to void (urinate) more frequently  ?  * the feeling of \"air\" in your urine  ?  * mild discomfort that is relieved with Tylenol    * bladder spasms  ?  ?  ?  Please contact our office promptly if you:  ?  * develop a fever above 101 degrees  ?  * are unable to urinate  ?  * develop bright red blood that does not stop  ?  * experience severe pain or swelling  ?  ?  ?  And of course, please contact our office with any concerns or questions [phone]  ?    AFTER YOUR CYSTOSCOPY        You have just completed a cystoscopy, or \"cysto\", which allowed your physician to learn more about your bladder (or to remove a stent placed after surgery). We suggest that you continue to avoid caffeine, fruit juice, and alcohol for the next 24 hours, however, you are encouraged to return to your normal activities.         A few things that are considered normal after your cystoscopy:     * Small amount of bleeding (or spotting) that clears within the next 24 hours     * Slight burning sensation with urination     * Sensation to of needing to avoid more frequently     * The feeling of \"air\" in your urine     * Mild discomfort that is relieved with Tylenol        Please contact our office promptly if you:     * Develop a fever above 101 degrees     * Are unable to urinate     * Develop bright red blood that does not stop     * Severe pain or swelling         Please contact " our office with any concerns or questions @[facility].

## 2022-02-19 ENCOUNTER — HEALTH MAINTENANCE LETTER (OUTPATIENT)
Age: 87
End: 2022-02-19

## 2022-03-09 ENCOUNTER — LAB (OUTPATIENT)
Dept: INFUSION THERAPY | Facility: CLINIC | Age: 87
End: 2022-03-09
Attending: INTERNAL MEDICINE
Payer: MEDICARE

## 2022-03-09 DIAGNOSIS — C67.2 MALIGNANT NEOPLASM OF LATERAL WALL OF URINARY BLADDER (H): ICD-10-CM

## 2022-03-09 DIAGNOSIS — Z91.89 AT RISK FOR INJURY FROM CHEMOTHERAPY: ICD-10-CM

## 2022-03-09 DIAGNOSIS — R53.83 OTHER FATIGUE: ICD-10-CM

## 2022-03-09 LAB
ALBUMIN SERPL-MCNC: 3.7 G/DL (ref 3.4–5)
ALP SERPL-CCNC: 94 U/L (ref 40–150)
ALT SERPL W P-5'-P-CCNC: 13 U/L (ref 0–70)
ANION GAP SERPL CALCULATED.3IONS-SCNC: 6 MMOL/L (ref 3–14)
AST SERPL W P-5'-P-CCNC: 14 U/L (ref 0–45)
BILIRUB SERPL-MCNC: 0.7 MG/DL (ref 0.2–1.3)
BUN SERPL-MCNC: 19 MG/DL (ref 7–30)
CALCIUM SERPL-MCNC: 9.3 MG/DL (ref 8.5–10.1)
CHLORIDE BLD-SCNC: 103 MMOL/L (ref 94–109)
CO2 SERPL-SCNC: 30 MMOL/L (ref 20–32)
CREAT SERPL-MCNC: 1.12 MG/DL (ref 0.66–1.25)
GFR SERPL CREATININE-BSD FRML MDRD: 62 ML/MIN/1.73M2
GLUCOSE BLD-MCNC: 234 MG/DL (ref 70–99)
POTASSIUM BLD-SCNC: 3.8 MMOL/L (ref 3.4–5.3)
PROT SERPL-MCNC: 7.2 G/DL (ref 6.8–8.8)
SODIUM SERPL-SCNC: 139 MMOL/L (ref 133–144)
TSH SERPL DL<=0.005 MIU/L-ACNC: 0.53 MU/L (ref 0.4–4)

## 2022-03-09 PROCEDURE — 80053 COMPREHEN METABOLIC PANEL: CPT | Performed by: INTERNAL MEDICINE

## 2022-03-09 PROCEDURE — 84443 ASSAY THYROID STIM HORMONE: CPT | Performed by: INTERNAL MEDICINE

## 2022-03-09 PROCEDURE — 82040 ASSAY OF SERUM ALBUMIN: CPT | Performed by: INTERNAL MEDICINE

## 2022-03-09 PROCEDURE — 36415 COLL VENOUS BLD VENIPUNCTURE: CPT

## 2022-03-09 NOTE — PROGRESS NOTES
Medical Assistant Note:  Santos Marti presents today for blood draw.    Patient seen by provider today: No.   present during visit today: Not Applicable.    Concerns: No Concerns.    Procedure:  Lab draw site: rac, Needle type: bf, Gauge: 23.    Post Assessment:  Labs drawn without difficulty: Yes.    Discharge Plan:  Departure Mode: Ambulatory.    Face to Face Time: 5 min.    Gaby Luque, CMA

## 2022-03-10 ENCOUNTER — INFUSION THERAPY VISIT (OUTPATIENT)
Dept: INFUSION THERAPY | Facility: CLINIC | Age: 87
End: 2022-03-10
Attending: INTERNAL MEDICINE
Payer: MEDICARE

## 2022-03-10 ENCOUNTER — ONCOLOGY VISIT (OUTPATIENT)
Dept: ONCOLOGY | Facility: CLINIC | Age: 87
End: 2022-03-10
Attending: INTERNAL MEDICINE
Payer: MEDICARE

## 2022-03-10 VITALS
TEMPERATURE: 97.5 F | DIASTOLIC BLOOD PRESSURE: 84 MMHG | OXYGEN SATURATION: 99 % | WEIGHT: 160.2 LBS | BODY MASS INDEX: 22.99 KG/M2 | HEART RATE: 91 BPM | SYSTOLIC BLOOD PRESSURE: 148 MMHG | RESPIRATION RATE: 16 BRPM

## 2022-03-10 DIAGNOSIS — C91.10 CLL (CHRONIC LYMPHOCYTIC LEUKEMIA) (H): ICD-10-CM

## 2022-03-10 DIAGNOSIS — C67.2 MALIGNANT NEOPLASM OF LATERAL WALL OF URINARY BLADDER (H): Primary | ICD-10-CM

## 2022-03-10 DIAGNOSIS — Z87.898 HISTORY OF GROSS HEMATURIA: ICD-10-CM

## 2022-03-10 DIAGNOSIS — N18.31 STAGE 3A CHRONIC KIDNEY DISEASE (H): ICD-10-CM

## 2022-03-10 DIAGNOSIS — D47.2 MGUS (MONOCLONAL GAMMOPATHY OF UNKNOWN SIGNIFICANCE): ICD-10-CM

## 2022-03-10 PROCEDURE — G0463 HOSPITAL OUTPT CLINIC VISIT: HCPCS | Mod: 25

## 2022-03-10 PROCEDURE — 96413 CHEMO IV INFUSION 1 HR: CPT

## 2022-03-10 PROCEDURE — 258N000003 HC RX IP 258 OP 636: Performed by: INTERNAL MEDICINE

## 2022-03-10 PROCEDURE — 250N000011 HC RX IP 250 OP 636: Performed by: INTERNAL MEDICINE

## 2022-03-10 PROCEDURE — 99214 OFFICE O/P EST MOD 30 MIN: CPT | Performed by: INTERNAL MEDICINE

## 2022-03-10 RX ORDER — HEPARIN SODIUM,PORCINE 10 UNIT/ML
5 VIAL (ML) INTRAVENOUS
Status: CANCELLED | OUTPATIENT
Start: 2022-03-10

## 2022-03-10 RX ORDER — ALBUTEROL SULFATE 90 UG/1
1-2 AEROSOL, METERED RESPIRATORY (INHALATION)
Status: CANCELLED
Start: 2022-03-10

## 2022-03-10 RX ORDER — MEPERIDINE HYDROCHLORIDE 25 MG/ML
25 INJECTION INTRAMUSCULAR; INTRAVENOUS; SUBCUTANEOUS EVERY 30 MIN PRN
Status: CANCELLED | OUTPATIENT
Start: 2022-03-10

## 2022-03-10 RX ORDER — LORAZEPAM 2 MG/ML
0.5 INJECTION INTRAMUSCULAR EVERY 4 HOURS PRN
Status: CANCELLED
Start: 2022-03-10

## 2022-03-10 RX ORDER — EPINEPHRINE 1 MG/ML
0.3 INJECTION, SOLUTION INTRAMUSCULAR; SUBCUTANEOUS EVERY 5 MIN PRN
Status: CANCELLED | OUTPATIENT
Start: 2022-03-10

## 2022-03-10 RX ORDER — METHYLPREDNISOLONE SODIUM SUCCINATE 125 MG/2ML
125 INJECTION, POWDER, LYOPHILIZED, FOR SOLUTION INTRAMUSCULAR; INTRAVENOUS
Status: CANCELLED
Start: 2022-03-10

## 2022-03-10 RX ORDER — SODIUM CHLORIDE 9 MG/ML
1000 INJECTION, SOLUTION INTRAVENOUS CONTINUOUS PRN
Status: CANCELLED
Start: 2022-03-10

## 2022-03-10 RX ORDER — HEPARIN SODIUM (PORCINE) LOCK FLUSH IV SOLN 100 UNIT/ML 100 UNIT/ML
5 SOLUTION INTRAVENOUS
Status: CANCELLED | OUTPATIENT
Start: 2022-03-10

## 2022-03-10 RX ORDER — NALOXONE HYDROCHLORIDE 0.4 MG/ML
.1-.4 INJECTION, SOLUTION INTRAMUSCULAR; INTRAVENOUS; SUBCUTANEOUS
Status: CANCELLED | OUTPATIENT
Start: 2022-03-10

## 2022-03-10 RX ORDER — DIPHENHYDRAMINE HYDROCHLORIDE 50 MG/ML
50 INJECTION INTRAMUSCULAR; INTRAVENOUS
Status: CANCELLED
Start: 2022-03-10

## 2022-03-10 RX ORDER — ALBUTEROL SULFATE 0.83 MG/ML
2.5 SOLUTION RESPIRATORY (INHALATION)
Status: CANCELLED | OUTPATIENT
Start: 2022-03-10

## 2022-03-10 RX ADMIN — SODIUM CHLORIDE 400 MG: 9 INJECTION, SOLUTION INTRAVENOUS at 11:34

## 2022-03-10 RX ADMIN — SODIUM CHLORIDE 1000 ML: 9 INJECTION, SOLUTION INTRAVENOUS at 11:11

## 2022-03-10 ASSESSMENT — PAIN SCALES - GENERAL: PAINLEVEL: NO PAIN (0)

## 2022-03-10 NOTE — PROGRESS NOTES
[facility]  HEMATOLOGY AND ONCOLOGY    FOLLOW-UP VISIT NOTE    PATIENT NAME: Santos Marti MRN # [id number]  DATE OF VISIT: Mar 10, 2022 YOB: 1931    REFERRING PROVIDER: Maximilian Costello    CANCER TYPE: High-grade invasive carcinoma, 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  He was on apixaban for atrial fibrillation.  TURBT done on 10/22/2019 revealed normal muscle invasive bladder cancer  -He was followed with surveillance cystoscopies every 3 months.  His cystoscopy evaluation was negative for any tumors or raised erythema on 3/11/2020, however a follow-up exam on 7/8/2020 revealed raised erythematous area in the left lateral bladder wall.  He had TURBT under anesthesia on 7/15/2020 which is revealed high-grade muscle invasive urothelial carcinoma.  -Santos was diagnosed with prostate cancer in October 2004.  He underwent brachii therapy followed by external beam radiation therapy administered at the [facility].  -Due to his previous radiation for his prostate cancer he is not a candidate for bladder radiation.  He is not a candidate for cystectomy due to his advanced age and also previous extensive radiation to the prostate.  - He was started on pembrolizumab for his locally advanced bladder cancer since 8/13/20.     CURRENT INTERVENTIONS:  Pembrolizumab 400 mg IV every 6 weeks since 8/13/20    SUBJECTIVE   Santos Marti is being followed for muscle invasive bladder cancer    Patient is being followed on therapy for his muscle invasive bladder cancer. He is alone at this visit. He has been started on pembrolizumab since August. He has not noticed any side effects on therapy other than itching.  He has itching on both of his hands and legs and trunk.  He has been applying cream on it which does seem to help some.  He has followed up with  dermatology and they agreed with the choice of the cream being used.      He denies any recurrent hematuria. He has good appetite and fair energy. No side effects suggestive of autoimmune disease other than his itching.       PAST MEDICAL HISTORY     Past Medical History:   Diagnosis Date     Arthritis      Complication of anesthesia      Diabetes (H)      Gastroesophageal reflux disease      Hypertension      Pacemaker          CURRENT OUTPATIENT MEDICATIONS     Current Outpatient Medications   Medication Sig     acetaminophen (TYLENOL) 500 MG tablet Take 500-1,000 mg by mouth every 6 hours as needed for mild pain     apixaban ANTICOAGULANT (ELIQUIS) 5 MG tablet Take 5 mg by mouth 2 times daily      isosorbide mononitrate (IMDUR) 30 MG 24 hr tablet TK 1 T PO ONCE D     lisinopril-hydrochlorothiazide (PRINZIDE/ZESTORETIC) 20-25 MG tablet Take 1 tablet by mouth     metFORMIN (GLUCOPHAGE) 1000 MG tablet Take 500 mg by mouth daily      metoprolol succinate ER (TOPROL-XL) 50 MG 24 hr tablet Take 50 mg by mouth daily Take one and a half tabs daily     omeprazole 20 MG tablet Take 20 mg by mouth daily     triamcinolone (KENALOG) 0.1 % external ointment Apply topically 2 times daily     Current Facility-Administered Medications   Medication     lidocaine (XYLOCAINE) 2 % external gel        ALLERGIES      Allergies   Allergen Reactions     Sulfa Drugs Rash        REVIEW OF SYSTEMS   As above in the HPI, o/w complete 12-point ROS was negative.     PHYSICAL EXAM   There were no vitals taken for this visit.  Limited physical exam during video visit due to COVID19 restrictions  Elderly male in no acute distress  Breathing comfortably, no tachypnea  Speech and hearing normal  Pleasant mood and congruent affect  Moving all extremities, no focal neurologic deficits apparent      LABORATORY AND IMAGING STUDIES     Recent Labs   Lab Test 03/09/22  1055 12/29/21  1023 11/17/21  0921 11/08/21  1132 10/06/21  0924 08/24/21  1225   NA  139 136 138  --  134 138   POTASSIUM 3.8 4.3 4.0  --  3.5 4.0   CHLORIDE 103 103 103  --  100 105   CO2 30 30 30  --  27 29   ANIONGAP 6 3 5  --  7 4   BUN 19 19 19  --  23 27   CR 1.12 1.16 1.10 1.3 1.36* 1.15   * 197* 157*  --  180* 162*   SAAD 9.3 9.3 9.3  --  8.9 8.7     No results for input(s): MAG, PHOS in the last 30801 hours.  Recent Labs   Lab Test 12/29/21  1023 08/24/21  1225 07/14/21  1127 06/02/21  1152 05/06/21  1110   WBC 6.7 8.0 7.7 9.1 8.3   HGB 14.7 12.2* 14.0 14.5 13.5    186 237 239 227   MCV 99 96 95 96 94   NEUTROPHIL 68 71 68 75.5 71.7     Recent Labs   Lab Test 03/09/22  1055 12/29/21  1023 11/17/21  0921 10/06/21  0924 08/24/21  1225 07/14/21  1127 07/14/21  1127 06/02/21  1152   BILITOTAL 0.7 0.6 0.3   < > 0.4   < > 0.5 0.9   ALKPHOS 94 85 93   < > 74   < > 81 85   ALT 13 18 20   < > 14   < > 17 16   AST 14 13 14   < > 12   < > 19 11   ALBUMIN 3.7 3.8 3.8   < > 3.2*   < > 3.6 3.7   LDH  --   --   --   --  160  --  242* 178    < > = values in this interval not displayed.     TSH   Date Value Ref Range Status   03/09/2022 0.53 0.40 - 4.00 mU/L Final   12/29/2021 0.68 0.40 - 4.00 mU/L Final   11/17/2021 1.69 0.40 - 4.00 mU/L Final   06/02/2021 0.43 0.40 - 4.00 mU/L Final   05/06/2021 0.70 0.40 - 4.00 mU/L Final   04/21/2021 0.67 0.40 - 4.00 mU/L Final     No results for input(s): CEA in the last 00757 hours.  Results for orders placed or performed during the hospital encounter of 11/08/21   CT Chest/Abdomen/Pelvis w Contrast    Narrative    CT CHEST/ABDOMEN/PELVIS W CONTRAST 11/8/2021 11:50 AM    CLINICAL HISTORY: Muscle invasive bladder cancer on pembrolizumab;  Malignant neoplasm of lateral wall of urinary bladder (H); Stage 3a  chronic kidney disease (H); History of gross hematuria; CLL (chronic  lymphocytic leukemia) (H); MGUS (monoclonal gammopathy of unknown  significance)    TECHNIQUE: CT scan of the chest, abdomen, and pelvis was performed  following injection of IV  contrast. Multiplanar reformats were  obtained. Dose reduction techniques were used.   CONTRAST:  76mL Isovue-370    COMPARISON: 8/24/2021, and 6/29/2021    FINDINGS:   LUNGS AND PLEURA: Few small pulmonary nodules. For example, there is a  10 mm right posterior lower lobe nodule (series 6, image 186) that is  stable since 6/29/2021, right upper lobe 7 mm nodule (series 6, image  91) and 7 mm nodule nodular in the lingula (series 6, image 115). No  new or enlarging nodules. No infiltrate or pleural effusion.    MEDIASTINUM/AXILLAE: No lymphadenopathy. Pacemaker. Mild cardiomegaly.  No thoracic aortic aneurysm. Severe coronary artery calcifications. No  pericardial effusion.    HEPATOBILIARY: Normal.    PANCREAS: Normal.    SPLEEN: Normal.    ADRENAL GLANDS: Normal.    KIDNEYS/BLADDER: Normal kidneys. The urinary bladder cannot be  evaluated due to streak artifact from bilateral total hip  arthroplasties.    BOWEL: No obstruction or inflammatory change.    PELVIC ORGANS: Evaluation significantly limited by streak artifact  from bilateral total hip arthroplasties. Brachytherapy seeds in the  prostate gland.    ADDITIONAL FINDINGS: No lymphadenopathy in the abdomen and pelvis. No  abdominal aortic aneurysm. No free fluid or fluid collections.    MUSCULOSKELETAL: Bilateral shoulder and hip arthroplasties. Mild  degenerative changes in the spine. No suspicious lesions in the bones.      Impression    IMPRESSION:  1.  Stable pulmonary nodules, including the largest 11 mm nodule in  the right lower lobe since at least 6/29/2021.  2.  No metastatic disease in the abdomen and pelvis. Evaluation of the  pelvis is slightly limited by streak artifact from bilateral total hip  arthroplasties.    AMADEO SMITH MD         SYSTEM ID:  [id number]     Recent Labs   Lab Test 03/10/21  1113   PSA <0.01         ASSESSMENT AND PLAN   1. High-grade muscle invasive bladder cancer in a patient not a candidate for either surgery or radiation  therapy  2. Prostate cancer treated with brachii therapy followed by external beam radiation therapy  3. Hypertension, diabetes mellitus type 2, atrial fibrillation on chronic anticoagulation with apixaban    I had a lengthy discussion with patient at this clinic visit. He has been started on pembrolizumab for his muscle invasive bladder cancer on 8/13/20. He has tolerated this well. He has not noticed any side effects on therapy other than itching. He denies any hematuria.     He has itching almost all over his body.  He has been applying triamcinolone cream as recommended.  He has not seen a big change.  He has followed up with dermatology and they did not have any additional recommendations.  I think his itching is secondary to pembrolizumab immunotherapy.  Definitely holding therapy would help, as would systemic steroids.  I would try to continue therapy as long as feasible.  In addition to the steroid creams he could keep his skin moisturized.     I have reviewed all of the labs done prior to this clinic visit.  Labs are all completely normal including electrolytes, renal function, hepatic panel, complete blood count and differential.  He has elevated blood glucose which was not fasting and elevated.     I reviewed actual images from his restaging CT scan. His bladder tumor cannot be assessed on the CT portion due to the bilateral hip replacements. There are no new lesions identified. He has stable pulmonary nodules. There is no evidence of metastatic disease other than stable pulmonary nodules in the chest, abdomen and pelvis.     Monitoring response to this disease is going to be a little difficult as it is only accurately measured on a PET-CT and we cannot use the PET scan for continued response assessment. I would follow him with CT scans. As long as he is not having a clear disease progression, we could continue on therapy as current. He is being followed up with cystoscopy and had last negative cystoscopy  on 1/12/22. I would plan to continue therapy for 2 years unless he has unacceptable side effects or disease progression.    I will have him follow with Polo Valderrama for the next infusion and I will see him in 3 months with labs including urine cytology and CT chest, abdomen and pelvis prior to visit.       25 minutes spent on the date of the encounter doing chart review, history and exam, documentation and further activities as noted above      Imtiaz Ellis    Hematologist and Medical Oncologist  [facility]

## 2022-03-10 NOTE — PROGRESS NOTES
Infusion Nursing Note:  Santos Marti presents today for Cycle 14 Day 1 Keytruda.    Patient seen by provider today: Yes: Dr. Ellis   present during visit today: Not Applicable.    Note: N/A.      Intravenous Access:  Peripheral IV placed.    Treatment Conditions:  Lab Results   Component Value Date     03/09/2022    POTASSIUM 3.8 03/09/2022    CR 1.12 03/09/2022    SAAD 9.3 03/09/2022    BILITOTAL 0.7 03/09/2022    ALBUMIN 3.7 03/09/2022    ALT 13 03/09/2022    AST 14 03/09/2022     Results reviewed, labs MET treatment parameters, ok to proceed with treatment.      Post Infusion Assessment:  Patient tolerated infusion without incident.  Blood return noted pre and post infusion.  Site patent and intact, free from redness, edema or discomfort.  No evidence of extravasations.  Access discontinued per protocol.       Discharge Plan:   Patient declined prescription refills.  Discharge instructions reviewed with: Patient.  Patient verbalized understanding of discharge instructions and all questions answered.  AVS to patient via [email or web address].  Patient will return 3/24/22 for next appointment.   Patient discharged in stable condition accompanied by: self.  Departure Mode: Ambulatory.      Emani Rehman RN

## 2022-03-10 NOTE — LETTER
3/10/2022         RE: Santos Marti  [address]  [place] [facility] [address]        Dear Colleague,    Thank you for referring your patient, Santos Marti, to the [facility]. Please see a copy of my visit note below.    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  He was on apixaban for atrial fibrillation.  TURBT done on 10/22/2019 revealed normal muscle invasive bladder cancer  -He was followed with surveillance cystoscopies every 3 months.  His cystoscopy evaluation was negative for any tumors or raised erythema on 3/11/2020, however a follow-up exam on 7/8/2020 revealed raised erythematous area in the left lateral bladder wall.  He had TURBT under anesthesia on 7/15/2020 which is revealed high-grade muscle invasive urothelial carcinoma.  -Santos was diagnosed with prostate cancer in October 2004.  He underwent brachii therapy followed by external beam radiation therapy administered at the [facility].  -Due to his previous radiation for his prostate cancer he is not a candidate for bladder radiation.  He is not a candidate for cystectomy due to his advanced age and also previous extensive radiation to the prostate.  - He was started on pembrolizumab for his locally advanced bladder cancer since 8/13/20.     CURRENT INTERVENTIONS:  Pembrolizumab 400 mg IV every 6 weeks since 8/13/20    SUBJECTIVE   Santos Marti is being followed for muscle invasive bladder cancer    Patient is being followed on therapy for his muscle invasive bladder cancer. He is alone at this  visit. He has been started on pembrolizumab since August. He has not noticed any side effects on therapy other than itching.  He has itching on both of his hands and legs and trunk.  He has been applying cream on it which does seem to help some.  He has followed up with dermatology and they agreed with the choice of the cream being used.      He denies any recurrent hematuria. He has good appetite and fair energy. No side effects suggestive of autoimmune disease other than his itching.       PAST MEDICAL HISTORY     Past Medical History:   Diagnosis Date     Arthritis      Complication of anesthesia      Diabetes (H)      Gastroesophageal reflux disease      Hypertension      Pacemaker          CURRENT OUTPATIENT MEDICATIONS     Current Outpatient Medications   Medication Sig     acetaminophen (TYLENOL) 500 MG tablet Take 500-1,000 mg by mouth every 6 hours as needed for mild pain     apixaban ANTICOAGULANT (ELIQUIS) 5 MG tablet Take 5 mg by mouth 2 times daily      isosorbide mononitrate (IMDUR) 30 MG 24 hr tablet TK 1 T PO ONCE D     lisinopril-hydrochlorothiazide (PRINZIDE/ZESTORETIC) 20-25 MG tablet Take 1 tablet by mouth     metFORMIN (GLUCOPHAGE) 1000 MG tablet Take 500 mg by mouth daily      metoprolol succinate ER (TOPROL-XL) 50 MG 24 hr tablet Take 50 mg by mouth daily Take one and a half tabs daily     omeprazole 20 MG tablet Take 20 mg by mouth daily     triamcinolone (KENALOG) 0.1 % external ointment Apply topically 2 times daily     Current Facility-Administered Medications   Medication     lidocaine (XYLOCAINE) 2 % external gel        ALLERGIES      Allergies   Allergen Reactions     Sulfa Drugs Rash        REVIEW OF SYSTEMS   As above in the HPI, o/w complete 12-point ROS was negative.     PHYSICAL EXAM   There were no vitals taken for this visit.  Limited physical exam during video visit due to COVID19 restrictions  Elderly male in no acute distress  Breathing comfortably, no tachypnea  Speech and  hearing normal  Pleasant mood and congruent affect  Moving all extremities, no focal neurologic deficits apparent      LABORATORY AND IMAGING STUDIES     Recent Labs   Lab Test 03/09/22  1055 12/29/21  1023 11/17/21  0921 11/08/21  1132 10/06/21  0924 08/24/21  1225    136 138  --  134 138   POTASSIUM 3.8 4.3 4.0  --  3.5 4.0   CHLORIDE 103 103 103  --  100 105   CO2 30 30 30  --  27 29   ANIONGAP 6 3 5  --  7 4   BUN 19 19 19  --  23 27   CR 1.12 1.16 1.10 1.3 1.36* 1.15   * 197* 157*  --  180* 162*   SAAD 9.3 9.3 9.3  --  8.9 8.7     No results for input(s): MAG, PHOS in the last 20161 hours.  Recent Labs   Lab Test 12/29/21  1023 08/24/21  1225 07/14/21  1127 06/02/21  1152 05/06/21  1110   WBC 6.7 8.0 7.7 9.1 8.3   HGB 14.7 12.2* 14.0 14.5 13.5    186 237 239 227   MCV 99 96 95 96 94   NEUTROPHIL 68 71 68 75.5 71.7     Recent Labs   Lab Test 03/09/22  1055 12/29/21  1023 11/17/21  0921 10/06/21  0924 08/24/21  1225 07/14/21  1127 07/14/21  1127 06/02/21  1152   BILITOTAL 0.7 0.6 0.3   < > 0.4   < > 0.5 0.9   ALKPHOS 94 85 93   < > 74   < > 81 85   ALT 13 18 20   < > 14   < > 17 16   AST 14 13 14   < > 12   < > 19 11   ALBUMIN 3.7 3.8 3.8   < > 3.2*   < > 3.6 3.7   LDH  --   --   --   --  160  --  242* 178    < > = values in this interval not displayed.     TSH   Date Value Ref Range Status   03/09/2022 0.53 0.40 - 4.00 mU/L Final   12/29/2021 0.68 0.40 - 4.00 mU/L Final   11/17/2021 1.69 0.40 - 4.00 mU/L Final   06/02/2021 0.43 0.40 - 4.00 mU/L Final   05/06/2021 0.70 0.40 - 4.00 mU/L Final   04/21/2021 0.67 0.40 - 4.00 mU/L Final     No results for input(s): CEA in the last 32572 hours.  Results for orders placed or performed during the hospital encounter of 11/08/21   CT Chest/Abdomen/Pelvis w Contrast    Narrative    CT CHEST/ABDOMEN/PELVIS W CONTRAST 11/8/2021 11:50 AM    CLINICAL HISTORY: Muscle invasive bladder cancer on pembrolizumab;  Malignant neoplasm of lateral wall of urinary  bladder (H); Stage 3a  chronic kidney disease (H); History of gross hematuria; CLL (chronic  lymphocytic leukemia) (H); MGUS (monoclonal gammopathy of unknown  significance)    TECHNIQUE: CT scan of the chest, abdomen, and pelvis was performed  following injection of IV contrast. Multiplanar reformats were  obtained. Dose reduction techniques were used.   CONTRAST:  76mL Isovue-370    COMPARISON: 8/24/2021, and 6/29/2021    FINDINGS:   LUNGS AND PLEURA: Few small pulmonary nodules. For example, there is a  10 mm right posterior lower lobe nodule (series 6, image 186) that is  stable since 6/29/2021, right upper lobe 7 mm nodule (series 6, image  91) and 7 mm nodule nodular in the lingula (series 6, image 115). No  new or enlarging nodules. No infiltrate or pleural effusion.    MEDIASTINUM/AXILLAE: No lymphadenopathy. Pacemaker. Mild cardiomegaly.  No thoracic aortic aneurysm. Severe coronary artery calcifications. No  pericardial effusion.    HEPATOBILIARY: Normal.    PANCREAS: Normal.    SPLEEN: Normal.    ADRENAL GLANDS: Normal.    KIDNEYS/BLADDER: Normal kidneys. The urinary bladder cannot be  evaluated due to streak artifact from bilateral total hip  arthroplasties.    BOWEL: No obstruction or inflammatory change.    PELVIC ORGANS: Evaluation significantly limited by streak artifact  from bilateral total hip arthroplasties. Brachytherapy seeds in the  prostate gland.    ADDITIONAL FINDINGS: No lymphadenopathy in the abdomen and pelvis. No  abdominal aortic aneurysm. No free fluid or fluid collections.    MUSCULOSKELETAL: Bilateral shoulder and hip arthroplasties. Mild  degenerative changes in the spine. No suspicious lesions in the bones.      Impression    IMPRESSION:  1.  Stable pulmonary nodules, including the largest 11 mm nodule in  the right lower lobe since at least 6/29/2021.  2.  No metastatic disease in the abdomen and pelvis. Evaluation of the  pelvis is slightly limited by streak artifact from  bilateral total hip  arthroplasties.    AMADEO SMITH MD         SYSTEM ID:  [id number]     Recent Labs   Lab Test 03/10/21  1113   PSA <0.01         ASSESSMENT AND PLAN   1. High-grade muscle invasive bladder cancer in a patient not a candidate for either surgery or radiation therapy  2. Prostate cancer treated with brachii therapy followed by external beam radiation therapy  3. Hypertension, diabetes mellitus type 2, atrial fibrillation on chronic anticoagulation with apixaban    I had a lengthy discussion with patient at this clinic visit. He has been started on pembrolizumab for his muscle invasive bladder cancer on 8/13/20. He has tolerated this well. He has not noticed any side effects on therapy other than itching. He denies any hematuria.     He has itching almost all over his body.  He has been applying triamcinolone cream as recommended.  He has not seen a big change.  He has followed up with dermatology and they did not have any additional recommendations.  I think his itching is secondary to pembrolizumab immunotherapy.  Definitely holding therapy would help, as would systemic steroids.  I would try to continue therapy as long as feasible.  In addition to the steroid creams he could keep his skin moisturized.     I have reviewed all of the labs done prior to this clinic visit.  Labs are all completely normal including electrolytes, renal function, hepatic panel, complete blood count and differential.  He has elevated blood glucose which was not fasting and elevated.     I reviewed actual images from his restaging CT scan. His bladder tumor cannot be assessed on the CT portion due to the bilateral hip replacements. There are no new lesions identified. He has stable pulmonary nodules. There is no evidence of metastatic disease other than stable pulmonary nodules in the chest, abdomen and pelvis.     Monitoring response to this disease is going to be a little difficult as it is only accurately measured on a  "PET-CT and we cannot use the PET scan for continued response assessment. I would follow him with CT scans. As long as he is not having a clear disease progression, we could continue on therapy as current. He is being followed up with cystoscopy and had last negative cystoscopy on 1/12/22. I would plan to continue therapy for 2 years unless he has unacceptable side effects or disease progression.    I will have him follow with [facility] Valderrama for the next infusion and I will see him in 3 months with labs including urine cytology and CT chest, abdomen and pelvis prior to visit.       25 minutes spent on the date of the encounter doing chart review, history and exam, documentation and further activities as noted above      Imtiaz Ellis    Hematologist and Medical Oncologist  [facility]      Oncology Rooming Note    March 10, 2022 10:24 AM   Santos Marti is a 89 year old male who presents for:    Chief Complaint   Patient presents with     Oncology Clinic Visit     Malignant neoplasm of lateral wall of urinary bladder (H)     Initial Vitals: BP (!) 179/94 (BP Location: Right arm, Patient Position: Sitting, Cuff Size: Adult Regular)   Pulse 91   Temp 97.5  F (36.4  C) (Oral)   Resp 16   Wt 72.7 kg (160 lb 3.2 oz)   SpO2 99%   BMI 22.99 kg/m   Estimated body mass index is 22.99 kg/m  as calculated from the following:    Height as of 1/12/22: 1.778 m (5' 10\").    Weight as of this encounter: 72.7 kg (160 lb 3.2 oz). Body surface area is 1.89 meters squared.  No Pain (0) Comment: Data Unavailable   No LMP for male patient.  Allergies reviewed: Yes  Medications reviewed: Yes    Medications: Medication refills not needed today.  Pharmacy name entered into [employer]:    [id number] - [place], [place] - [address] AT [facility] & [address] [place] [address] PHARMACY [facility] - [place] [place] - [address] [place] [address] PHARMACY - [place], [place] - [address]    Clinical concerns: no  "     Tanya Ceja CMA                  Again, thank you for allowing me to participate in the care of your patient.        Sincerely,        Imtiaz Ellis MD

## 2022-03-10 NOTE — PROGRESS NOTES
"Oncology Rooming Note    March 10, 2022 10:24 AM   Santos Marti is a 89 year old male who presents for:    Chief Complaint   Patient presents with     Oncology Clinic Visit     Malignant neoplasm of lateral wall of urinary bladder (H)     Initial Vitals: BP (!) 179/94 (BP Location: Right arm, Patient Position: Sitting, Cuff Size: Adult Regular)   Pulse 91   Temp 97.5  F (36.4  C) (Oral)   Resp 16   Wt 72.7 kg (160 lb 3.2 oz)   SpO2 99%   BMI 22.99 kg/m   Estimated body mass index is 22.99 kg/m  as calculated from the following:    Height as of 1/12/22: 1.778 m (5' 10\").    Weight as of this encounter: 72.7 kg (160 lb 3.2 oz). Body surface area is 1.89 meters squared.  No Pain (0) Comment: Data Unavailable   No LMP for male patient.  Allergies reviewed: Yes  Medications reviewed: Yes    Medications: Medication refills not needed today.  Pharmacy name entered into [employer]:    [id number] - [place], [place] - [address] AT [facility] & [address] [place] [address] PHARMACY [facility] - [place], [place] - [address] PHARMACY - [place], [place] - [address]    Clinical concerns: no      Tanya Ceja CMA              "

## 2022-04-13 ENCOUNTER — OFFICE VISIT (OUTPATIENT)
Dept: UROLOGY | Facility: CLINIC | Age: 87
End: 2022-04-13
Payer: MEDICARE

## 2022-04-13 VITALS
BODY MASS INDEX: 21.47 KG/M2 | SYSTOLIC BLOOD PRESSURE: 130 MMHG | HEIGHT: 70 IN | DIASTOLIC BLOOD PRESSURE: 70 MMHG | WEIGHT: 150 LBS

## 2022-04-13 DIAGNOSIS — C67.2 MALIGNANT NEOPLASM OF LATERAL WALL OF URINARY BLADDER (H): Primary | ICD-10-CM

## 2022-04-13 DIAGNOSIS — C61 PROSTATE CANCER (H): ICD-10-CM

## 2022-04-13 LAB
ALBUMIN UR-MCNC: 30 MG/DL
APPEARANCE UR: CLEAR
BILIRUB UR QL STRIP: NEGATIVE
COLOR UR AUTO: YELLOW
GLUCOSE UR STRIP-MCNC: NEGATIVE MG/DL
HGB UR QL STRIP: NEGATIVE
KETONES UR STRIP-MCNC: NEGATIVE MG/DL
LEUKOCYTE ESTERASE UR QL STRIP: NEGATIVE
NITRATE UR QL: NEGATIVE
PH UR STRIP: 5 [PH] (ref 5–7)
SP GR UR STRIP: 1.02 (ref 1–1.03)
UROBILINOGEN UR STRIP-ACNC: 0.2 E.U./DL

## 2022-04-13 PROCEDURE — 81003 URINALYSIS AUTO W/O SCOPE: CPT | Mod: QW | Performed by: UROLOGY

## 2022-04-13 PROCEDURE — 99212 OFFICE O/P EST SF 10 MIN: CPT | Mod: 25 | Performed by: UROLOGY

## 2022-04-13 PROCEDURE — 52000 CYSTOURETHROSCOPY: CPT | Performed by: UROLOGY

## 2022-04-13 RX ORDER — LIDOCAINE HYDROCHLORIDE 20 MG/ML
JELLY TOPICAL ONCE
Status: DISCONTINUED | OUTPATIENT
Start: 2022-04-13 | End: 2022-04-13 | Stop reason: HOSPADM

## 2022-04-13 ASSESSMENT — PAIN SCALES - GENERAL: PAINLEVEL: NO PAIN (0)

## 2022-04-13 NOTE — PROGRESS NOTES
"[facility]   CHIEF COMPLAINT   It was my pleasure to see Santos Marti who is a 89 year old male for follow-up of prostate cancer and bladder cancer.      HPI   Santos Marti is a very pleasant 89 year old male who is a prior patient of Dr. Costello with history of prostate cancer and muscle invasive bladder cancer.  He also follows with Dr. Ellis.  His initial diagnosis was in October 2019.  He was started on pembrolizumab for locally advanced bladder cancer on 8/13/2020.  He has a history of prostate cancer diagnosed in October 2004 for which [facility]  He is been deemed not a surgical candidate or a radiation candidate for his bladder cancer given his prior treatment for his prostate cancer.    He was last seen by Dr. Costello on 3/5/2021 for office cystoscopy with fulguration of a small papillary tumor above the right ureteral orifice.    6/7/21:  Follow-up today for surveillance cystoscopy  Doing well with no issues    9/8/21:  Follow-up today for surveillance cystoscopy  He has been doing fairly well  He notes that he is dry overnight however has some daytime frequency and urgency and uses pads  No gross hematuria  Cystoscopy with no evidence of disease    1/12/22:  He returns for surveillance cystoscopy  He has been doing well with no change in symptoms    TODAY 4/13/22:  Follow-up today for surveillance cystoscopy  He has been doing well with no change in symptoms or gross hematuria    PHYSICAL EXAM  Patient is a 89 year old  male   Vitals: Blood pressure 130/70, height 1.778 m (5' 10\"), weight 68 kg (150 lb).  Body mass index is 21.52 kg/m .  General Appearance Adult:   Alert, no acute distress, oriented  HENT: throat/mouth:normal, good dentition  Lungs: no respiratory distress, or pursed lip breathing  Heart: No obvious jugular venous distension present  Abdomen: soft, nontender, no organomegaly or masses  Musculoskeltal: extremities normal, " no peripheral edema  Skin: no suspicious lesions or rashes  Neuro: Alert, oriented, speech and mentation normal  Psych: affect and mood normal  Gait: Normal  : penis, scrotum, testes normal    PSA   Date Value Ref Range Status   03/10/2021 <0.01 0 - 4 ug/L Final     Comment:     Assay Method:  Chemiluminescence using Siemens Vista analyzer      UA RESULTS:  Recent Labs   Lab Test 01/12/22  1522 07/22/20  0810 07/16/20  2120   COLOR Yellow   < > Light Yellow   APPEARANCE Clear   < > Clear   URINEGLC Negative   < > Negative   URINEBILI Negative   < > Negative   URINEKETONE Trace*   < > Negative   SG >=1.030   < > 1.014   UBLD Negative   < > Moderate*   URINEPH 5.0   < > 5.5   PROTEIN Trace*   < > 50*   UROBILINOGEN 0.2   < >  --    NITRITE Negative   < > Negative   LEUKEST Negative   < > Small*   RBCU  --   --  71*   WBCU  --   --  20*    < > = values in this interval not displayed.      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   -Tumor invades lamina propria and muscularis propria.   -Urothelial (transitional cell) carcinoma in-situ: Not identified.   -Lymph/vascular space invasion: Not identified.   -Sampling includes: Urothelium, lamina propria, and muscularis propria.      Final Diagnosis   Specimen A                 Interpretation:                  Negative for High Grade Urothelial Carcinoma         IMAGING  All pertinent imaging reviewed:    All imaging studies reviewed by me.  I personally reviewed these imaging films.  A formal report from radiology will follow.    CT CHEST/ABD/PEL 11/8/21:  FINDINGS:   LUNGS AND PLEURA: Few small pulmonary nodules. For example, there is a  10 mm right posterior lower lobe nodule (series 6, image 186) that is  stable since 6/29/2021, right upper lobe 7 mm nodule (series 6, image  91) and 7 mm  nodule nodular in the lingula (series 6, image 115). No  new or enlarging nodules. No infiltrate or pleural effusion.     MEDIASTINUM/AXILLAE: No lymphadenopathy. Pacemaker. Mild cardiomegaly.  No thoracic aortic aneurysm. Severe coronary artery calcifications. No  pericardial effusion.     HEPATOBILIARY: Normal.     PANCREAS: Normal.     SPLEEN: Normal.     ADRENAL GLANDS: Normal.     KIDNEYS/BLADDER: Normal kidneys. The urinary bladder cannot be  evaluated due to streak artifact from bilateral total hip  arthroplasties.     BOWEL: No obstruction or inflammatory change.     PELVIC ORGANS: Evaluation significantly limited by streak artifact  from bilateral total hip arthroplasties. Brachytherapy seeds in the  prostate gland.     ADDITIONAL FINDINGS: No lymphadenopathy in the abdomen and pelvis. No  abdominal aortic aneurysm. No free fluid or fluid collections.     MUSCULOSKELETAL: Bilateral shoulder and hip arthroplasties. Mild  degenerative changes in the spine. No suspicious lesions in the bones.                                                                      IMPRESSION:  1.  Stable pulmonary nodules, including the largest 11 mm nodule in  the right lower lobe since at least 6/29/2021.  2.  No metastatic disease in the abdomen and pelvis. Evaluation of the  pelvis is slightly limited by streak artifact from bilateral total hip  arthroplasties.     CYSTOSCOPY PROCEDURE NOTE:     Santos Marti is a 89 year old male  who presents with muscle invasive bladder cancer for cystoscop.     Pt ID verified with patient: Yes      Procedure verified with patient: Yes      Procedure confirmed with physician and support staff: Yes      Consent form confirmed with physician and support staff.     Sign In  History and Physical Exam reviewed .  Informed Consent Discussed: Yes   Sign in Communication: Yes   Time Out:  Team Confirms the Correct Patient, Correct Procedure; Yes , Correct Site and Site Marking, Correct  Position (if applicable).     Affirmation of Time Out: Yes   Sign Out:  Sign Out Discussion: Yes   Physician: Manoj Mistry MD     A urinalysis was performed revealing no evidence of infection.     The benefits, risks, alternatives of the cystoscopy procedure and personnel were discussed with the patient. The verbal consent was obtained and the patient agrees to proceed.        Description of procedure:   After fully informed, voluntary consent was obtained, the patient was brought into the procedure room, identified and placed in a supine position on the cystoscopy table.  The groin/scrotum were prepped with betadine and draped in a sterile fashion.  Urojet lidocaine gel was introduced.  A 15F flexible cystoscope was inserted into the urethra, and the bladder and urethra wereexamined in a systematic manner.  The patient tolerated the procedure well and there were no complications.       Cystoscopic findings:  The urethra was normal without strictures.  The prostate was 3-cm long and demonstrated mild bilobar hypertrophy and evidence of prior radiation.  There was a slight narrowing at the prostate apex/membranous urethra.  There was no median lobe.  The external sphincter coapted and the bladder neck was normal. The bladder was  entered and careful pan endoscopy was carried out. The posterior, superior and lateral walls and dome of the bladder were all well visualized and the scope was retroflexed upon itself.  There was moderate trabeculation.  There were no neoplasms, stones, or diverticula identifed.  The ureteric orifices were normal in position and number and effluxing clear urine.  The area of prior fulguration was identified with no evidence of recurrence    ASSESSMENT and PLAN  89-year-old man with history of prostate cancer status post combined brachii and external beam radiation therapy in 2004 and muscle invasive bladder cancer diagnosed in 2018 on pembrolizumab who presents for surveillance  cystoscopy    Muscle invasive bladder cancer  -No evidence of recurrence on today's cystoscopy  -Follow-up in 3 months for next surveillance cystoscopy  -If his next cystoscopy remains nonconcerning, we can plan for 6-month follow-up  -Reviewed his prior biopsy results and the pathology report  -I reviewed his recent CT chest abdomen pelvis from 11/2021 and agree with radiology interpretation  -Continue to follow with Dr. Ellis    Prostate cancer  -PSA from March was undetectable      Time spent: 15 minutes spent on the date of the encounter doing chart review, history and exam, documentation and further activities as noted above.  This was in addition to cystoscopy time    Manoj Mistry MD   Urology  [facility]  [facility] Phone: [phone]  [facility] Phone: [phone]

## 2022-04-13 NOTE — LETTER
"4/13/2022       RE: Santos Marti  [address]  [facility] [address]     Dear Colleague,    Thank you for referring your patient, Santos Marti, to the [facility]. Please see a copy of my visit note below.    [facility]   CHIEF COMPLAINT   It was my pleasure to see Santos Marti who is a 89 year old male for follow-up of prostate cancer and bladder cancer.      HPI   Santos Marti is a very pleasant 89 year old male who is a prior patient of Dr. Costello with history of prostate cancer and muscle invasive bladder cancer.  He also follows with Dr. Ellis.  His initial diagnosis was in October 2019.  He was started on pembrolizumab for locally advanced bladder cancer on 8/13/2020.  He has a history of prostate cancer diagnosed in October 2004 for which [facility]  He is been deemed not a surgical candidate or a radiation candidate for his bladder cancer given his prior treatment for his prostate cancer.    He was last seen by Dr. Costello on 3/5/2021 for office cystoscopy with fulguration of a small papillary tumor above the right ureteral orifice.    6/7/21:  Follow-up today for surveillance cystoscopy  Doing well with no issues    9/8/21:  Follow-up today for surveillance cystoscopy  He has been doing fairly well  He notes that he is dry overnight however has some daytime frequency and urgency and uses pads  No gross hematuria  Cystoscopy with no evidence of disease    1/12/22:  He returns for surveillance cystoscopy  He has been doing well with no change in symptoms    TODAY 4/13/22:  Follow-up today for surveillance cystoscopy  He has been doing well with no change in symptoms or gross hematuria    PHYSICAL EXAM  Patient is a 89 year old  male   Vitals: Blood pressure 130/70, height 1.778 m (5' 10\"), weight 68 kg (150 lb).  Body mass index is " 21.52 kg/m .  General Appearance Adult:   Alert, no acute distress, oriented  HENT: throat/mouth:normal, good dentition  Lungs: no respiratory distress, or pursed lip breathing  Heart: No obvious jugular venous distension present  Abdomen: soft, nontender, no organomegaly or masses  Musculoskeltal: extremities normal, no peripheral edema  Skin: no suspicious lesions or rashes  Neuro: Alert, oriented, speech and mentation normal  Psych: affect and mood normal  Gait: Normal  : penis, scrotum, testes normal    PSA   Date Value Ref Range Status   03/10/2021 <0.01 0 - 4 ug/L Final     Comment:     Assay Method:  Chemiluminescence using Siemens Vista analyzer      UA RESULTS:  Recent Labs   Lab Test 01/12/22  1522 07/22/20  0810 07/16/20  2120   COLOR Yellow   < > Light Yellow   APPEARANCE Clear   < > Clear   URINEGLC Negative   < > Negative   URINEBILI Negative   < > Negative   URINEKETONE Trace*   < > Negative   SG >=1.030   < > 1.014   UBLD Negative   < > Moderate*   URINEPH 5.0   < > 5.5   PROTEIN Trace*   < > 50*   UROBILINOGEN 0.2   < >  --    NITRITE Negative   < > Negative   LEUKEST Negative   < > Small*   RBCU  --   --  71*   WBCU  --   --  20*    < > = values in this interval not displayed.      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   -Tumor invades lamina propria and muscularis propria.   -Urothelial (transitional cell) carcinoma in-situ: Not identified.   -Lymph/vascular space invasion: Not identified.   -Sampling includes: Urothelium, lamina propria, and muscularis propria.      Final Diagnosis   Specimen A                 Interpretation:                  Negative for High Grade Urothelial Carcinoma         IMAGING  All pertinent imaging reviewed:    All imaging studies reviewed by me.  I personally reviewed these imaging  films.  A formal report from radiology will follow.    CT CHEST/ABD/PEL 11/8/21:  FINDINGS:   LUNGS AND PLEURA: Few small pulmonary nodules. For example, there is a  10 mm right posterior lower lobe nodule (series 6, image 186) that is  stable since 6/29/2021, right upper lobe 7 mm nodule (series 6, image  91) and 7 mm nodule nodular in the lingula (series 6, image 115). No  new or enlarging nodules. No infiltrate or pleural effusion.     MEDIASTINUM/AXILLAE: No lymphadenopathy. Pacemaker. Mild cardiomegaly.  No thoracic aortic aneurysm. Severe coronary artery calcifications. No  pericardial effusion.     HEPATOBILIARY: Normal.     PANCREAS: Normal.     SPLEEN: Normal.     ADRENAL GLANDS: Normal.     KIDNEYS/BLADDER: Normal kidneys. The urinary bladder cannot be  evaluated due to streak artifact from bilateral total hip  arthroplasties.     BOWEL: No obstruction or inflammatory change.     PELVIC ORGANS: Evaluation significantly limited by streak artifact  from bilateral total hip arthroplasties. Brachytherapy seeds in the  prostate gland.     ADDITIONAL FINDINGS: No lymphadenopathy in the abdomen and pelvis. No  abdominal aortic aneurysm. No free fluid or fluid collections.     MUSCULOSKELETAL: Bilateral shoulder and hip arthroplasties. Mild  degenerative changes in the spine. No suspicious lesions in the bones.                                                                      IMPRESSION:  1.  Stable pulmonary nodules, including the largest 11 mm nodule in  the right lower lobe since at least 6/29/2021.  2.  No metastatic disease in the abdomen and pelvis. Evaluation of the  pelvis is slightly limited by streak artifact from bilateral total hip  arthroplasties.     CYSTOSCOPY PROCEDURE NOTE:     Santos Marti is a 89 year old male  who presents with muscle invasive bladder cancer for cystoscop.     Pt ID verified with patient: Yes      Procedure verified with patient: Yes      Procedure confirmed with  physician and support staff: Yes      Consent form confirmed with physician and support staff.     Sign In  History and Physical Exam reviewed .  Informed Consent Discussed: Yes   Sign in Communication: Yes   Time Out:  Team Confirms the Correct Patient, Correct Procedure; Yes , Correct Site and Site Marking, Correct Position (if applicable).     Affirmation of Time Out: Yes   Sign Out:  Sign Out Discussion: Yes   Physician: Manoj Mistry MD     A urinalysis was performed revealing no evidence of infection.     The benefits, risks, alternatives of the cystoscopy procedure and personnel were discussed with the patient. The verbal consent was obtained and the patient agrees to proceed.        Description of procedure:   After fully informed, voluntary consent was obtained, the patient was brought into the procedure room, identified and placed in a supine position on the cystoscopy table.  The groin/scrotum were prepped with betadine and draped in a sterile fashion.  Urojet lidocaine gel was introduced.  A 15F flexible cystoscope was inserted into the urethra, and the bladder and urethra wereexamined in a systematic manner.  The patient tolerated the procedure well and there were no complications.       Cystoscopic findings:  The urethra was normal without strictures.  The prostate was 3-cm long and demonstrated mild bilobar hypertrophy and evidence of prior radiation.  There was a slight narrowing at the prostate apex/membranous urethra.  There was no median lobe.  The external sphincter coapted and the bladder neck was normal. The bladder was  entered and careful pan endoscopy was carried out. The posterior, superior and lateral walls and dome of the bladder were all well visualized and the scope was retroflexed upon itself.  There was moderate trabeculation.  There were no neoplasms, stones, or diverticula identifed.  The ureteric orifices were normal in position and number and effluxing clear urine.  The area of  prior fulguration was identified with no evidence of recurrence    ASSESSMENT and PLAN  89-year-old man with history of prostate cancer status post combined brachii and external beam radiation therapy in 2004 and muscle invasive bladder cancer diagnosed in 2018 on pembrolizumab who presents for surveillance cystoscopy    Muscle invasive bladder cancer  -No evidence of recurrence on today's cystoscopy  -Follow-up in 3 months for next surveillance cystoscopy  -If his next cystoscopy remains nonconcerning, we can plan for 6-month follow-up  -Reviewed his prior biopsy results and the pathology report  -I reviewed his recent CT chest abdomen pelvis from 11/2021 and agree with radiology interpretation  -Continue to follow with Dr. Ellis    Prostate cancer  -PSA from March was undetectable      Time spent: 15 minutes spent on the date of the encounter doing chart review, history and exam, documentation and further activities as noted above.  This was in addition to cystoscopy time    Manoj Mistry MD   Urology  [facility]  [facility] Phone: [phone]  [facility] Phone: [phone]

## 2022-04-13 NOTE — NURSING NOTE
Chief Complaint   Patient presents with     Malignant neoplasm of lateral wall of urinary bladder     Here for a in office cystoscopy      Prior to the start of the procedure and with procedural staff participation, I verbally confirmed the patient s identity using two indicators, relevant allergies, that the procedure was appropriate and matched the consent or emergent situation, and that the correct equipment/implants were available. Immediately prior to starting the procedure I conducted the Time Out with the procedural staff and re-confirmed the patient s name, procedure, and site/side. I have wiped the patient off with the povidone-Iodine solution, draped them,  used Lidocaine hydrochloride jelly, and instilled sterile water into the bladder. (The Joint Commission universal protocol was followed.)  Yes    Sedation (Moderate or Deep): Urojet    5mL 2% lidocaine hydrochloride Urojet instilled into urethra.    NDC# [address]-6  Lot #: [id number]  Expiration Date:  7-22    Bridgette Yanez

## 2022-04-13 NOTE — PATIENT INSTRUCTIONS
"AFTER YOUR CYSTOSCOPY  ?  ?  You have just completed a cystoscopy, or \"cysto\", which allowed your physician to learn more about your bladder (or to remove a stent placed after surgery). We suggest that you continue to avoid caffeine, fruit juice, and alcohol for the next 24 hours, however, you are encouraged to return to your normal activities.  ?  ?  A few things that are considered normal after your cystoscopy:  ?  * small amount of bleeding (or spotting) that clears within the next 24 hours  ?  * slight burning sensation with urination  ?  * sensation of needing to void (urinate) more frequently  ?  * the feeling of \"air\" in your urine  ?  * mild discomfort that is relieved with Tylenol    * bladder spasms  ?  ?  ?  Please contact our office promptly if you:  ?  * develop a fever above 101 degrees  ?  * are unable to urinate  ?  * develop bright red blood that does not stop  ?  * experience severe pain or swelling  ?  ?  ?  And of course, please contact our office with any concerns or questions [phone]  ?    AFTER YOUR CYSTOSCOPY        You have just completed a cystoscopy, or \"cysto\", which allowed your physician to learn more about your bladder (or to remove a stent placed after surgery). We suggest that you continue to avoid caffeine, fruit juice, and alcohol for the next 24 hours, however, you are encouraged to return to your normal activities.         A few things that are considered normal after your cystoscopy:     * Small amount of bleeding (or spotting) that clears within the next 24 hours     * Slight burning sensation with urination     * Sensation to of needing to avoid more frequently     * The feeling of \"air\" in your urine     * Mild discomfort that is relieved with Tylenol        Please contact our office promptly if you:     * Develop a fever above 101 degrees     * Are unable to urinate     * Develop bright red blood that does not stop     * Severe pain or swelling         Please contact " our office with any concerns or questions @[facility].

## 2022-04-20 ENCOUNTER — LAB (OUTPATIENT)
Dept: INFUSION THERAPY | Facility: CLINIC | Age: 87
End: 2022-04-20
Attending: NURSE PRACTITIONER
Payer: MEDICARE

## 2022-04-20 DIAGNOSIS — C67.2 MALIGNANT NEOPLASM OF LATERAL WALL OF URINARY BLADDER (H): Primary | ICD-10-CM

## 2022-04-20 LAB
ALBUMIN SERPL-MCNC: 3.8 G/DL (ref 3.4–5)
ALP SERPL-CCNC: 82 U/L (ref 40–150)
ALT SERPL W P-5'-P-CCNC: 17 U/L (ref 0–70)
ANION GAP SERPL CALCULATED.3IONS-SCNC: 6 MMOL/L (ref 3–14)
AST SERPL W P-5'-P-CCNC: 17 U/L (ref 0–45)
BILIRUB SERPL-MCNC: 0.6 MG/DL (ref 0.2–1.3)
BUN SERPL-MCNC: 20 MG/DL (ref 7–30)
CALCIUM SERPL-MCNC: 9.3 MG/DL (ref 8.5–10.1)
CHLORIDE BLD-SCNC: 103 MMOL/L (ref 94–109)
CO2 SERPL-SCNC: 28 MMOL/L (ref 20–32)
CREAT SERPL-MCNC: 1.16 MG/DL (ref 0.66–1.25)
GFR SERPL CREATININE-BSD FRML MDRD: 60 ML/MIN/1.73M2
GLUCOSE BLD-MCNC: 231 MG/DL (ref 70–99)
HOLD SPECIMEN: NORMAL
POTASSIUM BLD-SCNC: 4.1 MMOL/L (ref 3.4–5.3)
PROT SERPL-MCNC: 7.5 G/DL (ref 6.8–8.8)
SODIUM SERPL-SCNC: 137 MMOL/L (ref 133–144)
TSH SERPL DL<=0.005 MIU/L-ACNC: 0.76 MU/L (ref 0.4–4)

## 2022-04-20 PROCEDURE — 80053 COMPREHEN METABOLIC PANEL: CPT | Performed by: NURSE PRACTITIONER

## 2022-04-20 PROCEDURE — 84443 ASSAY THYROID STIM HORMONE: CPT | Performed by: NURSE PRACTITIONER

## 2022-04-20 PROCEDURE — 36415 COLL VENOUS BLD VENIPUNCTURE: CPT | Performed by: NURSE PRACTITIONER

## 2022-04-21 ENCOUNTER — INFUSION THERAPY VISIT (OUTPATIENT)
Dept: INFUSION THERAPY | Facility: CLINIC | Age: 87
End: 2022-04-21
Attending: NURSE PRACTITIONER
Payer: MEDICARE

## 2022-04-21 ENCOUNTER — ONCOLOGY VISIT (OUTPATIENT)
Dept: ONCOLOGY | Facility: CLINIC | Age: 87
End: 2022-04-21
Attending: NURSE PRACTITIONER
Payer: MEDICARE

## 2022-04-21 VITALS
OXYGEN SATURATION: 96 % | SYSTOLIC BLOOD PRESSURE: 168 MMHG | WEIGHT: 151 LBS | RESPIRATION RATE: 16 BRPM | HEART RATE: 85 BPM | TEMPERATURE: 97.5 F | DIASTOLIC BLOOD PRESSURE: 101 MMHG | BODY MASS INDEX: 21.67 KG/M2

## 2022-04-21 DIAGNOSIS — C67.2 MALIGNANT NEOPLASM OF LATERAL WALL OF URINARY BLADDER (H): Primary | ICD-10-CM

## 2022-04-21 PROCEDURE — 99214 OFFICE O/P EST MOD 30 MIN: CPT | Performed by: NURSE PRACTITIONER

## 2022-04-21 PROCEDURE — 96413 CHEMO IV INFUSION 1 HR: CPT

## 2022-04-21 PROCEDURE — 250N000011 HC RX IP 250 OP 636: Performed by: NURSE PRACTITIONER

## 2022-04-21 PROCEDURE — 258N000003 HC RX IP 258 OP 636: Performed by: NURSE PRACTITIONER

## 2022-04-21 PROCEDURE — G0463 HOSPITAL OUTPT CLINIC VISIT: HCPCS | Mod: 25

## 2022-04-21 RX ORDER — HEPARIN SODIUM (PORCINE) LOCK FLUSH IV SOLN 100 UNIT/ML 100 UNIT/ML
5 SOLUTION INTRAVENOUS
Status: DISCONTINUED | OUTPATIENT
Start: 2022-04-21 | End: 2022-04-21 | Stop reason: HOSPADM

## 2022-04-21 RX ORDER — MEPERIDINE HYDROCHLORIDE 25 MG/ML
25 INJECTION INTRAMUSCULAR; INTRAVENOUS; SUBCUTANEOUS EVERY 30 MIN PRN
Status: CANCELLED | OUTPATIENT
Start: 2022-04-21

## 2022-04-21 RX ORDER — SODIUM CHLORIDE 9 MG/ML
1000 INJECTION, SOLUTION INTRAVENOUS CONTINUOUS PRN
Status: CANCELLED
Start: 2022-04-21

## 2022-04-21 RX ORDER — NALOXONE HYDROCHLORIDE 0.4 MG/ML
.1-.4 INJECTION, SOLUTION INTRAMUSCULAR; INTRAVENOUS; SUBCUTANEOUS
Status: CANCELLED | OUTPATIENT
Start: 2022-04-21

## 2022-04-21 RX ORDER — METHYLPREDNISOLONE SODIUM SUCCINATE 125 MG/2ML
125 INJECTION, POWDER, LYOPHILIZED, FOR SOLUTION INTRAMUSCULAR; INTRAVENOUS
Status: CANCELLED
Start: 2022-04-21

## 2022-04-21 RX ORDER — DIPHENHYDRAMINE HYDROCHLORIDE 50 MG/ML
50 INJECTION INTRAMUSCULAR; INTRAVENOUS
Status: CANCELLED
Start: 2022-04-21

## 2022-04-21 RX ORDER — EPINEPHRINE 1 MG/ML
0.3 INJECTION, SOLUTION INTRAMUSCULAR; SUBCUTANEOUS EVERY 5 MIN PRN
Status: CANCELLED | OUTPATIENT
Start: 2022-04-21

## 2022-04-21 RX ORDER — LORAZEPAM 2 MG/ML
0.5 INJECTION INTRAMUSCULAR EVERY 4 HOURS PRN
Status: CANCELLED
Start: 2022-04-21

## 2022-04-21 RX ORDER — ALBUTEROL SULFATE 90 UG/1
1-2 AEROSOL, METERED RESPIRATORY (INHALATION)
Status: CANCELLED
Start: 2022-04-21

## 2022-04-21 RX ORDER — HEPARIN SODIUM,PORCINE 10 UNIT/ML
5 VIAL (ML) INTRAVENOUS
Status: DISCONTINUED | OUTPATIENT
Start: 2022-04-21 | End: 2022-04-21 | Stop reason: HOSPADM

## 2022-04-21 RX ORDER — HEPARIN SODIUM,PORCINE 10 UNIT/ML
5 VIAL (ML) INTRAVENOUS
Status: CANCELLED | OUTPATIENT
Start: 2022-04-21

## 2022-04-21 RX ORDER — ALBUTEROL SULFATE 0.83 MG/ML
2.5 SOLUTION RESPIRATORY (INHALATION)
Status: CANCELLED | OUTPATIENT
Start: 2022-04-21

## 2022-04-21 RX ORDER — HEPARIN SODIUM (PORCINE) LOCK FLUSH IV SOLN 100 UNIT/ML 100 UNIT/ML
5 SOLUTION INTRAVENOUS
Status: CANCELLED | OUTPATIENT
Start: 2022-04-21

## 2022-04-21 RX ADMIN — SODIUM CHLORIDE 1000 ML: 9 INJECTION, SOLUTION INTRAVENOUS at 10:51

## 2022-04-21 RX ADMIN — SODIUM CHLORIDE 400 MG: 9 INJECTION, SOLUTION INTRAVENOUS at 11:04

## 2022-04-21 ASSESSMENT — PAIN SCALES - GENERAL: PAINLEVEL: NO PAIN (0)

## 2022-04-21 NOTE — LETTER
4/21/2022         RE: Santos Marti  [address]  [facility] [address]        Dear Colleague,    Thank you for referring your patient, Santos Marti, to the [facility]. Please see a copy of my visit note below.      Oncology/Hematology Visit Note  Apr 21, 2022    Reason for Visit: follow up of high-grade muscle invasive bladder cancer-patient is not a candidate for surgery or radiation therapy.  Patient had previous radiation to the prostate  Locally advanced bladder cancer pt met with Dr. Ellis who recommended treatment with Keytruda every 6 weeks       Interval History:  patient reports he continues to feel well.  He denies fever chills sweats cough shortness of breath chest pain nausea vomiting diarrhea abdominal pain or bleeding he reports he has been tolerating Keytruda well  Reports good energy and appetite    Review of Systems:  14 point ROS of systems including Constitutional, Eyes, Respiratory, Cardiovascular, Gastroenterology, Genitourinary, Integumentary, Muscularskeletal, Psychiatric were all negative except for pertinent positives noted in my HPI.      Physical Examination:  Physical Exam  HENT:      Head: Normocephalic.      Right Ear: Tympanic membrane normal.      Nose: Nose normal.      Mouth/Throat:      Mouth: Mucous membranes are moist.   Eyes:      Pupils: Pupils are equal, round, and reactive to light.   Cardiovascular:      Rate and Rhythm: Normal rate.      Pulses: Normal pulses.   Pulmonary:      Effort: Pulmonary effort is normal.   Abdominal:      General: Abdomen is flat.   Musculoskeletal:         General: Normal range of motion.      Cervical back: Normal range of motion.   Neurological:      Mental Status: He is alert.         Laboratory Data:   CMP and TSH results reviewed      Assessment and Plan:      This is a 89 -year-old male with    high-grade muscle invasive bladder cancer-patient is not a candidate for surgery or radiation  therapy.  Patient had previous radiation to the prostate  Locally advanced bladder cancer -pt met with Dr. Ellis who recommended treatment with Keytruda every 6 weeks  Labs reviewed patient has been tolerating treatment well   -continue with immunotherapy  -Schedule for Keytruda every 6 weeks  -Continue follow-up with urologist and cystoscopy every 3 months-  -labs reviewed and are mild and discussed  Continue with treatment  -Schedule for next treatment in June  Patient has follow-up appointment with Dr. Ellis in June with CT scans and urine cytology  -Continue per Dr. Ellis's  recommendations      Prostate cancer diagnosed in 2004 treated with brachytherapy and followed [facility]   PSA March 2021 was undetectable      Diabetes  Patient reports he is taking metformin  Advised him to follow-up with primary care physician    Hypertension  Patient is asymptomatic  Follow-up with primary care      Patient is advised to call our clinic or go to emergency room in the event of fever chills sweats cough shortness of breath chest pain sore throat nausea vomiting diarrhea abdominal pain or bleeding  Or any changes in health condition      CHUCK Dumont CNP  [facility] [place]     Chart documentation with Dragon Voice recognition Software. Although reviewed after completion, some words and grammatical errors may remain.            Again, thank you for allowing me to participate in the care of your patient.        Sincerely,        CHUCK Dumont CNP

## 2022-04-21 NOTE — PROGRESS NOTES
Oncology/Hematology Visit Note  Apr 21, 2022    Reason for Visit: follow up of high-grade muscle invasive bladder cancer-patient is not a candidate for surgery or radiation therapy.  Patient had previous radiation to the prostate  Locally advanced bladder cancer pt met with Dr. Ellis who recommended treatment with Keytruda every 6 weeks       Interval History:  patient reports he continues to feel well.  He denies fever chills sweats cough shortness of breath chest pain nausea vomiting diarrhea abdominal pain or bleeding he reports he has been tolerating Keytruda well  Reports good energy and appetite    Review of Systems:  14 point ROS of systems including Constitutional, Eyes, Respiratory, Cardiovascular, Gastroenterology, Genitourinary, Integumentary, Muscularskeletal, Psychiatric were all negative except for pertinent positives noted in my HPI.      Physical Examination:  Physical Exam  HENT:      Head: Normocephalic.      Right Ear: Tympanic membrane normal.      Nose: Nose normal.      Mouth/Throat:      Mouth: Mucous membranes are moist.   Eyes:      Pupils: Pupils are equal, round, and reactive to light.   Cardiovascular:      Rate and Rhythm: Normal rate.      Pulses: Normal pulses.   Pulmonary:      Effort: Pulmonary effort is normal.   Abdominal:      General: Abdomen is flat.   Musculoskeletal:         General: Normal range of motion.      Cervical back: Normal range of motion.   Neurological:      Mental Status: He is alert.         Laboratory Data:   CMP and TSH results reviewed      Assessment and Plan:      This is a 89 -year-old male with    high-grade muscle invasive bladder cancer-patient is not a candidate for surgery or radiation therapy.  Patient had previous radiation to the prostate  Locally advanced bladder cancer -pt met with Dr. Ellis who recommended treatment with Keytruda every 6 weeks  Labs reviewed patient has been tolerating treatment well   -continue with immunotherapy  -Schedule for  Keytruda every 6 weeks  -Continue follow-up with urologist and cystoscopy every 3 months-  -labs reviewed and are mild and discussed  Continue with treatment  -Schedule for next treatment in June  Patient has follow-up appointment with Dr. Ellis in June with CT scans and urine cytology  -Continue per Dr. Ellis's  recommendations      Prostate cancer diagnosed in 2004 treated with brachytherapy and followed by [facility]   PSA March 2021 was undetectable      Diabetes  Patient reports he is taking metformin  Advised him to follow-up with primary care physician    Hypertension  Patient is asymptomatic  Follow-up with primary care      Patient is advised to call our clinic or go to emergency room in the event of fever chills sweats cough shortness of breath chest pain sore throat nausea vomiting diarrhea abdominal pain or bleeding  Or any changes in health condition      CHUCK Dumont [facility] [place]     Chart documentation with Dragon Voice recognition Software. Although reviewed after completion, some words and grammatical errors may remain.

## 2022-04-21 NOTE — PROGRESS NOTES
Infusion Nursing Note:  Santos Marti presents today for keytruda.    Patient seen by provider today: Yes: Polo   present during visit today: Not Applicable.    Note: N/A.      Intravenous Access:  Peripheral IV placed.    Treatment Conditions:  Lab Results   Component Value Date     04/20/2022    POTASSIUM 4.1 04/20/2022    CR 1.16 04/20/2022    SAAD 9.3 04/20/2022    BILITOTAL 0.6 04/20/2022    ALBUMIN 3.8 04/20/2022    ALT 17 04/20/2022    AST 17 04/20/2022     Results reviewed, labs MET treatment parameters, ok to proceed with treatment.      Post Infusion Assessment:  Patient tolerated infusion without incident.  Blood return noted pre and post infusion.  Site patent and intact, free from redness, edema or discomfort.  No evidence of extravasations.  Access discontinued per protocol.       Discharge Plan:   Discharge instructions reviewed with: Patient.  Patient and/or family verbalized understanding of discharge instructions and all questions answered.  AVS to patient via the[employer].  Patient will return when scheduled for next appointment. Informed patient that he will be contacted by phone, mychart, or mail when his next appointment is scheduled.  Patient discharged in stable condition accompanied by: self.  Departure Mode: Ambulatory.      Lacy Smith RN

## 2022-04-25 ENCOUNTER — IMMUNIZATION (OUTPATIENT)
Dept: NURSING | Facility: CLINIC | Age: 87
End: 2022-04-25
Payer: MEDICARE

## 2022-04-25 PROCEDURE — 0054A COVID-19,PF,PFIZER (12+ YRS): CPT

## 2022-04-25 PROCEDURE — 91305 COVID-19,PF,PFIZER (12+ YRS): CPT

## 2022-06-07 ENCOUNTER — LAB (OUTPATIENT)
Dept: INFUSION THERAPY | Facility: CLINIC | Age: 87
End: 2022-06-07
Attending: INTERNAL MEDICINE
Payer: MEDICARE

## 2022-06-07 ENCOUNTER — HOSPITAL ENCOUNTER (OUTPATIENT)
Dept: CT IMAGING | Facility: CLINIC | Age: 87
Discharge: HOME | End: 2022-06-07
Attending: INTERNAL MEDICINE | Admitting: INTERNAL MEDICINE
Payer: MEDICARE

## 2022-06-07 DIAGNOSIS — C91.10 CLL (CHRONIC LYMPHOCYTIC LEUKEMIA) (H): ICD-10-CM

## 2022-06-07 DIAGNOSIS — C67.2 MALIGNANT NEOPLASM OF LATERAL WALL OF URINARY BLADDER (H): ICD-10-CM

## 2022-06-07 DIAGNOSIS — D47.2 MGUS (MONOCLONAL GAMMOPATHY OF UNKNOWN SIGNIFICANCE): ICD-10-CM

## 2022-06-07 DIAGNOSIS — N18.31 STAGE 3A CHRONIC KIDNEY DISEASE (H): ICD-10-CM

## 2022-06-07 DIAGNOSIS — C67.2 MALIGNANT NEOPLASM OF LATERAL WALL OF URINARY BLADDER (H): Primary | ICD-10-CM

## 2022-06-07 DIAGNOSIS — Z87.898 HISTORY OF GROSS HEMATURIA: ICD-10-CM

## 2022-06-07 LAB
ALBUMIN SERPL-MCNC: 3.8 G/DL (ref 3.4–5)
ALP SERPL-CCNC: 96 U/L (ref 40–150)
ALT SERPL W P-5'-P-CCNC: 14 U/L (ref 0–70)
ANION GAP SERPL CALCULATED.3IONS-SCNC: 5 MMOL/L (ref 3–14)
AST SERPL W P-5'-P-CCNC: 13 U/L (ref 0–45)
BILIRUB SERPL-MCNC: 0.5 MG/DL (ref 0.2–1.3)
BUN SERPL-MCNC: 20 MG/DL (ref 7–30)
CALCIUM SERPL-MCNC: 9.4 MG/DL (ref 8.5–10.1)
CHLORIDE BLD-SCNC: 105 MMOL/L (ref 94–109)
CO2 SERPL-SCNC: 29 MMOL/L (ref 20–32)
CREAT SERPL-MCNC: 1.11 MG/DL (ref 0.66–1.25)
GFR SERPL CREATININE-BSD FRML MDRD: 63 ML/MIN/1.73M2
GLUCOSE BLD-MCNC: 243 MG/DL (ref 70–99)
POTASSIUM BLD-SCNC: 3.8 MMOL/L (ref 3.4–5.3)
PROT SERPL-MCNC: 7.6 G/DL (ref 6.8–8.8)
SODIUM SERPL-SCNC: 139 MMOL/L (ref 133–144)
TSH SERPL DL<=0.005 MIU/L-ACNC: 0.58 MU/L (ref 0.4–4)

## 2022-06-07 PROCEDURE — 250N000009 HC RX 250: Performed by: INTERNAL MEDICINE

## 2022-06-07 PROCEDURE — 80053 COMPREHEN METABOLIC PANEL: CPT | Performed by: NURSE PRACTITIONER

## 2022-06-07 PROCEDURE — 36415 COLL VENOUS BLD VENIPUNCTURE: CPT | Performed by: NURSE PRACTITIONER

## 2022-06-07 PROCEDURE — 74177 CT ABD & PELVIS W/CONTRAST: CPT

## 2022-06-07 PROCEDURE — 88120 CYTP URNE 3-5 PROBES EA SPEC: CPT | Mod: TC | Performed by: NURSE PRACTITIONER

## 2022-06-07 PROCEDURE — 250N000011 HC RX IP 250 OP 636: Performed by: INTERNAL MEDICINE

## 2022-06-07 PROCEDURE — 84443 ASSAY THYROID STIM HORMONE: CPT | Performed by: NURSE PRACTITIONER

## 2022-06-07 PROCEDURE — 82040 ASSAY OF SERUM ALBUMIN: CPT | Performed by: NURSE PRACTITIONER

## 2022-06-07 RX ORDER — IOPAMIDOL 755 MG/ML
76 INJECTION, SOLUTION INTRAVASCULAR ONCE
Status: COMPLETED | OUTPATIENT
Start: 2022-06-07 | End: 2022-06-07

## 2022-06-07 RX ADMIN — SODIUM CHLORIDE 63 ML: 9 INJECTION, SOLUTION INTRAVENOUS at 11:22

## 2022-06-07 RX ADMIN — IOPAMIDOL 76 ML: 755 INJECTION, SOLUTION INTRAVENOUS at 11:22

## 2022-06-09 ENCOUNTER — INFUSION THERAPY VISIT (OUTPATIENT)
Dept: INFUSION THERAPY | Facility: CLINIC | Age: 87
End: 2022-06-09
Attending: NURSE PRACTITIONER
Payer: MEDICARE

## 2022-06-09 ENCOUNTER — ONCOLOGY VISIT (OUTPATIENT)
Dept: ONCOLOGY | Facility: CLINIC | Age: 87
End: 2022-06-09
Attending: INTERNAL MEDICINE
Payer: MEDICARE

## 2022-06-09 VITALS
BODY MASS INDEX: 22.21 KG/M2 | WEIGHT: 154.8 LBS | HEART RATE: 88 BPM | SYSTOLIC BLOOD PRESSURE: 177 MMHG | TEMPERATURE: 97.9 F | DIASTOLIC BLOOD PRESSURE: 84 MMHG | RESPIRATION RATE: 16 BRPM | OXYGEN SATURATION: 97 %

## 2022-06-09 DIAGNOSIS — D47.2 MGUS (MONOCLONAL GAMMOPATHY OF UNKNOWN SIGNIFICANCE): ICD-10-CM

## 2022-06-09 DIAGNOSIS — C67.2 MALIGNANT NEOPLASM OF LATERAL WALL OF URINARY BLADDER (H): Primary | ICD-10-CM

## 2022-06-09 DIAGNOSIS — C91.10 CLL (CHRONIC LYMPHOCYTIC LEUKEMIA) (H): ICD-10-CM

## 2022-06-09 DIAGNOSIS — Z87.898 HISTORY OF GROSS HEMATURIA: ICD-10-CM

## 2022-06-09 PROCEDURE — 250N000011 HC RX IP 250 OP 636: Performed by: INTERNAL MEDICINE

## 2022-06-09 PROCEDURE — 96413 CHEMO IV INFUSION 1 HR: CPT

## 2022-06-09 PROCEDURE — G0463 HOSPITAL OUTPT CLINIC VISIT: HCPCS | Mod: 25

## 2022-06-09 PROCEDURE — 258N000003 HC RX IP 258 OP 636: Performed by: INTERNAL MEDICINE

## 2022-06-09 PROCEDURE — 99215 OFFICE O/P EST HI 40 MIN: CPT | Performed by: INTERNAL MEDICINE

## 2022-06-09 RX ORDER — HEPARIN SODIUM (PORCINE) LOCK FLUSH IV SOLN 100 UNIT/ML 100 UNIT/ML
5 SOLUTION INTRAVENOUS
Status: CANCELLED | OUTPATIENT
Start: 2022-06-09

## 2022-06-09 RX ORDER — EPINEPHRINE 1 MG/ML
0.3 INJECTION, SOLUTION INTRAMUSCULAR; SUBCUTANEOUS EVERY 5 MIN PRN
Status: CANCELLED | OUTPATIENT
Start: 2022-06-09

## 2022-06-09 RX ORDER — ALBUTEROL SULFATE 0.83 MG/ML
2.5 SOLUTION RESPIRATORY (INHALATION)
Status: CANCELLED | OUTPATIENT
Start: 2022-06-09

## 2022-06-09 RX ORDER — DIPHENHYDRAMINE HYDROCHLORIDE 50 MG/ML
50 INJECTION INTRAMUSCULAR; INTRAVENOUS
Status: CANCELLED
Start: 2022-06-09

## 2022-06-09 RX ORDER — NALOXONE HYDROCHLORIDE 0.4 MG/ML
.1-.4 INJECTION, SOLUTION INTRAMUSCULAR; INTRAVENOUS; SUBCUTANEOUS
Status: CANCELLED | OUTPATIENT
Start: 2022-06-09

## 2022-06-09 RX ORDER — NALOXONE HYDROCHLORIDE 0.4 MG/ML
.1-.4 INJECTION, SOLUTION INTRAMUSCULAR; INTRAVENOUS; SUBCUTANEOUS
Status: CANCELLED | OUTPATIENT
Start: 2022-07-21

## 2022-06-09 RX ORDER — METHYLPREDNISOLONE SODIUM SUCCINATE 125 MG/2ML
125 INJECTION, POWDER, LYOPHILIZED, FOR SOLUTION INTRAMUSCULAR; INTRAVENOUS
Status: CANCELLED
Start: 2022-07-21

## 2022-06-09 RX ORDER — ALBUTEROL SULFATE 0.83 MG/ML
2.5 SOLUTION RESPIRATORY (INHALATION)
Status: CANCELLED | OUTPATIENT
Start: 2022-07-21

## 2022-06-09 RX ORDER — LORAZEPAM 2 MG/ML
0.5 INJECTION INTRAMUSCULAR EVERY 4 HOURS PRN
Status: CANCELLED
Start: 2022-06-09

## 2022-06-09 RX ORDER — HEPARIN SODIUM,PORCINE 10 UNIT/ML
5 VIAL (ML) INTRAVENOUS
Status: CANCELLED | OUTPATIENT
Start: 2022-06-09

## 2022-06-09 RX ORDER — ALBUTEROL SULFATE 90 UG/1
1-2 AEROSOL, METERED RESPIRATORY (INHALATION)
Status: CANCELLED
Start: 2022-07-21

## 2022-06-09 RX ORDER — MEPERIDINE HYDROCHLORIDE 25 MG/ML
25 INJECTION INTRAMUSCULAR; INTRAVENOUS; SUBCUTANEOUS EVERY 30 MIN PRN
Status: CANCELLED | OUTPATIENT
Start: 2022-07-21

## 2022-06-09 RX ORDER — MEPERIDINE HYDROCHLORIDE 25 MG/ML
25 INJECTION INTRAMUSCULAR; INTRAVENOUS; SUBCUTANEOUS EVERY 30 MIN PRN
Status: CANCELLED | OUTPATIENT
Start: 2022-06-09

## 2022-06-09 RX ORDER — SODIUM CHLORIDE 9 MG/ML
1000 INJECTION, SOLUTION INTRAVENOUS CONTINUOUS PRN
Status: CANCELLED
Start: 2022-07-21

## 2022-06-09 RX ORDER — HEPARIN SODIUM (PORCINE) LOCK FLUSH IV SOLN 100 UNIT/ML 100 UNIT/ML
5 SOLUTION INTRAVENOUS
Status: CANCELLED | OUTPATIENT
Start: 2022-07-21

## 2022-06-09 RX ORDER — HEPARIN SODIUM,PORCINE 10 UNIT/ML
5 VIAL (ML) INTRAVENOUS
Status: CANCELLED | OUTPATIENT
Start: 2022-07-21

## 2022-06-09 RX ORDER — METHYLPREDNISOLONE SODIUM SUCCINATE 125 MG/2ML
125 INJECTION, POWDER, LYOPHILIZED, FOR SOLUTION INTRAMUSCULAR; INTRAVENOUS
Status: CANCELLED
Start: 2022-06-09

## 2022-06-09 RX ORDER — EPINEPHRINE 1 MG/ML
0.3 INJECTION, SOLUTION INTRAMUSCULAR; SUBCUTANEOUS EVERY 5 MIN PRN
Status: CANCELLED | OUTPATIENT
Start: 2022-07-21

## 2022-06-09 RX ORDER — SODIUM CHLORIDE 9 MG/ML
1000 INJECTION, SOLUTION INTRAVENOUS CONTINUOUS PRN
Status: CANCELLED
Start: 2022-06-09

## 2022-06-09 RX ORDER — DIPHENHYDRAMINE HYDROCHLORIDE 50 MG/ML
50 INJECTION INTRAMUSCULAR; INTRAVENOUS
Status: CANCELLED
Start: 2022-07-21

## 2022-06-09 RX ORDER — LORAZEPAM 2 MG/ML
0.5 INJECTION INTRAMUSCULAR EVERY 4 HOURS PRN
Status: CANCELLED
Start: 2022-07-21

## 2022-06-09 RX ORDER — ALBUTEROL SULFATE 90 UG/1
1-2 AEROSOL, METERED RESPIRATORY (INHALATION)
Status: CANCELLED
Start: 2022-06-09

## 2022-06-09 RX ADMIN — SODIUM CHLORIDE 400 MG: 9 INJECTION, SOLUTION INTRAVENOUS at 11:27

## 2022-06-09 RX ADMIN — SODIUM CHLORIDE 1000 ML: 9 INJECTION, SOLUTION INTRAVENOUS at 11:27

## 2022-06-09 ASSESSMENT — PAIN SCALES - GENERAL: PAINLEVEL: NO PAIN (0)

## 2022-06-09 NOTE — PROGRESS NOTES
Infusion Nursing Note:  Santos Marti presents today for C16D1 keytruda  Patient seen by provider today: Yes: Rhonda   present during visit today: Not Applicable.    Note: N/A.      Intravenous Access:  Peripheral IV placed.    Treatment Conditions:  Lab Results   Component Value Date     06/07/2022    POTASSIUM 3.8 06/07/2022    CR 1.11 06/07/2022    SAAD 9.4 06/07/2022    BILITOTAL 0.5 06/07/2022    ALBUMIN 3.8 06/07/2022    ALT 14 06/07/2022    AST 13 06/07/2022     Results reviewed, labs MET treatment parameters, ok to proceed with treatment.      Post Infusion Assessment:  Patient tolerated infusion without incident.  Blood return noted pre and post infusion.  Site patent and intact, free from redness, edema or discomfort.  No evidence of extravasations.  Access discontinued per protocol.       Discharge Plan:   Discharge instructions reviewed with: Patient.  Patient and/or family verbalized understanding of discharge instructions and all questions answered.  Patient discharged in stable condition accompanied by: self.  Departure Mode: Ambulatory.      Marta Welch RN

## 2022-06-09 NOTE — PROGRESS NOTES
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  He was on apixaban for atrial fibrillation.  TURBT done on 10/22/2019 revealed normal muscle invasive bladder cancer  -He was followed with surveillance cystoscopies every 3 months.  His cystoscopy evaluation was negative for any tumors or raised erythema on 3/11/2020, however a follow-up exam on 7/8/2020 revealed raised erythematous area in the left lateral bladder wall.  He had TURBT under anesthesia on 7/15/2020 which is revealed high-grade muscle invasive urothelial carcinoma.  -Santos was diagnosed with prostate cancer in October 2004.  He underwent brachii therapy followed by external beam radiation therapy administered at the [facility].  -Due to his previous radiation for his prostate cancer he is not a candidate for bladder radiation.  He is not a candidate for cystectomy due to his advanced age and also previous extensive radiation to the prostate.  - He was started on pembrolizumab for his locally advanced bladder cancer since 8/13/20.     CURRENT INTERVENTIONS:  Pembrolizumab 400 mg IV every 6 weeks since 8/13/20    SUBJECTIVE   Santos Marti is being followed for muscle invasive bladder cancer    Patient is being followed on therapy for his muscle invasive bladder cancer. He is alone at this visit. He has been started on pembrolizumab since August 2020. He has not noticed any side effects on therapy other than itching.  He has itching on both of his hands and legs and trunk.  He has been applying cream on it which does seem to help some.  He has followed up  with dermatology and they agreed with the choice of the cream being used.      He denies any recurrent hematuria. He has good appetite and fair energy. No side effects suggestive of autoimmune disease other than his itching.       PAST MEDICAL HISTORY     Past Medical History:   Diagnosis Date     Arthritis      Complication of anesthesia      Diabetes (H)      Gastroesophageal reflux disease      Hypertension      Pacemaker          CURRENT OUTPATIENT MEDICATIONS     Current Outpatient Medications   Medication Sig     acetaminophen (TYLENOL) 500 MG tablet Take 500-1,000 mg by mouth every 6 hours as needed for mild pain     apixaban ANTICOAGULANT (ELIQUIS) 5 MG tablet Take 5 mg by mouth 2 times daily      isosorbide mononitrate (IMDUR) 30 MG 24 hr tablet TK 1 T PO ONCE D     lisinopril-hydrochlorothiazide (PRINZIDE/ZESTORETIC) 20-25 MG tablet Take 1 tablet by mouth     metFORMIN (GLUCOPHAGE) 1000 MG tablet Take 500 mg by mouth daily      metoprolol succinate ER (TOPROL-XL) 50 MG 24 hr tablet Take 50 mg by mouth daily Take one and a half tabs daily     omeprazole 20 MG tablet Take 20 mg by mouth daily     triamcinolone (KENALOG) 0.1 % external ointment Apply topically 2 times daily     Current Facility-Administered Medications   Medication     lidocaine (XYLOCAINE) 2 % external gel        ALLERGIES      Allergies   Allergen Reactions     Sulfa Drugs Rash        REVIEW OF SYSTEMS   As above in the HPI, o/w complete 12-point ROS was negative.     PHYSICAL EXAM   BP (!) 177/84   Pulse 88   Temp 97.9  F (36.6  C) (Oral)   Resp 16   Wt 70.2 kg (154 lb 12.8 oz)   SpO2 97%   BMI 22.21 kg/m    Limited physical exam during video visit due to COVID19 restrictions  Elderly male in no acute distress  Breathing comfortably, no tachypnea  Speech and hearing normal  Pleasant mood and congruent affect  Moving all extremities, no focal neurologic deficits apparent      LABORATORY AND IMAGING STUDIES     Recent Labs   Lab Test  06/07/22  1011 04/20/22  1107 03/09/22  1055 12/29/21  1023 11/17/21  0921    137 139 136 138   POTASSIUM 3.8 4.1 3.8 4.3 4.0   CHLORIDE 105 103 103 103 103   CO2 29 28 30 30 30   ANIONGAP 5 6 6 3 5   BUN 20 20 19 19 19   CR 1.11 1.16 1.12 1.16 1.10   * 231* 234* 197* 157*   SAAD 9.4 9.3 9.3 9.3 9.3     No results for input(s): MAG, PHOS in the last 39751 hours.  Recent Labs   Lab Test 12/29/21  1023 08/24/21  1225 07/14/21  1127 06/02/21  1152 05/06/21  1110   WBC 6.7 8.0 7.7 9.1 8.3   HGB 14.7 12.2* 14.0 14.5 13.5    186 237 239 227   MCV 99 96 95 96 94   NEUTROPHIL 68 71 68 75.5 71.7     Recent Labs   Lab Test 06/07/22  1011 04/20/22  1107 03/09/22  1055 10/06/21  0924 08/24/21  1225 07/14/21  1127 07/14/21  1127 06/02/21  1152   BILITOTAL 0.5 0.6 0.7   < > 0.4   < > 0.5 0.9   ALKPHOS 96 82 94   < > 74   < > 81 85   ALT 14 17 13   < > 14   < > 17 16   AST 13 17 14   < > 12   < > 19 11   ALBUMIN 3.8 3.8 3.7   < > 3.2*   < > 3.6 3.7   LDH  --   --   --   --  160  --  242* 178    < > = values in this interval not displayed.     TSH   Date Value Ref Range Status   06/07/2022 0.58 0.40 - 4.00 mU/L Final   04/20/2022 0.76 0.40 - 4.00 mU/L Final   03/09/2022 0.53 0.40 - 4.00 mU/L Final   06/02/2021 0.43 0.40 - 4.00 mU/L Final   05/06/2021 0.70 0.40 - 4.00 mU/L Final   04/21/2021 0.67 0.40 - 4.00 mU/L Final     No results for input(s): CEA in the last 81302 hours.  Results for orders placed or performed during the hospital encounter of 06/07/22   CT Chest/Abdomen/Pelvis w Contrast    Narrative    CT CHEST/ABDOMEN/PELVIS W CONTRAST 6/7/2022 11:39 AM    CLINICAL HISTORY: Bladder cancer on immunotherapy with pembrolizumab;  Malignant neoplasm of lateral wall of urinary bladder (H); History of  gross hematuria; CLL (chronic lymphocytic leukemia) (H); MGUS  (monoclonal gammopathy of unknown significance); Stage 3a chronic  kidney disease (H)    TECHNIQUE: CT scan of the chest, abdomen, and pelvis was  performed  following injection of IV contrast. Multiplanar reformats were  obtained. Dose reduction techniques were used.   CONTRAST: 76 mL [id number]    COMPARISON: 11/18/2021    FINDINGS:   LUNGS AND PLEURA: Evaluation slightly limited by breathing motion.  Mild bibasilar atelectasis. Few pulmonary nodules are stable. For  example, a right upper lobe 8 mm nodule (series 4, image 98), and a  right lower lobe basilar and millimeter nodule (series 4, image 209)  are unchanged. No new or enlarging nodules. No infiltrate or pleural  effusion.    MEDIASTINUM/AXILLAE: No lymphadenopathy. Left subclavian transvenous  pacemaker. Mild cardiomegaly. No thoracic aortic aneurysm. Severe  coronary artery calcifications.    HEPATOBILIARY: Normal.    PANCREAS: Normal.    SPLEEN: Normal.    ADRENAL GLANDS: Normal.    KIDNEYS/BLADDER: No hydronephrosis, calculi or significant renal  masses. Evaluation of the urinary bladder is limited by streak  artifact from bilateral total hip arthroplasties.    BOWEL: Diverticulosis in the colon. No acute inflammatory change. No  obstruction.     PELVIC ORGANS: Evaluation limited by streak artifact from bilateral  total hip arthroplasties. Brachytherapy seeds in the prostate.    ADDITIONAL FINDINGS: No lymphadenopathy in the abdomen and pelvis. No  abdominal aortic aneurysm. No free fluid or fluid collections.    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 Mild degenerative changes of the spine.  No suspicious lesions in the bones. Stable mild wedge compression  deformity of T7 vertebral body.      Impression    IMPRESSION:  1.  Stable pulmonary nodules measuring up to 11 mm.  2.  No definite evidence for recurrent or metastatic disease in the  abdomen and pelvis with evaluation limited by streak artifact from  bilateral total hip arthroplasties.    AMADEO SMITH MD         SYSTEM ID:  [id number]     Recent Labs   Lab Test 03/10/21  1113   PSA  <0.01         ASSESSMENT AND PLAN   1. High-grade muscle invasive bladder cancer in a patient not a candidate for either surgery or radiation therapy  2. Prostate cancer treated with brachii therapy followed by external beam radiation therapy  3. Hypertension, diabetes mellitus type 2, atrial fibrillation on chronic anticoagulation with apixaban    I had a lengthy discussion with patient at this clinic visit. He has been started on pembrolizumab for his muscle invasive bladder cancer on 8/13/20. He has tolerated this well. He has not noticed any side effects on therapy other than itching. He denies any hematuria.     He has itching almost all over his body.  He has been applying triamcinolone cream as recommended.  He has not seen a big change.  He has followed up with dermatology and they did not have any additional recommendations.  I think his itching is secondary to pembrolizumab immunotherapy.  Definitely holding therapy would help, as would systemic steroids.  I would try to continue therapy for now.  In addition to the steroid creams he could keep his skin moisturized.     He has noticed several advertisements online for treating itching. I would not be opposed to anything but steroids for him at this time. I would recommend that we hold therapy after his infusion in 6 weeks - tentatively 7/21. His itching would resolve on its own then.     I have reviewed all of the labs done prior to this clinic visit.  Labs are all completely normal including electrolytes, renal function, hepatic panel, complete blood count and differential.  He has elevated blood glucose which was not fasting and elevated.     I reviewed actual images from his restaging CT scan. His bladder tumor cannot be assessed on the CT portion due to the bilateral hip replacements. There are no new lesions identified. He has stable pulmonary nodules. There is no evidence of metastatic disease other than stable pulmonary nodules in the chest, abdomen  and pelvis.     Monitoring response to this disease is going to be a little difficult as it is only accurately measured on a PET-CT and we cannot use the PET scan for continued response assessment. I would follow him with CT scans. As long as he is not having a clear disease progression, we could continue on therapy as current. He is being followed up with cystoscopy and had last negative cystoscopy on 1/12/22. I would plan to continue therapy for 2 years unless he has unacceptable side effects or disease progression.    I will NOT have him follow with Polo Valderrama for the next infusion as he has been stable for nearly 2 yrs.     I will see him in 3 months with labs including urine cytology and CT chest, abdomen and pelvis prior to visit.       25 minutes spent on the date of the encounter doing chart review, history and exam, documentation and further activities as noted above      Imtiaz Ellis    Hematologist and Medical Oncologist  [facility]

## 2022-06-09 NOTE — LETTER
"    6/9/2022         RE: Santos Marti  [address]  [facility] [address]        Dear Colleague,    Thank you for referring your patient, Santos Marti, to the [facility]. Please see a copy of my visit note below.    Oncology Rooming Note    June 9, 2022 10:27 AM   Santos Marti is a 89 year old male who presents for:    No chief complaint on file.    Initial Vitals: BP (!) 177/84   Pulse 88   Temp 97.9  F (36.6  C) (Oral)   Resp 16   Wt 70.2 kg (154 lb 12.8 oz)   SpO2 97%   BMI 22.21 kg/m   Estimated body mass index is 22.21 kg/m  as calculated from the following:    Height as of 4/13/22: 1.778 m (5' 10\").    Weight as of this encounter: 70.2 kg (154 lb 12.8 oz). Body surface area is 1.86 meters squared.  No Pain (0) Comment: Data Unavailable   No LMP for male patient.  Allergies reviewed: Yes  Medications reviewed: Yes    Medications: Medication refills not needed today.  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  He was on apixaban for atrial fibrillation.  TURBT done on 10/22/2019 revealed normal muscle invasive bladder cancer  -He was followed with surveillance cystoscopies every 3 months.  His cystoscopy evaluation was negative for any tumors " or raised erythema on 3/11/2020, however a follow-up exam on 7/8/2020 revealed raised erythematous area in the left lateral bladder wall.  He had TURBT under anesthesia on 7/15/2020 which is revealed high-grade muscle invasive urothelial carcinoma.  -Santos was diagnosed with prostate cancer in October 2004.  He underwent brachii therapy followed by external beam radiation therapy administered at the [facility].  -Due to his previous radiation for his prostate cancer he is not a candidate for bladder radiation.  He is not a candidate for cystectomy due to his advanced age and also previous extensive radiation to the prostate.  - He was started on pembrolizumab for his locally advanced bladder cancer since 8/13/20.     CURRENT INTERVENTIONS:  Pembrolizumab 400 mg IV every 6 weeks since 8/13/20    SUBJECTIVE   Santos Marti is being followed for muscle invasive bladder cancer    Patient is being followed on therapy for his muscle invasive bladder cancer. He is alone at this visit. He has been started on pembrolizumab since August 2020. He has not noticed any side effects on therapy other than itching.  He has itching on both of his hands and legs and trunk.  He has been applying cream on it which does seem to help some.  He has followed up with dermatology and they agreed with the choice of the cream being used.      He denies any recurrent hematuria. He has good appetite and fair energy. No side effects suggestive of autoimmune disease other than his itching.       PAST MEDICAL HISTORY     Past Medical History:   Diagnosis Date     Arthritis      Complication of anesthesia      Diabetes (H)      Gastroesophageal reflux disease      Hypertension      Pacemaker          CURRENT OUTPATIENT MEDICATIONS     Current Outpatient Medications   Medication Sig     acetaminophen (TYLENOL) 500 MG tablet Take 500-1,000 mg by mouth every 6 hours as needed for mild pain     apixaban ANTICOAGULANT (ELIQUIS) 5 MG tablet  Take 5 mg by mouth 2 times daily      isosorbide mononitrate (IMDUR) 30 MG 24 hr tablet TK 1 T PO ONCE D     lisinopril-hydrochlorothiazide (PRINZIDE/ZESTORETIC) 20-25 MG tablet Take 1 tablet by mouth     metFORMIN (GLUCOPHAGE) 1000 MG tablet Take 500 mg by mouth daily      metoprolol succinate ER (TOPROL-XL) 50 MG 24 hr tablet Take 50 mg by mouth daily Take one and a half tabs daily     omeprazole 20 MG tablet Take 20 mg by mouth daily     triamcinolone (KENALOG) 0.1 % external ointment Apply topically 2 times daily     Current Facility-Administered Medications   Medication     lidocaine (XYLOCAINE) 2 % external gel        ALLERGIES      Allergies   Allergen Reactions     Sulfa Drugs Rash        REVIEW OF SYSTEMS   As above in the HPI, o/w complete 12-point ROS was negative.     PHYSICAL EXAM   BP (!) 177/84   Pulse 88   Temp 97.9  F (36.6  C) (Oral)   Resp 16   Wt 70.2 kg (154 lb 12.8 oz)   SpO2 97%   BMI 22.21 kg/m    Limited physical exam during video visit due to COVID19 restrictions  Elderly male in no acute distress  Breathing comfortably, no tachypnea  Speech and hearing normal  Pleasant mood and congruent affect  Moving all extremities, no focal neurologic deficits apparent      LABORATORY AND IMAGING STUDIES     Recent Labs   Lab Test 06/07/22  1011 04/20/22  1107 03/09/22  1055 12/29/21  1023 11/17/21  0921    137 139 136 138   POTASSIUM 3.8 4.1 3.8 4.3 4.0   CHLORIDE 105 103 103 103 103   CO2 29 28 30 30 30   ANIONGAP 5 6 6 3 5   BUN 20 20 19 19 19   CR 1.11 1.16 1.12 1.16 1.10   * 231* 234* 197* 157*   SAAD 9.4 9.3 9.3 9.3 9.3     No results for input(s): MAG, PHOS in the last 57216 hours.  Recent Labs   Lab Test 12/29/21  1023 08/24/21  1225 07/14/21  1127 06/02/21  1152 05/06/21  1110   WBC 6.7 8.0 7.7 9.1 8.3   HGB 14.7 12.2* 14.0 14.5 13.5    186 237 239 227   MCV 99 96 95 96 94   NEUTROPHIL 68 71 68 75.5 71.7     Recent Labs   Lab Test 06/07/22  1011 04/20/22  1107  03/09/22  1055 10/06/21  0924 08/24/21  1225 07/14/21  1127 07/14/21  1127 06/02/21  1152   BILITOTAL 0.5 0.6 0.7   < > 0.4   < > 0.5 0.9   ALKPHOS 96 82 94   < > 74   < > 81 85   ALT 14 17 13   < > 14   < > 17 16   AST 13 17 14   < > 12   < > 19 11   ALBUMIN 3.8 3.8 3.7   < > 3.2*   < > 3.6 3.7   LDH  --   --   --   --  160  --  242* 178    < > = values in this interval not displayed.     TSH   Date Value Ref Range Status   06/07/2022 0.58 0.40 - 4.00 mU/L Final   04/20/2022 0.76 0.40 - 4.00 mU/L Final   03/09/2022 0.53 0.40 - 4.00 mU/L Final   06/02/2021 0.43 0.40 - 4.00 mU/L Final   05/06/2021 0.70 0.40 - 4.00 mU/L Final   04/21/2021 0.67 0.40 - 4.00 mU/L Final     No results for input(s): CEA in the last 77256 hours.  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 Multiplanar reformats were  obtained. Dose reduction techniques were used.   CONTRAST: 76 mL Isovue-370    COMPARISON: 11/18/2021    FINDINGS:   LUNGS AND PLEURA: Evaluation slightly limited by breathing motion.  Mild bibasilar atelectasis. Few pulmonary nodules are stable. For  example, a right upper lobe 8 mm nodule (series 4, image 98), and a  right lower lobe basilar and millimeter nodule (series 4, image 209)  are unchanged. No new or enlarging nodules. No infiltrate or pleural  effusion.    MEDIASTINUM/AXILLAE: No lymphadenopathy. Left subclavian transvenous  pacemaker. Mild cardiomegaly. No thoracic aortic  aneurysm. Severe  coronary artery calcifications.    HEPATOBILIARY: Normal.    PANCREAS: Normal.    SPLEEN: Normal.    ADRENAL GLANDS: Normal.    KIDNEYS/BLADDER: No hydronephrosis, calculi or significant renal  masses. Evaluation of the urinary bladder is limited by streak  artifact from bilateral total hip arthroplasties.    BOWEL: Diverticulosis in the colon. No acute inflammatory change. No  obstruction.     PELVIC ORGANS: Evaluation limited by streak artifact from bilateral  total hip arthroplasties. Brachytherapy seeds in the prostate.    ADDITIONAL FINDINGS: No lymphadenopathy in the abdomen and pelvis. No  abdominal aortic aneurysm. No free fluid or fluid collections.    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 Mild degenerative changes of the spine.  No suspicious lesions in the bones. Stable mild wedge compression  deformity of T7 vertebral body.      Impression    IMPRESSION:  1.  Stable pulmonary nodules measuring up to 11 mm.  2.  No definite evidence for recurrent or metastatic disease in the  abdomen and pelvis with evaluation limited by streak artifact from  bilateral total hip arthroplasties.    AMADEO SMITH MD         SYSTEM ID:  [id number]     Recent Labs   Lab Test 03/10/21  1113   PSA <0.01         ASSESSMENT AND PLAN   1. High-grade muscle invasive bladder cancer in a patient not a candidate for either surgery or radiation therapy  2. Prostate cancer treated with brachii therapy followed by external beam radiation therapy  3. Hypertension, diabetes mellitus type 2, atrial fibrillation on chronic anticoagulation with apixaban    I had a lengthy discussion with patient at this clinic visit. He has been started on pembrolizumab for his muscle invasive bladder cancer on 8/13/20. He has tolerated this well. He has not noticed any side effects on therapy other than itching. He denies any hematuria.     He has itching almost all over his body.   He has been applying triamcinolone cream as recommended.  He has not seen a big change.  He has followed up with dermatology and they did not have any additional recommendations.  I think his itching is secondary to pembrolizumab immunotherapy.  Definitely holding therapy would help, as would systemic steroids.  I would try to continue therapy for now.  In addition to the steroid creams he could keep his skin moisturized.     He has noticed several advertisements online for treating itching. I would not be opposed to anything but steroids for him at this time. I would recommend that we hold therapy after his infusion in 6 weeks - tentatively 7/21. His itching would resolve on its own then.     I have reviewed all of the labs done prior to this clinic visit.  Labs are all completely normal including electrolytes, renal function, hepatic panel, complete blood count and differential.  He has elevated blood glucose which was not fasting and elevated.     I reviewed actual images from his restaging CT scan. His bladder tumor cannot be assessed on the CT portion due to the bilateral hip replacements. There are no new lesions identified. He has stable pulmonary nodules. There is no evidence of metastatic disease other than stable pulmonary nodules in the chest, abdomen and pelvis.     Monitoring response to this disease is going to be a little difficult as it is only accurately measured on a PET-CT and we cannot use the PET scan for continued response assessment. I would follow him with CT scans. As long as he is not having a clear disease progression, we could continue on therapy as current. He is being followed up with cystoscopy and had last negative cystoscopy on 1/12/22. I would plan to continue therapy for 2 years unless he has unacceptable side effects or disease progression.    I will NOT have him follow with [facility] Valderrama for the next infusion as he has been stable for nearly 2 yrs.     I will see him in 3 months  with labs including urine cytology and CT chest, abdomen and pelvis prior to visit.       25 minutes spent on the date of the encounter doing chart review, history and exam, documentation and further activities as noted above      Imtiaz Ellis    Hematologist and Medical Oncologist  [facility]        Again, thank you for allowing me to participate in the care of your patient.        Sincerely,        Imtiaz Ellis MD

## 2022-06-09 NOTE — PROGRESS NOTES
"Oncology Rooming Note    June 9, 2022 10:27 AM   Santos Marti is a 89 year old male who presents for:    No chief complaint on file.    Initial Vitals: BP (!) 177/84   Pulse 88   Temp 97.9  F (36.6  C) (Oral)   Resp 16   Wt 70.2 kg (154 lb 12.8 oz)   SpO2 97%   BMI 22.21 kg/m   Estimated body mass index is 22.21 kg/m  as calculated from the following:    Height as of 4/13/22: 1.778 m (5' 10\").    Weight as of this encounter: 70.2 kg (154 lb 12.8 oz). Body surface area is 1.86 meters squared.  No Pain (0) Comment: Data Unavailable   No LMP for male patient.  Allergies reviewed: Yes  Medications reviewed: Yes    Medications: Medication refills not needed today.  Pharmacy name entered into digedu:    [facility][id number] - [place], [place] - [address] [facility] [address] [facility] [place], [place] - [address] [place] [address] PHARMACY - [place], [place] - [address]    Clinical concerns: no      Lani Muhammad CMA              "

## 2022-06-15 PROCEDURE — 88120 CYTP URNE 3-5 PROBES EA SPEC: CPT | Mod: 26 | Performed by: PATHOLOGY

## 2022-06-29 ENCOUNTER — TELEPHONE (OUTPATIENT)
Dept: ONCOLOGY | Facility: CLINIC | Age: 87
End: 2022-06-29

## 2022-06-29 NOTE — TELEPHONE ENCOUNTER
"Patient called to report a positive COVID-19 test. Santos's wife tested positive a \"couple of days ago,\" and he tested positive yesterday; both used an at-home rapid antigen test. Advised patient to follow CDC guidelines and isolate for 5 days if they are asymptomatic, or their symptoms are resolving. Informed patient I would send an in-basket to Dr. Ellis.   "

## 2022-07-13 ENCOUNTER — OFFICE VISIT (OUTPATIENT)
Dept: UROLOGY | Facility: CLINIC | Age: 87
End: 2022-07-13
Payer: MEDICARE

## 2022-07-13 VITALS
DIASTOLIC BLOOD PRESSURE: 70 MMHG | HEIGHT: 70 IN | BODY MASS INDEX: 21.47 KG/M2 | SYSTOLIC BLOOD PRESSURE: 122 MMHG | WEIGHT: 150 LBS

## 2022-07-13 DIAGNOSIS — N39.41 URGE INCONTINENCE: ICD-10-CM

## 2022-07-13 DIAGNOSIS — C67.2 MALIGNANT NEOPLASM OF LATERAL WALL OF URINARY BLADDER (H): Primary | ICD-10-CM

## 2022-07-13 DIAGNOSIS — C61 PROSTATE CANCER (H): ICD-10-CM

## 2022-07-13 LAB
ALBUMIN UR-MCNC: 100 MG/DL
APPEARANCE UR: CLEAR
BILIRUB UR QL STRIP: ABNORMAL
COLOR UR AUTO: YELLOW
GLUCOSE UR STRIP-MCNC: NEGATIVE MG/DL
HGB UR QL STRIP: NEGATIVE
KETONES UR STRIP-MCNC: ABNORMAL MG/DL
LEUKOCYTE ESTERASE UR QL STRIP: NEGATIVE
NITRATE UR QL: NEGATIVE
PH UR STRIP: 5.5 [PH] (ref 5–7)
SP GR UR STRIP: 1.02 (ref 1–1.03)
UROBILINOGEN UR STRIP-ACNC: 1 E.U./DL

## 2022-07-13 PROCEDURE — 81003 URINALYSIS AUTO W/O SCOPE: CPT | Mod: QW | Performed by: UROLOGY

## 2022-07-13 PROCEDURE — 99214 OFFICE O/P EST MOD 30 MIN: CPT | Mod: 25 | Performed by: UROLOGY

## 2022-07-13 PROCEDURE — 52000 CYSTOURETHROSCOPY: CPT | Performed by: UROLOGY

## 2022-07-13 RX ORDER — LIDOCAINE HYDROCHLORIDE 20 MG/ML
JELLY TOPICAL ONCE
Status: DISCONTINUED | OUTPATIENT
Start: 2022-07-13 | End: 2022-07-13 | Stop reason: HOSPADM

## 2022-07-13 ASSESSMENT — PAIN SCALES - GENERAL: PAINLEVEL: NO PAIN (0)

## 2022-07-13 NOTE — NURSING NOTE
Chief Complaint   Patient presents with     Malignant neoplasm of lateral wall of urinary bladder     Patient here for a in office cystoscopy      Prior to the start of the procedure and with procedural staff participation, I verbally confirmed the patient s identity using two indicators, relevant allergies, that the procedure was appropriate and matched the consent or emergent situation, and that the correct equipment/implants were available. Immediately prior to starting the procedure I conducted the Time Out with the procedural staff and re-confirmed the patient s name, procedure, and site/side. I have wiped the patient off with the povidone-Iodine solution, draped them,  used Lidocaine hydrochloride jelly, and instilled sterile water into the bladder. (The Joint Commission universal protocol was followed.)  Yes    Sedation (Moderate or Deep): Urojet    5mL 2% lidocaine hydrochloride Urojet instilled into urethra.    NDC# [address]-0  Lot #: [id number]  Expiration Date:  7-22    Bridgette Yanez

## 2022-07-13 NOTE — LETTER
7/13/2022       RE: Santos Marti  [address]  [facility] [address]     Dear Colleague,    Thank you for referring your patient, Santos Marti, to the [facility] [place] at [facility]. Please see a copy of my visit note below.    [place]   CHIEF COMPLAINT   It was my pleasure to see Santos Marti who is a 89 year old male for follow-up of prostate cancer and bladder cancer.      HPI   Santos Marti is a very pleasant 89 year old male who is a prior patient of Dr. Costello with history of prostate cancer and muscle invasive bladder cancer.  He also follows with Dr. Ellis.  His initial diagnosis was in October 2019.  He was started on pembrolizumab for locally advanced bladder cancer on 8/13/2020.  He has a history of prostate cancer diagnosed in October 2004 for which [facility]  He is been deemed not a surgical candidate or a radiation candidate for his bladder cancer given his prior treatment for his prostate cancer.    He was last seen by Dr. Costello on 3/5/2021 for office cystoscopy with fulguration of a small papillary tumor above the right ureteral orifice.    6/7/21:  Follow-up today for surveillance cystoscopy  Doing well with no issues    9/8/21:  Follow-up today for surveillance cystoscopy  He has been doing fairly well  He notes that he is dry overnight however has some daytime frequency and urgency and uses pads  No gross hematuria  Cystoscopy with no evidence of disease    1/12/22:  He returns for surveillance cystoscopy  He has been doing well with no change in symptoms    4/13/22:  Follow-up today for surveillance cystoscopy  He has been doing well with no change in symptoms or gross hematuria    TODAY 7/13/2022:  Follow-up today for surveillance cystoscopy  He is doing well with no gross hematuria  He is having bothersome urinary incontinence,  "mainly urgency incontinence and going through about 3 depends per day  He saw an ad for condom catheters and is very interested in trying this    PHYSICAL EXAM  Patient is a 89 year old  male   Vitals: Blood pressure 122/70, height 1.778 m (5' 10\"), weight 68 kg (150 lb).  Body mass index is 21.52 kg/m .  General Appearance Adult:   Alert, no acute distress, oriented  HENT: throat/mouth:normal, good dentition  Lungs: no respiratory distress, or pursed lip breathing  Heart: No obvious jugular venous distension present  Abdomen: soft, nontender, no organomegaly or masses  Musculoskeltal: extremities normal, no peripheral edema  Skin: no suspicious lesions or rashes  Neuro: Alert, oriented, speech and mentation normal  Psych: affect and mood normal  Gait: Normal  : penis, scrotum, testes normal    PSA   Date Value Ref Range Status   03/10/2021 <0.01 0 - 4 ug/L Final     Comment:     Assay Method:  Chemiluminescence using Siemens Vista analyzer      UA RESULTS:  Recent Labs   Lab Test 01/12/22  1522 07/22/20  0810 07/16/20  2120   COLOR Yellow   < > Light Yellow   APPEARANCE Clear   < > Clear   URINEGLC Negative   < > Negative   URINEBILI Negative   < > Negative   URINEKETONE Trace*   < > Negative   SG >=1.030   < > 1.014   UBLD Negative   < > Moderate*   URINEPH 5.0   < > 5.5   PROTEIN Trace*   < > 50*   UROBILINOGEN 0.2   < >  --    NITRITE Negative   < > Negative   LEUKEST Negative   < > Small*   RBCU  --   --  71*   WBCU  --   --  20*    < > = values in this interval not displayed.      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   -Tumor invades lamina propria and muscularis propria.   -Urothelial (transitional cell) carcinoma in-situ: Not identified.   -Lymph/vascular space invasion: Not identified.   -Sampling includes: Urothelium, " lamina propria, and muscularis propria.        CYTOLOGY:  Final Diagnosis   Specimen A                 Interpretation:                  Negative for High Grade Urothelial Carcinoma     FISH 6/7/22:  Final Diagnosis   Specimen A                 Interpretation:                  Negative UroVysion test            IMAGING  All pertinent imaging reviewed:    All imaging studies reviewed by me.  I personally reviewed these imaging films.  A formal report from radiology will follow.    CT CHEST/ABD/PEL 11/8/21:  FINDINGS:   LUNGS AND PLEURA: Few small pulmonary nodules. For example, there is a  10 mm right posterior lower lobe nodule (series 6, image 186) that is  stable since 6/29/2021, right upper lobe 7 mm nodule (series 6, image  91) and 7 mm nodule nodular in the lingula (series 6, image 115). No  new or enlarging nodules. No infiltrate or pleural effusion.     MEDIASTINUM/AXILLAE: No lymphadenopathy. Pacemaker. Mild cardiomegaly.  No thoracic aortic aneurysm. Severe coronary artery calcifications. No  pericardial effusion.     HEPATOBILIARY: Normal.     PANCREAS: Normal.     SPLEEN: Normal.     ADRENAL GLANDS: Normal.     KIDNEYS/BLADDER: Normal kidneys. The urinary bladder cannot be  evaluated due to streak artifact from bilateral total hip  arthroplasties.     BOWEL: No obstruction or inflammatory change.     PELVIC ORGANS: Evaluation significantly limited by streak artifact  from bilateral total hip arthroplasties. Brachytherapy seeds in the  prostate gland.     ADDITIONAL FINDINGS: No lymphadenopathy in the abdomen and pelvis. No  abdominal aortic aneurysm. No free fluid or fluid collections.     MUSCULOSKELETAL: Bilateral shoulder and hip arthroplasties. Mild  degenerative changes in the spine. No suspicious lesions in the bones.                                                                      IMPRESSION:  1.  Stable pulmonary nodules, including the largest 11 mm nodule in  the right lower lobe since at  least 6/29/2021.  2.  No metastatic disease in the abdomen and pelvis. Evaluation of the  pelvis is slightly limited by streak artifact from bilateral total hip  arthroplasties.     CYSTOSCOPY PROCEDURE NOTE:     Santos Marti is a 89 year old male  who presents with muscle invasive bladder cancer for cystoscop.     Pt ID verified with patient: Yes      Procedure verified with patient: Yes      Procedure confirmed with physician and support staff: Yes      Consent form confirmed with physician and support staff.     Sign In  History and Physical Exam reviewed .  Informed Consent Discussed: Yes   Sign in Communication: Yes   Time Out:  Team Confirms the Correct Patient, Correct Procedure; Yes , Correct Site and Site Marking, Correct Position (if applicable).     Affirmation of Time Out: Yes   Sign Out:  Sign Out Discussion: Yes   Physician: Manoj Mistry MD     A urinalysis was performed revealing no evidence of infection.     The benefits, risks, alternatives of the cystoscopy procedure and personnel were discussed with the patient. The verbal consent was obtained and the patient agrees to proceed.        Description of procedure:   After fully informed, voluntary consent was obtained, the patient was brought into the procedure room, identified and placed in a supine position on the cystoscopy table.  The groin/scrotum were prepped with betadine and draped in a sterile fashion.  Urojet lidocaine gel was introduced.  A 15F flexible cystoscope was inserted into the urethra, and the bladder and urethra wereexamined in a systematic manner.  The patient tolerated the procedure well and there were no complications.       Cystoscopic findings:  The urethra was normal without strictures.  The prostate was 3-cm long and demonstrated mild bilobar hypertrophy and evidence of prior radiation.  There was a slight narrowing at the prostate apex/membranous urethra with evidence of radiation changes.  There was no median  lobe.  The external sphincter coapted poorly given prior radiation and the bladder neck was normal. The bladder was  entered and careful pan endoscopy was carried out. The posterior, superior and lateral walls and dome of the bladder were all well visualized and the scope was retroflexed upon itself.  There was moderate trabeculation.  There were no neoplasms, stones, or diverticula identifed.  The ureteric orifices were normal in position and number and effluxing clear urine.  The area of prior fulguration was identified with no evidence of recurrence    ASSESSMENT and PLAN  89-year-old man with history of prostate cancer status post combined brachii and external beam radiation therapy in 2004 and muscle invasive bladder cancer diagnosed in 2018 on pembrolizumab who presents for surveillance cystoscopy    Muscle invasive bladder cancer  -No evidence of recurrence on today's cystoscopy  -Follow-up in 6 months for next surveillance cystoscopy  -Reviewed his prior biopsy results and the pathology report  -I reviewed his recent CT chest abdomen pelvis from 11/2021 and agree with radiology interpretation  -Continue to follow with Dr. Ellis    Prostate cancer  -PSA from March was undetectable    Urge incontinence  - We discussed treatment options and will fit him for condom catheter today      Time spent: 15 minutes spent on the date of the encounter doing chart review, history and exam, documentation and further activities as noted above.  This was in addition to cystoscopy time    Manoj Mistry MD   Urology  [facility]  [facility] Phone: [phone]  [facility] Phone: [phone]

## 2022-07-13 NOTE — PROGRESS NOTES
"[facility]   CHIEF COMPLAINT   It was my pleasure to see Santos Marti who is a 89 year old male for follow-up of prostate cancer and bladder cancer.      HPI   Santos Marti is a very pleasant 89 year old male who is a prior patient of Dr. Costello with history of prostate cancer and muscle invasive bladder cancer.  He also follows with Dr. Ellis.  His initial diagnosis was in October 2019.  He was started on pembrolizumab for locally advanced bladder cancer on 8/13/2020.  He has a history of prostate cancer diagnosed in October 2004 for which he [facility].  He is been deemed not a surgical candidate or a radiation candidate for his bladder cancer given his prior treatment for his prostate cancer.    He was last seen by Dr. Costello on 3/5/2021 for office cystoscopy with fulguration of a small papillary tumor above the right ureteral orifice.    6/7/21:  Follow-up today for surveillance cystoscopy  Doing well with no issues    9/8/21:  Follow-up today for surveillance cystoscopy  He has been doing fairly well  He notes that he is dry overnight however has some daytime frequency and urgency and uses pads  No gross hematuria  Cystoscopy with no evidence of disease    1/12/22:  He returns for surveillance cystoscopy  He has been doing well with no change in symptoms    4/13/22:  Follow-up today for surveillance cystoscopy  He has been doing well with no change in symptoms or gross hematuria    TODAY 7/13/2022:  Follow-up today for surveillance cystoscopy  He is doing well with no gross hematuria  He is having bothersome urinary incontinence, mainly urgency incontinence and going through about 3 depends per day  He saw an ad for condom catheters and is very interested in trying this    PHYSICAL EXAM  Patient is a 89 year old  male   Vitals: Blood pressure 122/70, height 1.778 m (5' 10\"), weight 68 kg (150 lb).  Body mass index is 21.52 kg/m .  General " Appearance Adult:   Alert, no acute distress, oriented  HENT: throat/mouth:normal, good dentition  Lungs: no respiratory distress, or pursed lip breathing  Heart: No obvious jugular venous distension present  Abdomen: soft, nontender, no organomegaly or masses  Musculoskeltal: extremities normal, no peripheral edema  Skin: no suspicious lesions or rashes  Neuro: Alert, oriented, speech and mentation normal  Psych: affect and mood normal  Gait: Normal  : penis, scrotum, testes normal    PSA   Date Value Ref Range Status   03/10/2021 <0.01 0 - 4 ug/L Final     Comment:     Assay Method:  Chemiluminescence using Siemens Vista analyzer      UA RESULTS:  Recent Labs   Lab Test 01/12/22  1522 07/22/20  0810 07/16/20  2120   COLOR Yellow   < > Light Yellow   APPEARANCE Clear   < > Clear   URINEGLC Negative   < > Negative   URINEBILI Negative   < > Negative   URINEKETONE Trace*   < > Negative   SG >=1.030   < > 1.014   UBLD Negative   < > Moderate*   URINEPH 5.0   < > 5.5   PROTEIN Trace*   < > 50*   UROBILINOGEN 0.2   < >  --    NITRITE Negative   < > Negative   LEUKEST Negative   < > Small*   RBCU  --   --  71*   WBCU  --   --  20*    < > = values in this interval not displayed.      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   -Tumor invades lamina propria and muscularis propria.   -Urothelial (transitional cell) carcinoma in-situ: Not identified.   -Lymph/vascular space invasion: Not identified.   -Sampling includes: Urothelium, lamina propria, and muscularis propria.        CYTOLOGY:  Final Diagnosis   Specimen A                 Interpretation:                  Negative for High Grade Urothelial Carcinoma     FISH 6/7/22:  Final Diagnosis   Specimen A                 Interpretation:                  Negative UroVysion test             IMAGING  All pertinent imaging reviewed:    All imaging studies reviewed by me.  I personally reviewed these imaging films.  A formal report from radiology will follow.    CT CHEST/ABD/PEL 11/8/21:  FINDINGS:   LUNGS AND PLEURA: Few small pulmonary nodules. For example, there is a  10 mm right posterior lower lobe nodule (series 6, image 186) that is  stable since 6/29/2021, right upper lobe 7 mm nodule (series 6, image  91) and 7 mm nodule nodular in the lingula (series 6, image 115). No  new or enlarging nodules. No infiltrate or pleural effusion.     MEDIASTINUM/AXILLAE: No lymphadenopathy. Pacemaker. Mild cardiomegaly.  No thoracic aortic aneurysm. Severe coronary artery calcifications. No  pericardial effusion.     HEPATOBILIARY: Normal.     PANCREAS: Normal.     SPLEEN: Normal.     ADRENAL GLANDS: Normal.     KIDNEYS/BLADDER: Normal kidneys. The urinary bladder cannot be  evaluated due to streak artifact from bilateral total hip  arthroplasties.     BOWEL: No obstruction or inflammatory change.     PELVIC ORGANS: Evaluation significantly limited by streak artifact  from bilateral total hip arthroplasties. Brachytherapy seeds in the  prostate gland.     ADDITIONAL FINDINGS: No lymphadenopathy in the abdomen and pelvis. No  abdominal aortic aneurysm. No free fluid or fluid collections.     MUSCULOSKELETAL: Bilateral shoulder and hip arthroplasties. Mild  degenerative changes in the spine. No suspicious lesions in the bones.                                                                      IMPRESSION:  1.  Stable pulmonary nodules, including the largest 11 mm nodule in  the right lower lobe since at least 6/29/2021.  2.  No metastatic disease in the abdomen and pelvis. Evaluation of the  pelvis is slightly limited by streak artifact from bilateral total hip  arthroplasties.     CYSTOSCOPY PROCEDURE NOTE:     Santos Marti is a 89 year old male  who presents with muscle invasive bladder cancer  for cystoscop.     Pt ID verified with patient: Yes      Procedure verified with patient: Yes      Procedure confirmed with physician and support staff: Yes      Consent form confirmed with physician and support staff.     Sign In  History and Physical Exam reviewed .  Informed Consent Discussed: Yes   Sign in Communication: Yes   Time Out:  Team Confirms the Correct Patient, Correct Procedure; Yes , Correct Site and Site Marking, Correct Position (if applicable).     Affirmation of Time Out: Yes   Sign Out:  Sign Out Discussion: Yes   Physician: Manoj Mistry MD     A urinalysis was performed revealing no evidence of infection.     The benefits, risks, alternatives of the cystoscopy procedure and personnel were discussed with the patient. The verbal consent was obtained and the patient agrees to proceed.        Description of procedure:   After fully informed, voluntary consent was obtained, the patient was brought into the procedure room, identified and placed in a supine position on the cystoscopy table.  The groin/scrotum were prepped with betadine and draped in a sterile fashion.  Urojet lidocaine gel was introduced.  A 15F flexible cystoscope was inserted into the urethra, and the bladder and urethra wereexamined in a systematic manner.  The patient tolerated the procedure well and there were no complications.       Cystoscopic findings:  The urethra was normal without strictures.  The prostate was 3-cm long and demonstrated mild bilobar hypertrophy and evidence of prior radiation.  There was a slight narrowing at the prostate apex/membranous urethra with evidence of radiation changes.  There was no median lobe.  The external sphincter coapted poorly given prior radiation and the bladder neck was normal. The bladder was  entered and careful pan endoscopy was carried out. The posterior, superior and lateral walls and dome of the bladder were all well visualized and the scope was retroflexed upon itself.   There was moderate trabeculation.  There were no neoplasms, stones, or diverticula identifed.  The ureteric orifices were normal in position and number and effluxing clear urine.  The area of prior fulguration was identified with no evidence of recurrence    ASSESSMENT and PLAN  89-year-old man with history of prostate cancer status post combined brachii and external beam radiation therapy in 2004 and muscle invasive bladder cancer diagnosed in 2018 on pembrolizumab who presents for surveillance cystoscopy    Muscle invasive bladder cancer  -No evidence of recurrence on today's cystoscopy  -Follow-up in 6 months for next surveillance cystoscopy  -Reviewed his prior biopsy results and the pathology report  -I reviewed his recent CT chest abdomen pelvis from 11/2021 and agree with radiology interpretation  -Continue to follow with Dr. Ellis    Prostate cancer  -PSA from March was undetectable    Urge incontinence  - We discussed treatment options and will fit him for condom catheter today      Time spent: 15 minutes spent on the date of the encounter doing chart review, history and exam, documentation and further activities as noted above.  This was in addition to cystoscopy time    Manoj Mistry MD   Urology  [facility]  [facility] Phone: [phone]  [facility] Phone: [phone]

## 2022-07-13 NOTE — PATIENT INSTRUCTIONS
"AFTER YOUR CYSTOSCOPY  ?  ?  You have just completed a cystoscopy, or \"cysto\", which allowed your physician to learn more about your bladder (or to remove a stent placed after surgery). We suggest that you continue to avoid caffeine, fruit juice, and alcohol for the next 24 hours, however, you are encouraged to return to your normal activities.  ?  ?  A few things that are considered normal after your cystoscopy:  ?  * small amount of bleeding (or spotting) that clears within the next 24 hours  ?  * slight burning sensation with urination  ?  * sensation of needing to void (urinate) more frequently  ?  * the feeling of \"air\" in your urine  ?  * mild discomfort that is relieved with Tylenol    * bladder spasms  ?  ?  ?  Please contact our office promptly if you:  ?  * develop a fever above 101 degrees  ?  * are unable to urinate  ?  * develop bright red blood that does not stop  ?  * experience severe pain or swelling  ?  ?  ?  And of course, please contact our office with any concerns or questions [phone]  ?    AFTER YOUR CYSTOSCOPY        You have just completed a cystoscopy, or \"cysto\", which allowed your physician to learn more about your bladder (or to remove a stent placed after surgery). We suggest that you continue to avoid caffeine, fruit juice, and alcohol for the next 24 hours, however, you are encouraged to return to your normal activities.         A few things that are considered normal after your cystoscopy:     * Small amount of bleeding (or spotting) that clears within the next 24 hours     * Slight burning sensation with urination     * Sensation to of needing to avoid more frequently     * The feeling of \"air\" in your urine     * Mild discomfort that is relieved with Tylenol        Please contact our office promptly if you:     * Develop a fever above 101 degrees     * Are unable to urinate     * Develop bright red blood that does not stop     * Severe pain or swelling         Please contact " our office with any concerns or questions @[facility].

## 2022-07-19 ENCOUNTER — LAB (OUTPATIENT)
Dept: INFUSION THERAPY | Facility: CLINIC | Age: 87
End: 2022-07-19
Attending: INTERNAL MEDICINE
Payer: MEDICARE

## 2022-07-19 DIAGNOSIS — C67.2 MALIGNANT NEOPLASM OF LATERAL WALL OF URINARY BLADDER (H): Primary | ICD-10-CM

## 2022-07-19 LAB
ALBUMIN SERPL-MCNC: 3.5 G/DL (ref 3.4–5)
ALP SERPL-CCNC: 97 U/L (ref 40–150)
ALT SERPL W P-5'-P-CCNC: 14 U/L (ref 0–70)
ANION GAP SERPL CALCULATED.3IONS-SCNC: 5 MMOL/L (ref 3–14)
AST SERPL W P-5'-P-CCNC: 11 U/L (ref 0–45)
BILIRUB SERPL-MCNC: 0.4 MG/DL (ref 0.2–1.3)
BUN SERPL-MCNC: 17 MG/DL (ref 7–30)
CALCIUM SERPL-MCNC: 9.2 MG/DL (ref 8.5–10.1)
CHLORIDE BLD-SCNC: 104 MMOL/L (ref 94–109)
CO2 SERPL-SCNC: 30 MMOL/L (ref 20–32)
CREAT SERPL-MCNC: 1.03 MG/DL (ref 0.66–1.25)
GFR SERPL CREATININE-BSD FRML MDRD: 69 ML/MIN/1.73M2
GLUCOSE BLD-MCNC: 158 MG/DL (ref 70–99)
HOLD SPECIMEN: NORMAL
POTASSIUM BLD-SCNC: 4.3 MMOL/L (ref 3.4–5.3)
PROT SERPL-MCNC: 7.1 G/DL (ref 6.8–8.8)
SODIUM SERPL-SCNC: 139 MMOL/L (ref 133–144)
TSH SERPL DL<=0.005 MIU/L-ACNC: 0.66 MU/L (ref 0.4–4)

## 2022-07-19 PROCEDURE — 84443 ASSAY THYROID STIM HORMONE: CPT | Performed by: INTERNAL MEDICINE

## 2022-07-19 PROCEDURE — 88112 CYTOPATH CELL ENHANCE TECH: CPT | Mod: TC | Performed by: INTERNAL MEDICINE

## 2022-07-19 PROCEDURE — 80053 COMPREHEN METABOLIC PANEL: CPT | Performed by: INTERNAL MEDICINE

## 2022-07-19 PROCEDURE — 36415 COLL VENOUS BLD VENIPUNCTURE: CPT | Performed by: INTERNAL MEDICINE

## 2022-07-21 ENCOUNTER — INFUSION THERAPY VISIT (OUTPATIENT)
Dept: INFUSION THERAPY | Facility: CLINIC | Age: 87
End: 2022-07-21
Attending: INTERNAL MEDICINE
Payer: MEDICARE

## 2022-07-21 VITALS
HEART RATE: 82 BPM | DIASTOLIC BLOOD PRESSURE: 82 MMHG | TEMPERATURE: 97.4 F | OXYGEN SATURATION: 98 % | RESPIRATION RATE: 16 BRPM | WEIGHT: 150 LBS | HEIGHT: 70 IN | SYSTOLIC BLOOD PRESSURE: 153 MMHG | BODY MASS INDEX: 21.47 KG/M2

## 2022-07-21 DIAGNOSIS — C67.2 MALIGNANT NEOPLASM OF LATERAL WALL OF URINARY BLADDER (H): Primary | ICD-10-CM

## 2022-07-21 LAB
PATH REPORT.COMMENTS IMP SPEC: NORMAL
PATH REPORT.FINAL DX SPEC: NORMAL
PATH REPORT.GROSS SPEC: NORMAL
PATH REPORT.MICROSCOPIC SPEC OTHER STN: NORMAL
PATH REPORT.RELEVANT HX SPEC: NORMAL

## 2022-07-21 PROCEDURE — 88112 CYTOPATH CELL ENHANCE TECH: CPT | Mod: 26 | Performed by: PATHOLOGY

## 2022-07-21 PROCEDURE — 258N000003 HC RX IP 258 OP 636: Performed by: INTERNAL MEDICINE

## 2022-07-21 PROCEDURE — 96413 CHEMO IV INFUSION 1 HR: CPT

## 2022-07-21 PROCEDURE — 250N000011 HC RX IP 250 OP 636: Performed by: INTERNAL MEDICINE

## 2022-07-21 RX ADMIN — SODIUM CHLORIDE 1000 ML: 9 INJECTION, SOLUTION INTRAVENOUS at 09:45

## 2022-07-21 RX ADMIN — SODIUM CHLORIDE 400 MG: 9 INJECTION, SOLUTION INTRAVENOUS at 09:52

## 2022-07-21 ASSESSMENT — PAIN SCALES - GENERAL: PAINLEVEL: NO PAIN (0)

## 2022-07-21 NOTE — PROGRESS NOTES
Infusion Nursing Note:  Santos Marti presents today for Keytruda.    Patient seen by provider today: No   present during visit today: Not Applicable.    Note: Per pt, he is taking a chemo break after today's infusion and will follow up with labs and Dr. Ellis in September.    Intravenous Access:  Peripheral IV placed.    Treatment Conditions:  Lab Results   Component Value Date     07/19/2022    POTASSIUM 4.3 07/19/2022    CR 1.03 07/19/2022    SAAD 9.2 07/19/2022    BILITOTAL 0.4 07/19/2022    ALBUMIN 3.5 07/19/2022    ALT 14 07/19/2022    AST 11 07/19/2022   TSH 0.66  Results reviewed, labs MET treatment parameters, ok to proceed with treatment.    Post Infusion Assessment:  Patient tolerated infusion without incident.  Blood return noted pre and post infusion.  Site patent and intact, free from redness, edema or discomfort.  No evidence of extravasations.  Access discontinued per protocol.     Discharge Plan:   Patient declined prescription refills.  Discharge instructions reviewed with: Patient.  Patient and/or family verbalized understanding of discharge instructions and all questions answered.  AVS to patient via [email or web address].  Patient will return 9/6/22 for next appointment.   Patient discharged in stable condition accompanied by: self.  Departure Mode: Ambulatory.      Laura Gonzalez RN

## 2022-08-02 ENCOUNTER — ALLIED HEALTH/NURSE VISIT (OUTPATIENT)
Dept: UROLOGY | Facility: CLINIC | Age: 87
End: 2022-08-02
Payer: MEDICARE

## 2022-08-02 DIAGNOSIS — N39.41 URGE INCONTINENCE: Primary | ICD-10-CM

## 2022-08-02 PROCEDURE — 99211 OFF/OP EST MAY X REQ PHY/QHP: CPT

## 2022-08-02 NOTE — PROGRESS NOTES
Santos Marti comes into clinic today at the request of Dr. Mistry Ordering Provider for Condom Cath Fitting/Teach.    Patient presents to clinic to be fitted and shown how to put on a condom catheter. Patient was sized for a 28 sport and shown how to attach and detach leg-bag, plus empty bag. Patient was given samples and informed that a [phone] number will call him to get supplies set-up and he needs to answer this call. Patient will call before he runs out of samples, if he does not hear from catheter company. Order has been completed and needs to be signed by MD. Patient has chronic urinary incontinence and requires condom catheter for urinary leakage/Incontinence.     This service provided today was under the supervising provider of the day Dr. Field, who was available if needed.    Skye Serrano LPN

## 2022-08-31 DIAGNOSIS — R53.82 CHRONIC FATIGUE: ICD-10-CM

## 2022-08-31 DIAGNOSIS — C91.10 CLL (CHRONIC LYMPHOCYTIC LEUKEMIA) (H): ICD-10-CM

## 2022-08-31 DIAGNOSIS — Z87.898 HISTORY OF GROSS HEMATURIA: ICD-10-CM

## 2022-08-31 DIAGNOSIS — C67.2 MALIGNANT NEOPLASM OF LATERAL WALL OF URINARY BLADDER (H): Primary | ICD-10-CM

## 2022-09-06 ENCOUNTER — LAB (OUTPATIENT)
Dept: INFUSION THERAPY | Facility: CLINIC | Age: 87
End: 2022-09-06
Attending: INTERNAL MEDICINE
Payer: MEDICARE

## 2022-09-06 DIAGNOSIS — Z87.898 HISTORY OF GROSS HEMATURIA: ICD-10-CM

## 2022-09-06 DIAGNOSIS — R53.82 CHRONIC FATIGUE: ICD-10-CM

## 2022-09-06 DIAGNOSIS — C91.10 CLL (CHRONIC LYMPHOCYTIC LEUKEMIA) (H): ICD-10-CM

## 2022-09-06 DIAGNOSIS — C67.2 MALIGNANT NEOPLASM OF LATERAL WALL OF URINARY BLADDER (H): ICD-10-CM

## 2022-09-06 LAB
ALBUMIN SERPL-MCNC: 3.7 G/DL (ref 3.4–5)
ALP SERPL-CCNC: 105 U/L (ref 40–150)
ALT SERPL W P-5'-P-CCNC: 12 U/L (ref 0–70)
ANION GAP SERPL CALCULATED.3IONS-SCNC: 5 MMOL/L (ref 3–14)
AST SERPL W P-5'-P-CCNC: 13 U/L (ref 0–45)
BASOPHILS # BLD AUTO: 0 10E3/UL (ref 0–0.2)
BASOPHILS NFR BLD AUTO: 0 %
BILIRUB SERPL-MCNC: 0.5 MG/DL (ref 0.2–1.3)
BUN SERPL-MCNC: 18 MG/DL (ref 7–30)
CALCIUM SERPL-MCNC: 9.6 MG/DL (ref 8.5–10.1)
CHLORIDE BLD-SCNC: 104 MMOL/L (ref 94–109)
CO2 SERPL-SCNC: 28 MMOL/L (ref 20–32)
CREAT SERPL-MCNC: 1.01 MG/DL (ref 0.66–1.25)
EOSINOPHIL # BLD AUTO: 0.1 10E3/UL (ref 0–0.7)
EOSINOPHIL NFR BLD AUTO: 2 %
ERYTHROCYTE [DISTWIDTH] IN BLOOD BY AUTOMATED COUNT: 13.1 % (ref 10–15)
GFR SERPL CREATININE-BSD FRML MDRD: 70 ML/MIN/1.73M2
GLUCOSE BLD-MCNC: 137 MG/DL (ref 70–99)
HCT VFR BLD AUTO: 43.3 % (ref 40–53)
HGB BLD-MCNC: 14.4 G/DL (ref 13.3–17.7)
IMM GRANULOCYTES # BLD: 0 10E3/UL
IMM GRANULOCYTES NFR BLD: 0 %
LDH SERPL L TO P-CCNC: 203 U/L (ref 85–227)
LYMPHOCYTES # BLD AUTO: 1.9 10E3/UL (ref 0.8–5.3)
LYMPHOCYTES NFR BLD AUTO: 24 %
MCH RBC QN AUTO: 31.4 PG (ref 26.5–33)
MCHC RBC AUTO-ENTMCNC: 33.3 G/DL (ref 31.5–36.5)
MCV RBC AUTO: 95 FL (ref 78–100)
MONOCYTES # BLD AUTO: 0.6 10E3/UL (ref 0–1.3)
MONOCYTES NFR BLD AUTO: 8 %
NEUTROPHILS # BLD AUTO: 5.2 10E3/UL (ref 1.6–8.3)
NEUTROPHILS NFR BLD AUTO: 66 %
NRBC # BLD AUTO: 0 10E3/UL
NRBC BLD AUTO-RTO: 0 /100
PLATELET # BLD AUTO: 242 10E3/UL (ref 150–450)
POTASSIUM BLD-SCNC: 3.7 MMOL/L (ref 3.4–5.3)
PROT SERPL-MCNC: 7.5 G/DL (ref 6.8–8.8)
RBC # BLD AUTO: 4.58 10E6/UL (ref 4.4–5.9)
SODIUM SERPL-SCNC: 137 MMOL/L (ref 133–144)
TSH SERPL DL<=0.005 MIU/L-ACNC: 0.7 MU/L (ref 0.4–4)
WBC # BLD AUTO: 7.9 10E3/UL (ref 4–11)

## 2022-09-06 PROCEDURE — 85025 COMPLETE CBC W/AUTO DIFF WBC: CPT | Performed by: INTERNAL MEDICINE

## 2022-09-06 PROCEDURE — 83615 LACTATE (LD) (LDH) ENZYME: CPT | Performed by: INTERNAL MEDICINE

## 2022-09-06 PROCEDURE — 80053 COMPREHEN METABOLIC PANEL: CPT | Performed by: INTERNAL MEDICINE

## 2022-09-06 PROCEDURE — 36415 COLL VENOUS BLD VENIPUNCTURE: CPT

## 2022-09-06 PROCEDURE — 84443 ASSAY THYROID STIM HORMONE: CPT | Performed by: INTERNAL MEDICINE

## 2022-09-08 ENCOUNTER — ONCOLOGY VISIT (OUTPATIENT)
Dept: ONCOLOGY | Facility: CLINIC | Age: 87
End: 2022-09-08
Attending: INTERNAL MEDICINE
Payer: MEDICARE

## 2022-09-08 VITALS
BODY MASS INDEX: 21.29 KG/M2 | RESPIRATION RATE: 16 BRPM | SYSTOLIC BLOOD PRESSURE: 136 MMHG | HEART RATE: 97 BPM | DIASTOLIC BLOOD PRESSURE: 66 MMHG | OXYGEN SATURATION: 97 % | TEMPERATURE: 97.6 F | WEIGHT: 148.4 LBS

## 2022-09-08 DIAGNOSIS — C91.10 CLL (CHRONIC LYMPHOCYTIC LEUKEMIA) (H): ICD-10-CM

## 2022-09-08 DIAGNOSIS — C67.2 MALIGNANT NEOPLASM OF LATERAL WALL OF URINARY BLADDER (H): Primary | ICD-10-CM

## 2022-09-08 DIAGNOSIS — D47.2 MGUS (MONOCLONAL GAMMOPATHY OF UNKNOWN SIGNIFICANCE): ICD-10-CM

## 2022-09-08 DIAGNOSIS — Z87.898 HISTORY OF GROSS HEMATURIA: ICD-10-CM

## 2022-09-08 DIAGNOSIS — R53.82 CHRONIC FATIGUE: ICD-10-CM

## 2022-09-08 PROCEDURE — 99214 OFFICE O/P EST MOD 30 MIN: CPT | Performed by: INTERNAL MEDICINE

## 2022-09-08 ASSESSMENT — PAIN SCALES - GENERAL: PAINLEVEL: NO PAIN (0)

## 2022-09-08 NOTE — LETTER
"    9/8/2022         RE: Santos Marti  [address]  [facility] [address]        Dear Colleague,    Thank you for referring your patient, Santos Marti, to the [facility]. Please see a copy of my visit note below.    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    Weight as of this encounter: 67.3 kg (148 lb 6.4 oz). Body surface area is 1.82 meters squared.  No Pain (0) Comment: Data Unavailable   No LMP for male patient.  Allergies reviewed: Yes  Medications reviewed: Yes    Medications: Medication refills not needed today.  Pharmacy name entered into Wally:    [id number] - [place], [place] - [address] AT [facility] & [address] [place] [address] PHARMACY [facility] - [place], [place] - [address] [place] [address] PHARMACY - [place], [place] - [address]    Clinical concerns: no      Tanya Ceja CMA                [facility]  HEMATOLOGY AND ONCOLOGY    FOLLOW-UP VISIT NOTE    PATIENT NAME: Santos Marti MRN # [id number]  DATE OF VISIT: Sep 8, 2022 YOB: 1931    REFERRING PROVIDER: Maximilian Costello    CANCER TYPE: High-grade invasive carcinoma, 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  He was on apixaban for atrial fibrillation.  TURBT done on " 10/22/2019 revealed normal muscle invasive bladder cancer  -He was followed with surveillance cystoscopies every 3 months.  His cystoscopy evaluation was negative for any tumors or raised erythema on 3/11/2020, however a follow-up exam on 7/8/2020 revealed raised erythematous area in the left lateral bladder wall.  He had TURBT under anesthesia on 7/15/2020 which is revealed high-grade muscle invasive urothelial carcinoma.  -Santos was diagnosed with prostate cancer in October 2004.  He underwent brachii therapy followed by external beam radiation therapy administered at the [facility].  -Due to his previous radiation for his prostate cancer he is not a candidate for bladder radiation.  He is not a candidate for cystectomy due to his advanced age and also previous extensive radiation to the prostate.  - He was started on pembrolizumab for his locally advanced bladder cancer since 8/13/20.     CURRENT INTERVENTIONS:  Pembrolizumab 400 mg IV every 6 weeks since 8/13/20    SUBJECTIVE   Santos Marti is being followed for muscle invasive bladder cancer    Patient is being followed on therapy for his muscle invasive bladder cancer. He is alone at this visit. He has been started on pembrolizumab since August 2020. He has not noticed any side effects on therapy other than itching.  He has itching on both of his hands and legs and trunk.  He has been applying cream on it which does seem to help some.  He has followed up with dermatology and they agreed with the choice of the cream being used.      He denies any recurrent hematuria. He has good appetite and fair energy. No side effects suggestive of autoimmune disease other than his itching.       PAST MEDICAL HISTORY     Past Medical History:   Diagnosis Date     Arthritis      Complication of anesthesia      Diabetes (H)      Gastroesophageal reflux disease      Hypertension      Pacemaker          CURRENT OUTPATIENT MEDICATIONS     Current Outpatient  Medications   Medication Sig     acetaminophen (TYLENOL) 500 MG tablet Take 500-1,000 mg by mouth every 6 hours as needed for mild pain     apixaban ANTICOAGULANT (ELIQUIS) 5 MG tablet Take 5 mg by mouth 2 times daily      isosorbide mononitrate (IMDUR) 30 MG 24 hr tablet TK 1 T PO ONCE D     lisinopril-hydrochlorothiazide (PRINZIDE/ZESTORETIC) 20-25 MG tablet Take 1 tablet by mouth     metFORMIN (GLUCOPHAGE) 1000 MG tablet Take 500 mg by mouth daily      metoprolol succinate ER (TOPROL-XL) 50 MG 24 hr tablet Take 50 mg by mouth daily Take one and a half tabs daily     omeprazole 20 MG tablet Take 20 mg by mouth daily     triamcinolone (KENALOG) 0.1 % external ointment Apply topically 2 times daily     Current Facility-Administered Medications   Medication     lidocaine (XYLOCAINE) 2 % external gel        ALLERGIES      Allergies   Allergen Reactions     Sulfa Drugs Rash        REVIEW OF SYSTEMS   As above in the HPI, o/w complete 12-point ROS was negative.     PHYSICAL EXAM   /66 (BP Location: Right arm, Patient Position: Sitting, Cuff Size: Adult Regular)   Pulse 97   Temp 97.6  F (36.4  C) (Oral)   Resp 16   Wt 67.3 kg (148 lb 6.4 oz)   SpO2 97%   BMI 21.29 kg/m    Limited physical exam during video visit due to COVID19 restrictions  Elderly male in no acute distress  Breathing comfortably, no tachypnea  Speech and hearing normal  Pleasant mood and congruent affect  Moving all extremities, no focal neurologic deficits apparent      LABORATORY AND IMAGING STUDIES     Recent Labs   Lab Test 09/06/22  1004 07/19/22  1120 06/07/22  1011 04/20/22  1107 03/09/22  1055    139 139 137 139   POTASSIUM 3.7 4.3 3.8 4.1 3.8   CHLORIDE 104 104 105 103 103   CO2 28 30 29 28 30   ANIONGAP 5 5 5 6 6   BUN 18 17 20 20 19   CR 1.01 1.03 1.11 1.16 1.12   * 158* 243* 231* 234*   SAAD 9.6 9.2 9.4 9.3 9.3     No results for input(s): MAG, PHOS in the last 55604 hours.  Recent Labs   Lab Test 09/06/22  1004  12/29/21  1023 08/24/21  1225 07/14/21  1127 06/02/21  1152   WBC 7.9 6.7 8.0 7.7 9.1   HGB 14.4 14.7 12.2* 14.0 14.5    185 186 237 239   MCV 95 99 96 95 96   NEUTROPHIL 66 68 71 68 75.5     Recent Labs   Lab Test 09/06/22  1004 07/19/22  1120 06/07/22  1011 10/06/21  0924 08/24/21  1225 07/14/21  1127   BILITOTAL 0.5 0.4 0.5   < > 0.4 0.5   ALKPHOS 105 97 96   < > 74 81   ALT 12 14 14   < > 14 17   AST 13 11 13   < > 12 19   ALBUMIN 3.7 3.5 3.8   < > 3.2* 3.6     --   --   --  160 242*    < > = values in this interval not displayed.     TSH   Date Value Ref Range Status   09/06/2022 0.70 0.40 - 4.00 mU/L Final   07/19/2022 0.66 0.40 - 4.00 mU/L Final   06/07/2022 0.58 0.40 - 4.00 mU/L Final   06/02/2021 0.43 0.40 - 4.00 mU/L Final   05/06/2021 0.70 0.40 - 4.00 mU/L Final   04/21/2021 0.67 0.40 - 4.00 mU/L Final     No results for input(s): CEA in the last 95574 hours.  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 Multiplanar reformats were  obtained. Dose reduction techniques were used.   CONTRAST: 76 mL Isovue-370    COMPARISON: 11/18/2021    FINDINGS:   LUNGS AND PLEURA: Evaluation slightly limited by breathing motion.  Mild bibasilar atelectasis. Few pulmonary nodules are stable. For  example, a right upper lobe 8 mm nodule (series 4, image 98), and a  right lower lobe basilar and millimeter nodule (series 4, image 209)  are unchanged. No new or enlarging nodules.  No infiltrate or pleural  effusion.    MEDIASTINUM/AXILLAE: No lymphadenopathy. Left subclavian transvenous  pacemaker. Mild cardiomegaly. No thoracic aortic aneurysm. Severe  coronary artery calcifications.    HEPATOBILIARY: Normal.    PANCREAS: Normal.    SPLEEN: Normal.    ADRENAL GLANDS: Normal.    KIDNEYS/BLADDER: No hydronephrosis, calculi or significant renal  masses. Evaluation of the urinary bladder is limited by streak  artifact from bilateral total hip arthroplasties.    BOWEL: Diverticulosis in the colon. No acute inflammatory change. No  obstruction.     PELVIC ORGANS: Evaluation limited by streak artifact from bilateral  total hip arthroplasties. Brachytherapy seeds in the prostate.    ADDITIONAL FINDINGS: No lymphadenopathy in the abdomen and pelvis. No  abdominal aortic aneurysm. No free fluid or fluid collections.    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 Mild degenerative changes of the spine.  No suspicious lesions in the bones. Stable mild wedge compression  deformity of T7 vertebral body.      Impression    IMPRESSION:  1.  Stable pulmonary nodules measuring up to 11 mm.  2.  No definite evidence for recurrent or metastatic disease in the  abdomen and pelvis with evaluation limited by streak artifact from  bilateral total hip arthroplasties.    AMADEO SMITH MD         SYSTEM ID:  [id number]          ASSESSMENT AND PLAN   1. High-grade muscle invasive bladder cancer in a patient not a candidate for either surgery or radiation therapy  2. Prostate cancer treated with brachii therapy followed by external beam radiation therapy  3. Hypertension, diabetes mellitus type 2, atrial fibrillation on chronic anticoagulation with apixaban    I had a lengthy discussion with patient at this clinic visit. He has been started on pembrolizumab for his muscle invasive bladder cancer on 8/13/20. He has tolerated this well. He has not noticed any side  effects on therapy other than itching. He denies any hematuria.     He has itching almost all over his body.  He has tried a number of creams and dermatology consultations for this.  It is not debilitating to any extent but does bother him.    He has been on pembrolizumab for 2 years now.  There is no evidence of metastatic disease and he has complete response on therapy.  He has not noticed any hematuria since the start of therapy.  It is not unreasonable to hold any further therapy at this time.  This should help his itching very quickly.    I have reviewed all of the labs done prior to this clinic visit.  Labs are all completely normal including electrolytes, renal function, hepatic panel, complete blood count and differential.  He has elevated blood glucose which was not fasting and elevated.     I reviewed actual images from his restaging CT scan. His bladder tumor cannot be assessed on the CT portion due to the bilateral hip replacements. There are no new lesions identified. He has stable pulmonary nodules. There is no evidence of metastatic disease other than stable pulmonary nodules in the chest, abdomen and pelvis.     I will see him in 3-4 months with labs including urine cytology and CT chest, abdomen and pelvis prior to visit.       25 minutes spent on the date of the encounter doing chart review, history and exam, documentation and further activities as noted above      Imtiaz Ellis    Hematologist and Medical Oncologist  [facility]        Again, thank you for allowing me to participate in the care of your patient.        Sincerely,        Imtiaz Ellis MD

## 2022-09-08 NOTE — PROGRESS NOTES
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    Weight as of this encounter: 67.3 kg (148 lb 6.4 oz). Body surface area is 1.82 meters squared.  No Pain (0) Comment: Data Unavailable   No LMP for male patient.  Allergies reviewed: Yes  Medications reviewed: Yes    Medications: Medication refills not needed today.  Pharmacy name entered into Phone2[employer]:    [facility][id number] - [place], [place] - [address] [facility] [place] [address] [place] [address] PHARMACY [place], [place] - [address] PHARMACY - [place], [place] - [address]    Clinical concerns: no      Tanya Ceja CMA              "

## 2022-09-08 NOTE — PROGRESS NOTES
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  He was on apixaban for atrial fibrillation.  TURBT done on 10/22/2019 revealed normal muscle invasive bladder cancer  -He was followed with surveillance cystoscopies every 3 months.  His cystoscopy evaluation was negative for any tumors or raised erythema on 3/11/2020, however a follow-up exam on 7/8/2020 revealed raised erythematous area in the left lateral bladder wall.  He had TURBT under anesthesia on 7/15/2020 which is revealed high-grade muscle invasive urothelial carcinoma.  -Santos was diagnosed with prostate cancer in October 2004.  He underwent brachii therapy followed by external beam radiation therapy administered at the [facility].  -Due to his previous radiation for his prostate cancer he is not a candidate for bladder radiation.  He is not a candidate for cystectomy due to his advanced age and also previous extensive radiation to the prostate.  - He was started on pembrolizumab for his locally advanced bladder cancer since 8/13/20.     CURRENT INTERVENTIONS:  Pembrolizumab 400 mg IV every 6 weeks since 8/13/20    SUBJECTIVE   Santos Marti is being followed for muscle invasive bladder cancer    Patient is being followed on therapy for his muscle invasive bladder cancer. He is alone at this visit. He has been started on pembrolizumab since August 2020. He has not noticed any side effects on therapy other than itching.  He has itching on both of his hands and legs and trunk.  He has been applying cream on it which does seem to help some.  He has followed up  with dermatology and they agreed with the choice of the cream being used.      He denies any recurrent hematuria. He has good appetite and fair energy. No side effects suggestive of autoimmune disease other than his itching.       PAST MEDICAL HISTORY     Past Medical History:   Diagnosis Date     Arthritis      Complication of anesthesia      Diabetes (H)      Gastroesophageal reflux disease      Hypertension      Pacemaker          CURRENT OUTPATIENT MEDICATIONS     Current Outpatient Medications   Medication Sig     acetaminophen (TYLENOL) 500 MG tablet Take 500-1,000 mg by mouth every 6 hours as needed for mild pain     apixaban ANTICOAGULANT (ELIQUIS) 5 MG tablet Take 5 mg by mouth 2 times daily      isosorbide mononitrate (IMDUR) 30 MG 24 hr tablet TK 1 T PO ONCE D     lisinopril-hydrochlorothiazide (PRINZIDE/ZESTORETIC) 20-25 MG tablet Take 1 tablet by mouth     metFORMIN (GLUCOPHAGE) 1000 MG tablet Take 500 mg by mouth daily      metoprolol succinate ER (TOPROL-XL) 50 MG 24 hr tablet Take 50 mg by mouth daily Take one and a half tabs daily     omeprazole 20 MG tablet Take 20 mg by mouth daily     triamcinolone (KENALOG) 0.1 % external ointment Apply topically 2 times daily     Current Facility-Administered Medications   Medication     lidocaine (XYLOCAINE) 2 % external gel        ALLERGIES      Allergies   Allergen Reactions     Sulfa Drugs Rash        REVIEW OF SYSTEMS   As above in the HPI, o/w complete 12-point ROS was negative.     PHYSICAL EXAM   /66 (BP Location: Right arm, Patient Position: Sitting, Cuff Size: Adult Regular)   Pulse 97   Temp 97.6  F (36.4  C) (Oral)   Resp 16   Wt 67.3 kg (148 lb 6.4 oz)   SpO2 97%   BMI 21.29 kg/m    Limited physical exam during video visit due to COVID19 restrictions  Elderly male in no acute distress  Breathing comfortably, no tachypnea  Speech and hearing normal  Pleasant mood and congruent affect  Moving all extremities, no focal neurologic deficits  apparent      LABORATORY AND IMAGING STUDIES     Recent Labs   Lab Test 09/06/22  1004 07/19/22  1120 06/07/22  1011 04/20/22  1107 03/09/22  1055    139 139 137 139   POTASSIUM 3.7 4.3 3.8 4.1 3.8   CHLORIDE 104 104 105 103 103   CO2 28 30 29 28 30   ANIONGAP 5 5 5 6 6   BUN 18 17 20 20 19   CR 1.01 1.03 1.11 1.16 1.12   * 158* 243* 231* 234*   SAAD 9.6 9.2 9.4 9.3 9.3     No results for input(s): MAG, PHOS in the last 01366 hours.  Recent Labs   Lab Test 09/06/22  1004 12/29/21  1023 08/24/21  1225 07/14/21  1127 06/02/21  1152   WBC 7.9 6.7 8.0 7.7 9.1   HGB 14.4 14.7 12.2* 14.0 14.5    185 186 237 239   MCV 95 99 96 95 96   NEUTROPHIL 66 68 71 68 75.5     Recent Labs   Lab Test 09/06/22  1004 07/19/22  1120 06/07/22  1011 10/06/21  0924 08/24/21  1225 07/14/21  1127   BILITOTAL 0.5 0.4 0.5   < > 0.4 0.5   ALKPHOS 105 97 96   < > 74 81   ALT 12 14 14   < > 14 17   AST 13 11 13   < > 12 19   ALBUMIN 3.7 3.5 3.8   < > 3.2* 3.6     --   --   --  160 242*    < > = values in this interval not displayed.     TSH   Date Value Ref Range Status   09/06/2022 0.70 0.40 - 4.00 mU/L Final   07/19/2022 0.66 0.40 - 4.00 mU/L Final   06/07/2022 0.58 0.40 - 4.00 mU/L Final   06/02/2021 0.43 0.40 - 4.00 mU/L Final   05/06/2021 0.70 0.40 - 4.00 mU/L Final   04/21/2021 0.67 0.40 - 4.00 mU/L Final     No results for input(s): CEA in the last 48255 hours.  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 Multiplanar reformats were  obtained. Dose reduction techniques were used.   CONTRAST: 76 mL Isovue-370    COMPARISON: 11/18/2021    FINDINGS:   LUNGS AND PLEURA: Evaluation slightly limited by breathing motion.  Mild bibasilar atelectasis. Few pulmonary nodules are stable. For  example, a right upper lobe 8 mm nodule (series 4, image 98), and a  right lower lobe basilar and millimeter nodule (series 4, image 209)  are unchanged. No new or enlarging nodules. No infiltrate or pleural  effusion.    MEDIASTINUM/AXILLAE: No lymphadenopathy. Left subclavian transvenous  pacemaker. Mild cardiomegaly. No thoracic aortic aneurysm. Severe  coronary artery calcifications.    HEPATOBILIARY: Normal.    PANCREAS: Normal.    SPLEEN: Normal.    ADRENAL GLANDS: Normal.    KIDNEYS/BLADDER: No hydronephrosis, calculi or significant renal  masses. Evaluation of the urinary bladder is limited by streak  artifact from bilateral total hip arthroplasties.    BOWEL: Diverticulosis in the colon. No acute inflammatory change. No  obstruction.     PELVIC ORGANS: Evaluation limited by streak artifact from bilateral  total hip arthroplasties. Brachytherapy seeds in the prostate.    ADDITIONAL FINDINGS: No lymphadenopathy in the abdomen and pelvis. No  abdominal aortic aneurysm. No free fluid or fluid collections.    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 Mild degenerative changes of the spine.  No suspicious lesions in the bones. Stable mild wedge compression  deformity of T7 vertebral body.      Impression    IMPRESSION:  1.  Stable pulmonary nodules measuring up to 11 mm.  2.  No definite evidence for recurrent or metastatic disease in the  abdomen and pelvis with evaluation limited by streak artifact from  bilateral total hip arthroplasties.    AMADEO SMITH MD         SYSTEM ID:  [id number]          ASSESSMENT AND PLAN   1. High-grade muscle invasive bladder  cancer in a patient not a candidate for either surgery or radiation therapy  2. Prostate cancer treated with brachii therapy followed by external beam radiation therapy  3. Hypertension, diabetes mellitus type 2, atrial fibrillation on chronic anticoagulation with apixaban    I had a lengthy discussion with patient at this clinic visit. He has been started on pembrolizumab for his muscle invasive bladder cancer on 8/13/20. He has tolerated this well. He has not noticed any side effects on therapy other than itching. He denies any hematuria.     He has itching almost all over his body.  He has tried a number of creams and dermatology consultations for this.  It is not debilitating to any extent but does bother him.    He has been on pembrolizumab for 2 years now.  There is no evidence of metastatic disease and he has complete response on therapy.  He has not noticed any hematuria since the start of therapy.  It is not unreasonable to hold any further therapy at this time.  This should help his itching very quickly.    I have reviewed all of the labs done prior to this clinic visit.  Labs are all completely normal including electrolytes, renal function, hepatic panel, complete blood count and differential.  He has elevated blood glucose which was not fasting and elevated.     I reviewed actual images from his restaging CT scan. His bladder tumor cannot be assessed on the CT portion due to the bilateral hip replacements. There are no new lesions identified. He has stable pulmonary nodules. There is no evidence of metastatic disease other than stable pulmonary nodules in the chest, abdomen and pelvis.     I will see him in 3-4 months with labs including urine cytology and CT chest, abdomen and pelvis prior to visit.       25 minutes spent on the date of the encounter doing chart review, history and exam, documentation and further activities as noted above      Imtiaz Ellis    Hematologist and Medical Oncologist  [facility]  [place]

## 2022-10-16 ENCOUNTER — HEALTH MAINTENANCE LETTER (OUTPATIENT)
Age: 87
End: 2022-10-16

## 2022-12-12 ENCOUNTER — HOSPITAL ENCOUNTER (OUTPATIENT)
Dept: CT IMAGING | Facility: CLINIC | Age: 87
Discharge: HOME | End: 2022-12-12
Attending: INTERNAL MEDICINE | Admitting: INTERNAL MEDICINE
Payer: MEDICARE

## 2022-12-12 DIAGNOSIS — C91.10 CLL (CHRONIC LYMPHOCYTIC LEUKEMIA) (H): ICD-10-CM

## 2022-12-12 DIAGNOSIS — Z87.898 HISTORY OF GROSS HEMATURIA: ICD-10-CM

## 2022-12-12 DIAGNOSIS — C67.2 MALIGNANT NEOPLASM OF LATERAL WALL OF URINARY BLADDER (H): ICD-10-CM

## 2022-12-12 DIAGNOSIS — D47.2 MGUS (MONOCLONAL GAMMOPATHY OF UNKNOWN SIGNIFICANCE): ICD-10-CM

## 2022-12-12 DIAGNOSIS — R53.82 CHRONIC FATIGUE: ICD-10-CM

## 2022-12-12 LAB
CREAT BLD-MCNC: 1.2 MG/DL (ref 0.7–1.3)
GFR SERPL CREATININE-BSD FRML MDRD: 57 ML/MIN/1.73M2

## 2022-12-12 PROCEDURE — 74177 CT ABD & PELVIS W/CONTRAST: CPT | Mod: MG

## 2022-12-12 PROCEDURE — 250N000009 HC RX 250: Performed by: INTERNAL MEDICINE

## 2022-12-12 PROCEDURE — 250N000011 HC RX IP 250 OP 636: Performed by: INTERNAL MEDICINE

## 2022-12-12 PROCEDURE — G1010 CDSM STANSON: HCPCS

## 2022-12-12 PROCEDURE — 82565 ASSAY OF CREATININE: CPT

## 2022-12-12 RX ORDER — IOPAMIDOL 755 MG/ML
72 INJECTION, SOLUTION INTRAVASCULAR ONCE
Status: COMPLETED | OUTPATIENT
Start: 2022-12-12 | End: 2022-12-12

## 2022-12-12 RX ADMIN — IOPAMIDOL 72 ML: 755 INJECTION, SOLUTION INTRAVENOUS at 10:43

## 2022-12-12 RX ADMIN — SODIUM CHLORIDE 61 ML: 9 INJECTION, SOLUTION INTRAVENOUS at 10:43

## 2023-01-01 ENCOUNTER — LAB (OUTPATIENT)
Dept: LAB | Facility: CLINIC | Age: 88
End: 2023-01-01
Payer: MEDICARE

## 2023-01-01 ENCOUNTER — ONCOLOGY VISIT (OUTPATIENT)
Dept: ONCOLOGY | Facility: CLINIC | Age: 88
End: 2023-01-01
Attending: INTERNAL MEDICINE
Payer: MEDICARE

## 2023-01-01 ENCOUNTER — ANCILLARY PROCEDURE (OUTPATIENT)
Dept: CT IMAGING | Facility: CLINIC | Age: 88
End: 2023-01-01
Attending: INTERNAL MEDICINE
Payer: MEDICARE

## 2023-01-01 VITALS
RESPIRATION RATE: 16 BRPM | TEMPERATURE: 97.6 F | OXYGEN SATURATION: 98 % | DIASTOLIC BLOOD PRESSURE: 84 MMHG | SYSTOLIC BLOOD PRESSURE: 171 MMHG | WEIGHT: 150.2 LBS | HEART RATE: 57 BPM | BODY MASS INDEX: 21.55 KG/M2

## 2023-01-01 DIAGNOSIS — D47.2 MGUS (MONOCLONAL GAMMOPATHY OF UNKNOWN SIGNIFICANCE): ICD-10-CM

## 2023-01-01 DIAGNOSIS — Z87.898 HISTORY OF GROSS HEMATURIA: ICD-10-CM

## 2023-01-01 DIAGNOSIS — N18.31 STAGE 3A CHRONIC KIDNEY DISEASE (H): ICD-10-CM

## 2023-01-01 DIAGNOSIS — C91.10 CLL (CHRONIC LYMPHOCYTIC LEUKEMIA) (H): Primary | ICD-10-CM

## 2023-01-01 DIAGNOSIS — C91.10 CLL (CHRONIC LYMPHOCYTIC LEUKEMIA) (H): ICD-10-CM

## 2023-01-01 DIAGNOSIS — C67.2 MALIGNANT NEOPLASM OF LATERAL WALL OF URINARY BLADDER (H): ICD-10-CM

## 2023-01-01 LAB
ALBUMIN SERPL BCG-MCNC: 4.4 G/DL (ref 3.5–5.2)
ALP SERPL-CCNC: 101 U/L (ref 40–150)
ALT SERPL W P-5'-P-CCNC: 10 U/L (ref 0–70)
ANION GAP SERPL CALCULATED.3IONS-SCNC: 13 MMOL/L (ref 7–15)
AST SERPL W P-5'-P-CCNC: 16 U/L (ref 0–45)
BASOPHILS # BLD AUTO: 0 10E3/UL (ref 0–0.2)
BASOPHILS NFR BLD AUTO: 0 %
BILIRUB SERPL-MCNC: 0.4 MG/DL
BUN SERPL-MCNC: 21.2 MG/DL (ref 8–23)
CALCIUM SERPL-MCNC: 10 MG/DL (ref 8.2–9.6)
CHLORIDE SERPL-SCNC: 99 MMOL/L (ref 98–107)
CREAT BLD-MCNC: 1.4 MG/DL (ref 0.7–1.3)
CREAT SERPL-MCNC: 1.27 MG/DL (ref 0.67–1.17)
DEPRECATED HCO3 PLAS-SCNC: 26 MMOL/L (ref 22–29)
EGFRCR SERPLBLD CKD-EPI 2021: 47 ML/MIN/1.73M2
EGFRCR SERPLBLD CKD-EPI 2021: 53 ML/MIN/1.73M2
EOSINOPHIL # BLD AUTO: 0.1 10E3/UL (ref 0–0.7)
EOSINOPHIL NFR BLD AUTO: 1 %
ERYTHROCYTE [DISTWIDTH] IN BLOOD BY AUTOMATED COUNT: 12.7 % (ref 10–15)
GLUCOSE SERPL-MCNC: 265 MG/DL (ref 70–99)
HCT VFR BLD AUTO: 44.9 % (ref 40–53)
HGB BLD-MCNC: 14.8 G/DL (ref 13.3–17.7)
IMM GRANULOCYTES # BLD: 0 10E3/UL
IMM GRANULOCYTES NFR BLD: 0 %
LDH SERPL L TO P-CCNC: 204 U/L (ref 0–250)
LYMPHOCYTES # BLD AUTO: 2.1 10E3/UL (ref 0.8–5.3)
LYMPHOCYTES NFR BLD AUTO: 24 %
MCH RBC QN AUTO: 31.2 PG (ref 26.5–33)
MCHC RBC AUTO-ENTMCNC: 33 G/DL (ref 31.5–36.5)
MCV RBC AUTO: 95 FL (ref 78–100)
MONOCYTES # BLD AUTO: 0.6 10E3/UL (ref 0–1.3)
MONOCYTES NFR BLD AUTO: 7 %
NEUTROPHILS # BLD AUTO: 5.9 10E3/UL (ref 1.6–8.3)
NEUTROPHILS NFR BLD AUTO: 67 %
PATH REPORT.COMMENTS IMP SPEC: NORMAL
PATH REPORT.FINAL DX SPEC: NORMAL
PATH REPORT.GROSS SPEC: NORMAL
PATH REPORT.MICROSCOPIC SPEC OTHER STN: NORMAL
PATH REPORT.RELEVANT HX SPEC: NORMAL
PLATELET # BLD AUTO: 211 10E3/UL (ref 150–450)
POTASSIUM SERPL-SCNC: 4.2 MMOL/L (ref 3.4–5.3)
PROT SERPL-MCNC: 7.5 G/DL (ref 6.4–8.3)
RBC # BLD AUTO: 4.74 10E6/UL (ref 4.4–5.9)
SODIUM SERPL-SCNC: 138 MMOL/L (ref 135–145)
WBC # BLD AUTO: 8.8 10E3/UL (ref 4–11)

## 2023-01-01 PROCEDURE — 99214 OFFICE O/P EST MOD 30 MIN: CPT | Performed by: INTERNAL MEDICINE

## 2023-01-01 PROCEDURE — 83615 LACTATE (LD) (LDH) ENZYME: CPT

## 2023-01-01 PROCEDURE — 36415 COLL VENOUS BLD VENIPUNCTURE: CPT

## 2023-01-01 PROCEDURE — 250N000009 HC RX 250: Performed by: INTERNAL MEDICINE

## 2023-01-01 PROCEDURE — 250N000011 HC RX IP 250 OP 636: Performed by: INTERNAL MEDICINE

## 2023-01-01 PROCEDURE — 88120 CYTP URNE 3-5 PROBES EA SPEC: CPT | Performed by: PATHOLOGY

## 2023-01-01 PROCEDURE — 74177 CT ABD & PELVIS W/CONTRAST: CPT | Mod: MG

## 2023-01-01 PROCEDURE — 85025 COMPLETE CBC W/AUTO DIFF WBC: CPT

## 2023-01-01 PROCEDURE — 82565 ASSAY OF CREATININE: CPT

## 2023-01-01 PROCEDURE — G0463 HOSPITAL OUTPT CLINIC VISIT: HCPCS | Performed by: INTERNAL MEDICINE

## 2023-01-01 PROCEDURE — G1010 CDSM STANSON: HCPCS

## 2023-01-01 RX ORDER — IOPAMIDOL 755 MG/ML
90 INJECTION, SOLUTION INTRAVASCULAR ONCE
Status: COMPLETED | OUTPATIENT
Start: 2023-01-01 | End: 2023-01-01

## 2023-01-01 RX ADMIN — IOPAMIDOL 90 ML: 755 INJECTION, SOLUTION INTRAVENOUS at 14:20

## 2023-01-01 RX ADMIN — SODIUM CHLORIDE 40 ML: 9 INJECTION, SOLUTION INTRAVENOUS at 14:20

## 2023-01-01 ASSESSMENT — PAIN SCALES - GENERAL: PAINLEVEL: NO PAIN (0)

## 2023-01-02 ENCOUNTER — PATIENT OUTREACH (OUTPATIENT)
Dept: ONCOLOGY | Facility: CLINIC | Age: 88
End: 2023-01-02

## 2023-01-02 ENCOUNTER — LAB (OUTPATIENT)
Dept: INFUSION THERAPY | Facility: CLINIC | Age: 88
End: 2023-01-02
Attending: FAMILY MEDICINE
Payer: MEDICARE

## 2023-01-02 DIAGNOSIS — C67.2 MALIGNANT NEOPLASM OF LATERAL WALL OF URINARY BLADDER (H): ICD-10-CM

## 2023-01-02 DIAGNOSIS — C91.10 CLL (CHRONIC LYMPHOCYTIC LEUKEMIA) (H): ICD-10-CM

## 2023-01-02 DIAGNOSIS — D47.2 MGUS (MONOCLONAL GAMMOPATHY OF UNKNOWN SIGNIFICANCE): ICD-10-CM

## 2023-01-02 DIAGNOSIS — Z87.898 HISTORY OF GROSS HEMATURIA: ICD-10-CM

## 2023-01-02 DIAGNOSIS — R53.82 CHRONIC FATIGUE: ICD-10-CM

## 2023-01-02 LAB
ALBUMIN SERPL-MCNC: 3.7 G/DL (ref 3.4–5)
ALP SERPL-CCNC: 99 U/L (ref 40–150)
ALT SERPL W P-5'-P-CCNC: 11 U/L (ref 0–70)
ANION GAP SERPL CALCULATED.3IONS-SCNC: 8 MMOL/L (ref 3–14)
AST SERPL W P-5'-P-CCNC: 16 U/L (ref 0–45)
BASOPHILS # BLD AUTO: 0 10E3/UL (ref 0–0.2)
BASOPHILS NFR BLD AUTO: 0 %
BILIRUB SERPL-MCNC: 1.2 MG/DL (ref 0.2–1.3)
BUN SERPL-MCNC: 22 MG/DL (ref 7–30)
CALCIUM SERPL-MCNC: 9.2 MG/DL (ref 8.5–10.1)
CHLORIDE BLD-SCNC: 101 MMOL/L (ref 94–109)
CO2 SERPL-SCNC: 28 MMOL/L (ref 20–32)
CREAT SERPL-MCNC: 1.04 MG/DL (ref 0.66–1.25)
EOSINOPHIL # BLD AUTO: 0.2 10E3/UL (ref 0–0.7)
EOSINOPHIL NFR BLD AUTO: 2 %
ERYTHROCYTE [DISTWIDTH] IN BLOOD BY AUTOMATED COUNT: 13.2 % (ref 10–15)
GFR SERPL CREATININE-BSD FRML MDRD: 68 ML/MIN/1.73M2
GLUCOSE BLD-MCNC: 240 MG/DL (ref 70–99)
HCT VFR BLD AUTO: 42.8 % (ref 40–53)
HGB BLD-MCNC: 14.3 G/DL (ref 13.3–17.7)
IMM GRANULOCYTES # BLD: 0 10E3/UL
IMM GRANULOCYTES NFR BLD: 0 %
LDH SERPL L TO P-CCNC: 214 U/L (ref 85–227)
LYMPHOCYTES # BLD AUTO: 1.4 10E3/UL (ref 0.8–5.3)
LYMPHOCYTES NFR BLD AUTO: 19 %
MCH RBC QN AUTO: 32.5 PG (ref 26.5–33)
MCHC RBC AUTO-ENTMCNC: 33.4 G/DL (ref 31.5–36.5)
MCV RBC AUTO: 97 FL (ref 78–100)
MONOCYTES # BLD AUTO: 0.6 10E3/UL (ref 0–1.3)
MONOCYTES NFR BLD AUTO: 9 %
NEUTROPHILS # BLD AUTO: 5.2 10E3/UL (ref 1.6–8.3)
NEUTROPHILS NFR BLD AUTO: 70 %
NRBC # BLD AUTO: 0 10E3/UL
NRBC BLD AUTO-RTO: 0 /100
PLATELET # BLD AUTO: 215 10E3/UL (ref 150–450)
POTASSIUM BLD-SCNC: 3.9 MMOL/L (ref 3.4–5.3)
PROT SERPL-MCNC: 7.5 G/DL (ref 6.8–8.8)
RBC # BLD AUTO: 4.4 10E6/UL (ref 4.4–5.9)
SODIUM SERPL-SCNC: 137 MMOL/L (ref 133–144)
TSH SERPL DL<=0.005 MIU/L-ACNC: 0.76 MU/L (ref 0.4–4)
WBC # BLD AUTO: 7.5 10E3/UL (ref 4–11)

## 2023-01-02 PROCEDURE — 36415 COLL VENOUS BLD VENIPUNCTURE: CPT

## 2023-01-02 PROCEDURE — 84443 ASSAY THYROID STIM HORMONE: CPT | Performed by: INTERNAL MEDICINE

## 2023-01-02 PROCEDURE — 80053 COMPREHEN METABOLIC PANEL: CPT | Performed by: INTERNAL MEDICINE

## 2023-01-02 PROCEDURE — 85025 COMPLETE CBC W/AUTO DIFF WBC: CPT | Performed by: INTERNAL MEDICINE

## 2023-01-02 PROCEDURE — 83615 LACTATE (LD) (LDH) ENZYME: CPT | Performed by: INTERNAL MEDICINE

## 2023-01-11 ENCOUNTER — OFFICE VISIT (OUTPATIENT)
Dept: UROLOGY | Facility: CLINIC | Age: 88
End: 2023-01-11
Payer: MEDICARE

## 2023-01-11 VITALS
WEIGHT: 155 LBS | HEART RATE: 77 BPM | DIASTOLIC BLOOD PRESSURE: 91 MMHG | BODY MASS INDEX: 22.19 KG/M2 | SYSTOLIC BLOOD PRESSURE: 143 MMHG | HEIGHT: 70 IN

## 2023-01-11 DIAGNOSIS — C67.9 MALIGNANT NEOPLASM OF URINARY BLADDER, UNSPECIFIED SITE (H): Primary | ICD-10-CM

## 2023-01-11 DIAGNOSIS — N39.41 URGE INCONTINENCE: ICD-10-CM

## 2023-01-11 LAB
ALBUMIN UR-MCNC: 100 MG/DL
APPEARANCE UR: CLEAR
BILIRUB UR QL STRIP: NEGATIVE
COLOR UR AUTO: YELLOW
GLUCOSE UR STRIP-MCNC: 250 MG/DL
HGB UR QL STRIP: ABNORMAL
KETONES UR STRIP-MCNC: NEGATIVE MG/DL
LEUKOCYTE ESTERASE UR QL STRIP: ABNORMAL
NITRATE UR QL: NEGATIVE
PH UR STRIP: 5 [PH] (ref 5–7)
SP GR UR STRIP: >=1.03 (ref 1–1.03)
UROBILINOGEN UR STRIP-ACNC: 0.2 E.U./DL

## 2023-01-11 PROCEDURE — 81003 URINALYSIS AUTO W/O SCOPE: CPT | Mod: QW | Performed by: UROLOGY

## 2023-01-11 PROCEDURE — 52000 CYSTOURETHROSCOPY: CPT | Performed by: UROLOGY

## 2023-01-11 PROCEDURE — 99213 OFFICE O/P EST LOW 20 MIN: CPT | Mod: 25 | Performed by: UROLOGY

## 2023-01-11 RX ORDER — LIDOCAINE HYDROCHLORIDE 20 MG/ML
JELLY TOPICAL ONCE
Status: COMPLETED | OUTPATIENT
Start: 2023-01-11 | End: 2023-01-11

## 2023-01-11 RX ADMIN — LIDOCAINE HYDROCHLORIDE: 20 JELLY TOPICAL at 13:47

## 2023-01-11 ASSESSMENT — PAIN SCALES - GENERAL: PAINLEVEL: NO PAIN (0)

## 2023-01-11 NOTE — PATIENT INSTRUCTIONS
"AFTER YOUR CYSTOSCOPY  ?  ?  You have just completed a cystoscopy, or \"cysto\", which allowed your physician to learn more about your bladder (or to remove a stent placed after surgery). We suggest that you continue to avoid caffeine, fruit juice, and alcohol for the next 24 hours, however, you are encouraged to return to your normal activities.  ?  ?  A few things that are considered normal after your cystoscopy:  ?  * small amount of bleeding (or spotting) that clears within the next 24 hours  ?  * slight burning sensation with urination  ?  * sensation of needing to void (urinate) more frequently  ?  * the feeling of \"air\" in your urine  ?  * mild discomfort that is relieved with Tylenol    * bladder spasms  ?  ?  ?  Please contact our office promptly if you:  ?  * develop a fever above 101 degrees  ?  * are unable to urinate  ?  * develop bright red blood that does not stop  ?  * experience severe pain or swelling  ?  ?  ?  And of course, please contact our office with any concerns or questions [phone].  ?    AFTER YOUR CYSTOSCOPY        You have just completed a cystoscopy, or \"cysto\", which allowed your physician to learn more about your bladder (or to remove a stent placed after surgery). We suggest that you continue to avoid caffeine, fruit juice, and alcohol for the next 24 hours, however, you are encouraged to return to your normal activities.         A few things that are considered normal after your cystoscopy:     * Small amount of bleeding (or spotting) that clears within the next 24 hours     * Slight burning sensation with urination     * Sensation to of needing to avoid more frequently     * The feeling of \"air\" in your urine     * Mild discomfort that is relieved with Tylenol        Please contact our office promptly if you:     * Develop a fever above 101 degrees     * Are unable to urinate     * Develop bright red blood that does not stop     * Severe pain or swelling         Please contact " our office with any concerns or questions @[facility].

## 2023-01-11 NOTE — NURSING NOTE
Prior to the start of the procedure and with procedural staff participation, I verbally confirmed the patient s identity using two indicators, relevant allergies, that the procedure was appropriate and matched the consent or emergent situation, and that the correct equipment/implants were available. Immediately prior to starting the procedure I conducted the Time Out with the procedural staff and re-confirmed the patient s name, procedure, and site/side. I have wiped the patient off with the povidone-Iodine solution, draped them,  used Lidocaine hydrochloride jelly, and instilled sterile water into the bladder. (The Joint Commission universal protocol was followed.)  Yes    Sedation (Moderate or Deep): None  5mL 2% lidocaine hydrochloride Urojet instilled into urethra.    NDC# [address]-5  Lot #: [id number]  Expiration Date:  07/24

## 2023-01-11 NOTE — PROGRESS NOTES
[facility]   CHIEF COMPLAINT   It was my pleasure to see Santos Marti who is a 89 year old male for follow-up of prostate cancer and bladder cancer.      HPI   Santos Marti is a very pleasant 89 year old male who is a prior patient of Dr. Costello with history of prostate cancer and muscle invasive bladder cancer.  He also follows with Dr. Ellis.  His initial diagnosis was in October 2019.  He was started on pembrolizumab for locally advanced bladder cancer on 8/13/2020.  He has a history of prostate cancer diagnosed in October 2004 for which he [facility]  He is been deemed not a surgical candidate or a radiation candidate for his bladder cancer given his prior treatment for his prostate cancer.    He was last seen by Dr. Costello on 3/5/2021 for office cystoscopy with fulguration of a small papillary tumor above the right ureteral orifice.    6/7/21:  Follow-up today for surveillance cystoscopy  Doing well with no issues    9/8/21:  Follow-up today for surveillance cystoscopy  He has been doing fairly well  He notes that he is dry overnight however has some daytime frequency and urgency and uses pads  No gross hematuria  Cystoscopy with no evidence of disease    1/12/22:  He returns for surveillance cystoscopy  He has been doing well with no change in symptoms    4/13/22:  Follow-up today for surveillance cystoscopy  He has been doing well with no change in symptoms or gross hematuria    7/13/2022:  Follow-up today for surveillance cystoscopy  He is doing well with no gross hematuria  He is having bothersome urinary incontinence, mainly urgency incontinence and going through about 3 depends per day  He saw an ad for condom catheters and is very interested in trying this    TODAY 1/11/2023 :  Follow-up today for surveillance cystoscopy  He is doing well with no gross hematuria  He tried the condom catheters, however this was not very functional for him  and he is otherwise managing fairly well    PHYSICAL EXAM  Patient is a 89 year old  male   Vitals: There were no vitals taken for this visit.  There is no height or weight on file to calculate BMI.  General Appearance Adult:   Alert, no acute distress, oriented  HENT: throat/mouth:normal, good dentition  Lungs: no respiratory distress, or pursed lip breathing  Heart: No obvious jugular venous distension present  Abdomen: soft, nontender, no organomegaly or masses  Musculoskeltal: extremities normal, no peripheral edema  Skin: no suspicious lesions or rashes  Neuro: Alert, oriented, speech and mentation normal  Psych: affect and mood normal  Gait: Normal  : penis, scrotum, testes normal    PSA   Date Value Ref Range Status   03/10/2021 <0.01 0 - 4 ug/L Final     Comment:     Assay Method:  Chemiluminescence using Siemens Vista analyzer      UA RESULTS:  Recent Labs   Lab Test 01/12/22  1522 07/22/20  0810 07/16/20  2120   COLOR Yellow   < > Light Yellow   APPEARANCE Clear   < > Clear   URINEGLC Negative   < > Negative   URINEBILI Negative   < > Negative   URINEKETONE Trace*   < > Negative   SG >=1.030   < > 1.014   UBLD Negative   < > Moderate*   URINEPH 5.0   < > 5.5   PROTEIN Trace*   < > 50*   UROBILINOGEN 0.2   < >  --    NITRITE Negative   < > Negative   LEUKEST Negative   < > Small*   RBCU  --   --  71*   WBCU  --   --  20*    < > = values in this interval not displayed.      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   -Tumor invades lamina propria and muscularis propria.   -Urothelial (transitional cell) carcinoma in-situ: Not identified.   -Lymph/vascular space invasion: Not identified.   -Sampling includes: Urothelium, lamina propria, and muscularis propria.        CYTOLOGY:  Final Diagnosis   Specimen A                  Interpretation:                  Negative for High Grade Urothelial Carcinoma     FISH 6/7/22:  Final Diagnosis   Specimen A                 Interpretation:                  Negative UroVysion test            IMAGING  All pertinent imaging reviewed:    All imaging studies reviewed by me.  I personally reviewed these imaging films.  A formal report from radiology will follow.    CT CHEST/ABD/PEL 12/12/22:  IMPRESSION:  1.  Stable pulmonary nodules.  2.  No adenopathy demonstrated.  3.  No new findings.     CYSTOSCOPY PROCEDURE NOTE:     Santos Marti is a 89 year old male  who presents with muscle invasive bladder cancer for cystoscop.     Pt ID verified with patient: Yes      Procedure verified with patient: Yes      Procedure confirmed with physician and support staff: Yes      Consent form confirmed with physician and support staff.     Sign In  History and Physical Exam reviewed .  Informed Consent Discussed: Yes   Sign in Communication: Yes   Time Out:  Team Confirms the Correct Patient, Correct Procedure; Yes , Correct Site and Site Marking, Correct Position (if applicable).     Affirmation of Time Out: Yes   Sign Out:  Sign Out Discussion: Yes   Physician: Manoj Mistry MD     A urinalysis was performed revealing no evidence of infection.     The benefits, risks, alternatives of the cystoscopy procedure and personnel were discussed with the patient. The verbal consent was obtained and the patient agrees to proceed.        Description of procedure:   After fully informed, voluntary consent was obtained, the patient was brought into the procedure room, identified and placed in a supine position on the cystoscopy table.  The groin/scrotum were prepped with betadine and draped in a sterile fashion.  Urojet lidocaine gel was introduced.  A 15F flexible cystoscope was inserted into the urethra, and the bladder and urethra wereexamined in a systematic manner.  The patient tolerated the procedure well and  there were no complications.       Cystoscopic findings:  The urethra was normal without strictures.  The prostate was 3-cm long and demonstrated mild bilobar hypertrophy and evidence of prior radiation.  There was a slight narrowing at the prostate apex/membranous urethra with evidence of radiation changes.  There was no median lobe.  The external sphincter coapted poorly given prior radiation and the bladder neck was normal. The bladder was  entered and careful pan endoscopy was carried out. The posterior, superior and lateral walls and dome of the bladder were all well visualized and the scope was retroflexed upon itself.  There was moderate trabeculation.  There were no neoplasms, stones, or diverticula identifed.  The ureteric orifices were normal in position and number and effluxing clear urine.  The area of prior fulguration was identified with no evidence of recurrence    ASSESSMENT and PLAN  89-year-old man with history of prostate cancer status post combined brachii and external beam radiation therapy in 2004 and muscle invasive bladder cancer diagnosed in 2018 on pembrolizumab who presents for surveillance cystoscopy    Muscle invasive bladder cancer  -No evidence of recurrence on today's cystoscopy  -Follow-up in 6 months for next surveillance cystoscopy  -Reviewed his prior biopsy results and the pathology report  -I reviewed his recent CT chest abdomen pelvis from 12/12/22 and agree with radiology interpretation  -Continue to follow with Dr. Ellis      Urge incontinence  - The condom catheters did not fit well  He is managing fairly well      Time spent: 15 minutes spent on the date of the encounter doing chart review, history and exam, documentation and further activities as noted above.  This was in addition to cystoscopy time    Manoj Mistry MD   Urology  [facility]  [facility] Phone: [phone]  [facility] Phone:  [phone]

## 2023-01-11 NOTE — LETTER
1/11/2023       RE: Santos Marti  [address]  [facility] [address]     Dear Colleague,    Thank you for referring your patient, Santos Marti, to the [facility] at [facility]. Please see a copy of my visit note below.    [place]   CHIEF COMPLAINT   It was my pleasure to see Santos Marti who is a 89 year old male for follow-up of prostate cancer and bladder cancer.      HPI   Santos Marti is a very pleasant 89 year old male who is a prior patient of Dr. Costello with history of prostate cancer and muscle invasive bladder cancer.  He also follows with Dr. Ellis.  His initial diagnosis was in October 2019.  He was started on pembrolizumab for locally advanced bladder cancer on 8/13/2020.  He has a history of prostate cancer diagnosed in October 2004 for which he [facility]  He is been deemed not a surgical candidate or a radiation candidate for his bladder cancer given his prior treatment for his prostate cancer.    He was last seen by Dr. Costello on 3/5/2021 for office cystoscopy with fulguration of a small papillary tumor above the right ureteral orifice.    6/7/21:  Follow-up today for surveillance cystoscopy  Doing well with no issues    9/8/21:  Follow-up today for surveillance cystoscopy  He has been doing fairly well  He notes that he is dry overnight however has some daytime frequency and urgency and uses pads  No gross hematuria  Cystoscopy with no evidence of disease    1/12/22:  He returns for surveillance cystoscopy  He has been doing well with no change in symptoms    4/13/22:  Follow-up today for surveillance cystoscopy  He has been doing well with no change in symptoms or gross hematuria    7/13/2022:  Follow-up today for surveillance cystoscopy  He is doing well with no gross hematuria  He is having bothersome urinary incontinence, mainly  urgency incontinence and going through about 3 depends per day  He saw an ad for condom catheters and is very interested in trying this    TODAY 1/11/2023 :  Follow-up today for surveillance cystoscopy  He is doing well with no gross hematuria  He tried the condom catheters, however this was not very functional for him and he is otherwise managing fairly well    PHYSICAL EXAM  Patient is a 89 year old  male   Vitals: There were no vitals taken for this visit.  There is no height or weight on file to calculate BMI.  General Appearance Adult:   Alert, no acute distress, oriented  HENT: throat/mouth:normal, good dentition  Lungs: no respiratory distress, or pursed lip breathing  Heart: No obvious jugular venous distension present  Abdomen: soft, nontender, no organomegaly or masses  Musculoskeltal: extremities normal, no peripheral edema  Skin: no suspicious lesions or rashes  Neuro: Alert, oriented, speech and mentation normal  Psych: affect and mood normal  Gait: Normal  : penis, scrotum, testes normal    PSA   Date Value Ref Range Status   03/10/2021 <0.01 0 - 4 ug/L Final     Comment:     Assay Method:  Chemiluminescence using Siemens Vista analyzer      UA RESULTS:  Recent Labs   Lab Test 01/12/22  1522 07/22/20  0810 07/16/20  2120   COLOR Yellow   < > Light Yellow   APPEARANCE Clear   < > Clear   URINEGLC Negative   < > Negative   URINEBILI Negative   < > Negative   URINEKETONE Trace*   < > Negative   SG >=1.030   < > 1.014   UBLD Negative   < > Moderate*   URINEPH 5.0   < > 5.5   PROTEIN Trace*   < > 50*   UROBILINOGEN 0.2   < >  --    NITRITE Negative   < > Negative   LEUKEST Negative   < > Small*   RBCU  --   --  71*   WBCU  --   --  20*    < > = values in this interval not displayed.      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    -Tumor invades lamina propria and muscularis propria.   -Urothelial (transitional cell) carcinoma in-situ: Not identified.   -Lymph/vascular space invasion: Not identified.   -Sampling includes: Urothelium, lamina propria, and muscularis propria.        CYTOLOGY:  Final Diagnosis   Specimen A                 Interpretation:                  Negative for High Grade Urothelial Carcinoma     FISH 6/7/22:  Final Diagnosis   Specimen A                 Interpretation:                  Negative UroVysion test            IMAGING  All pertinent imaging reviewed:    All imaging studies reviewed by me.  I personally reviewed these imaging films.  A formal report from radiology will follow.    CT CHEST/ABD/PEL 12/12/22:  IMPRESSION:  1.  Stable pulmonary nodules.  2.  No adenopathy demonstrated.  3.  No new findings.     CYSTOSCOPY PROCEDURE NOTE:     Santos Marti is a 89 year old male  who presents with muscle invasive bladder cancer for cystoscop.     Pt ID verified with patient: Yes      Procedure verified with patient: Yes      Procedure confirmed with physician and support staff: Yes      Consent form confirmed with physician and support staff.     Sign In  History and Physical Exam reviewed .  Informed Consent Discussed: Yes   Sign in Communication: Yes   Time Out:  Team Confirms the Correct Patient, Correct Procedure; Yes , Correct Site and Site Marking, Correct Position (if applicable).     Affirmation of Time Out: Yes   Sign Out:  Sign Out Discussion: Yes   Physician: Manoj Mistry MD     A urinalysis was performed revealing no evidence of infection.     The benefits, risks, alternatives of the cystoscopy procedure and personnel were discussed with the patient. The verbal consent was obtained and the patient agrees to proceed.        Description of procedure:   After fully informed, voluntary consent was obtained, the patient was brought into the procedure room, identified and placed in a supine position  on the cystoscopy table.  The groin/scrotum were prepped with betadine and draped in a sterile fashion.  Urojet lidocaine gel was introduced.  A 15F flexible cystoscope was inserted into the urethra, and the bladder and urethra wereexamined in a systematic manner.  The patient tolerated the procedure well and there were no complications.       Cystoscopic findings:  The urethra was normal without strictures.  The prostate was 3-cm long and demonstrated mild bilobar hypertrophy and evidence of prior radiation.  There was a slight narrowing at the prostate apex/membranous urethra with evidence of radiation changes.  There was no median lobe.  The external sphincter coapted poorly given prior radiation and the bladder neck was normal. The bladder was  entered and careful pan endoscopy was carried out. The posterior, superior and lateral walls and dome of the bladder were all well visualized and the scope was retroflexed upon itself.  There was moderate trabeculation.  There were no neoplasms, stones, or diverticula identifed.  The ureteric orifices were normal in position and number and effluxing clear urine.  The area of prior fulguration was identified with no evidence of recurrence    ASSESSMENT and PLAN  89-year-old man with history of prostate cancer status post combined brachii and external beam radiation therapy in 2004 and muscle invasive bladder cancer diagnosed in 2018 on pembrolizumab who presents for surveillance cystoscopy    Muscle invasive bladder cancer  -No evidence of recurrence on today's cystoscopy  -Follow-up in 6 months for next surveillance cystoscopy  -Reviewed his prior biopsy results and the pathology report  -I reviewed his recent CT chest abdomen pelvis from 12/12/22 and agree with radiology interpretation  -Continue to follow with Dr. Ellis      Urge incontinence  - The condom catheters did not fit well  He is managing fairly well      Time spent: 15 minutes spent on the date of the encounter  doing chart review, history and exam, documentation and further activities as noted above.  This was in addition to cystoscopy time    Manoj Mistry MD   Urology  [facility]  [facility] Phone: [phone]  [facility] Phone: [phone]

## 2023-01-12 ENCOUNTER — ONCOLOGY VISIT (OUTPATIENT)
Dept: ONCOLOGY | Facility: CLINIC | Age: 88
End: 2023-01-12
Attending: INTERNAL MEDICINE
Payer: MEDICARE

## 2023-01-12 VITALS
RESPIRATION RATE: 18 BRPM | WEIGHT: 150 LBS | TEMPERATURE: 96.4 F | SYSTOLIC BLOOD PRESSURE: 130 MMHG | OXYGEN SATURATION: 93 % | DIASTOLIC BLOOD PRESSURE: 76 MMHG | BODY MASS INDEX: 21.52 KG/M2 | HEART RATE: 88 BPM

## 2023-01-12 DIAGNOSIS — D47.2 MGUS (MONOCLONAL GAMMOPATHY OF UNKNOWN SIGNIFICANCE): ICD-10-CM

## 2023-01-12 DIAGNOSIS — C91.10 CLL (CHRONIC LYMPHOCYTIC LEUKEMIA) (H): ICD-10-CM

## 2023-01-12 DIAGNOSIS — Z87.898 HISTORY OF GROSS HEMATURIA: ICD-10-CM

## 2023-01-12 DIAGNOSIS — R53.82 CHRONIC FATIGUE: ICD-10-CM

## 2023-01-12 DIAGNOSIS — C67.2 MALIGNANT NEOPLASM OF LATERAL WALL OF URINARY BLADDER (H): ICD-10-CM

## 2023-01-12 PROCEDURE — G0463 HOSPITAL OUTPT CLINIC VISIT: HCPCS | Performed by: INTERNAL MEDICINE

## 2023-01-12 PROCEDURE — 99214 OFFICE O/P EST MOD 30 MIN: CPT | Performed by: INTERNAL MEDICINE

## 2023-01-12 ASSESSMENT — PAIN SCALES - GENERAL: PAINLEVEL: NO PAIN (0)

## 2023-01-12 NOTE — LETTER
1/12/2023         RE: Santos Marti  [address]  [facility] [address]        Dear Colleague,    Thank you for referring your patient, Santos Marti, to the [facility]. Please see a copy of my visit note below.    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  He was on apixaban for atrial fibrillation.  TURBT done on 10/22/2019 revealed normal muscle invasive bladder cancer  -He was followed with surveillance cystoscopies every 3 months.  His cystoscopy evaluation was negative for any tumors or raised erythema on 3/11/2020, however a follow-up exam on 7/8/2020 revealed raised erythematous area in the left lateral bladder wall.  He had TURBT under anesthesia on 7/15/2020 which is revealed high-grade muscle invasive urothelial carcinoma.  -Santos was diagnosed with prostate cancer in October 2004.  He underwent brachii therapy followed by external beam radiation therapy administered at the [facility].  -Due to his previous radiation for his prostate cancer he is not a candidate for bladder radiation.  He is not a candidate for cystectomy due to his advanced age and also previous extensive radiation to the prostate.  - He was started on pembrolizumab for his locally advanced bladder cancer since 8/13/20.     CURRENT INTERVENTIONS:  On surveillance post pembrolizumab 400 mg IV every 6 weeks since 8/13/20 - 7/21/22    SUBJECTIVE   Santos Marti is being followed for muscle invasive bladder cancer    Patient is being followed on therapy for his muscle invasive bladder  cancer. He is accompanied by his wife at this visit. He had been on pembrolizumab between August 2020 -  July 2022. He has persistent itching.  He has not been using cream as it seems to help only momentarily.     He is back to baseline other than his itching.     He denies any recurrent hematuria. He has good appetite and fair energy. No side effects suggestive of autoimmune disease other than his itching.       PAST MEDICAL HISTORY     Past Medical History:   Diagnosis Date     Arthritis      Complication of anesthesia      Diabetes (H)      Gastroesophageal reflux disease      Hypertension      Pacemaker          CURRENT OUTPATIENT MEDICATIONS     Current Outpatient Medications   Medication Sig     acetaminophen (TYLENOL) 500 MG tablet Take 500-1,000 mg by mouth every 6 hours as needed for mild pain     apixaban ANTICOAGULANT (ELIQUIS) 5 MG tablet Take 5 mg by mouth 2 times daily      isosorbide mononitrate (IMDUR) 30 MG 24 hr tablet TK 1 T PO ONCE D     lisinopril-hydrochlorothiazide (PRINZIDE/ZESTORETIC) 20-25 MG tablet Take 1 tablet by mouth     metFORMIN (GLUCOPHAGE) 1000 MG tablet Take 500 mg by mouth daily      metoprolol succinate ER (TOPROL-XL) 50 MG 24 hr tablet Take 50 mg by mouth daily Take one and a half tabs daily     omeprazole 20 MG tablet Take 20 mg by mouth daily     triamcinolone (KENALOG) 0.1 % external ointment Apply topically 2 times daily     Current Facility-Administered Medications   Medication     lidocaine (XYLOCAINE) 2 % external gel        ALLERGIES      Allergies   Allergen Reactions     Sulfa Drugs Rash        REVIEW OF SYSTEMS   As above in the HPI, o/w complete 12-point ROS was negative.     PHYSICAL EXAM   There were no vitals taken for this visit.  Limited physical exam during video visit due to COVID19 restrictions  Elderly male in no acute distress  Breathing comfortably, no tachypnea  Speech and hearing normal  Pleasant mood and congruent affect  Moving all extremities, no  focal neurologic deficits apparent      LABORATORY AND IMAGING STUDIES     Recent Labs   Lab Test 01/02/23  1039 12/12/22  1032 09/06/22  1004 07/19/22  1120 06/07/22  1011 04/20/22  1107     --  137 139 139 137   POTASSIUM 3.9  --  3.7 4.3 3.8 4.1   CHLORIDE 101  --  104 104 105 103   CO2 28  --  28 30 29 28   ANIONGAP 8  --  5 5 5 6   BUN 22  --  18 17 20 20   CR 1.04 1.2 1.01 1.03 1.11 1.16   *  --  137* 158* 243* 231*   SAAD 9.2  --  9.6 9.2 9.4 9.3     No results for input(s): MAG, PHOS in the last 79103 hours.  Recent Labs   Lab Test 01/02/23  1039 09/06/22  1004 12/29/21  1023 08/24/21  1225 07/14/21  1127   WBC 7.5 7.9 6.7 8.0 7.7   HGB 14.3 14.4 14.7 12.2* 14.0    242 185 186 237   MCV 97 95 99 96 95   NEUTROPHIL 70 66 68 71 68     Recent Labs   Lab Test 01/02/23  1039 09/06/22  1004 07/19/22  1120 10/06/21  0924 08/24/21  1225   BILITOTAL 1.2 0.5 0.4   < > 0.4   ALKPHOS 99 105 97   < > 74   ALT 11 12 14   < > 14   AST 16 13 11   < > 12   ALBUMIN 3.7 3.7 3.5   < > 3.2*    203  --   --  160    < > = values in this interval not displayed.     TSH   Date Value Ref Range Status   01/02/2023 0.76 0.40 - 4.00 mU/L Final   09/06/2022 0.70 0.40 - 4.00 mU/L Final   07/19/2022 0.66 0.40 - 4.00 mU/L Final   06/02/2021 0.43 0.40 - 4.00 mU/L Final   05/06/2021 0.70 0.40 - 4.00 mU/L Final   04/21/2021 0.67 0.40 - 4.00 mU/L Final     No results for input(s): CEA in the last 02674 hours.  Results for orders placed or performed during the hospital encounter of 12/12/22   CT Chest/Abdomen/Pelvis w Contrast    Narrative    CT CHEST/ABDOMEN/PELVIS WITH CONTRAST 12/12/2022 11:16 AM    CLINICAL HISTORY: High-grade invasive carcinoma of bladder- on  pembrolizumab (not surgical or chemotherapy candidate). Malignant  neoplasm of lateral wall of urinary bladder (H). CLL (chronic  lymphocytic leukemia) (H). History of gross hematuria. Chronic  fatigue. MGUS (monoclonal gammopathy of unknown  significance).    TECHNIQUE: CT scan of the chest, abdomen, and pelvis was performed  following injection of IV contrast. Multiplanar reformats were  obtained. Dose reduction techniques were used.   CONTRAST: 72mL ISOVUE-370    COMPARISON: June 7, 2022    FINDINGS:   LUNGS AND PLEURA: Scattered atelectasis and/or fibrosis. No effusions.  Stable 8 mm nodule central left upper lobe image 133. Stable 8 mm  nodule right upper lobe image 93. No new or enlarging nodule. Areas of  the lung bases limited by respiratory motion artifact.    MEDIASTINUM/AXILLAE: No lymphadenopathy. No thoracic aortic aneurysms.    CORONARY ARTERY CALCIFICATIONS: Severe and/or stents.    HEPATOBILIARY: No significant mass or bile duct dilatation. No  calcified gallstones.     PANCREAS: No significant mass, duct dilatation, or inflammatory  change.    SPLEEN: Normal size.    ADRENAL GLANDS: No significant nodules.    KIDNEYS/BLADDER: No significant mass, stones, or hydronephrosis.    BOWEL: No obstruction or inflammatory change.    PELVIC ORGANS: Largely obscured by streak artifact from the bilateral  hip arthroplasties, no gross abnormality.    ADDITIONAL FINDINGS: No definite adenopathy or free fluid. There are  moderate atherosclerotic changes of the visualized aorta and its  branches. There is no evidence of aortic dissection or aneurysm.    MUSCULOSKELETAL: No frankly destructive bony lesions. Anterior wedge  deformity of T7 is stable.      Impression    IMPRESSION:  1.  Stable pulmonary nodules.  2.  No adenopathy demonstrated.  3.  No new findings.    JANESSA HUTCHISON MD         SYSTEM ID:  [id number]          ASSESSMENT AND PLAN   1. High-grade muscle invasive bladder cancer in a patient not a candidate for either surgery or radiation therapy  2. Prostate cancer treated with brachii therapy followed by external beam radiation therapy  3. Hypertension, diabetes mellitus type 2, atrial fibrillation on chronic anticoagulation with apixaban    I  had a lengthy discussion with patient at this clinic visit. He has been started on pembrolizumab for his muscle invasive bladder cancer on 8/13/20 - 7/21/22. He continues to have itching from his infusion.     He has itching almost all over his body.  He has tried a number of creams and dermatology consultations for this.  It is not debilitating to any extent but does bother him. I have encouraged him to bring this up with dermatology.     There is no evidence of metastatic disease and he has complete response on therapy.  He has not noticed any hematuria since the start of therapy.     I have reviewed all of the labs done prior to this clinic visit.  Labs are all completely normal including electrolytes, renal function, hepatic panel, complete blood count and differential.  He has elevated blood glucose which was not fasting and elevated.     I reviewed actual images from his restaging CT scan. His bladder tumor cannot be assessed on the CT portion due to the bilateral hip replacements. There are no new lesions identified. He has stable pulmonary nodules. There is no evidence of metastatic disease other than stable pulmonary nodules in the chest, abdomen and pelvis.     I will see him in 6 months with labs including urine cytology and CT chest, abdomen and pelvis prior to visit.       25 minutes spent on the date of the encounter doing chart review, history and exam, documentation and further activities as noted above      Imtiaz Ellis    Hematologist and Medical Oncologist  [facility]        Again, thank you for allowing me to participate in the care of your patient.        Sincerely,        Imtiaz Ellis MD

## 2023-01-12 NOTE — PROGRESS NOTES
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  He was on apixaban for atrial fibrillation.  TURBT done on 10/22/2019 revealed normal muscle invasive bladder cancer  -He was followed with surveillance cystoscopies every 3 months.  His cystoscopy evaluation was negative for any tumors or raised erythema on 3/11/2020, however a follow-up exam on 7/8/2020 revealed raised erythematous area in the left lateral bladder wall.  He had TURBT under anesthesia on 7/15/2020 which is revealed high-grade muscle invasive urothelial carcinoma.  -Santos was diagnosed with prostate cancer in October 2004.  He underwent brachii therapy followed by external beam radiation therapy administered at the [facility].  -Due to his previous radiation for his prostate cancer he is not a candidate for bladder radiation.  He is not a candidate for cystectomy due to his advanced age and also previous extensive radiation to the prostate.  - He was started on pembrolizumab for his locally advanced bladder cancer since 8/13/20.     CURRENT INTERVENTIONS:  On surveillance post pembrolizumab 400 mg IV every 6 weeks since 8/13/20 - 7/21/22    SUBJECTIVE   Santos Marti is being followed for muscle invasive bladder cancer    Patient is being followed on therapy for his muscle invasive bladder cancer. He is accompanied by his wife at this visit. He had been on pembrolizumab between August 2020 -  July 2022. He has persistent itching.  He has not been using cream as it seems to help only momentarily.     He is back to baseline other than his itching.     He  denies any recurrent hematuria. He has good appetite and fair energy. No side effects suggestive of autoimmune disease other than his itching.       PAST MEDICAL HISTORY     Past Medical History:   Diagnosis Date     Arthritis      Complication of anesthesia      Diabetes (H)      Gastroesophageal reflux disease      Hypertension      Pacemaker          CURRENT OUTPATIENT MEDICATIONS     Current Outpatient Medications   Medication Sig     acetaminophen (TYLENOL) 500 MG tablet Take 500-1,000 mg by mouth every 6 hours as needed for mild pain     apixaban ANTICOAGULANT (ELIQUIS) 5 MG tablet Take 5 mg by mouth 2 times daily      isosorbide mononitrate (IMDUR) 30 MG 24 hr tablet TK 1 T PO ONCE D     lisinopril-hydrochlorothiazide (PRINZIDE/ZESTORETIC) 20-25 MG tablet Take 1 tablet by mouth     metFORMIN (GLUCOPHAGE) 1000 MG tablet Take 500 mg by mouth daily      metoprolol succinate ER (TOPROL-XL) 50 MG 24 hr tablet Take 50 mg by mouth daily Take one and a half tabs daily     omeprazole 20 MG tablet Take 20 mg by mouth daily     triamcinolone (KENALOG) 0.1 % external ointment Apply topically 2 times daily     Current Facility-Administered Medications   Medication     lidocaine (XYLOCAINE) 2 % external gel        ALLERGIES      Allergies   Allergen Reactions     Sulfa Drugs Rash        REVIEW OF SYSTEMS   As above in the HPI, o/w complete 12-point ROS was negative.     PHYSICAL EXAM   There were no vitals taken for this visit.  Limited physical exam during video visit due to COVID19 restrictions  Elderly male in no acute distress  Breathing comfortably, no tachypnea  Speech and hearing normal  Pleasant mood and congruent affect  Moving all extremities, no focal neurologic deficits apparent      LABORATORY AND IMAGING STUDIES     Recent Labs   Lab Test 01/02/23  1039 12/12/22  1032 09/06/22  1004 07/19/22  1120 06/07/22  1011 04/20/22  1107     --  137 139 139 137   POTASSIUM 3.9  --  3.7 4.3 3.8 4.1   CHLORIDE 101   --  104 104 105 103   CO2 28  --  28 30 29 28   ANIONGAP 8  --  5 5 5 6   BUN 22  --  18 17 20 20   CR 1.04 1.2 1.01 1.03 1.11 1.16   *  --  137* 158* 243* 231*   SAAD 9.2  --  9.6 9.2 9.4 9.3     No results for input(s): MAG, PHOS in the last 09353 hours.  Recent Labs   Lab Test 01/02/23  1039 09/06/22  1004 12/29/21  1023 08/24/21  1225 07/14/21  1127   WBC 7.5 7.9 6.7 8.0 7.7   HGB 14.3 14.4 14.7 12.2* 14.0    242 185 186 237   MCV 97 95 99 96 95   NEUTROPHIL 70 66 68 71 68     Recent Labs   Lab Test 01/02/23  1039 09/06/22  1004 07/19/22  1120 10/06/21  0924 08/24/21  1225   BILITOTAL 1.2 0.5 0.4   < > 0.4   ALKPHOS 99 105 97   < > 74   ALT 11 12 14   < > 14   AST 16 13 11   < > 12   ALBUMIN 3.7 3.7 3.5   < > 3.2*    203  --   --  160    < > = values in this interval not displayed.     TSH   Date Value Ref Range Status   01/02/2023 0.76 0.40 - 4.00 mU/L Final   09/06/2022 0.70 0.40 - 4.00 mU/L Final   07/19/2022 0.66 0.40 - 4.00 mU/L Final   06/02/2021 0.43 0.40 - 4.00 mU/L Final   05/06/2021 0.70 0.40 - 4.00 mU/L Final   04/21/2021 0.67 0.40 - 4.00 mU/L Final     No results for input(s): CEA in the last 41265 hours.  Results for orders placed or performed during the hospital encounter of 12/12/22   CT Chest/Abdomen/Pelvis w Contrast    Narrative    CT CHEST/ABDOMEN/PELVIS WITH CONTRAST 12/12/2022 11:16 AM    CLINICAL HISTORY: High-grade invasive carcinoma of bladder- on  pembrolizumab (not surgical or chemotherapy candidate). Malignant  neoplasm of lateral wall of urinary bladder (H). CLL (chronic  lymphocytic leukemia) (H). History of gross hematuria. Chronic  fatigue. MGUS (monoclonal gammopathy of unknown significance).    TECHNIQUE: CT scan of the chest, abdomen, and pelvis was performed  following injection of IV contrast. Multiplanar reformats were  obtained. Dose reduction techniques were used.   CONTRAST: 72mL ISOVUE-370    COMPARISON: June 7, 2022    FINDINGS:   LUNGS AND PLEURA:  Scattered atelectasis and/or fibrosis. No effusions.  Stable 8 mm nodule central left upper lobe image 133. Stable 8 mm  nodule right upper lobe image 93. No new or enlarging nodule. Areas of  the lung bases limited by respiratory motion artifact.    MEDIASTINUM/AXILLAE: No lymphadenopathy. No thoracic aortic aneurysms.    CORONARY ARTERY CALCIFICATIONS: Severe and/or stents.    HEPATOBILIARY: No significant mass or bile duct dilatation. No  calcified gallstones.     PANCREAS: No significant mass, duct dilatation, or inflammatory  change.    SPLEEN: Normal size.    ADRENAL GLANDS: No significant nodules.    KIDNEYS/BLADDER: No significant mass, stones, or hydronephrosis.    BOWEL: No obstruction or inflammatory change.    PELVIC ORGANS: Largely obscured by streak artifact from the bilateral  hip arthroplasties, no gross abnormality.    ADDITIONAL FINDINGS: No definite adenopathy or free fluid. There are  moderate atherosclerotic changes of the visualized aorta and its  branches. There is no evidence of aortic dissection or aneurysm.    MUSCULOSKELETAL: No frankly destructive bony lesions. Anterior wedge  deformity of T7 is stable.      Impression    IMPRESSION:  1.  Stable pulmonary nodules.  2.  No adenopathy demonstrated.  3.  No new findings.    JANESSA HUTCHISON MD         SYSTEM ID:  [id number]          ASSESSMENT AND PLAN   1. High-grade muscle invasive bladder cancer in a patient not a candidate for either surgery or radiation therapy  2. Prostate cancer treated with brachii therapy followed by external beam radiation therapy  3. Hypertension, diabetes mellitus type 2, atrial fibrillation on chronic anticoagulation with apixaban    I had a lengthy discussion with patient at this clinic visit. He has been started on pembrolizumab for his muscle invasive bladder cancer on 8/13/20 - 7/21/22. He continues to have itching from his infusion.     He has itching almost all over his body.  He has tried a number of creams  and dermatology consultations for this.  It is not debilitating to any extent but does bother him. I have encouraged him to bring this up with dermatology.     There is no evidence of metastatic disease and he has complete response on therapy.  He has not noticed any hematuria since the start of therapy.     I have reviewed all of the labs done prior to this clinic visit.  Labs are all completely normal including electrolytes, renal function, hepatic panel, complete blood count and differential.  He has elevated blood glucose which was not fasting and elevated.     I reviewed actual images from his restaging CT scan. His bladder tumor cannot be assessed on the CT portion due to the bilateral hip replacements. There are no new lesions identified. He has stable pulmonary nodules. There is no evidence of metastatic disease other than stable pulmonary nodules in the chest, abdomen and pelvis.     I will see him in 6 months with labs including urine cytology and CT chest, abdomen and pelvis prior to visit.       25 minutes spent on the date of the encounter doing chart review, history and exam, documentation and further activities as noted above      Imtiaz Ellis    Hematologist and Medical Oncologist  [facility]

## 2023-04-01 ENCOUNTER — HEALTH MAINTENANCE LETTER (OUTPATIENT)
Age: 88
End: 2023-04-01

## 2023-06-12 ENCOUNTER — HOSPITAL ENCOUNTER (EMERGENCY)
Facility: CLINIC | Age: 88
Discharge: HOME | End: 2023-06-12
Attending: EMERGENCY MEDICINE | Admitting: EMERGENCY MEDICINE
Payer: MEDICARE

## 2023-06-12 ENCOUNTER — APPOINTMENT (OUTPATIENT)
Dept: CT IMAGING | Facility: CLINIC | Age: 88
End: 2023-06-12
Attending: EMERGENCY MEDICINE
Payer: MEDICARE

## 2023-06-12 VITALS
RESPIRATION RATE: 20 BRPM | TEMPERATURE: 98.2 F | SYSTOLIC BLOOD PRESSURE: 141 MMHG | HEART RATE: 62 BPM | DIASTOLIC BLOOD PRESSURE: 90 MMHG | OXYGEN SATURATION: 98 %

## 2023-06-12 DIAGNOSIS — S00.81XA ABRASION OF FACE, INITIAL ENCOUNTER: ICD-10-CM

## 2023-06-12 DIAGNOSIS — S09.90XA CLOSED HEAD INJURY, INITIAL ENCOUNTER: ICD-10-CM

## 2023-06-12 PROCEDURE — 72125 CT NECK SPINE W/O DYE: CPT | Mod: MA

## 2023-06-12 PROCEDURE — 99284 EMERGENCY DEPT VISIT MOD MDM: CPT | Mod: 25

## 2023-06-12 PROCEDURE — 90471 IMMUNIZATION ADMIN: CPT | Performed by: EMERGENCY MEDICINE

## 2023-06-12 PROCEDURE — 90715 TDAP VACCINE 7 YRS/> IM: CPT | Performed by: EMERGENCY MEDICINE

## 2023-06-12 PROCEDURE — 70450 CT HEAD/BRAIN W/O DYE: CPT | Mod: MA

## 2023-06-12 PROCEDURE — 250N000011 HC RX IP 250 OP 636: Performed by: EMERGENCY MEDICINE

## 2023-06-12 RX ADMIN — CLOSTRIDIUM TETANI TOXOID ANTIGEN (FORMALDEHYDE INACTIVATED), CORYNEBACTERIUM DIPHTHERIAE TOXOID ANTIGEN (FORMALDEHYDE INACTIVATED), BORDETELLA PERTUSSIS TOXOID ANTIGEN (GLUTARALDEHYDE INACTIVATED), BORDETELLA PERTUSSIS FILAMENTOUS HEMAGGLUTININ ANTIGEN (FORMALDEHYDE INACTIVATED), BORDETELLA PERTUSSIS PERTACTIN ANTIGEN, AND BORDETELLA PERTUSSIS FIMBRIAE 2/3 ANTIGEN 0.5 ML: 5; 2; 2.5; 5; 3; 5 INJECTION, SUSPENSION INTRAMUSCULAR at 20:36

## 2023-06-12 ASSESSMENT — ACTIVITIES OF DAILY LIVING (ADL): ADLS_ACUITY_SCORE: 33

## 2023-06-12 NOTE — ED TRIAGE NOTES
Patient fell and hit head on driveway. Denies LOC, neck pain, and back pain. On blood thinners. Patient fell yesterday.      Triage Assessment     Row Name 06/12/23 9467       Triage Assessment (Adult)    Airway WDL WDL       Respiratory WDL    Respiratory WDL WDL       Skin Circulation/Temperature WDL    Skin Circulation/Temperature WDL X       Cardiac WDL    Cardiac WDL WDL       Peripheral/Neurovascular WDL    Peripheral Neurovascular WDL WDL       Cognitive/Neuro/Behavioral WDL    Cognitive/Neuro/Behavioral WDL WDL

## 2023-06-12 NOTE — ED NOTES
Rapid Assessment Note    History:   Santos Marti is a 89 year old male who presents after a fall that occurred yesterday. The patient reports that he tripped over a garden hose, fell to his knees, and then hit his head on concrete causing a wound to his head and knees. The patient reports that the swelling and bruising to his wound has worsened causing him to present to the ED. The patient denies loss of consciousness, neck, confusion, or vomiting. The patient does not have a tetanus vaccination on record.     Exam:   General: Alert, No distress. Nontoxic appearance  Head: No signs of trauma.   Mouth/Throat: Oropharynx moist.   Eyes: Conjunctivae are normal. Pupils are equal..   Neck: Normal range of motion.    CV: Appears well perfused.  Resp:No respiratory distress.   MSK: Normal range of motion. No obvious deformity. No neck pain  Neuro: The patient is alert and interactive. TANG. Speech normal. GCS 15  Skin: abrasion over his right forehead and the bridge of his nose. Bruising under his right eye.   Psych: normal mood and affect. behavior is normal.      Plan of Care:   I evaluated the patient and developed an initial plan of care. I discussed this plan and explained that I, or one of my partners, would be returning to complete the evaluation.       I, Elizabeth Arsalan, am serving as a scribe to document services personally performed by Sung Ac MD, based on my observations and the provider's statements to me.    6/12/2023  [employer]    Portions of this medical record were completed by a scribe. UPON MY REVIEW AND AUTHENTICATION BY ELECTRONIC SIGNATURE, this confirms (a) I performed the applicable clinical services, and (b) the record is accurate.        Sung Ac MD  06/12/23 7319

## 2023-06-13 NOTE — ED PROVIDER NOTES
History     Chief Complaint:  Fall       HPI   Santos Marti is a 89 year old anticoagulated male with a history of DM II, a-fib, CKD, hypertension, and osteoarthritis who presents for evaluation of a fall that occurred yesterday. He was outside when he turned around and tripped over a hose, causing him to fall on cement. He hit his head and scraped his knees, but denies loss of consciousness. He has still been ambulatory. He denies headache, vision changes, or neck pain.    Independent Historian:   His wife mentions that the fall occurred yesterday, however, he has had an increased amount of bleeding today.    Review of External Notes:   None       Medications:    Eliquis  Imdur  Prinizide  Glucophage  Toprol  Omeprazole    Past Medical History:    Arthritis  DM II  Prostate cancer  Osteoarthritis  CKD  A-fib  GERD  Hypertension    Past Surgical History:   Cystoscopy x3  Tonsillectomy  Hernia repair  Orthopedic surgery  Prostate surgery      Physical Exam     Patient Vitals for the past 24 hrs:   BP Temp Temp src Pulse Resp SpO2   06/12/23 2110 -- -- -- -- -- 98 %   06/12/23 1638 (!) 156/95 98.2  F (36.8  C) Temporal 82 20 100 %        Physical Exam  General: alert, lying comfortably on gurney  HENT: mucous membranes moist, PERRL, EOMI  Face: Face overall appears symmetric, no crepitus to the orbits, zygomatic arches, no midface instability, full mouth opening without pain.  Dentition is intact.  CV: regular rate, regular rhythm  Resp: normal effort, clear throughout, no crackles or wheezing  GI: abdomen soft and nontender, no guarding  MSK: no midline cervical spine tenderness.  Skin: healing large, scabbed abrasion over the right forehead, approx 5x5cm.  Bruising around the right orbit, extending across midline to the left forehead, and down into the right cheek.  Extremities: no edema, calves non-tender  Neuro: alert, clear speech, oriented.  Strength 5 out of 5 bilateral handgrips, triceps, biceps  bilaterally.  Dorsiflexion, plantarflexion, hip flexion 5 out of 5 bilaterally.  Normal steady gait, patient slightly shuffling and moving slowly when changing direction, which is baseline for him.  Psych: normal mood and affect      Emergency Department Course     Imaging:  Head CT w/o contrast   Final Result   IMPRESSION:   HEAD CT:   1.  Age-indeterminate right cerebellar infarct, new from prior.   2.  No acute intracranial hemorrhage, extra-axial collection, or midline shift..   3.  Brain atrophy and presumed chronic microvascular ischemic changes as above.      CERVICAL SPINE CT:   1.  No CT evidence for acute fracture or post traumatic subluxation.      Cervical spine CT w/o contrast   Final Result   IMPRESSION:   HEAD CT:   1.  Age-indeterminate right cerebellar infarct, new from prior.   2.  No acute intracranial hemorrhage, extra-axial collection, or midline shift..   3.  Brain atrophy and presumed chronic microvascular ischemic changes as above.      CERVICAL SPINE CT:   1.  No CT evidence for acute fracture or post traumatic subluxation.         Report per radiology    Emergency Department Course & Assessments:    Interventions:  Medications   Tdap (tetanus-diphtheria-acell pertussis) (ADACEL) injection 0.5 mL (0.5 mLs Intramuscular $Given 6/12/23 2036)        Assessments:  2100 I obtained history and examined the patient, as noted above.    Independent Interpretation (X-rays, CTs, rhythm strip):  None    Consultations/Discussion of Management or Tests:  None     Social Determinants of Health affecting care:   None    Disposition:  The patient was discharged to home.     Impression & Plan        Medical Decision Making:  Santos Marti is a 89 year old male with a history of atrial fibrillation, on Eliquis, type 2 diabetes, CKD, pacemaker, who presents following a mechanical fall yesterday.  On exam, the patient is neurologically intact.  He has evidence of facial trauma, but no evidence for true  vocal hematoma, open globe, or hyphema.  CT imaging had returned by the time I evaluated the patient, and is negative for intracranial hemorrhage, facial fracture, or cervical spine fracture.  There is a new finding of a right cerebellar stroke since his CT scan 11/19.  I informed the family of this, and we discussed that it could be contributing to gait instability.  The patient did pass a trial of ambulation at the bedside.  He is independently ambulatory with a steady gait, though has to take his time with I turning.  Stable for returning home.      Diagnosis:    ICD-10-CM    1. Closed head injury, initial encounter  S09.90XA       2. Abrasion of face, initial encounter  S00.81XA            Discharge Medications:  New Prescriptions    No medications on file      Scribe Disclosure:  CARMEL, Gianfranco Petit & Magen Rolon, am serving as a scribe at 8:32 PM on 6/12/2023 to document services personally performed by Sabrina Cooper MD based on my observations and the provider's statements to me.      Sabrina Cooper MD  06/13/23 [id number]

## 2023-07-06 ENCOUNTER — LAB (OUTPATIENT)
Dept: LAB | Facility: CLINIC | Age: 88
End: 2023-07-06
Attending: INTERNAL MEDICINE
Payer: MEDICARE

## 2023-07-06 ENCOUNTER — HOSPITAL ENCOUNTER (OUTPATIENT)
Dept: CT IMAGING | Facility: CLINIC | Age: 88
Discharge: HOME | End: 2023-07-06
Attending: INTERNAL MEDICINE
Payer: MEDICARE

## 2023-07-06 DIAGNOSIS — Z87.898 HISTORY OF GROSS HEMATURIA: ICD-10-CM

## 2023-07-06 DIAGNOSIS — C67.2 MALIGNANT NEOPLASM OF LATERAL WALL OF URINARY BLADDER (H): ICD-10-CM

## 2023-07-06 DIAGNOSIS — D47.2 MGUS (MONOCLONAL GAMMOPATHY OF UNKNOWN SIGNIFICANCE): ICD-10-CM

## 2023-07-06 DIAGNOSIS — C91.10 CLL (CHRONIC LYMPHOCYTIC LEUKEMIA) (H): ICD-10-CM

## 2023-07-06 DIAGNOSIS — R53.82 CHRONIC FATIGUE: ICD-10-CM

## 2023-07-06 LAB
ALBUMIN SERPL BCG-MCNC: 4.4 G/DL (ref 3.5–5.2)
ALP SERPL-CCNC: 105 U/L (ref 40–129)
ALT SERPL W P-5'-P-CCNC: 6 U/L (ref 0–70)
ANION GAP SERPL CALCULATED.3IONS-SCNC: 10 MMOL/L (ref 7–15)
AST SERPL W P-5'-P-CCNC: 14 U/L (ref 0–45)
BASOPHILS # BLD AUTO: 0 10E3/UL (ref 0–0.2)
BASOPHILS NFR BLD AUTO: 0 %
BILIRUB SERPL-MCNC: 0.5 MG/DL
BUN SERPL-MCNC: 23.3 MG/DL (ref 8–23)
CALCIUM SERPL-MCNC: 9.8 MG/DL (ref 8.2–9.6)
CHLORIDE SERPL-SCNC: 102 MMOL/L (ref 98–107)
CREAT BLD-MCNC: 1.4 MG/DL (ref 0.7–1.3)
CREAT SERPL-MCNC: 1.18 MG/DL (ref 0.67–1.17)
DEPRECATED HCO3 PLAS-SCNC: 27 MMOL/L (ref 22–29)
EOSINOPHIL # BLD AUTO: 0.1 10E3/UL (ref 0–0.7)
EOSINOPHIL NFR BLD AUTO: 1 %
ERYTHROCYTE [DISTWIDTH] IN BLOOD BY AUTOMATED COUNT: 12.7 % (ref 10–15)
GFR SERPL CREATININE-BSD FRML MDRD: 47 ML/MIN/1.73M2
GFR SERPL CREATININE-BSD FRML MDRD: 58 ML/MIN/1.73M2
GLUCOSE SERPL-MCNC: 207 MG/DL (ref 70–99)
HCT VFR BLD AUTO: 43.9 % (ref 40–53)
HGB BLD-MCNC: 14.7 G/DL (ref 13.3–17.7)
IMM GRANULOCYTES # BLD: 0 10E3/UL
IMM GRANULOCYTES NFR BLD: 0 %
LDH SERPL L TO P-CCNC: 177 U/L (ref 0–250)
LYMPHOCYTES # BLD AUTO: 1.2 10E3/UL (ref 0.8–5.3)
LYMPHOCYTES NFR BLD AUTO: 18 %
MCH RBC QN AUTO: 32.5 PG (ref 26.5–33)
MCHC RBC AUTO-ENTMCNC: 33.5 G/DL (ref 31.5–36.5)
MCV RBC AUTO: 97 FL (ref 78–100)
MONOCYTES # BLD AUTO: 0.5 10E3/UL (ref 0–1.3)
MONOCYTES NFR BLD AUTO: 7 %
NEUTROPHILS # BLD AUTO: 4.8 10E3/UL (ref 1.6–8.3)
NEUTROPHILS NFR BLD AUTO: 74 %
NRBC # BLD AUTO: 0 10E3/UL
NRBC BLD AUTO-RTO: 0 /100
PLATELET # BLD AUTO: 232 10E3/UL (ref 150–450)
POTASSIUM SERPL-SCNC: 4.3 MMOL/L (ref 3.4–5.3)
PROT SERPL-MCNC: 7.6 G/DL (ref 6.4–8.3)
RBC # BLD AUTO: 4.52 10E6/UL (ref 4.4–5.9)
SODIUM SERPL-SCNC: 139 MMOL/L (ref 136–145)
TSH SERPL DL<=0.005 MIU/L-ACNC: 1.02 UIU/ML (ref 0.3–4.2)
WBC # BLD AUTO: 6.6 10E3/UL (ref 4–11)

## 2023-07-06 PROCEDURE — 85025 COMPLETE CBC W/AUTO DIFF WBC: CPT

## 2023-07-06 PROCEDURE — 250N000011 HC RX IP 250 OP 636: Performed by: INTERNAL MEDICINE

## 2023-07-06 PROCEDURE — 82565 ASSAY OF CREATININE: CPT | Mod: 91

## 2023-07-06 PROCEDURE — 80053 COMPREHEN METABOLIC PANEL: CPT

## 2023-07-06 PROCEDURE — 250N000009 HC RX 250: Performed by: INTERNAL MEDICINE

## 2023-07-06 PROCEDURE — 83615 LACTATE (LD) (LDH) ENZYME: CPT

## 2023-07-06 PROCEDURE — 84443 ASSAY THYROID STIM HORMONE: CPT

## 2023-07-06 PROCEDURE — 36415 COLL VENOUS BLD VENIPUNCTURE: CPT

## 2023-07-06 PROCEDURE — 74177 CT ABD & PELVIS W/CONTRAST: CPT | Mod: MG

## 2023-07-06 PROCEDURE — G1010 CDSM STANSON: HCPCS

## 2023-07-06 RX ORDER — IOPAMIDOL 755 MG/ML
74 INJECTION, SOLUTION INTRAVASCULAR ONCE
Status: COMPLETED | OUTPATIENT
Start: 2023-07-06 | End: 2023-07-06

## 2023-07-06 RX ADMIN — IOPAMIDOL 74 ML: 755 INJECTION, SOLUTION INTRAVENOUS at 10:28

## 2023-07-06 RX ADMIN — SODIUM CHLORIDE 61 ML: 9 INJECTION, SOLUTION INTRAVENOUS at 10:28

## 2023-07-13 ENCOUNTER — ONCOLOGY VISIT (OUTPATIENT)
Dept: ONCOLOGY | Facility: CLINIC | Age: 88
End: 2023-07-13
Attending: INTERNAL MEDICINE
Payer: MEDICARE

## 2023-07-13 VITALS
WEIGHT: 151 LBS | OXYGEN SATURATION: 99 % | SYSTOLIC BLOOD PRESSURE: 139 MMHG | HEART RATE: 87 BPM | BODY MASS INDEX: 21.67 KG/M2 | RESPIRATION RATE: 16 BRPM | DIASTOLIC BLOOD PRESSURE: 83 MMHG

## 2023-07-13 DIAGNOSIS — N18.31 STAGE 3A CHRONIC KIDNEY DISEASE (H): ICD-10-CM

## 2023-07-13 DIAGNOSIS — C91.10 CLL (CHRONIC LYMPHOCYTIC LEUKEMIA) (H): ICD-10-CM

## 2023-07-13 DIAGNOSIS — Z87.898 HISTORY OF GROSS HEMATURIA: ICD-10-CM

## 2023-07-13 DIAGNOSIS — D47.2 MGUS (MONOCLONAL GAMMOPATHY OF UNKNOWN SIGNIFICANCE): ICD-10-CM

## 2023-07-13 DIAGNOSIS — C67.2 MALIGNANT NEOPLASM OF LATERAL WALL OF URINARY BLADDER (H): Primary | ICD-10-CM

## 2023-07-13 PROCEDURE — G0463 HOSPITAL OUTPT CLINIC VISIT: HCPCS | Performed by: INTERNAL MEDICINE

## 2023-07-13 PROCEDURE — 99214 OFFICE O/P EST MOD 30 MIN: CPT | Performed by: INTERNAL MEDICINE

## 2023-07-13 ASSESSMENT — PAIN SCALES - GENERAL: PAINLEVEL: NO PAIN (0)

## 2023-07-13 NOTE — LETTER
7/13/2023         RE: Santos Marti  [address]  [place] [facility] [address]        Dear Colleague,    Thank you for referring your patient, Santos Marti, to the [facility]. Please see a copy of my visit note below.    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  He was on apixaban for atrial fibrillation.  TURBT done on 10/22/2019 revealed normal muscle invasive bladder cancer  -He was followed with surveillance cystoscopies every 3 months.  His cystoscopy evaluation was negative for any tumors or raised erythema on 3/11/2020, however a follow-up exam on 7/8/2020 revealed raised erythematous area in the left lateral bladder wall.  He had TURBT under anesthesia on 7/15/2020 which is revealed high-grade muscle invasive urothelial carcinoma.  -Santos was diagnosed with prostate cancer in October 2004.  He underwent brachii therapy followed by external beam radiation therapy administered at the [facility].  -Due to his previous radiation for his prostate cancer he is not a candidate for bladder radiation.  He is not a candidate for cystectomy due to his advanced age and also previous extensive radiation to the prostate.  - He was started on pembrolizumab for his locally advanced bladder cancer since 8/13/20.     CURRENT INTERVENTIONS:  On surveillance post pembrolizumab 400 mg IV every 6 weeks since 8/13/20 - 7/21/22    SUBJECTIVE   Santos Marti is being followed for muscle invasive bladder cancer    Patient is being followed on therapy for his muscle invasive bladder  cancer. He is accompanied by his wife at this visit. He had been on pembrolizumab between August 2020 -  July 2022. He has persistent itching.  He has not been using cream as it seems to help only momentarily.     He is back to baseline other than his itching.     He denies any recurrent hematuria. He has good appetite and fair energy. No side effects suggestive of autoimmune disease other than his itching.       PAST MEDICAL HISTORY     Past Medical History:   Diagnosis Date     Arthritis      Complication of anesthesia      Diabetes (H)      Gastroesophageal reflux disease      Hypertension      Pacemaker          CURRENT OUTPATIENT MEDICATIONS     Current Outpatient Medications   Medication Sig     apixaban ANTICOAGULANT (ELIQUIS) 5 MG tablet Take 5 mg by mouth 2 times daily      isosorbide mononitrate (IMDUR) 30 MG 24 hr tablet TK 1 T PO ONCE D     lisinopril-hydrochlorothiazide (PRINZIDE/ZESTORETIC) 20-25 MG tablet Take 1 tablet by mouth     metFORMIN (GLUCOPHAGE) 1000 MG tablet Take 500 mg by mouth daily      metoprolol succinate ER (TOPROL-XL) 50 MG 24 hr tablet Take 50 mg by mouth daily Take one and a half tabs daily     omeprazole 20 MG tablet Take 20 mg by mouth daily     triamcinolone (KENALOG) 0.1 % external ointment Apply topically 2 times daily     acetaminophen (TYLENOL) 500 MG tablet Take 500-1,000 mg by mouth every 6 hours as needed for mild pain (Patient not taking: Reported on 1/12/2023)     Current Facility-Administered Medications   Medication     lidocaine (XYLOCAINE) 2 % external gel        ALLERGIES      Allergies   Allergen Reactions     Sulfa Antibiotics Rash        REVIEW OF SYSTEMS   As above in the HPI, o/w complete 12-point ROS was negative.     PHYSICAL EXAM   /83   Pulse 87   Resp 16   Wt 68.5 kg (151 lb)   SpO2 99%   BMI 21.67 kg/m    Limited physical exam during video visit due to COVID19 restrictions  Elderly male in no acute distress  Breathing comfortably, no  tachypnea  Speech and hearing normal  Pleasant mood and congruent affect  Moving all extremities, no focal neurologic deficits apparent      LABORATORY AND IMAGING STUDIES     Lab Test 07/06/23  1017 01/02/23  1039 09/06/22  1004 07/19/22  1120 06/07/22  1011    137 137 139 139   POTASSIUM 4.3 3.9 3.7 4.3 3.8   CHLORIDE 102 101 104 104 105   CO2 27 28 28 30 29   ANIONGAP 10 8 5 5 5   BUN 23.3* 22 18 17 20   CR 1.18* 1.04 1.01 1.03 1.11   * 240* 137* 158* 243*   SAAD 9.8* 9.2 9.6 9.2 9.4     No results for input(s): MAG, PHOS in the last 81850 hours.  Lab Test 07/06/23  1017 01/02/23  1039 09/06/22  1004 12/29/21  1023   WBC 6.6 7.5 7.9 6.7   HGB 14.7 14.3 14.4 14.7    215 242 185   MCV 97 97 95 99   NEUTROPHIL 74 70 66 68     Recent Labs   Lab Test 07/06/23  1017 01/02/23  1039 09/06/22  1004   BILITOTAL 0.5 1.2 0.5   ALKPHOS 105 99 105   ALT 6 11 12   AST 14 16 13   ALBUMIN 4.4 3.7 3.7    214 203     TSH   Date Value Ref Range Status   07/06/2023 1.02 0.30 - 4.20 uIU/mL Final   01/02/2023 0.76 0.40 - 4.00 mU/L Final   09/06/2022 0.70 0.40 - 4.00 mU/L Final   07/19/2022 0.66 0.40 - 4.00 mU/L Final   06/02/2021 0.43 0.40 - 4.00 mU/L Final   05/06/2021 0.70 0.40 - 4.00 mU/L Final   04/21/2021 0.67 0.40 - 4.00 mU/L Final     No results for input(s): CEA in the last 15989 hours.  Results for orders placed or performed during the hospital encounter of 07/06/23   CT Chest/Abdomen/Pelvis w Contrast    Narrative    CT CHEST/ABDOMEN/PELVIS WITH CONTRAST 7/6/2023 10:43 AM    CLINICAL HISTORY: Muscle invasive bladder cancer. Malignant neoplasm  of lateral wall of urinary bladder (H). CLL (chronic lymphocytic  leukemia) (H). History of gross hematuria. Chronic fatigue. MGUS  (monoclonal gammopathy of unknown significance).    TECHNIQUE: CT scan of the chest, abdomen, and pelvis was performed  following injection of IV contrast. Multiplanar reformats were  obtained. Dose reduction techniques were used.    CONTRAST: 74 mL Isovue-370    COMPARISON: December 12, 2022    FINDINGS:   LUNGS AND PLEURA: Pulmonary nodules include an elongated right upper  lobe nodule measuring 8 mm, image 105 series 5, and 8 mm nodule  central left upper lobe, image 136, are stable. No new nodules. No  infiltrates or effusions.    MEDIASTINUM/AXILLAE: No lymphadenopathy. Borderline prominent upper  normal ascending aorta at 3 cm.    CORONARY ARTERY CALCIFICATIONS: Severe and/or stents.    HEPATOBILIARY: No significant mass or bile duct dilatation. No  calcified gallstones.     PANCREAS: No significant mass, duct dilatation, or inflammatory  change.    SPLEEN: Normal size.    ADRENAL GLANDS: No significant nodules.    KIDNEYS/BLADDER: No significant mass, stones, or hydronephrosis.    BOWEL: No obstruction or inflammatory change.    PELVIC ORGANS: No pelvic masses.    ADDITIONAL FINDINGS: There are moderate atherosclerotic changes of the  visualized aorta and its branches. There is no evidence of aortic  dissection or aneurysm. No adenopathy or free fluid.    MUSCULOSKELETAL: No frankly destructive bony lesions. Mid thoracic  spine wedging, stable.      Impression    IMPRESSION:  1.  Stable pulmonary nodules.  2.  Otherwise no recurrent or residual malignancy demonstrated.    JANESSA HUTCHISON MD         SYSTEM ID:  [id number]          ASSESSMENT AND PLAN   1. High-grade muscle invasive bladder cancer in a patient not a candidate for either surgery or radiation therapy  2. Prostate cancer treated with brachii therapy followed by external beam radiation therapy  3. Hypertension, diabetes mellitus type 2, atrial fibrillation on chronic anticoagulation with apixaban    I had a lengthy discussion with patient at this clinic visit. He has been started on pembrolizumab for his muscle invasive bladder cancer on 8/13/20 - 7/21/22. He continues to have itching from his infusion.     He has itching almost all over his body.  He has tried a number of creams  and dermatology consultations for this.  It is not debilitating to any extent but does bother him. I have encouraged him to bring this up with dermatology.     There is no evidence of metastatic disease and he has complete response on therapy.  He has not noticed any hematuria since the start of therapy.     I have reviewed all of the labs done prior to this clinic visit.  Labs are all completely normal including electrolytes, renal function, hepatic panel, complete blood count and differential.  He has elevated blood glucose which was not fasting and elevated.     I reviewed actual images from his restaging CT scan. His bladder tumor cannot be assessed on the CT portion due to the bilateral hip replacements. There are no new lesions identified. He has stable pulmonary nodules. There is no evidence of metastatic disease other than stable pulmonary nodules in the chest, abdomen and pelvis.     He does not like to have cystoscopies done and so he skipped his last visit in urology. If he was just a little younger, I would have convinced him to follow in urology. However at 89 yrs and soon to be 89 yrs, I am not sure that it will have a huge impact. Cystoscopy could  recurrent disease a lot sooner than a scan. But even a minor procedure like cystoscopy can have significant risks for him. It is okay to just monitor with scans.     I will see him in 6 months with labs including urine cytology and CT chest, abdomen and pelvis prior to visit.       25 minutes spent on the date of the encounter doing chart review, history and exam, documentation and further activities as noted above      Imtiaz Ellis    Hematologist and Medical Oncologist  [facility]        Again, thank you for allowing me to participate in the care of your patient.        Sincerely,        Imtiaz Ellis MD

## 2023-07-13 NOTE — PROGRESS NOTES
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  He was on apixaban for atrial fibrillation.  TURBT done on 10/22/2019 revealed normal muscle invasive bladder cancer  -He was followed with surveillance cystoscopies every 3 months.  His cystoscopy evaluation was negative for any tumors or raised erythema on 3/11/2020, however a follow-up exam on 7/8/2020 revealed raised erythematous area in the left lateral bladder wall.  He had TURBT under anesthesia on 7/15/2020 which is revealed high-grade muscle invasive urothelial carcinoma.  -Santos was diagnosed with prostate cancer in October 2004.  He underwent brachii therapy followed by external beam radiation therapy administered at the [facility].  -Due to his previous radiation for his prostate cancer he is not a candidate for bladder radiation.  He is not a candidate for cystectomy due to his advanced age and also previous extensive radiation to the prostate.  - He was started on pembrolizumab for his locally advanced bladder cancer since 8/13/20.     CURRENT INTERVENTIONS:  On surveillance post pembrolizumab 400 mg IV every 6 weeks since 8/13/20 - 7/21/22    SUBJECTIVE   Santos Marti is being followed for muscle invasive bladder cancer    Patient is being followed on therapy for his muscle invasive bladder cancer. He is accompanied by his wife at this visit. He had been on pembrolizumab between August 2020 -  July 2022. He has persistent itching.  He has not been using cream as it seems to help only momentarily.     He is back to baseline other than his itching.     He  denies any recurrent hematuria. He has good appetite and fair energy. No side effects suggestive of autoimmune disease other than his itching.       PAST MEDICAL HISTORY     Past Medical History:   Diagnosis Date     Arthritis      Complication of anesthesia      Diabetes (H)      Gastroesophageal reflux disease      Hypertension      Pacemaker          CURRENT OUTPATIENT MEDICATIONS     Current Outpatient Medications   Medication Sig     apixaban ANTICOAGULANT (ELIQUIS) 5 MG tablet Take 5 mg by mouth 2 times daily      isosorbide mononitrate (IMDUR) 30 MG 24 hr tablet TK 1 T PO ONCE D     lisinopril-hydrochlorothiazide (PRINZIDE/ZESTORETIC) 20-25 MG tablet Take 1 tablet by mouth     metFORMIN (GLUCOPHAGE) 1000 MG tablet Take 500 mg by mouth daily      metoprolol succinate ER (TOPROL-XL) 50 MG 24 hr tablet Take 50 mg by mouth daily Take one and a half tabs daily     omeprazole 20 MG tablet Take 20 mg by mouth daily     triamcinolone (KENALOG) 0.1 % external ointment Apply topically 2 times daily     acetaminophen (TYLENOL) 500 MG tablet Take 500-1,000 mg by mouth every 6 hours as needed for mild pain (Patient not taking: Reported on 1/12/2023)     Current Facility-Administered Medications   Medication     lidocaine (XYLOCAINE) 2 % external gel        ALLERGIES      Allergies   Allergen Reactions     Sulfa Antibiotics Rash        REVIEW OF SYSTEMS   As above in the HPI, o/w complete 12-point ROS was negative.     PHYSICAL EXAM   /83   Pulse 87   Resp 16   Wt 68.5 kg (151 lb)   SpO2 99%   BMI 21.67 kg/m    Limited physical exam during video visit due to COVID19 restrictions  Elderly male in no acute distress  Breathing comfortably, no tachypnea  Speech and hearing normal  Pleasant mood and congruent affect  Moving all extremities, no focal neurologic deficits apparent      LABORATORY AND IMAGING STUDIES     Lab Test 07/06/23  1017 01/02/23  1039 09/06/22  1004 07/19/22  1120 06/07/22  1011    137 137  139 139   POTASSIUM 4.3 3.9 3.7 4.3 3.8   CHLORIDE 102 101 104 104 105   CO2 27 28 28 30 29   ANIONGAP 10 8 5 5 5   BUN 23.3* 22 18 17 20   CR 1.18* 1.04 1.01 1.03 1.11   * 240* 137* 158* 243*   SAAD 9.8* 9.2 9.6 9.2 9.4     No results for input(s): MAG, PHOS in the last 88993 hours.  Lab Test 07/06/23  1017 01/02/23  1039 09/06/22  1004 12/29/21  1023   WBC 6.6 7.5 7.9 6.7   HGB 14.7 14.3 14.4 14.7    215 242 185   MCV 97 97 95 99   NEUTROPHIL 74 70 66 68     Recent Labs   Lab Test 07/06/23  1017 01/02/23  1039 09/06/22  1004   BILITOTAL 0.5 1.2 0.5   ALKPHOS 105 99 105   ALT 6 11 12   AST 14 16 13   ALBUMIN 4.4 3.7 3.7    214 203     TSH   Date Value Ref Range Status   07/06/2023 1.02 0.30 - 4.20 uIU/mL Final   01/02/2023 0.76 0.40 - 4.00 mU/L Final   09/06/2022 0.70 0.40 - 4.00 mU/L Final   07/19/2022 0.66 0.40 - 4.00 mU/L Final   06/02/2021 0.43 0.40 - 4.00 mU/L Final   05/06/2021 0.70 0.40 - 4.00 mU/L Final   04/21/2021 0.67 0.40 - 4.00 mU/L Final     No results for input(s): CEA in the last 51710 hours.  Results for orders placed or performed during the hospital encounter of 07/06/23   CT Chest/Abdomen/Pelvis w Contrast    Narrative    CT CHEST/ABDOMEN/PELVIS WITH CONTRAST 7/6/2023 10:43 AM    CLINICAL HISTORY: Muscle invasive bladder cancer. Malignant neoplasm  of lateral wall of urinary bladder (H). CLL (chronic lymphocytic  leukemia) (H). History of gross hematuria. Chronic fatigue. MGUS  (monoclonal gammopathy of unknown significance).    TECHNIQUE: CT scan of the chest, abdomen, and pelvis was performed  following injection of IV contrast. Multiplanar reformats were  obtained. Dose reduction techniques were used.   CONTRAST: 74 mL Isovue-370    COMPARISON: December 12, 2022    FINDINGS:   LUNGS AND PLEURA: Pulmonary nodules include an elongated right upper  lobe nodule measuring 8 mm, image 105 series 5, and 8 mm nodule  central left upper lobe, image 136, are stable. No new nodules.  No  infiltrates or effusions.    MEDIASTINUM/AXILLAE: No lymphadenopathy. Borderline prominent upper  normal ascending aorta at 3 cm.    CORONARY ARTERY CALCIFICATIONS: Severe and/or stents.    HEPATOBILIARY: No significant mass or bile duct dilatation. No  calcified gallstones.     PANCREAS: No significant mass, duct dilatation, or inflammatory  change.    SPLEEN: Normal size.    ADRENAL GLANDS: No significant nodules.    KIDNEYS/BLADDER: No significant mass, stones, or hydronephrosis.    BOWEL: No obstruction or inflammatory change.    PELVIC ORGANS: No pelvic masses.    ADDITIONAL FINDINGS: There are moderate atherosclerotic changes of the  visualized aorta and its branches. There is no evidence of aortic  dissection or aneurysm. No adenopathy or free fluid.    MUSCULOSKELETAL: No frankly destructive bony lesions. Mid thoracic  spine wedging, stable.      Impression    IMPRESSION:  1.  Stable pulmonary nodules.  2.  Otherwise no recurrent or residual malignancy demonstrated.    JANESSA HUTCHISON MD         SYSTEM ID:  [id number]          ASSESSMENT AND PLAN   1. High-grade muscle invasive bladder cancer in a patient not a candidate for either surgery or radiation therapy  2. Prostate cancer treated with brachii therapy followed by external beam radiation therapy  3. Hypertension, diabetes mellitus type 2, atrial fibrillation on chronic anticoagulation with apixaban    I had a lengthy discussion with patient at this clinic visit. He has been started on pembrolizumab for his muscle invasive bladder cancer on 8/13/20 - 7/21/22. He continues to have itching from his infusion.     He has itching almost all over his body.  He has tried a number of creams and dermatology consultations for this.  It is not debilitating to any extent but does bother him. I have encouraged him to bring this up with dermatology.     There is no evidence of metastatic disease and he has complete response on therapy.  He has not noticed any  hematuria since the start of therapy.     I have reviewed all of the labs done prior to this clinic visit.  Labs are all completely normal including electrolytes, renal function, hepatic panel, complete blood count and differential.  He has elevated blood glucose which was not fasting and elevated.     I reviewed actual images from his restaging CT scan. His bladder tumor cannot be assessed on the CT portion due to the bilateral hip replacements. There are no new lesions identified. He has stable pulmonary nodules. There is no evidence of metastatic disease other than stable pulmonary nodules in the chest, abdomen and pelvis.     He does not like to have cystoscopies done and so he skipped his last visit in urology. If he was just a little younger, I would have convinced him to follow in urology. However at 89 yrs and soon to be 89 yrs, I am not sure that it will have a huge impact. Cystoscopy could  recurrent disease a lot sooner than a scan. But even a minor procedure like cystoscopy can have significant risks for him. It is okay to just monitor with scans.     I will see him in 6 months with labs including urine cytology and CT chest, abdomen and pelvis prior to visit.       25 minutes spent on the date of the encounter doing chart review, history and exam, documentation and further activities as noted above      Imtiaz Ellis    Hematologist and Medical Oncologist  [facility]

## 2023-08-26 ENCOUNTER — HEALTH MAINTENANCE LETTER (OUTPATIENT)
Age: 88
End: 2023-08-26

## 2023-12-14 NOTE — LETTER
"    12/14/2023         RE: Santos Marti  [address]  [facility] [address]        Dear Colleague,    Thank you for referring your patient, Santos Marti, to the [facility]. Please see a copy of my visit note below.    Oncology Rooming Note    December 14, 2023 9:35 AM   Santos Marti is a 89 year old male who presents for:    Chief Complaint   Patient presents with     Oncology Clinic Visit     Initial Vitals: BP (!) 171/84 (BP Location: Right arm, Patient Position: Sitting, Cuff Size: Adult Regular)   Temp 97.6  F (36.4  C) (Oral)   Resp 16   Wt 68.1 kg (150 lb 3.2 oz)   BMI 21.55 kg/m   Estimated body mass index is 21.55 kg/m  as calculated from the following:    Height as of 1/11/23: 1.778 m (5' 10\").    Weight as of this encounter: 68.1 kg (150 lb 3.2 oz). Body surface area is 1.83 meters squared.  No Pain (0) Comment: Data Unavailable   No LMP for male patient.  Allergies reviewed: Yes  Medications reviewed: Yes    Medications: Medication refills not needed today.  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  He was on apixaban for atrial fibrillation.  TURBT done on 10/22/2019 revealed normal muscle invasive bladder cancer  -He was followed with " surveillance cystoscopies every 3 months.  His cystoscopy evaluation was negative for any tumors or raised erythema on 3/11/2020, however a follow-up exam on 7/8/2020 revealed raised erythematous area in the left lateral bladder wall.  He had TURBT under anesthesia on 7/15/2020 which is revealed high-grade muscle invasive urothelial carcinoma.  -Santos was diagnosed with prostate cancer in October 2004.  He underwent brachii therapy followed by external beam radiation therapy administered at the [facility].  -Due to his previous radiation for his prostate cancer he is not a candidate for bladder radiation.  He is not a candidate for cystectomy due to his advanced age and also previous extensive radiation to the prostate.  - He was started on pembrolizumab for his locally advanced bladder cancer since 8/13/20.     CURRENT INTERVENTIONS:  On surveillance post pembrolizumab 400 mg IV every 6 weeks since 8/13/20 - 7/21/22    SUBJECTIVE   Santos Marti is being followed for muscle invasive bladder cancer    Patient is being followed on therapy for his muscle invasive bladder cancer. He is accompanied by his wife at this visit. He had been on pembrolizumab between August 2020 -  July 2022. He had persistent itching.      He is back to baseline. Even his itching has resolved.     He denies any recurrent hematuria. He has good appetite and fair energy. No side effects suggestive of autoimmune disease other than his itching.       PAST MEDICAL HISTORY     Past Medical History:   Diagnosis Date     Arthritis      Complication of anesthesia      Diabetes (H)      Gastroesophageal reflux disease      Hypertension      Pacemaker          CURRENT OUTPATIENT MEDICATIONS     Current Outpatient Medications   Medication Sig     acetaminophen (TYLENOL) 500 MG tablet Take 500-1,000 mg by mouth every 6 hours as needed for mild pain     apixaban ANTICOAGULANT (ELIQUIS) 5 MG tablet Take 5 mg by mouth 2 times daily       "isosorbide mononitrate (IMDUR) 30 MG 24 hr tablet TK 1 T PO ONCE D     lisinopril-hydrochlorothiazide (PRINZIDE/ZESTORETIC) 20-25 MG tablet Take 1 tablet by mouth     metoprolol succinate ER (TOPROL-XL) 50 MG 24 hr tablet Take 50 mg by mouth daily Take one and a half tabs daily     omeprazole 20 MG tablet Take 20 mg by mouth daily     triamcinolone (KENALOG) 0.1 % external ointment Apply topically 2 times daily     metFORMIN (GLUCOPHAGE) 1000 MG tablet Take 500 mg by mouth daily  (Patient not taking: Reported on 12/14/2023)     Current Facility-Administered Medications   Medication     lidocaine (XYLOCAINE) 2 % external gel        ALLERGIES      Allergies   Allergen Reactions     Sulfa Antibiotics Rash        REVIEW OF SYSTEMS   As above in the HPI, o/w complete 12-point ROS was negative.     PHYSICAL EXAM   BP (!) 171/84 (BP Location: Right arm, Patient Position: Sitting, Cuff Size: Adult Regular)   Pulse 57   Temp 97.6  F (36.4  C) (Oral)   Resp 16   Wt 68.1 kg (150 lb 3.2 oz)   SpO2 98%   BMI 21.55 kg/m    Limited physical exam during video visit due to COVID19 restrictions  Elderly male in no acute distress  Breathing comfortably, no tachypnea  Speech and hearing normal  Pleasant mood and congruent affect  Moving all extremities, no focal neurologic deficits apparent      LABORATORY AND IMAGING STUDIES     Recent Labs   Lab Test 12/04/23  1421 12/04/23  1336 07/06/23  1032 07/06/23  1017 01/02/23  1039 12/12/22  1032 09/06/22  1004 07/19/22  1120   NA  --  138  --  139 137  --  137 139   POTASSIUM  --  4.2  --  4.3 3.9  --  3.7 4.3   CHLORIDE  --  99  --  102 101  --  104 104   CO2  --  26  --  27 28  --  28 30   ANIONGAP  --  13  --  10 8  --  5 5   BUN  --  21.2  --  23.3* 22  --  18 17   CR 1.4* 1.27* 1.4* 1.18* 1.04   < > 1.01 1.03   GLC  --  265*  --  207* 240*  --  137* 158*   SAAD  --  10.0*  --  9.8* 9.2  --  9.6 9.2    < > = values in this interval not displayed.     No results for input(s): \"MAG\", " "\"PHOS\" in the last 77875 hours.  Recent Labs   Lab Test 12/04/23  1336 07/06/23  1017 01/02/23  1039 09/06/22  1004 12/29/21  1023   WBC 8.8 6.6 7.5 7.9 6.7   HGB 14.8 14.7 14.3 14.4 14.7    232 215 242 185   MCV 95 97 97 95 99   NEUTROPHIL 67 74 70 66 68     Recent Labs   Lab Test 12/04/23  1336 07/06/23  1017 01/02/23  1039   BILITOTAL 0.4 0.5 1.2   ALKPHOS 101 105 99   ALT 10 6 11   AST 16 14 16   ALBUMIN 4.4 4.4 3.7    177 214     TSH   Date Value Ref Range Status   07/06/2023 1.02 0.30 - 4.20 uIU/mL Final   01/02/2023 0.76 0.40 - 4.00 mU/L Final   09/06/2022 0.70 0.40 - 4.00 mU/L Final   07/19/2022 0.66 0.40 - 4.00 mU/L Final   06/02/2021 0.43 0.40 - 4.00 mU/L Final   05/06/2021 0.70 0.40 - 4.00 mU/L Final   04/21/2021 0.67 0.40 - 4.00 mU/L Final     No results for input(s): \"CEA\" in the last 50289 hours.  Results for orders placed or performed in visit on 12/04/23   CT Chest/Abdomen/Pelvis w Contrast    Narrative    EXAM: CT CHEST/ABDOMEN/PELVIS W CONTRAST  LOCATION: [facility]  DATE: 12/4/2023    INDICATION: High grade invasive carcinoma of bladder  on pembrolizumab (not surgical or chemotherapy candidate) being held after 2 yrs with complete response.  COMPARISON: CT chest, abdomen and pelvis 07/06/2023.  TECHNIQUE: CT scan of the chest, abdomen, and pelvis was performed following injection of IV contrast. Multiplanar reformats were obtained. Dose reduction techniques were used.   CONTRAST: 90 ml Isovue 370.    FINDINGS:   LUNGS AND PLEURA: No effusions or acute airspace disease. Stable 8 mm right upper lobe nodule series 4 image 96. Stable 7 mm left upper lobe nodule image 135. Stable 11 mm right lower lobe nodule image 219. Stable right lower lobe 7 mm nodule image 231.   No convincing areas of new airspace disease. A few calcified granulomas.    MEDIASTINUM/AXILLAE: No acute mediastinal abnormality. Thoracic lymph nodes appear stable. No enlarging lymph nodes are " seen in the chest.    CORONARY ARTERY CALCIFICATION: Severe and/or stents.    HEPATOBILIARY: No new focal hepatic lesion. Gallbladder is unremarkable.    PANCREAS: Normal.    SPLEEN: Normal.    ADRENAL GLANDS: Normal.    KIDNEYS/BLADDER: No hydronephrosis on either side. A few tiny renal cysts noted without specific imaging follow-up recommended. The bladder is obscured for assessment.    BOWEL: No obstruction or acute inflammation. Colonic diverticulosis distally.    LYMPH NODES: No enlarged lymph nodes are seen at the abdominal level. Pelvis assessment limited by streak artifact.    VASCULATURE: Scattered calcifications.    PELVIC ORGANS: Largely obscured. Prostate radiation seeds noted.    MUSCULOSKELETAL: Bilateral hip arthroplasties. Degenerative changes throughout the spine. No aggressive bone lesion identified.      Impression    IMPRESSION:  1.  Stable exam without evidence for new disease.  2.  Stable several prominent pulmonary nodules. Recommend surveillance imaging and establishment of long-term stability.        ASSESSMENT AND PLAN   High-grade muscle invasive bladder cancer in a patient not a candidate for either surgery or radiation therapy  Prostate cancer treated with brachii therapy followed by external beam radiation therapy  Hypertension, diabetes mellitus type 2, atrial fibrillation on chronic anticoagulation with apixaban    I had a lengthy discussion with patient at this clinic visit. He has been started on pembrolizumab for his muscle invasive bladder cancer on 8/13/20 - 7/21/22.     There is no evidence of metastatic disease and he has complete response on therapy.  He has not noticed any hematuria since the start of therapy.     I have reviewed all of the labs done prior to this clinic visit.  Labs are all completely normal including electrolytes, renal function, hepatic panel, complete blood count and differential.  He has borderline elevation in his creatinine which is expected at his age  and elevated blood glucose which was not fasting and elevated.     I reviewed actual images from his restaging CT scan. His bladder tumor cannot be assessed on the CT portion due to the bilateral hip replacements. There are no new lesions identified. He has stable pulmonary nodules. There is no evidence of metastatic disease other than stable pulmonary nodules in the chest, abdomen and pelvis.     He does not like to have cystoscopies done and so he skipped his last visit in urology. If he was just a little younger, I would have convinced him to follow in urology. However at 89 yrs, I am not sure that it will have a huge impact. Cystoscopy could  recurrent disease a lot sooner than a scan. But even a minor procedure like cystoscopy can have significant risks for him. It is okay to just monitor with scans.     He had a lengthy discussion. He was surprised that immunotherapy worked. He had never heard of immunotherapy prior to his visit with me. He told me the story how he was diagnosed with prostate cancer. His sisters  who is a psychiatrist told that per rectal digital examination was more helpful if it was done by an experience physician like a urologist. Both of them then started following a urologist. He had rising PSA which led to biopsy. This confirmed prostate cancer in the 4th attempt. His urologist despite being the surgeon referred him to radiation oncologist. He had brachy therapy done. He had done his research and he understood that with robotic surgeries of prostate and nerve sparing procedures that are a commonly done and lead to failures. He had brachytherapy and is cured of prostate cancer.     I will see him in 6 months with labs including urine cytology and CT chest, abdomen and pelvis prior to visit.       30 minutes spent on the date of the encounter doing chart review, history and exam, documentation and further activities as noted above      Imtiaz Ellis    Hematologist and Medical  Oncologist  [facility]      Again, thank you for allowing me to participate in the care of your patient.        Sincerely,        Imtiaz Ellis MD

## 2023-12-14 NOTE — PROGRESS NOTES
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  He was on apixaban for atrial fibrillation.  TURBT done on 10/22/2019 revealed normal muscle invasive bladder cancer  -He was followed with surveillance cystoscopies every 3 months.  His cystoscopy evaluation was negative for any tumors or raised erythema on 3/11/2020, however a follow-up exam on 7/8/2020 revealed raised erythematous area in the left lateral bladder wall.  He had TURBT under anesthesia on 7/15/2020 which is revealed high-grade muscle invasive urothelial carcinoma.  -Santos was diagnosed with prostate cancer in October 2004.  He underwent brachii therapy followed by external beam radiation therapy administered at the [facility].  -Due to his previous radiation for his prostate cancer he is not a candidate for bladder radiation.  He is not a candidate for cystectomy due to his advanced age and also previous extensive radiation to the prostate.  - He was started on pembrolizumab for his locally advanced bladder cancer since 8/13/20.     CURRENT INTERVENTIONS:  On surveillance post pembrolizumab 400 mg IV every 6 weeks since 8/13/20 - 7/21/22    SUBJECTIVE   Santos Marti is being followed for muscle invasive bladder cancer    Patient is being followed on therapy for his muscle invasive bladder cancer. He is accompanied by his wife at this visit. He had been on pembrolizumab between August 2020 -  July 2022. He had persistent itching.      He is back to baseline. Even his itching has resolved.     He denies any recurrent hematuria. He has good appetite and  fair energy. No side effects suggestive of autoimmune disease other than his itching.       PAST MEDICAL HISTORY     Past Medical History:   Diagnosis Date    Arthritis     Complication of anesthesia     Diabetes (H)     Gastroesophageal reflux disease     Hypertension     Pacemaker          CURRENT OUTPATIENT MEDICATIONS     Current Outpatient Medications   Medication Sig    acetaminophen (TYLENOL) 500 MG tablet Take 500-1,000 mg by mouth every 6 hours as needed for mild pain    apixaban ANTICOAGULANT (ELIQUIS) 5 MG tablet Take 5 mg by mouth 2 times daily     isosorbide mononitrate (IMDUR) 30 MG 24 hr tablet TK 1 T PO ONCE D    lisinopril-hydrochlorothiazide (PRINZIDE/ZESTORETIC) 20-25 MG tablet Take 1 tablet by mouth    metoprolol succinate ER (TOPROL-XL) 50 MG 24 hr tablet Take 50 mg by mouth daily Take one and a half tabs daily    omeprazole 20 MG tablet Take 20 mg by mouth daily    triamcinolone (KENALOG) 0.1 % external ointment Apply topically 2 times daily    metFORMIN (GLUCOPHAGE) 1000 MG tablet Take 500 mg by mouth daily  (Patient not taking: Reported on 12/14/2023)     Current Facility-Administered Medications   Medication    lidocaine (XYLOCAINE) 2 % external gel        ALLERGIES      Allergies   Allergen Reactions    Sulfa Antibiotics Rash        REVIEW OF SYSTEMS   As above in the HPI, o/w complete 12-point ROS was negative.     PHYSICAL EXAM   BP (!) 171/84 (BP Location: Right arm, Patient Position: Sitting, Cuff Size: Adult Regular)   Pulse 57   Temp 97.6  F (36.4  C) (Oral)   Resp 16   Wt 68.1 kg (150 lb 3.2 oz)   SpO2 98%   BMI 21.55 kg/m    Limited physical exam during video visit due to COVID19 restrictions  Elderly male in no acute distress  Breathing comfortably, no tachypnea  Speech and hearing normal  Pleasant mood and congruent affect  Moving all extremities, no focal neurologic deficits apparent      LABORATORY AND IMAGING STUDIES     Recent Labs   Lab Test 12/04/23  1421  "12/04/23  1336 07/06/23  1032 07/06/23  1017 01/02/23  1039 12/12/22  1032 09/06/22  1004 07/19/22  1120   NA  --  138  --  139 137  --  137 139   POTASSIUM  --  4.2  --  4.3 3.9  --  3.7 4.3   CHLORIDE  --  99  --  102 101  --  104 104   CO2  --  26  --  27 28  --  28 30   ANIONGAP  --  13  --  10 8  --  5 5   BUN  --  21.2  --  23.3* 22  --  18 17   CR 1.4* 1.27* 1.4* 1.18* 1.04   < > 1.01 1.03   GLC  --  265*  --  207* 240*  --  137* 158*   SAAD  --  10.0*  --  9.8* 9.2  --  9.6 9.2    < > = values in this interval not displayed.     No results for input(s): \"MAG\", \"PHOS\" in the last 33560 hours.  Recent Labs   Lab Test 12/04/23  1336 07/06/23  1017 01/02/23  1039 09/06/22  1004 12/29/21  1023   WBC 8.8 6.6 7.5 7.9 6.7   HGB 14.8 14.7 14.3 14.4 14.7    232 215 242 185   MCV 95 97 97 95 99   NEUTROPHIL 67 74 70 66 68     Recent Labs   Lab Test 12/04/23  1336 07/06/23  1017 01/02/23  1039   BILITOTAL 0.4 0.5 1.2   ALKPHOS 101 105 99   ALT 10 6 11   AST 16 14 16   ALBUMIN 4.4 4.4 3.7    177 214     TSH   Date Value Ref Range Status   07/06/2023 1.02 0.30 - 4.20 uIU/mL Final   01/02/2023 0.76 0.40 - 4.00 mU/L Final   09/06/2022 0.70 0.40 - 4.00 mU/L Final   07/19/2022 0.66 0.40 - 4.00 mU/L Final   06/02/2021 0.43 0.40 - 4.00 mU/L Final   05/06/2021 0.70 0.40 - 4.00 mU/L Final   04/21/2021 0.67 0.40 - 4.00 mU/L Final     No results for input(s): \"CEA\" in the last 14575 hours.  Results for orders placed or performed in visit on 12/04/23   CT Chest/Abdomen/Pelvis w Contrast    Narrative    EXAM: CT CHEST/ABDOMEN/PELVIS W CONTRAST  LOCATION: [facility]  DATE: 12/4/2023    INDICATION: High grade invasive carcinoma of bladder  on pembrolizumab (not surgical or chemotherapy candidate) being held after 2 yrs with complete response.  COMPARISON: CT chest, abdomen and pelvis 07/06/2023.  TECHNIQUE: CT scan of the chest, abdomen, and pelvis was performed following injection of IV contrast. " Multiplanar reformats were obtained. Dose reduction techniques were used.   CONTRAST: 90 ml Isovue 370.    FINDINGS:   LUNGS AND PLEURA: No effusions or acute airspace disease. Stable 8 mm right upper lobe nodule series 4 image 96. Stable 7 mm left upper lobe nodule image 135. Stable 11 mm right lower lobe nodule image 219. Stable right lower lobe 7 mm nodule image 231.   No convincing areas of new airspace disease. A few calcified granulomas.    MEDIASTINUM/AXILLAE: No acute mediastinal abnormality. Thoracic lymph nodes appear stable. No enlarging lymph nodes are seen in the chest.    CORONARY ARTERY CALCIFICATION: Severe and/or stents.    HEPATOBILIARY: No new focal hepatic lesion. Gallbladder is unremarkable.    PANCREAS: Normal.    SPLEEN: Normal.    ADRENAL GLANDS: Normal.    KIDNEYS/BLADDER: No hydronephrosis on either side. A few tiny renal cysts noted without specific imaging follow-up recommended. The bladder is obscured for assessment.    BOWEL: No obstruction or acute inflammation. Colonic diverticulosis distally.    LYMPH NODES: No enlarged lymph nodes are seen at the abdominal level. Pelvis assessment limited by streak artifact.    VASCULATURE: Scattered calcifications.    PELVIC ORGANS: Largely obscured. Prostate radiation seeds noted.    MUSCULOSKELETAL: Bilateral hip arthroplasties. Degenerative changes throughout the spine. No aggressive bone lesion identified.      Impression    IMPRESSION:  1.  Stable exam without evidence for new disease.  2.  Stable several prominent pulmonary nodules. Recommend surveillance imaging and establishment of long-term stability.        ASSESSMENT AND PLAN   High-grade muscle invasive bladder cancer in a patient not a candidate for either surgery or radiation therapy  Prostate cancer treated with brachii therapy followed by external beam radiation therapy  Hypertension, diabetes mellitus type 2, atrial fibrillation on chronic anticoagulation with apixaban    I had a  lengthy discussion with patient at this clinic visit. He has been started on pembrolizumab for his muscle invasive bladder cancer on 8/13/20 - 7/21/22.     There is no evidence of metastatic disease and he has complete response on therapy.  He has not noticed any hematuria since the start of therapy.     I have reviewed all of the labs done prior to this clinic visit.  Labs are all completely normal including electrolytes, renal function, hepatic panel, complete blood count and differential.  He has borderline elevation in his creatinine which is expected at his age and elevated blood glucose which was not fasting and elevated.     I reviewed actual images from his restaging CT scan. His bladder tumor cannot be assessed on the CT portion due to the bilateral hip replacements. There are no new lesions identified. He has stable pulmonary nodules. There is no evidence of metastatic disease other than stable pulmonary nodules in the chest, abdomen and pelvis.     He does not like to have cystoscopies done and so he skipped his last visit in urology. If he was just a little younger, I would have convinced him to follow in urology. However at 89 yrs, I am not sure that it will have a huge impact. Cystoscopy could  recurrent disease a lot sooner than a scan. But even a minor procedure like cystoscopy can have significant risks for him. It is okay to just monitor with scans.     He had a lengthy discussion. He was surprised that immunotherapy worked. He had never heard of immunotherapy prior to his visit with me. He told me the story how he was diagnosed with prostate cancer. His sisters  who is a psychiatrist told that per rectal digital examination was more helpful if it was done by an experience physician like a urologist. Both of them then started following a urologist. He had rising PSA which led to biopsy. This confirmed prostate cancer in the 4th attempt. His urologist despite being the surgeon referred  him to radiation oncologist. He had brachy therapy done. He had done his research and he understood that with robotic surgeries of prostate and nerve sparing procedures that are a commonly done and lead to failures. He had brachytherapy and is cured of prostate cancer.     I will see him in 6 months with labs including urine cytology and CT chest, abdomen and pelvis prior to visit.       30 minutes spent on the date of the encounter doing chart review, history and exam, documentation and further activities as noted above      Imtiaz Ellis    Hematologist and Medical Oncologist  [facility]

## 2023-12-14 NOTE — PROGRESS NOTES
"Oncology Rooming Note    December 14, 2023 9:35 AM   Santos Marti is a 89 year old male who presents for:    Chief Complaint   Patient presents with    Oncology Clinic Visit     Initial Vitals: BP (!) 171/84 (BP Location: Right arm, Patient Position: Sitting, Cuff Size: Adult Regular)   Temp 97.6  F (36.4  C) (Oral)   Resp 16   Wt 68.1 kg (150 lb 3.2 oz)   BMI 21.55 kg/m   Estimated body mass index is 21.55 kg/m  as calculated from the following:    Height as of 1/11/23: 1.778 m (5' 10\").    Weight as of this encounter: 68.1 kg (150 lb 3.2 oz). Body surface area is 1.83 meters squared.  No Pain (0) Comment: Data Unavailable   No LMP for male patient.  Allergies reviewed: Yes  Medications reviewed: Yes    Medications: Medication refills not needed today.  Pharmacy name entered into [employer]:    [id number] - [place], [place] - [address] AT [facility] & [address] [place] [address] PHARMACY [facility] - [place], [place] - [address] [place] [address] PHARMACY - [place], [place] - [address]    Clinical concerns: no      Tanya Ceja CMA              "

## 2024-01-01 ENCOUNTER — LAB (OUTPATIENT)
Dept: LAB | Facility: CLINIC | Age: 89
End: 2024-01-01
Payer: MEDICARE

## 2024-01-01 ENCOUNTER — ONCOLOGY VISIT (OUTPATIENT)
Dept: ONCOLOGY | Facility: CLINIC | Age: 89
End: 2024-01-01
Attending: INTERNAL MEDICINE
Payer: MEDICARE

## 2024-01-01 ENCOUNTER — HEALTH MAINTENANCE LETTER (OUTPATIENT)
Age: 89
End: 2024-01-01

## 2024-01-01 ENCOUNTER — ANCILLARY PROCEDURE (OUTPATIENT)
Dept: CT IMAGING | Facility: CLINIC | Age: 89
End: 2024-01-01
Attending: INTERNAL MEDICINE
Payer: MEDICARE

## 2024-01-01 VITALS
TEMPERATURE: 97.4 F | BODY MASS INDEX: 20.75 KG/M2 | SYSTOLIC BLOOD PRESSURE: 124 MMHG | WEIGHT: 144.6 LBS | RESPIRATION RATE: 16 BRPM | OXYGEN SATURATION: 96 % | HEART RATE: 50 BPM | DIASTOLIC BLOOD PRESSURE: 80 MMHG

## 2024-01-01 DIAGNOSIS — C67.2 MALIGNANT NEOPLASM OF LATERAL WALL OF URINARY BLADDER (H): ICD-10-CM

## 2024-01-01 DIAGNOSIS — D47.2 MGUS (MONOCLONAL GAMMOPATHY OF UNKNOWN SIGNIFICANCE): ICD-10-CM

## 2024-01-01 DIAGNOSIS — C91.10 CLL (CHRONIC LYMPHOCYTIC LEUKEMIA) (H): Primary | ICD-10-CM

## 2024-01-01 DIAGNOSIS — C91.10 CLL (CHRONIC LYMPHOCYTIC LEUKEMIA) (H): ICD-10-CM

## 2024-01-01 DIAGNOSIS — N18.31 STAGE 3A CHRONIC KIDNEY DISEASE (H): ICD-10-CM

## 2024-01-01 DIAGNOSIS — Z87.898 HISTORY OF GROSS HEMATURIA: ICD-10-CM

## 2024-01-01 LAB
ALBUMIN SERPL BCG-MCNC: 4.3 G/DL (ref 3.5–5.2)
ALP SERPL-CCNC: 101 U/L (ref 40–150)
ALT SERPL W P-5'-P-CCNC: 15 U/L (ref 0–70)
ANION GAP SERPL CALCULATED.3IONS-SCNC: 15 MMOL/L (ref 7–15)
AST SERPL W P-5'-P-CCNC: 21 U/L (ref 0–45)
BASOPHILS # BLD AUTO: 0 10E3/UL (ref 0–0.2)
BASOPHILS NFR BLD AUTO: 0 %
BILIRUB SERPL-MCNC: 0.7 MG/DL
BUN SERPL-MCNC: 23.2 MG/DL (ref 8–23)
CALCIUM SERPL-MCNC: 9.8 MG/DL (ref 8.2–9.6)
CHLORIDE SERPL-SCNC: 101 MMOL/L (ref 98–107)
CREAT BLD-MCNC: 1.3 MG/DL (ref 0.7–1.3)
CREAT SERPL-MCNC: 1.16 MG/DL (ref 0.67–1.17)
DEPRECATED HCO3 PLAS-SCNC: 23 MMOL/L (ref 22–29)
EGFRCR SERPLBLD CKD-EPI 2021: 52 ML/MIN/1.73M2
EGFRCR SERPLBLD CKD-EPI 2021: 59 ML/MIN/1.73M2
EOSINOPHIL # BLD AUTO: 0 10E3/UL (ref 0–0.7)
EOSINOPHIL NFR BLD AUTO: 1 %
ERYTHROCYTE [DISTWIDTH] IN BLOOD BY AUTOMATED COUNT: 13.4 % (ref 10–15)
GLUCOSE SERPL-MCNC: 227 MG/DL (ref 70–99)
HCT VFR BLD AUTO: 44.4 % (ref 40–53)
HGB BLD-MCNC: 14.3 G/DL (ref 13.3–17.7)
IMM GRANULOCYTES # BLD: 0 10E3/UL
IMM GRANULOCYTES NFR BLD: 0 %
LDH SERPL L TO P-CCNC: 221 U/L (ref 0–250)
LYMPHOCYTES # BLD AUTO: 1.6 10E3/UL (ref 0.8–5.3)
LYMPHOCYTES NFR BLD AUTO: 18 %
MCH RBC QN AUTO: 30.7 PG (ref 26.5–33)
MCHC RBC AUTO-ENTMCNC: 32.2 G/DL (ref 31.5–36.5)
MCV RBC AUTO: 95 FL (ref 78–100)
MONOCYTES # BLD AUTO: 0.5 10E3/UL (ref 0–1.3)
MONOCYTES NFR BLD AUTO: 6 %
NEUTROPHILS # BLD AUTO: 6.4 10E3/UL (ref 1.6–8.3)
NEUTROPHILS NFR BLD AUTO: 75 %
PLATELET # BLD AUTO: 251 10E3/UL (ref 150–450)
POTASSIUM SERPL-SCNC: 4.3 MMOL/L (ref 3.4–5.3)
PROT SERPL-MCNC: 7.5 G/DL (ref 6.4–8.3)
RBC # BLD AUTO: 4.66 10E6/UL (ref 4.4–5.9)
SODIUM SERPL-SCNC: 139 MMOL/L (ref 135–145)
WBC # BLD AUTO: 8.5 10E3/UL (ref 4–11)

## 2024-01-01 PROCEDURE — 99214 OFFICE O/P EST MOD 30 MIN: CPT | Performed by: INTERNAL MEDICINE

## 2024-01-01 PROCEDURE — 250N000011 HC RX IP 250 OP 636: Performed by: INTERNAL MEDICINE

## 2024-01-01 PROCEDURE — 85025 COMPLETE CBC W/AUTO DIFF WBC: CPT

## 2024-01-01 PROCEDURE — G2211 COMPLEX E/M VISIT ADD ON: HCPCS | Performed by: INTERNAL MEDICINE

## 2024-01-01 PROCEDURE — 71260 CT THORAX DX C+: CPT | Mod: MG

## 2024-01-01 PROCEDURE — 83615 LACTATE (LD) (LDH) ENZYME: CPT

## 2024-01-01 PROCEDURE — G0463 HOSPITAL OUTPT CLINIC VISIT: HCPCS | Performed by: INTERNAL MEDICINE

## 2024-01-01 PROCEDURE — 82565 ASSAY OF CREATININE: CPT

## 2024-01-01 PROCEDURE — 250N000009 HC RX 250: Performed by: INTERNAL MEDICINE

## 2024-01-01 PROCEDURE — 36415 COLL VENOUS BLD VENIPUNCTURE: CPT

## 2024-01-01 RX ORDER — IOPAMIDOL 755 MG/ML
90 INJECTION, SOLUTION INTRAVASCULAR ONCE
Status: COMPLETED | OUTPATIENT
Start: 2024-01-01 | End: 2024-01-01

## 2024-01-01 RX ADMIN — SODIUM CHLORIDE 40 ML: 9 INJECTION, SOLUTION INTRAVENOUS at 14:33

## 2024-01-01 RX ADMIN — IOPAMIDOL 90 ML: 755 INJECTION, SOLUTION INTRAVENOUS at 14:33

## 2024-01-01 ASSESSMENT — PAIN SCALES - GENERAL: PAINLEVEL: NO PAIN (0)

## 2024-06-27 NOTE — PROGRESS NOTES
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    Weight as of this encounter: 65.6 kg (144 lb 9.6 oz). Body surface area is 1.8 meters squared.  No Pain (0) Comment: Data Unavailable   No LMP for male patient.  Allergies reviewed: Yes  Medications reviewed: Yes    Medications: Medication refills not needed today.  Pharmacy name entered into eMagin:    [id number] - [place], [place] - [address] [facility] [address] [facility] [address] [facility] - [place], [place] - [facility]    Frailty Screening:   Is the patient here for a new oncology consult visit in cancer care? 1. Yes. Over the past month, have you experienced difficulty or required a caregiver to assist with:   1. Balance, walking or general mobility (including any falls)? NO  2. Completion of self-care tasks such as bathing, dressing, toileting, grooming/hygiene?  NO  3. Concentration or memory that affects your daily life?  NO       Clinical concerns: no        Tanya Ceja CMA              "

## 2024-06-27 NOTE — LETTER
"6/27/2024      Santos Marti  [address]  [facility] [address]      Dear Colleague,    Thank you for referring your patient, Santos Marti, to the [facility]. Please see a copy of my visit note below.    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    Weight as of this encounter: 65.6 kg (144 lb 9.6 oz). Body surface area is 1.8 meters squared.  No Pain (0) Comment: Data Unavailable   No LMP for male patient.  Allergies reviewed: Yes  Medications reviewed: Yes    Medications: Medication refills not needed today.  Pharmacy name entered into [employer]:    [facility] #[id number] - [place], [place] - [address] AT [facility] & [address] [place] [address] [facility] [facility] - [place], [place] - [address]    Frailty Screening:   Is the patient here for a new oncology consult visit in cancer care? 1. Yes. Over the past month, have you experienced difficulty or required a caregiver to assist with:   1. Balance, walking or general mobility (including any falls)? NO  2. Completion of self-care tasks such as bathing, dressing, toileting, grooming/hygiene?  NO  3. Concentration or memory that affects your daily life?  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  He was on apixaban for atrial fibrillation.  TURBT done on 10/22/2019 revealed normal muscle invasive bladder cancer  -He was followed with surveillance cystoscopies every 3 months.  His cystoscopy evaluation was negative for any tumors or raised erythema on 3/11/2020, however a follow-up exam on 7/8/2020 revealed raised erythematous area in the left lateral bladder wall.  He had TURBT under anesthesia on 7/15/2020 which is revealed high-grade muscle invasive urothelial carcinoma.  -Santos was diagnosed with prostate cancer in October 2004.  He underwent brachii therapy followed by external beam radiation therapy administered at the [facility].  -Due to his previous radiation for his prostate cancer he is not a candidate for bladder radiation.  He is not a candidate for cystectomy due to his advanced age and also previous extensive radiation to the prostate.  - He was started on pembrolizumab for his locally advanced bladder cancer since 8/13/20.     CURRENT INTERVENTIONS:  On surveillance post pembrolizumab 400 mg IV every 6 weeks since 8/13/20 - 7/21/22    SUBJECTIVE   Santos Marti is being followed for muscle invasive bladder cancer    Patient is being followed on therapy for his muscle invasive bladder cancer. He is accompanied by his daughter at this visit. He had been on pembrolizumab between August 2020 -  July 2022. He had persistent itching.      He is back to baseline. Even his itching has resolved.     He denies any recurrent hematuria. He has good appetite and fair energy. He has been undergoing physical therapy through the [facility]. He now has a walker. His  daughter notes that he is not eating enough and during his walks he is spotted sitting on the walker instead.        PAST MEDICAL HISTORY     Past Medical History:   Diagnosis Date     Arthritis      Complication of anesthesia      Diabetes (H)      Gastroesophageal reflux disease      Hypertension      Pacemaker          CURRENT OUTPATIENT MEDICATIONS     Current Outpatient Medications   Medication Sig     acetaminophen (TYLENOL) 500 MG tablet Take 500-1,000 mg by mouth every 6 hours as needed for mild pain     apixaban ANTICOAGULANT (ELIQUIS) 5 MG tablet Take 5 mg by mouth 2 times daily      isosorbide mononitrate (IMDUR) 30 MG 24 hr tablet TK 1 T PO ONCE D     lisinopril-hydrochlorothiazide (PRINZIDE/ZESTORETIC) 20-25 MG tablet Take 1 tablet by mouth     metFORMIN (GLUCOPHAGE) 1000 MG tablet Take 500 mg by mouth daily     metoprolol succinate ER (TOPROL-XL) 50 MG 24 hr tablet Take 50 mg by mouth daily Take one and a half tabs daily     omeprazole 20 MG tablet Take 20 mg by mouth daily     triamcinolone (KENALOG) 0.1 % external ointment Apply topically 2 times daily     Current Facility-Administered Medications   Medication Dose Route Frequency Provider Last Rate Last Admin     lidocaine (XYLOCAINE) 2 % external gel   Urethral Once Maximilian Costello MD            ALLERGIES      Allergies   Allergen Reactions     Sulfa Antibiotics Rash        REVIEW OF SYSTEMS   As above in the HPI, o/w complete 12-point ROS was negative.     PHYSICAL EXAM   /80 (BP Location: Left arm, Patient Position: Sitting, Cuff Size: Adult Large)   Pulse 50   Temp 97.4  F (36.3  C) (Oral)   Resp 16   Wt 65.6 kg (144 lb 9.6 oz)   SpO2 96%   BMI 20.75 kg/m    Limited physical exam during video visit due to COVID19 restrictions  Elderly male in no acute distress  Breathing comfortably, no tachypnea  Speech and hearing normal  Pleasant mood and congruent affect  Moving all extremities, no focal neurologic deficits apparent       "LABORATORY AND IMAGING STUDIES     Recent Labs   Lab Test 06/14/24  1415 12/04/23  1336 07/06/23  1017 01/02/23  1039 09/06/22  1004    138 139 137 137   POTASSIUM 4.3 4.2 4.3 3.9 3.7   CHLORIDE 101 99 102 101 104   CO2 23 26 27 28 28   ANIONGAP 15 13 10 8 5   BUN 23.2* 21.2 23.3* 22 18   CR 1.16 1.27* 1.18* 1.04 1.01   * 265* 207* 240* 137*   SAAD 9.8* 10.0* 9.8* 9.2 9.6    < > = values in this interval not displayed.     No results for input(s): \"MAG\", \"PHOS\" in the last 32008 hours.  Recent Labs   Lab Test 06/14/24  1415 12/04/23  1336 07/06/23  1017 01/02/23  1039 09/06/22  1004   WBC 8.5 8.8 6.6 7.5 7.9   HGB 14.3 14.8 14.7 14.3 14.4    211 232 215 242   MCV 95 95 97 97 95   NEUTROPHIL 75 67 74 70 66     Recent Labs   Lab Test 06/14/24  1415 12/04/23  1336 07/06/23  1017   BILITOTAL 0.7 0.4 0.5   ALKPHOS 101 101 105   ALT 15 10 6   AST 21 16 14   ALBUMIN 4.3 4.4 4.4    204 177     TSH   Date Value Ref Range Status   07/06/2023 1.02 0.30 - 4.20 uIU/mL Final   01/02/2023 0.76 0.40 - 4.00 mU/L Final   09/06/2022 0.70 0.40 - 4.00 mU/L Final   07/19/2022 0.66 0.40 - 4.00 mU/L Final   06/02/2021 0.43 0.40 - 4.00 mU/L Final   05/06/2021 0.70 0.40 - 4.00 mU/L Final   04/21/2021 0.67 0.40 - 4.00 mU/L Final     No results for input(s): \"CEA\" in the last 93336 hours.  Results for orders placed or performed in visit on 06/14/24   CT Chest/Abdomen/Pelvis w Contrast    Narrative    EXAM: CT CHEST/ABDOMEN/PELVIS W CONTRAST  LOCATION: [facility]  DATE: 6/14/2024    INDICATION: High grade invasive carcinoma of bladder  on pembrolizumab (not surgical or chemotherapy candidate) being held after 2 yrs with complete response  COMPARISON: 4/12/2023  TECHNIQUE: CT scan of the chest, abdomen, and pelvis was performed following injection of IV contrast. Multiplanar reformats were obtained. Dose reduction techniques were used.   CONTRAST: 90ml isovue 370    FINDINGS: Partially limited " exam due to motion artifact an metallic streak artifact from bilateral shoulder and hip arthroplasties.    LUNGS AND PLEURA: Patent airways. Stable 7 mm right upper lobe nodule (series 8, image 102) and 7 mm left upper lobe nodule (image 142). The 11 mm right lower lobe nodule is not distinct due to motion artifact, but may be present on image 207. No   definite new pulmonary nodule or pneumonic consolidation. New trace pleural effusions.    MEDIASTINUM/AXILLAE: Left chest wall pacer. Cardiomegaly without pericardial effusion. Mildly prominent descending thoracic aorta measuring 31 x 31 mm. Multiple prominent mediastinal lymph nodes such as an 8-9 mm precarinal lymph node are stable. No   enlarging thoracic lymph node or node considered enlarged by size criteria.    CORONARY ARTERY CALCIFICATION: Severe.    HEPATOBILIARY: Normal.    PANCREAS: Normal.    SPLEEN: Normal.    ADRENAL GLANDS: Normal.    KIDNEYS/BLADDER: No suspicious renal lesion, stone, or hydronephrosis. Evaluation the bladder is nondiagnostic due to extensive metallic streak artifact.    BOWEL: Nondistended. Normal appendix. Distal colonic diverticulosis.    LYMPH NODES: Normal where seen. The pelvic sidewalls are obscured by streak artifact.    VASCULATURE: Moderate atherosclerosis without aneurysmal dilation.    PELVIC ORGANS: Prostate brachytherapy seeds.    MUSCULOSKELETAL: No aggressive osseous lesion. Stable T7 wedging. Bilateral shoulder and hip arthroplasties. Mild anasarca.      Impression    IMPRESSION:  1.  Stable exam without evidence of new disease noting that the pelvis cannot be evaluated due to metallic streak artifact, unchanged from the prior exam.  2.  Stable prominent, but nonenlarged mediastinal lymph nodes.  3.  Stable pulmonary nodules. Of note, the right lower lobe nodule is not distinct due to motion artifact and can be reassessed at follow-up.  4.  New trace pleural effusions.            ASSESSMENT AND PLAN   High-grade  muscle invasive bladder cancer in a patient not a candidate for either surgery or radiation therapy  Prostate cancer treated with brachii therapy followed by external beam radiation therapy  Hypertension, diabetes mellitus type 2, atrial fibrillation on chronic anticoagulation with apixaban    I had a lengthy discussion with patient at this clinic visit. He has been started on pembrolizumab for his muscle invasive bladder cancer on 8/13/20 - 7/21/22.     There is no evidence of metastatic disease and he has complete response on therapy.  He has not noticed any hematuria since the start of therapy.     I have reviewed all of the labs done prior to this clinic visit.  Labs are all completely normal including electrolytes, renal function, hepatic panel, complete blood count and differential.  He has borderline elevation in his creatinine which is expected at his age and elevated blood glucose which was not fasting and elevated.     I reviewed actual images from his restaging CT scan. His bladder tumor cannot be assessed on the CT portion due to the bilateral hip replacements. There are no new lesions identified. He has stable pulmonary nodules. There is no evidence of metastatic disease other than stable pulmonary nodules in the chest, abdomen and pelvis.     He does not like to have cystoscopies done and so he skipped his last visit in urology. If he was just a little younger, I would have convinced him to follow in urology. However at 89 yrs, I am not sure that it will have a huge impact. Cystoscopy could  recurrent disease a lot sooner than a scan. But even a minor procedure like cystoscopy can have significant risks for him. It is okay to just monitor with scans.     He had a lengthy discussion. We again reviewed the value of surveillance scans. If he is doing well and has no acute health concerns, it is not unreasonable to get these surveillance scans every 6 months. If he is struggling with other health  issues or for some reason we cannot make it, we can always stop.     I will see him in 6 months with labs including urine cytology and CT chest, abdomen and pelvis prior to visit.       30 minutes spent on the date of the encounter doing chart review, history and exam, documentation and further activities as noted above      Imtiaz Ellis    Hematologist and Medical Oncologist  [facility]      Again, thank you for allowing me to participate in the care of your patient.        Sincerely,        Imtiaz Ellis MD

## 2024-06-27 NOTE — PROGRESS NOTES
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  He was on apixaban for atrial fibrillation.  TURBT done on 10/22/2019 revealed normal muscle invasive bladder cancer  -He was followed with surveillance cystoscopies every 3 months.  His cystoscopy evaluation was negative for any tumors or raised erythema on 3/11/2020, however a follow-up exam on 7/8/2020 revealed raised erythematous area in the left lateral bladder wall.  He had TURBT under anesthesia on 7/15/2020 which is revealed high-grade muscle invasive urothelial carcinoma.  -Santos was diagnosed with prostate cancer in October 2004.  He underwent brachii therapy followed by external beam radiation therapy administered at the [facility].  -Due to his previous radiation for his prostate cancer he is not a candidate for bladder radiation.  He is not a candidate for cystectomy due to his advanced age and also previous extensive radiation to the prostate.  - He was started on pembrolizumab for his locally advanced bladder cancer since 8/13/20.     CURRENT INTERVENTIONS:  On surveillance post pembrolizumab 400 mg IV every 6 weeks since 8/13/20 - 7/21/22    SUBJECTIVE   Santos Marti is being followed for muscle invasive bladder cancer    Patient is being followed on therapy for his muscle invasive bladder cancer. He is accompanied by his daughter at this visit. He had been on pembrolizumab between August 2020 -  July 2022. He had persistent itching.      He is back to baseline. Even his itching has resolved.     He denies any recurrent hematuria. He has good appetite and  fair energy. He has been undergoing physical therapy through the [facility]. He now has a walker. His daughter notes that he is not eating enough and during his walks he is spotted sitting on the walker instead.        PAST MEDICAL HISTORY     Past Medical History:   Diagnosis Date    Arthritis     Complication of anesthesia     Diabetes (H)     Gastroesophageal reflux disease     Hypertension     Pacemaker          CURRENT OUTPATIENT MEDICATIONS     Current Outpatient Medications   Medication Sig    acetaminophen (TYLENOL) 500 MG tablet Take 500-1,000 mg by mouth every 6 hours as needed for mild pain    apixaban ANTICOAGULANT (ELIQUIS) 5 MG tablet Take 5 mg by mouth 2 times daily     isosorbide mononitrate (IMDUR) 30 MG 24 hr tablet TK 1 T PO ONCE D    lisinopril-hydrochlorothiazide (PRINZIDE/ZESTORETIC) 20-25 MG tablet Take 1 tablet by mouth    metFORMIN (GLUCOPHAGE) 1000 MG tablet Take 500 mg by mouth daily    metoprolol succinate ER (TOPROL-XL) 50 MG 24 hr tablet Take 50 mg by mouth daily Take one and a half tabs daily    omeprazole 20 MG tablet Take 20 mg by mouth daily    triamcinolone (KENALOG) 0.1 % external ointment Apply topically 2 times daily     Current Facility-Administered Medications   Medication Dose Route Frequency Provider Last Rate Last Admin    lidocaine (XYLOCAINE) 2 % external gel   Urethral Once Maximilian Costello MD            ALLERGIES      Allergies   Allergen Reactions    Sulfa Antibiotics Rash        REVIEW OF SYSTEMS   As above in the HPI, o/w complete 12-point ROS was negative.     PHYSICAL EXAM   /80 (BP Location: Left arm, Patient Position: Sitting, Cuff Size: Adult Large)   Pulse 50   Temp 97.4  F (36.3  C) (Oral)   Resp 16   Wt 65.6 kg (144 lb 9.6 oz)   SpO2 96%   BMI 20.75 kg/m    Limited physical exam during video visit due to COVID19 restrictions  Elderly male in no acute distress  Breathing comfortably, no tachypnea  Speech and hearing normal  Pleasant mood and congruent  "affect  Moving all extremities, no focal neurologic deficits apparent      LABORATORY AND IMAGING STUDIES     Recent Labs   Lab Test 06/14/24  1415 12/04/23  1336 07/06/23  1017 01/02/23  1039 09/06/22  1004    138 139 137 137   POTASSIUM 4.3 4.2 4.3 3.9 3.7   CHLORIDE 101 99 102 101 104   CO2 23 26 27 28 28   ANIONGAP 15 13 10 8 5   BUN 23.2* 21.2 23.3* 22 18   CR 1.16 1.27* 1.18* 1.04 1.01   * 265* 207* 240* 137*   SAAD 9.8* 10.0* 9.8* 9.2 9.6    < > = values in this interval not displayed.     No results for input(s): \"MAG\", \"PHOS\" in the last 21676 hours.  Recent Labs   Lab Test 06/14/24  1415 12/04/23  1336 07/06/23  1017 01/02/23  1039 09/06/22  1004   WBC 8.5 8.8 6.6 7.5 7.9   HGB 14.3 14.8 14.7 14.3 14.4    211 232 215 242   MCV 95 95 97 97 95   NEUTROPHIL 75 67 74 70 66     Recent Labs   Lab Test 06/14/24  1415 12/04/23  1336 07/06/23  1017   BILITOTAL 0.7 0.4 0.5   ALKPHOS 101 101 105   ALT 15 10 6   AST 21 16 14   ALBUMIN 4.3 4.4 4.4    204 177     TSH   Date Value Ref Range Status   07/06/2023 1.02 0.30 - 4.20 uIU/mL Final   01/02/2023 0.76 0.40 - 4.00 mU/L Final   09/06/2022 0.70 0.40 - 4.00 mU/L Final   07/19/2022 0.66 0.40 - 4.00 mU/L Final   06/02/2021 0.43 0.40 - 4.00 mU/L Final   05/06/2021 0.70 0.40 - 4.00 mU/L Final   04/21/2021 0.67 0.40 - 4.00 mU/L Final     No results for input(s): \"CEA\" in the last 71695 hours.  Results for orders placed or performed in visit on 06/14/24   CT Chest/Abdomen/Pelvis w Contrast    Narrative    EXAM: CT CHEST/ABDOMEN/PELVIS W CONTRAST  LOCATION: [facility]  DATE: 6/14/2024    INDICATION: High grade invasive carcinoma of bladder  on pembrolizumab (not surgical or chemotherapy candidate) being held after 2 yrs with complete response  COMPARISON: 4/12/2023  TECHNIQUE: CT scan of the chest, abdomen, and pelvis was performed following injection of IV contrast. Multiplanar reformats were obtained. Dose reduction " techniques were used.   CONTRAST: 90ml isovue 370    FINDINGS: Partially limited exam due to motion artifact an metallic streak artifact from bilateral shoulder and hip arthroplasties.    LUNGS AND PLEURA: Patent airways. Stable 7 mm right upper lobe nodule (series 8, image 102) and 7 mm left upper lobe nodule (image 142). The 11 mm right lower lobe nodule is not distinct due to motion artifact, but may be present on image 207. No   definite new pulmonary nodule or pneumonic consolidation. New trace pleural effusions.    MEDIASTINUM/AXILLAE: Left chest wall pacer. Cardiomegaly without pericardial effusion. Mildly prominent descending thoracic aorta measuring 31 x 31 mm. Multiple prominent mediastinal lymph nodes such as an 8-9 mm precarinal lymph node are stable. No   enlarging thoracic lymph node or node considered enlarged by size criteria.    CORONARY ARTERY CALCIFICATION: Severe.    HEPATOBILIARY: Normal.    PANCREAS: Normal.    SPLEEN: Normal.    ADRENAL GLANDS: Normal.    KIDNEYS/BLADDER: No suspicious renal lesion, stone, or hydronephrosis. Evaluation the bladder is nondiagnostic due to extensive metallic streak artifact.    BOWEL: Nondistended. Normal appendix. Distal colonic diverticulosis.    LYMPH NODES: Normal where seen. The pelvic sidewalls are obscured by streak artifact.    VASCULATURE: Moderate atherosclerosis without aneurysmal dilation.    PELVIC ORGANS: Prostate brachytherapy seeds.    MUSCULOSKELETAL: No aggressive osseous lesion. Stable T7 wedging. Bilateral shoulder and hip arthroplasties. Mild anasarca.      Impression    IMPRESSION:  1.  Stable exam without evidence of new disease noting that the pelvis cannot be evaluated due to metallic streak artifact, unchanged from the prior exam.  2.  Stable prominent, but nonenlarged mediastinal lymph nodes.  3.  Stable pulmonary nodules. Of note, the right lower lobe nodule is not distinct due to motion artifact and can be reassessed at  follow-up.  4.  New trace pleural effusions.            ASSESSMENT AND PLAN   High-grade muscle invasive bladder cancer in a patient not a candidate for either surgery or radiation therapy  Prostate cancer treated with brachii therapy followed by external beam radiation therapy  Hypertension, diabetes mellitus type 2, atrial fibrillation on chronic anticoagulation with apixaban    I had a lengthy discussion with patient at this clinic visit. He has been started on pembrolizumab for his muscle invasive bladder cancer on 8/13/20 - 7/21/22.     There is no evidence of metastatic disease and he has complete response on therapy.  He has not noticed any hematuria since the start of therapy.     I have reviewed all of the labs done prior to this clinic visit.  Labs are all completely normal including electrolytes, renal function, hepatic panel, complete blood count and differential.  He has borderline elevation in his creatinine which is expected at his age and elevated blood glucose which was not fasting and elevated.     I reviewed actual images from his restaging CT scan. His bladder tumor cannot be assessed on the CT portion due to the bilateral hip replacements. There are no new lesions identified. He has stable pulmonary nodules. There is no evidence of metastatic disease other than stable pulmonary nodules in the chest, abdomen and pelvis.     He does not like to have cystoscopies done and so he skipped his last visit in urology. If he was just a little younger, I would have convinced him to follow in urology. However at 89 yrs, I am not sure that it will have a huge impact. Cystoscopy could  recurrent disease a lot sooner than a scan. But even a minor procedure like cystoscopy can have significant risks for him. It is okay to just monitor with scans.     He had a lengthy discussion. We again reviewed the value of surveillance scans. If he is doing well and has no acute health concerns, it is not unreasonable  to get these surveillance scans every 6 months. If he is struggling with other health issues or for some reason we cannot make it, we can always stop.     I will see him in 6 months with labs including urine cytology and CT chest, abdomen and pelvis prior to visit.       30 minutes spent on the date of the encounter doing chart review, history and exam, documentation and further activities as noted above      Imtiaz Ellis    Hematologist and Medical Oncologist  [facility]

## (undated) DEVICE — LINEN HALF SHEET 5512

## (undated) DEVICE — TUBING IRRIG TUR Y TYPE 96" LF 6543-01

## (undated) DEVICE — CATH HOLDER STRAP 36600

## (undated) DEVICE — COVER FOOTSWITCH URO

## (undated) DEVICE — PREP TECHNI-CARE CHLOROXYLENOL 3% 4OZ BOTTLE C222-4ZWO

## (undated) DEVICE — DRSG TELFA 2X3"

## (undated) DEVICE — ESU ELEC ROLLERBALL 24FR 3MM  27050N

## (undated) DEVICE — PACK CYSTO CUSTOM RIDGES

## (undated) DEVICE — ESU GROUND PAD ADULT W/CORD E7507

## (undated) DEVICE — LINEN FULL SHEET 5511

## (undated) DEVICE — ESU CORD MONOPOLAR 10'  E0510

## (undated) DEVICE — SOL WATER IRRIG 1000ML BOTTLE 2F7114

## (undated) DEVICE — BAG CLEAR TRASH 1.3M 39X33" P4040C

## (undated) DEVICE — GLOVE PROTEXIS POWDER FREE SMT 7.5  2D72PT75X

## (undated) DEVICE — SOL WATER IRRIG 3000ML BAG 2B7117

## (undated) DEVICE — COVER FOOTSWITCH W/CINCH 20X24" 923267

## (undated) DEVICE — SYR 70ML TOOMEY 041170

## (undated) RX ORDER — FENTANYL CITRATE 50 UG/ML
INJECTION, SOLUTION INTRAMUSCULAR; INTRAVENOUS
Status: DISPENSED
Start: 2019-10-22

## (undated) RX ORDER — ONDANSETRON 2 MG/ML
INJECTION INTRAMUSCULAR; INTRAVENOUS
Status: DISPENSED
Start: 2019-10-22

## (undated) RX ORDER — DEXAMETHASONE SODIUM PHOSPHATE 4 MG/ML
INJECTION, SOLUTION INTRA-ARTICULAR; INTRALESIONAL; INTRAMUSCULAR; INTRAVENOUS; SOFT TISSUE
Status: DISPENSED
Start: 2019-10-22

## (undated) RX ORDER — CEFAZOLIN SODIUM 2 G/100ML
INJECTION, SOLUTION INTRAVENOUS
Status: DISPENSED
Start: 2020-07-15

## (undated) RX ORDER — PHENYLEPHRINE HCL IN 0.9% NACL 1 MG/10 ML
SYRINGE (ML) INTRAVENOUS
Status: DISPENSED
Start: 2019-10-22

## (undated) RX ORDER — PHENYLEPHRINE HCL IN 0.9% NACL 1 MG/10 ML
SYRINGE (ML) INTRAVENOUS
Status: DISPENSED
Start: 2020-07-15

## (undated) RX ORDER — PROPOFOL 10 MG/ML
INJECTION, EMULSION INTRAVENOUS
Status: DISPENSED
Start: 2019-10-22

## (undated) RX ORDER — LIDOCAINE HYDROCHLORIDE 10 MG/ML
INJECTION, SOLUTION EPIDURAL; INFILTRATION; INTRACAUDAL; PERINEURAL
Status: DISPENSED
Start: 2019-10-22

## (undated) RX ORDER — ONDANSETRON 2 MG/ML
INJECTION INTRAMUSCULAR; INTRAVENOUS
Status: DISPENSED
Start: 2020-07-15

## (undated) RX ORDER — CEFAZOLIN SODIUM 2 G/100ML
INJECTION, SOLUTION INTRAVENOUS
Status: DISPENSED
Start: 2019-10-22

## (undated) RX ORDER — FENTANYL CITRATE 50 UG/ML
INJECTION, SOLUTION INTRAMUSCULAR; INTRAVENOUS
Status: DISPENSED
Start: 2020-07-15

## (undated) RX ORDER — GLYCOPYRROLATE 0.2 MG/ML
INJECTION INTRAMUSCULAR; INTRAVENOUS
Status: DISPENSED
Start: 2020-07-15